# Patient Record
Sex: MALE | Race: WHITE | NOT HISPANIC OR LATINO | Employment: OTHER | URBAN - METROPOLITAN AREA
[De-identification: names, ages, dates, MRNs, and addresses within clinical notes are randomized per-mention and may not be internally consistent; named-entity substitution may affect disease eponyms.]

---

## 2022-10-01 ENCOUNTER — HOSPITAL ENCOUNTER (EMERGENCY)
Facility: MEDICAL CENTER | Age: 77
End: 2022-10-02
Attending: EMERGENCY MEDICINE
Payer: MEDICARE

## 2022-10-01 ENCOUNTER — APPOINTMENT (OUTPATIENT)
Dept: RADIOLOGY | Facility: MEDICAL CENTER | Age: 77
End: 2022-10-01
Attending: EMERGENCY MEDICINE
Payer: MEDICARE
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
## 2022-10-01 LAB — EKG IMPRESSION: NORMAL

## 2022-10-01 PROCEDURE — 70450 CT HEAD/BRAIN W/O DYE: CPT

## 2022-10-01 PROCEDURE — 93005 ELECTROCARDIOGRAM TRACING: CPT | Performed by: EMERGENCY MEDICINE

## 2022-10-01 PROCEDURE — 99284 EMERGENCY DEPT VISIT MOD MDM: CPT

## 2022-10-02 VITALS
TEMPERATURE: 98.3 F | DIASTOLIC BLOOD PRESSURE: 78 MMHG | WEIGHT: 180 LBS | SYSTOLIC BLOOD PRESSURE: 123 MMHG | HEIGHT: 69 IN | RESPIRATION RATE: 16 BRPM | HEART RATE: 65 BPM | OXYGEN SATURATION: 97 % | BODY MASS INDEX: 26.66 KG/M2

## 2022-10-02 NOTE — ED PROVIDER NOTES
Scribed for Damon Ward M.D. by Holley Hunter. 10/1/2022,  6:58 PM.    Means of Arrival: EMS  History obtained from: EMS and patient    CHIEF COMPLAINT  Chief Complaint   Patient presents with    Head Injury     MGLF. Pt at assisted facility. Pt was found on the ground and takes xarelto. Pt states he hit his head on the wall. Pt has history of dementia and is oriented 2-4 at baseline. Pt arrives alert and oriented x 4, GCS 15.        HPI  Wale Olivas is a 77 y.o. male who presents via EMS for evaluation of a head injury after a fall onset prior to arrival. According to EMS, the patient had slipped and feel, hitting his head on a door. Staff found him on the floor, after an unknown amount of time, but the believed it was a few minutes after he fell. Staff informed EMS that he was mentating to his normal baseline when they found him. He denies any loss of consciousness, chest pain, shortness of breath, syncope, or pain.     REVIEW OF SYSTEMS  HEENT: head injury  CV: No syncope  Resp: No shortness of breath   CardiacL No chest pain  See HPI for further details.   All other systems are negative.     PAST MEDICAL HISTORY  Past Medical History:   Diagnosis Date    Dementia (HCC)        FAMILY HISTORY  History reviewed. No pertinent family history.    SOCIAL HISTORY   reports that he has never smoked. He has never used smokeless tobacco. He reports that he does not currently use alcohol. He reports that he does not currently use drugs.    SURGICAL HISTORY  History reviewed. No pertinent surgical history.    CURRENT MEDICATIONS  Home Medications       Reviewed by Margot Chiang R.N. (Registered Nurse) on 10/01/22 at 1913  Med List Status: Not Addressed     Medication Last Dose Status        Patient Savage Taking any Medications                           ALLERGIES  No Known Allergies    PHYSICAL EXAM  VITAL SIGNS: BP (!) 169/72   Pulse 69   Temp 36.2 °C (97.1 °F) (Temporal)   Resp 20   Ht 1.753 m (5'  "9\")   Wt 81.6 kg (180 lb)   SpO2 97%   BMI 26.58 kg/m²    Gen: Alert, oriented  HENT: No racoon eyes septal hematoma, facial instability  Eye: EOMI, no chemosis, PERRL  Neck: trachea midline, no tenderness  Resp: no respiratory distress, no chest wall tenderness or crepitus  CV: No JVD, RRR.  + peripheral pulses. heart regular no murmurs  Abd: soft, non-distended, non-tender. No ecchymosis  Back: no spinal tenderness or deformities  Ext: No deformities, tenderness. no pedal edema  Psych: normal mood  Neuro: speech fluent, moves all extremities. GCS 15    RADIOLOGY/PROCEDURES  CT-HEAD W/O   Final Result      1.  Exam limited by motion artifact.   2.  No acute intracranial hemorrhage or acute displaced calvarial fracture.   3.  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   4.  There is diffuse atrophy and nonspecific small vessel ischemic change in the white matter.   5.  There is underlying chronic sinus disease.           EKG  Results for orders placed or performed during the hospital encounter of 10/01/22   EKG (NOW)   Result Value Ref Range    Report       [facility] Emergency Dept.    Test Date:  2022-10-01  Pt Name:    FRENCH TUBBS                Department: ER  MRN:        [id number]                      Room:       BL 15  Gender:     Male                         Technician: [id number]  :        1945                   Requested By:ER TRIAGE PROTOCOL  Order #:    [id number]                    Reading MD: Damon Ward    Measurements  Intervals                                Axis  Rate:       69                           P:          -79  WA:         70                           QRS:        -31  QRSD:       97                           T:          32  QT:         399  QTc:        428    Interpretive Statements  Sinus or ectopic atrial rhythm  Atrial premature complexes  Short WA interval  Inferior infarct, old  No previous ECG available for comparison  Electronically Signed " On 10-1-2022 19:20:03 PDT by Damon Ward          COURSE & MEDICAL DECISION MAKING  Pertinent Labs & Imaging studies reviewed. (See chart for details)    7:41 PM - Patient was reevaluated at bedside. Discussed radiology results with the patient and informed him that has long as he can walk to his baseline and without issues, I feel comfortable with him being discharged home.      8:13 PM - The patient was able to ambulate according to his baseline with his walker. I discussed plan for discharge and follow up as outlined below. The patient is stable for discharge at this time and will return for any new or worsening symptoms. Patient verbalizes understanding and support with my plan for discharge.      Appropriate PPE were worn at this encounter.     The patient will return for new or worsening symptoms and is stable at the time of discharge.    The patient is referred to a primary physician for blood pressure management, diabetic screening, and for all other preventative health concerns.    Patient presents with what appears to be a nonsyncopal fall.  He does have head strike but is not on Xarelto.  No evidence of other traumatic injury to the spine, torso, extremities.  He is at his baseline according to the staff.  CT scan of the head is reassuring.  Patient's EKG demonstrates no signs of ischemia, low suspicion for DVT/PE, ACS, cardiac syncope.  Patient is able to ambulate independently at his baseline with his walker.  We will plan for discharge home.    DISPOSITION:  Patient will be discharged home in stable condition.    FOLLOW UP:  [facility], Emergency Dept  [address] [id number] [phone]    If symptoms worsen    Your regular doctor    Schedule an appointment as soon as possible for a visit       FINAL IMPRESSION  1. Fall, initial encounter           This dictation was created using voice recognition software. The accuracy of the dictation is limited to the  abilities of the software. I expect there may be some errors of grammar and possibly content. The nursing notes were reviewed and certain aspects of this information were incorporated into this note.     I, Holley Hunter (Elisabeth), am scribing for, and in the presence of, Damon Ward M.D..    Electronically signed by: Holley Hunter (Elisabeth), 10/1/2022    IDamon M.D. personally performed the services described in this documentation, as scribed by Holley Hunter in my presence, and it is both accurate and complete.

## 2022-10-02 NOTE — ED NOTES
The patient was able to ambulate SBA with a walker down the hallway, no issues, he was steady on his feet.

## 2022-10-02 NOTE — ED NOTES
The patient is sitting at the nursing station, safely, and still waiting for his ride back to his facility.

## 2022-10-02 NOTE — DISCHARGE PLANNING
MARIAH asked to assist with transportation of Pt back to [facility].  MARIAH completed [facility] PCS form and faxed all required documentation to [facility].  MARIAH called [facility] and was told the earliest they could accommodate this Pt's transport is 10/2/22 at 0600. MARIAH placed a call to [facility] and spoke with Marvin.  This SW updated her on transport time.  Transfer packet and COBRA completed and placed in Pt medical chart.

## 2022-10-02 NOTE — ED TRIAGE NOTES
"Chief Complaint   Patient presents with    Head Injury     MGLF. Pt at assisted facility. Pt was found on the ground and takes xarelto. Pt states he hit his head on the wall. Pt has history of dementia and is oriented 2-4 at baseline. Pt arrives alert and oriented x 4, GCS 15.      BP (!) 169/72   Pulse 69   Temp 36.2 °C (97.1 °F) (Temporal)   Resp 20   Ht 1.753 m (5' 9\")   Wt 81.6 kg (180 lb)   SpO2 97%   BMI 26.58 kg/m²     "

## 2022-10-02 NOTE — DISCHARGE INSTRUCTIONS
You were seen in the emergency department after suffering a fall.  Thankfully your trauma evaluation and CAT scan were reassuring.  Your EKG demonstrates no high risk features.  At this point, it is safe to return home.    Please follow with your regular doctor.    Please return to the emergency department or seek medical attention if you develop:  Severe progressive headache, confusion, chest pain, shortness of breath, lightheadedness, abdominal pain, any other new or concerning findings

## 2022-10-02 NOTE — ED NOTES
The patient is resting comfortably in the bed, the physician was just at the bedside speaking to him, patient has no concerns at this time.  Will attempt to walk with the patient with a walker when additional help is available.

## 2022-10-02 NOTE — ED NOTES
I spoke with social work and they talked to PASTOR about a ride home for the patient. They stated to me that they will pick her up when they can but for now ETA is at 6am. The patient was made aware, I also gave the patient food, and he is resting in the bed watching TV.

## 2022-10-02 NOTE — ED NOTES
Daughter at the bedside with the patient, updated her on the treatment plan and waiting for a ride home, no further needs at this time.

## 2023-01-29 ENCOUNTER — APPOINTMENT (OUTPATIENT)
Dept: RADIOLOGY | Facility: MEDICAL CENTER | Age: 78
DRG: 872 | End: 2023-01-29
Attending: EMERGENCY MEDICINE
Payer: COMMERCIAL

## 2023-01-29 ENCOUNTER — HOSPITAL ENCOUNTER (INPATIENT)
Facility: MEDICAL CENTER | Age: 78
LOS: 4 days | DRG: 872 | End: 2023-02-02
Attending: EMERGENCY MEDICINE | Admitting: INTERNAL MEDICINE
Payer: COMMERCIAL

## 2023-01-29 ENCOUNTER — APPOINTMENT (OUTPATIENT)
Dept: RADIOLOGY | Facility: MEDICAL CENTER | Age: 78
DRG: 872 | End: 2023-01-29
Attending: INTERNAL MEDICINE
Payer: COMMERCIAL

## 2023-01-29 DIAGNOSIS — Z79.01 CHRONIC ANTICOAGULATION: ICD-10-CM

## 2023-01-29 DIAGNOSIS — E11.65 TYPE 2 DIABETES MELLITUS WITH HYPERGLYCEMIA, WITHOUT LONG-TERM CURRENT USE OF INSULIN (HCC): ICD-10-CM

## 2023-01-29 DIAGNOSIS — I49.8 ATRIAL ARRHYTHMIA: ICD-10-CM

## 2023-01-29 DIAGNOSIS — A41.9 SEPSIS SECONDARY TO UTI (HCC): ICD-10-CM

## 2023-01-29 DIAGNOSIS — N17.9 AKI (ACUTE KIDNEY INJURY) (HCC): ICD-10-CM

## 2023-01-29 DIAGNOSIS — J44.9 CHRONIC OBSTRUCTIVE PULMONARY DISEASE, UNSPECIFIED COPD TYPE (HCC): ICD-10-CM

## 2023-01-29 DIAGNOSIS — N39.0 SEPSIS SECONDARY TO UTI (HCC): ICD-10-CM

## 2023-01-29 DIAGNOSIS — A41.9 SEPSIS, DUE TO UNSPECIFIED ORGANISM, UNSPECIFIED WHETHER ACUTE ORGAN DYSFUNCTION PRESENT (HCC): ICD-10-CM

## 2023-01-29 DIAGNOSIS — N39.0 URINARY TRACT INFECTION WITHOUT HEMATURIA, SITE UNSPECIFIED: ICD-10-CM

## 2023-01-29 PROBLEM — I10 PRIMARY HYPERTENSION: Status: ACTIVE | Noted: 2023-01-29

## 2023-01-29 PROBLEM — G89.4 CHRONIC PAIN SYNDROME: Status: ACTIVE | Noted: 2023-01-29

## 2023-01-29 PROBLEM — N18.9 ACUTE KIDNEY INJURY SUPERIMPOSED ON CHRONIC KIDNEY DISEASE (HCC): Status: ACTIVE | Noted: 2023-01-29

## 2023-01-29 PROBLEM — E78.5 DYSLIPIDEMIA: Status: ACTIVE | Noted: 2023-01-29

## 2023-01-29 PROBLEM — F03.90 DEMENTIA (HCC): Status: ACTIVE | Noted: 2023-01-29

## 2023-01-29 LAB
ALBUMIN SERPL BCP-MCNC: 2.7 G/DL (ref 3.2–4.9)
ALBUMIN/GLOB SERPL: 0.7 G/DL
ALP SERPL-CCNC: 77 U/L (ref 30–99)
ALT SERPL-CCNC: 8 U/L (ref 2–50)
ANION GAP SERPL CALC-SCNC: 16 MMOL/L (ref 7–16)
APPEARANCE UR: ABNORMAL
AST SERPL-CCNC: 15 U/L (ref 12–45)
BACTERIA #/AREA URNS HPF: ABNORMAL /HPF
BASOPHILS # BLD AUTO: 0.9 % (ref 0–1.8)
BASOPHILS # BLD: 0.08 K/UL (ref 0–0.12)
BILIRUB SERPL-MCNC: 0.7 MG/DL (ref 0.1–1.5)
BILIRUB UR QL STRIP.AUTO: NEGATIVE
BUN SERPL-MCNC: 36 MG/DL (ref 8–22)
CALCIUM ALBUM COR SERPL-MCNC: 9.9 MG/DL (ref 8.5–10.5)
CALCIUM SERPL-MCNC: 8.9 MG/DL (ref 8.5–10.5)
CHLORIDE SERPL-SCNC: 96 MMOL/L (ref 96–112)
CO2 SERPL-SCNC: 19 MMOL/L (ref 20–33)
COLOR UR: YELLOW
CREAT SERPL-MCNC: 2.12 MG/DL (ref 0.5–1.4)
EKG IMPRESSION: NORMAL
EOSINOPHIL # BLD AUTO: 0 K/UL (ref 0–0.51)
EOSINOPHIL NFR BLD: 0 % (ref 0–6.9)
EPI CELLS #/AREA URNS HPF: ABNORMAL /HPF
ERYTHROCYTE [DISTWIDTH] IN BLOOD BY AUTOMATED COUNT: 44.8 FL (ref 35.9–50)
EST. AVERAGE GLUCOSE BLD GHB EST-MCNC: 146 MG/DL
GFR SERPLBLD CREATININE-BSD FMLA CKD-EPI: 32 ML/MIN/1.73 M 2
GLOBULIN SER CALC-MCNC: 4 G/DL (ref 1.9–3.5)
GLUCOSE BLD STRIP.AUTO-MCNC: 236 MG/DL (ref 65–99)
GLUCOSE BLD STRIP.AUTO-MCNC: 273 MG/DL (ref 65–99)
GLUCOSE SERPL-MCNC: 243 MG/DL (ref 65–99)
GLUCOSE UR STRIP.AUTO-MCNC: 500 MG/DL
HBA1C MFR BLD: 6.7 % (ref 4–5.6)
HCT VFR BLD AUTO: 34.1 % (ref 42–52)
HGB BLD-MCNC: 11.7 G/DL (ref 14–18)
INR PPP: 1.47 (ref 0.87–1.13)
KETONES UR STRIP.AUTO-MCNC: ABNORMAL MG/DL
LACTATE SERPL-SCNC: 4.8 MMOL/L (ref 0.5–2)
LACTATE SERPL-SCNC: 5.4 MMOL/L (ref 0.5–2)
LACTATE SERPL-SCNC: 6.6 MMOL/L (ref 0.5–2)
LEUKOCYTE ESTERASE UR QL STRIP.AUTO: ABNORMAL
LYMPHOCYTES # BLD AUTO: 0.3 K/UL (ref 1–4.8)
LYMPHOCYTES NFR BLD: 3.5 % (ref 22–41)
MANUAL DIFF BLD: ABNORMAL
MCH RBC QN AUTO: 32.1 PG (ref 27–33)
MCHC RBC AUTO-ENTMCNC: 34.3 G/DL (ref 33.7–35.3)
MCV RBC AUTO: 93.7 FL (ref 81.4–97.8)
METAMYELOCYTES NFR BLD MANUAL: 2.6 %
MICRO URNS: ABNORMAL
MONOCYTES # BLD AUTO: 0.6 K/UL (ref 0–0.85)
MONOCYTES NFR BLD AUTO: 7 % (ref 0–13.4)
MORPHOLOGY BLD-IMP: NORMAL
NEUTROPHILS # BLD AUTO: 7.31 K/UL (ref 1.82–7.42)
NEUTROPHILS NFR BLD: 73 % (ref 44–72)
NEUTS BAND NFR BLD MANUAL: 13 % (ref 0–10)
NITRITE UR QL STRIP.AUTO: NEGATIVE
NRBC # BLD AUTO: 0 K/UL
NRBC BLD-RTO: 0 /100 WBC
PH UR STRIP.AUTO: 5 [PH] (ref 5–8)
PLATELET # BLD AUTO: 87 K/UL (ref 164–446)
PLATELET BLD QL SMEAR: NORMAL
PMV BLD AUTO: 8.9 FL (ref 9–12.9)
POTASSIUM SERPL-SCNC: 3.5 MMOL/L (ref 3.6–5.5)
PROT SERPL-MCNC: 6.7 G/DL (ref 6–8.2)
PROT UR QL STRIP: 100 MG/DL
PROTHROMBIN TIME: 17.6 SEC (ref 12–14.6)
RBC # BLD AUTO: 3.64 M/UL (ref 4.7–6.1)
RBC # URNS HPF: ABNORMAL /HPF
RBC BLD AUTO: NORMAL
RBC UR QL AUTO: ABNORMAL
RENAL EPI CELLS #/AREA URNS HPF: NEGATIVE /HPF
SODIUM SERPL-SCNC: 131 MMOL/L (ref 135–145)
SP GR UR STRIP.AUTO: 1.02
TROPONIN T SERPL-MCNC: 51 NG/L (ref 6–19)
UROBILINOGEN UR STRIP.AUTO-MCNC: 1 MG/DL
WBC # BLD AUTO: 8.5 K/UL (ref 4.8–10.8)
WBC #/AREA URNS HPF: ABNORMAL /HPF

## 2023-01-29 PROCEDURE — 83605 ASSAY OF LACTIC ACID: CPT | Mod: 91

## 2023-01-29 PROCEDURE — 85610 PROTHROMBIN TIME: CPT

## 2023-01-29 PROCEDURE — 770000 HCHG ROOM/CARE - INTERMEDIATE ICU *

## 2023-01-29 PROCEDURE — 87186 SC STD MICRODIL/AGAR DIL: CPT | Mod: 91

## 2023-01-29 PROCEDURE — 700105 HCHG RX REV CODE 258: Performed by: EMERGENCY MEDICINE

## 2023-01-29 PROCEDURE — 96361 HYDRATE IV INFUSION ADD-ON: CPT

## 2023-01-29 PROCEDURE — 700105 HCHG RX REV CODE 258: Performed by: INTERNAL MEDICINE

## 2023-01-29 PROCEDURE — 70450 CT HEAD/BRAIN W/O DYE: CPT

## 2023-01-29 PROCEDURE — 85025 COMPLETE CBC W/AUTO DIFF WBC: CPT

## 2023-01-29 PROCEDURE — 84484 ASSAY OF TROPONIN QUANT: CPT

## 2023-01-29 PROCEDURE — 80053 COMPREHEN METABOLIC PANEL: CPT

## 2023-01-29 PROCEDURE — 99223 1ST HOSP IP/OBS HIGH 75: CPT | Mod: AI | Performed by: INTERNAL MEDICINE

## 2023-01-29 PROCEDURE — 87040 BLOOD CULTURE FOR BACTERIA: CPT | Mod: 91

## 2023-01-29 PROCEDURE — 81001 URINALYSIS AUTO W/SCOPE: CPT

## 2023-01-29 PROCEDURE — 87086 URINE CULTURE/COLONY COUNT: CPT

## 2023-01-29 PROCEDURE — 700111 HCHG RX REV CODE 636 W/ 250 OVERRIDE (IP): Performed by: EMERGENCY MEDICINE

## 2023-01-29 PROCEDURE — 74176 CT ABD & PELVIS W/O CONTRAST: CPT

## 2023-01-29 PROCEDURE — 71045 X-RAY EXAM CHEST 1 VIEW: CPT

## 2023-01-29 PROCEDURE — 700101 HCHG RX REV CODE 250: Performed by: INTERNAL MEDICINE

## 2023-01-29 PROCEDURE — 36415 COLL VENOUS BLD VENIPUNCTURE: CPT

## 2023-01-29 PROCEDURE — 82962 GLUCOSE BLOOD TEST: CPT

## 2023-01-29 PROCEDURE — 83036 HEMOGLOBIN GLYCOSYLATED A1C: CPT

## 2023-01-29 PROCEDURE — 99285 EMERGENCY DEPT VISIT HI MDM: CPT

## 2023-01-29 PROCEDURE — 85007 BL SMEAR W/DIFF WBC COUNT: CPT

## 2023-01-29 PROCEDURE — 700102 HCHG RX REV CODE 250 W/ 637 OVERRIDE(OP): Performed by: INTERNAL MEDICINE

## 2023-01-29 PROCEDURE — 96374 THER/PROPH/DIAG INJ IV PUSH: CPT

## 2023-01-29 PROCEDURE — A9270 NON-COVERED ITEM OR SERVICE: HCPCS | Performed by: INTERNAL MEDICINE

## 2023-01-29 PROCEDURE — 87077 CULTURE AEROBIC IDENTIFY: CPT | Mod: 91

## 2023-01-29 PROCEDURE — 93005 ELECTROCARDIOGRAM TRACING: CPT | Performed by: EMERGENCY MEDICINE

## 2023-01-29 RX ORDER — ONDANSETRON 2 MG/ML
4 INJECTION INTRAMUSCULAR; INTRAVENOUS EVERY 4 HOURS PRN
Status: DISCONTINUED | OUTPATIENT
Start: 2023-01-29 | End: 2023-02-02 | Stop reason: HOSPADM

## 2023-01-29 RX ORDER — BACLOFEN 10 MG/1
10 TABLET ORAL EVERY EVENING
Status: ON HOLD | COMMUNITY
End: 2023-02-02

## 2023-01-29 RX ORDER — SODIUM CHLORIDE, SODIUM LACTATE, POTASSIUM CHLORIDE, CALCIUM CHLORIDE 600; 310; 30; 20 MG/100ML; MG/100ML; MG/100ML; MG/100ML
1000 INJECTION, SOLUTION INTRAVENOUS ONCE
Status: COMPLETED | OUTPATIENT
Start: 2023-01-29 | End: 2023-01-29

## 2023-01-29 RX ORDER — FINASTERIDE 5 MG/1
5 TABLET, FILM COATED ORAL DAILY
COMMUNITY

## 2023-01-29 RX ORDER — HYDROCODONE BITARTRATE AND ACETAMINOPHEN 5; 325 MG/1; MG/1
1 TABLET ORAL 3 TIMES DAILY
COMMUNITY
End: 2023-09-20

## 2023-01-29 RX ORDER — CALCIUM CARBONATE 500 MG/1
500 TABLET, CHEWABLE ORAL 2 TIMES DAILY
Status: DISCONTINUED | OUTPATIENT
Start: 2023-01-29 | End: 2023-02-02 | Stop reason: HOSPADM

## 2023-01-29 RX ORDER — SENNOSIDES A AND B 8.6 MG/1
8.6 TABLET, FILM COATED ORAL 2 TIMES DAILY
COMMUNITY
End: 2023-09-20

## 2023-01-29 RX ORDER — IPRATROPIUM BROMIDE AND ALBUTEROL SULFATE 2.5; .5 MG/3ML; MG/3ML
3 SOLUTION RESPIRATORY (INHALATION)
Status: DISCONTINUED | OUTPATIENT
Start: 2023-01-29 | End: 2023-01-29

## 2023-01-29 RX ORDER — SODIUM CHLORIDE, SODIUM LACTATE, POTASSIUM CHLORIDE, CALCIUM CHLORIDE 600; 310; 30; 20 MG/100ML; MG/100ML; MG/100ML; MG/100ML
INJECTION, SOLUTION INTRAVENOUS CONTINUOUS
Status: DISCONTINUED | OUTPATIENT
Start: 2023-01-29 | End: 2023-02-02 | Stop reason: HOSPADM

## 2023-01-29 RX ORDER — ROFLUMILAST 500 UG/1
500 TABLET ORAL DAILY
Status: DISCONTINUED | OUTPATIENT
Start: 2023-01-30 | End: 2023-01-29

## 2023-01-29 RX ORDER — TAMSULOSIN HYDROCHLORIDE 0.4 MG/1
0.4 CAPSULE ORAL DAILY
COMMUNITY

## 2023-01-29 RX ORDER — LIDOCAINE 4 G/G
3 PATCH TOPICAL
COMMUNITY
End: 2023-09-20

## 2023-01-29 RX ORDER — TAMSULOSIN HYDROCHLORIDE 0.4 MG/1
0.4 CAPSULE ORAL DAILY
Status: DISCONTINUED | OUTPATIENT
Start: 2023-01-30 | End: 2023-02-02 | Stop reason: HOSPADM

## 2023-01-29 RX ORDER — DIVALPROEX SODIUM 500 MG/1
1500 TABLET, DELAYED RELEASE ORAL
Status: DISCONTINUED | OUTPATIENT
Start: 2023-01-29 | End: 2023-02-02 | Stop reason: HOSPADM

## 2023-01-29 RX ORDER — POTASSIUM CHLORIDE 20 MEQ/1
20 TABLET, EXTENDED RELEASE ORAL DAILY
COMMUNITY
End: 2023-09-20

## 2023-01-29 RX ORDER — CHOLECALCIFEROL (VITAMIN D3) 125 MCG
500 CAPSULE ORAL
COMMUNITY
End: 2023-09-20

## 2023-01-29 RX ORDER — CHOLECALCIFEROL (VITAMIN D3) 125 MCG
500 CAPSULE ORAL
Status: DISCONTINUED | OUTPATIENT
Start: 2023-01-30 | End: 2023-02-02 | Stop reason: HOSPADM

## 2023-01-29 RX ORDER — IPRATROPIUM BROMIDE AND ALBUTEROL SULFATE 2.5; .5 MG/3ML; MG/3ML
3 SOLUTION RESPIRATORY (INHALATION)
Status: DISCONTINUED | OUTPATIENT
Start: 2023-01-29 | End: 2023-02-02 | Stop reason: HOSPADM

## 2023-01-29 RX ORDER — CALCIUM CARBONATE 500 MG/1
500 TABLET, CHEWABLE ORAL 2 TIMES DAILY
COMMUNITY
End: 2023-09-20

## 2023-01-29 RX ORDER — ONDANSETRON 4 MG/1
4 TABLET, ORALLY DISINTEGRATING ORAL EVERY 4 HOURS PRN
Status: DISCONTINUED | OUTPATIENT
Start: 2023-01-29 | End: 2023-02-02 | Stop reason: HOSPADM

## 2023-01-29 RX ORDER — BISACODYL 10 MG
10 SUPPOSITORY, RECTAL RECTAL
Status: DISCONTINUED | OUTPATIENT
Start: 2023-01-29 | End: 2023-02-02 | Stop reason: HOSPADM

## 2023-01-29 RX ORDER — POLYETHYLENE GLYCOL 3350 17 G/17G
1 POWDER, FOR SOLUTION ORAL
Status: DISCONTINUED | OUTPATIENT
Start: 2023-01-29 | End: 2023-02-02 | Stop reason: HOSPADM

## 2023-01-29 RX ORDER — FLUTICASONE FUROATE AND VILANTEROL 100; 25 UG/1; UG/1
1 POWDER RESPIRATORY (INHALATION) EVERY MORNING
COMMUNITY

## 2023-01-29 RX ORDER — LIDOCAINE 50 MG/G
3 PATCH TOPICAL
Status: DISCONTINUED | OUTPATIENT
Start: 2023-01-29 | End: 2023-02-02 | Stop reason: HOSPADM

## 2023-01-29 RX ORDER — ROFLUMILAST 500 UG/1
500 TABLET ORAL DAILY
COMMUNITY
End: 2023-09-20

## 2023-01-29 RX ORDER — FINASTERIDE 5 MG/1
5 TABLET, FILM COATED ORAL DAILY
Status: DISCONTINUED | OUTPATIENT
Start: 2023-01-30 | End: 2023-02-02 | Stop reason: HOSPADM

## 2023-01-29 RX ORDER — CARBOXYMETHYLCELLULOSE SODIUM 5 MG/ML
1 SOLUTION/ DROPS OPHTHALMIC 4 TIMES DAILY
COMMUNITY
End: 2023-09-20

## 2023-01-29 RX ORDER — VITAMIN B COMPLEX
1000 TABLET ORAL DAILY
COMMUNITY
End: 2023-09-20

## 2023-01-29 RX ORDER — PANTOPRAZOLE SODIUM 40 MG/1
40 TABLET, DELAYED RELEASE ORAL DAILY
COMMUNITY

## 2023-01-29 RX ORDER — UMECLIDINIUM 62.5 UG/1
1 AEROSOL, POWDER ORAL DAILY
COMMUNITY

## 2023-01-29 RX ORDER — DIVALPROEX SODIUM 125 MG/1
250-500 TABLET, DELAYED RELEASE ORAL SEE ADMIN INSTRUCTIONS
Status: ON HOLD | COMMUNITY
End: 2024-01-11

## 2023-01-29 RX ORDER — HYDRALAZINE HYDROCHLORIDE 25 MG/1
25 TABLET, FILM COATED ORAL 2 TIMES DAILY
COMMUNITY
End: 2023-09-20

## 2023-01-29 RX ORDER — FUROSEMIDE 40 MG/1
40 TABLET ORAL DAILY
COMMUNITY
End: 2023-09-20

## 2023-01-29 RX ORDER — VITAMIN B COMPLEX
1000 TABLET ORAL DAILY
Status: DISCONTINUED | OUTPATIENT
Start: 2023-01-30 | End: 2023-02-02 | Stop reason: HOSPADM

## 2023-01-29 RX ORDER — ACETAMINOPHEN 325 MG/1
650 TABLET ORAL EVERY 6 HOURS PRN
COMMUNITY
End: 2023-09-20

## 2023-01-29 RX ORDER — CEFTRIAXONE 2 G/1
2000 INJECTION, POWDER, FOR SOLUTION INTRAMUSCULAR; INTRAVENOUS ONCE
Status: COMPLETED | OUTPATIENT
Start: 2023-01-29 | End: 2023-01-29

## 2023-01-29 RX ORDER — SODIUM CHLORIDE, SODIUM LACTATE, POTASSIUM CHLORIDE, AND CALCIUM CHLORIDE .6; .31; .03; .02 G/100ML; G/100ML; G/100ML; G/100ML
30 INJECTION, SOLUTION INTRAVENOUS
Status: COMPLETED | OUTPATIENT
Start: 2023-01-29 | End: 2023-01-30

## 2023-01-29 RX ORDER — AMOXICILLIN 250 MG
2 CAPSULE ORAL 2 TIMES DAILY
Status: DISCONTINUED | OUTPATIENT
Start: 2023-01-29 | End: 2023-02-02 | Stop reason: HOSPADM

## 2023-01-29 RX ORDER — TRIAMCINOLONE ACETONIDE 1 MG/G
1 CREAM TOPICAL 2 TIMES DAILY
COMMUNITY
Start: 2023-01-24 | End: 2023-09-20

## 2023-01-29 RX ORDER — PRAVASTATIN SODIUM 20 MG
80 TABLET ORAL NIGHTLY
Status: DISCONTINUED | OUTPATIENT
Start: 2023-01-29 | End: 2023-02-02 | Stop reason: HOSPADM

## 2023-01-29 RX ORDER — IPRATROPIUM BROMIDE AND ALBUTEROL SULFATE 2.5; .5 MG/3ML; MG/3ML
3 SOLUTION RESPIRATORY (INHALATION)
Status: DISCONTINUED | OUTPATIENT
Start: 2023-01-29 | End: 2023-01-30

## 2023-01-29 RX ORDER — PRAVASTATIN SODIUM 20 MG
80 TABLET ORAL
COMMUNITY

## 2023-01-29 RX ORDER — DULOXETIN HYDROCHLORIDE 60 MG/1
60 CAPSULE, DELAYED RELEASE ORAL DAILY
Status: DISCONTINUED | OUTPATIENT
Start: 2023-01-30 | End: 2023-02-02 | Stop reason: HOSPADM

## 2023-01-29 RX ORDER — CARBOXYMETHYLCELLULOSE SODIUM 5 MG/ML
1 SOLUTION/ DROPS OPHTHALMIC 4 TIMES DAILY
Status: DISCONTINUED | OUTPATIENT
Start: 2023-01-29 | End: 2023-02-02 | Stop reason: HOSPADM

## 2023-01-29 RX ORDER — BACLOFEN 10 MG/1
10 TABLET ORAL EVERY EVENING
Status: DISCONTINUED | OUTPATIENT
Start: 2023-01-29 | End: 2023-01-31

## 2023-01-29 RX ORDER — TRAZODONE HYDROCHLORIDE 100 MG/1
150 TABLET ORAL NIGHTLY
COMMUNITY
End: 2023-09-20

## 2023-01-29 RX ORDER — BUDESONIDE AND FORMOTEROL FUMARATE DIHYDRATE 160; 4.5 UG/1; UG/1
2 AEROSOL RESPIRATORY (INHALATION) 2 TIMES DAILY
Status: DISCONTINUED | OUTPATIENT
Start: 2023-01-30 | End: 2023-02-02 | Stop reason: HOSPADM

## 2023-01-29 RX ORDER — OMEPRAZOLE 20 MG/1
40 CAPSULE, DELAYED RELEASE ORAL DAILY
Status: DISCONTINUED | OUTPATIENT
Start: 2023-01-30 | End: 2023-02-02 | Stop reason: HOSPADM

## 2023-01-29 RX ORDER — ACETAMINOPHEN 325 MG/1
650 TABLET ORAL EVERY 6 HOURS PRN
Status: DISCONTINUED | OUTPATIENT
Start: 2023-01-29 | End: 2023-02-02 | Stop reason: HOSPADM

## 2023-01-29 RX ORDER — CEFDINIR 300 MG/1
300 CAPSULE ORAL 2 TIMES DAILY
Status: ON HOLD | COMMUNITY
Start: 2023-01-29 | End: 2023-02-02

## 2023-01-29 RX ORDER — POLYETHYLENE GLYCOL 3350 17 G/17G
17 POWDER, FOR SOLUTION ORAL DAILY
COMMUNITY

## 2023-01-29 RX ORDER — SODIUM CHLORIDE 9 MG/ML
100 INJECTION, SOLUTION INTRAVENOUS CONTINUOUS
Status: ON HOLD | COMMUNITY
Start: 2023-01-28 | End: 2023-02-02

## 2023-01-29 RX ORDER — HYDROCODONE BITARTRATE AND ACETAMINOPHEN 5; 325 MG/1; MG/1
1 TABLET ORAL 3 TIMES DAILY PRN
Status: DISCONTINUED | OUTPATIENT
Start: 2023-01-29 | End: 2023-02-02 | Stop reason: HOSPADM

## 2023-01-29 RX ORDER — PROPRANOLOL HYDROCHLORIDE 20 MG/1
40 TABLET ORAL 2 TIMES DAILY
Status: DISCONTINUED | OUTPATIENT
Start: 2023-01-29 | End: 2023-02-02 | Stop reason: HOSPADM

## 2023-01-29 RX ORDER — SODIUM CHLORIDE, SODIUM LACTATE, POTASSIUM CHLORIDE, AND CALCIUM CHLORIDE .6; .31; .03; .02 G/100ML; G/100ML; G/100ML; G/100ML
500 INJECTION, SOLUTION INTRAVENOUS
Status: DISCONTINUED | OUTPATIENT
Start: 2023-01-29 | End: 2023-02-02 | Stop reason: HOSPADM

## 2023-01-29 RX ORDER — DILTIAZEM HYDROCHLORIDE 5 MG/ML
0.25 INJECTION INTRAVENOUS ONCE
Status: DISCONTINUED | OUTPATIENT
Start: 2023-01-29 | End: 2023-01-29

## 2023-01-29 RX ORDER — LISINOPRIL 40 MG/1
40 TABLET ORAL DAILY
COMMUNITY
End: 2023-09-20

## 2023-01-29 RX ORDER — PROPRANOLOL HYDROCHLORIDE 20 MG/1
20 TABLET ORAL EVERY 12 HOURS
COMMUNITY
End: 2024-02-08

## 2023-01-29 RX ADMIN — CEFTRIAXONE SODIUM 2000 MG: 2 INJECTION, POWDER, FOR SOLUTION INTRAMUSCULAR; INTRAVENOUS at 15:31

## 2023-01-29 RX ADMIN — SODIUM CHLORIDE, POTASSIUM CHLORIDE, SODIUM LACTATE AND CALCIUM CHLORIDE 1000 ML: 600; 310; 30; 20 INJECTION, SOLUTION INTRAVENOUS at 14:08

## 2023-01-29 RX ADMIN — SENNOSIDES AND DOCUSATE SODIUM 2 TABLET: 50; 8.6 TABLET ORAL at 18:30

## 2023-01-29 RX ADMIN — INSULIN HUMAN 2 UNITS: 100 INJECTION, SOLUTION PARENTERAL at 21:27

## 2023-01-29 RX ADMIN — SODIUM CHLORIDE, POTASSIUM CHLORIDE, SODIUM LACTATE AND CALCIUM CHLORIDE: 600; 310; 30; 20 INJECTION, SOLUTION INTRAVENOUS at 21:13

## 2023-01-29 RX ADMIN — LIDOCAINE 3 PATCH: 50 PATCH TOPICAL at 21:17

## 2023-01-29 RX ADMIN — SODIUM CHLORIDE, POTASSIUM CHLORIDE, SODIUM LACTATE AND CALCIUM CHLORIDE 1000 ML: 600; 310; 30; 20 INJECTION, SOLUTION INTRAVENOUS at 17:19

## 2023-01-29 RX ADMIN — CALCIUM CARBONATE 500 MG: 500 TABLET, CHEWABLE ORAL at 21:07

## 2023-01-29 RX ADMIN — SODIUM CHLORIDE, POTASSIUM CHLORIDE, SODIUM LACTATE AND CALCIUM CHLORIDE 1000 ML: 600; 310; 30; 20 INJECTION, SOLUTION INTRAVENOUS at 15:18

## 2023-01-29 RX ADMIN — PRAVASTATIN SODIUM 80 MG: 20 TABLET ORAL at 21:07

## 2023-01-29 RX ADMIN — PROPRANOLOL HYDROCHLORIDE 40 MG: 20 TABLET ORAL at 21:06

## 2023-01-29 RX ADMIN — HYDROCODONE BITARTRATE AND ACETAMINOPHEN 1 TABLET: 5; 325 TABLET ORAL at 21:08

## 2023-01-29 RX ADMIN — SODIUM CHLORIDE, POTASSIUM CHLORIDE, SODIUM LACTATE AND CALCIUM CHLORIDE 448 ML: 600; 310; 30; 20 INJECTION, SOLUTION INTRAVENOUS at 16:12

## 2023-01-29 RX ADMIN — BACLOFEN 10 MG: 10 TABLET ORAL at 21:07

## 2023-01-29 ASSESSMENT — COGNITIVE AND FUNCTIONAL STATUS - GENERAL
DRESSING REGULAR LOWER BODY CLOTHING: A LOT
MOVING FROM LYING ON BACK TO SITTING ON SIDE OF FLAT BED: UNABLE
SUGGESTED CMS G CODE MODIFIER MOBILITY: CM
MOVING TO AND FROM BED TO CHAIR: UNABLE
DRESSING REGULAR UPPER BODY CLOTHING: A LOT
STANDING UP FROM CHAIR USING ARMS: TOTAL
TURNING FROM BACK TO SIDE WHILE IN FLAT BAD: A LOT
SUGGESTED CMS G CODE MODIFIER DAILY ACTIVITY: CK
TOILETING: A LOT
DAILY ACTIVITIY SCORE: 14
MOBILITY SCORE: 7
HELP NEEDED FOR BATHING: A LOT
CLIMB 3 TO 5 STEPS WITH RAILING: TOTAL
WALKING IN HOSPITAL ROOM: TOTAL
PERSONAL GROOMING: A LOT

## 2023-01-29 ASSESSMENT — CHA2DS2 SCORE
VASCULAR DISEASE: NO
PRIOR STROKE OR TIA OR THROMBOEMBOLISM: NO
SEX: MALE
AGE 75 OR GREATER: YES
CHF OR LEFT VENTRICULAR DYSFUNCTION: NO
CHA2DS2 VASC SCORE: 3
AGE 65 TO 74: NO
DIABETES: NO
HYPERTENSION: YES

## 2023-01-29 ASSESSMENT — LIFESTYLE VARIABLES
DOES PATIENT WANT TO STOP DRINKING: CANNOT ASSESS
ALCOHOL_USE: YES

## 2023-01-29 ASSESSMENT — FIBROSIS 4 INDEX
FIB4 SCORE: 4.57
FIB4 SCORE: 4.57

## 2023-01-29 ASSESSMENT — PATIENT HEALTH QUESTIONNAIRE - PHQ9
2. FEELING DOWN, DEPRESSED, IRRITABLE, OR HOPELESS: NOT AT ALL
1. LITTLE INTEREST OR PLEASURE IN DOING THINGS: NOT AT ALL
SUM OF ALL RESPONSES TO PHQ9 QUESTIONS 1 AND 2: 0

## 2023-01-29 ASSESSMENT — COPD QUESTIONNAIRES
HAVE YOU SMOKED AT LEAST 100 CIGARETTES IN YOUR ENTIRE LIFE: YES
DURING THE PAST 4 WEEKS HOW MUCH DID YOU FEEL SHORT OF BREATH: NONE/LITTLE OF THE TIME
DO YOU EVER COUGH UP ANY MUCUS OR PHLEGM?: NO/ONLY WITH OCCASIONAL COLDS OR INFECTIONS
COPD SCREENING SCORE: 4

## 2023-01-29 ASSESSMENT — PAIN DESCRIPTION - PAIN TYPE: TYPE: ACUTE PAIN

## 2023-01-29 NOTE — ED PROVIDER NOTES
ER Provider Note    Scribed for Manuel Alvarez M.d. by Bucky Tim. 1/29/2023  1:31 PM    Primary Care Provider: Pcp Pt States None    CHIEF COMPLAINT  Chief Complaint   Patient presents with    T-5000 FALL     Pt BIB EMS from a [facility] where he suffered an unwitnessed fall out of bed. Unknown LOC, no obvious head trauma noted. Pt recently diagnosed with a UTI and starting on Cefdinir as of this morning. Pt has hx of demential and arrives disoriented and unable to provide any history surrounding the event.      LIMITATION TO HISTORY   Select: Altered mental status / Confusion    HPI/ROS  OUTSIDE HISTORIAN(S):  EMS Brought in by    EXTERNAL RECORDS REVIEWED  Other records from the outpatient facility show a history of diabetes mellitus dementia    Wale Olivas is a 77 y.o. male who presents to the ED via EMS for possible traumatic brain injury. Per EMS, the patient had an unwitnessed ground level fall at the [facility]. He was found by the staff on the floor on his right side with his head laying on the floor. It is unknown if there was loss of consciousness. The nursing staff also reports that the patient appears in worse condition than last night. Patient is currently taking blood thinners. He is also currently being treated for a UTI with Cefdinir. He was started on IV Fluids prior to EMS arrival. No alleviating or exacerbating factors reported. Patient has a past medical history of Dementia.    Further history limited by patient's ALOC and confusion.    PAST MEDICAL HISTORY  Past Medical History:   Diagnosis Date    Anxiety     BPH (benign prostatic hyperplasia)     CKD (chronic kidney disease)     COPD (chronic obstructive pulmonary disease) ([facility])     COVID-19     Dementia ([facility])     GERD (gastroesophageal reflux disease)     Hyperlipidemia     Insomnia     Major depressive disorder     Type II diabetes mellitus (HCC)        SURGICAL HISTORY  History reviewed. No pertinent surgical  history.    FAMILY HISTORY  History reviewed. No pertinent family history.    SOCIAL HISTORY   reports that he has never smoked. He has never used smokeless tobacco. He reports that he does not currently use alcohol. He reports that he does not currently use drugs.    CURRENT MEDICATIONS  Previous Medications    ACETAMINOPHEN (TYLENOL) 325 MG TAB    Take 650 mg by mouth every 6 hours as needed for Mild Pain.    APIXABAN (ELIQUIS) 2.5MG TAB    Take 2.5 mg by mouth 2 times a day.    BACLOFEN (LIORESAL) 10 MG TAB    Take 10 mg by mouth every evening.    CALCIUM CARBONATE (TUMS) 500 MG CHEW TAB    Chew 500 mg 2 times a day.    CARBOXYMETHYLCELLULOSE (REFRESH TEARS) 0.5 % SOLUTION    Administer 1 Drop into both eyes 4 times a day.    CEFDINIR (OMNICEF) 300 MG CAP    Take 300 mg by mouth 2 times a day.    CYANOCOBALAMIN (VITAMIN B-12) 500 MCG TAB    Take 500 mcg by mouth every 48 hours.    DICLOFENAC SODIUM (VOLTAREN) 1 % GEL    Apply  topically 4 times a day. Apply to thumbs for chronic pain syndrome    DIVALPROEX (DEPAKOTE) 500 MG TABLET DELAYED RESPONSE    Take 1,500 mg by mouth at bedtime.    DULOXETINE (CYMBALTA) 60 MG CAP DR PARTICLES DELAYED-RELEASE CAPSULE    Take 60 mg by mouth every day.    FINASTERIDE (PROSCAR) 5 MG TAB    Take 5 mg by mouth every day.    FLUTICASONE FUROATE-VILANTEROL (BREO) 100-25 MCG/ACT AEROSOL POWDER, BREATH ACTIVATED    Inhale 1 Puff every day.    FUROSEMIDE (LASIX) 40 MG TAB    Take 40 mg by mouth every day.    HYDRALAZINE (APRESOLINE) 25 MG TAB    Take 25 mg by mouth 2 times a day.    HYDROCODONE-ACETAMINOPHEN (NORCO) 5-325 MG TAB PER TABLET    Take 1 Tablet by mouth 3 times a day. Scheduled    LIDOCAINE 4 % PATCH    Apply 3 Patches topically at bedtime. Apply to back for chronic pain syndrome    LISINOPRIL (PRINIVIL) 40 MG TABLET    Take 40 mg by mouth every day.    MENTHOL-METHYL SALICYLATE EX    Apply 1 Application topically 2 times a day. Apply to hands for chronic pain syndrome     "PANTOPRAZOLE (PROTONIX) 40 MG TABLET DELAYED RESPONSE    Take 40 mg by mouth every day.    POLYETHYLENE GLYCOL/LYTES (MIRALAX) 17 G PACK    Take 17 g by mouth every day.    POTASSIUM CHLORIDE SA (KDUR) 20 MEQ TAB CR    Take 20 mEq by mouth every day.    PRAVASTATIN (PRAVACHOL) 80 MG TABLET    Take 80 mg by mouth every evening.    PROBIOTIC PRODUCT (PROBIOTIC PO)    Take 1 Capsule by mouth 2 times a day.    PROPRANOLOL (INDERAL) 40 MG TAB    Take 40 mg by mouth 2 times a day.    ROFLUMILAST 500 MCG TAB    Take 500 mcg by mouth every day.    SENNOSIDES (SENOKOT) 8.6 MG TAB    Take 8.6 mg by mouth 2 times a day.    SODIUM CHLORIDE (NS) SOLUTION    Infuse 100 mL into a venous catheter continuous. Times 2 liters    TAMSULOSIN (FLOMAX) 0.4 MG CAPSULE    Take 0.4 mg by mouth every day.    TRAZODONE (DESYREL) 100 MG TAB    Take 150 mg by mouth every evening.    TRIAMCINOLONE ACETONIDE (KENALOG) 0.1 % CREAM    Apply 1 Application topically 2 times a day. Apply to abdomen ( rash)   For 14 days    UMECLIDINIUM BROMIDE (INCRUSE ELLIPTA) 62.5 MCG/ACT AEROSOL POWDER, BREATH ACTIVATED    Inhale 1 Puff every day.    VITAMIN D3 (CHOLECALCIFEROL) 1000 UNIT (25 MCG) TAB    Take 1,000 Units by mouth every day.       ALLERGIES  Patient has no known allergies.    PHYSICAL EXAM  /61   Pulse (!) 119   Temp 36 °C (96.8 °F) (Temporal)   Resp 20   Ht 1.753 m (5' 9\")   Wt 81.6 kg (180 lb)   SpO2 96%   BMI 26.58 kg/m²     Constitutional: Well-developed, elderly appearance, looking around. No overt signs of new trauma.  HENT: Normocephalic, Atraumatic, Bilateral external ears normal. Dry mucus membranes.  Eyes:  conjunctiva are normal.   Neck: Supple.  Nontender midline  Cardiovascular: Regular rate and rhythm without murmurs gallops or rubs. Diminished heart tone.  Thorax & Lungs: Breathing comfortably. Lungs are diminished bilaterally, there are no wheezes no rales. Chest wall is nontender.  Abdomen: Soft, nontender " nondistended.  Skin: Warm, Dry, Skin tears on left arm, multiple contusions  Back: No tenderness, No CVA tenderness.  Musculoskeletal: No clubbing cyanosis or edema good range of motion   Neurologic: Alert & oriented x 1 only to person, Cranial nerves II-XII grossly intact, moving all 4 extremities, 5/5 strength in all 4 extremities, cerebellar functions intact, no other focal abnormalities. Follows commands.  Psychiatric: Affect normal, Judgment normal, Mood normal.     DIAGNOSTIC STUDIES & PROCEDURES    Labs:   Results for orders placed or performed during the hospital encounter of 01/29/23   LACTIC ACID   Result Value Ref Range    Lactic Acid 5.4 (HH) 0.5 - 2.0 mmol/L   LACTIC ACID   Result Value Ref Range    Lactic Acid 4.8 (HH) 0.5 - 2.0 mmol/L   CBC WITH DIFFERENTIAL   Result Value Ref Range    WBC 8.5 4.8 - 10.8 K/uL    RBC 3.64 (L) 4.70 - 6.10 M/uL    Hemoglobin 11.7 (L) 14.0 - 18.0 g/dL    Hematocrit 34.1 (L) 42.0 - 52.0 %    MCV 93.7 81.4 - 97.8 fL    MCH 32.1 27.0 - 33.0 pg    MCHC 34.3 33.7 - 35.3 g/dL    RDW 44.8 35.9 - 50.0 fL    Platelet Count 87 (L) 164 - 446 K/uL    MPV 8.9 (L) 9.0 - 12.9 fL    Neutrophils-Polys 73.00 (H) 44.00 - 72.00 %    Lymphocytes 3.50 (L) 22.00 - 41.00 %    Monocytes 7.00 0.00 - 13.40 %    Eosinophils 0.00 0.00 - 6.90 %    Basophils 0.90 0.00 - 1.80 %    Nucleated RBC 0.00 /100 WBC    Neutrophils (Absolute) 7.31 1.82 - 7.42 K/uL    Lymphs (Absolute) 0.30 (L) 1.00 - 4.80 K/uL    Monos (Absolute) 0.60 0.00 - 0.85 K/uL    Eos (Absolute) 0.00 0.00 - 0.51 K/uL    Baso (Absolute) 0.08 0.00 - 0.12 K/uL    NRBC (Absolute) 0.00 K/uL   COMP METABOLIC PANEL   Result Value Ref Range    Sodium 131 (L) 135 - 145 mmol/L    Potassium 3.5 (L) 3.6 - 5.5 mmol/L    Chloride 96 96 - 112 mmol/L    Co2 19 (L) 20 - 33 mmol/L    Anion Gap 16.0 7.0 - 16.0    Glucose 243 (H) 65 - 99 mg/dL    Bun 36 (H) 8 - 22 mg/dL    Creatinine 2.12 (H) 0.50 - 1.40 mg/dL    Calcium 8.9 8.5 - 10.5 mg/dL    AST(SGOT) 15  12 - 45 U/L    ALT(SGPT) 8 2 - 50 U/L    Alkaline Phosphatase 77 30 - 99 U/L    Total Bilirubin 0.7 0.1 - 1.5 mg/dL    Albumin 2.7 (L) 3.2 - 4.9 g/dL    Total Protein 6.7 6.0 - 8.2 g/dL    Globulin 4.0 (H) 1.9 - 3.5 g/dL    A-G Ratio 0.7 g/dL   URINALYSIS    Specimen: Urine   Result Value Ref Range    Color Yellow     Character Turbid (A)     Specific Gravity 1.017 <1.035    Ph 5.0 5.0 - 8.0    Glucose 500 (A) Negative mg/dL    Ketones Trace (A) Negative mg/dL    Protein 100 (A) Negative mg/dL    Bilirubin Negative Negative    Urobilinogen, Urine 1.0 Negative    Nitrite Negative Negative    Leukocyte Esterase Large (A) Negative    Occult Blood Large (A) Negative    Micro Urine Req Microscopic    TROPONIN   Result Value Ref Range    Troponin T 51 (H) 6 - 19 ng/L   DIFFERENTIAL MANUAL   Result Value Ref Range    Bands-Stabs 13.00 (H) 0.00 - 10.00 %    Metamyelocytes 2.60 %    Manual Diff Status PERFORMED    PERIPHERAL SMEAR REVIEW   Result Value Ref Range    Peripheral Smear Review see below    PLATELET ESTIMATE   Result Value Ref Range    Plt Estimation Decreased    MORPHOLOGY   Result Value Ref Range    RBC Morphology Normal    CORRECTED CALCIUM   Result Value Ref Range    Correct Calcium 9.9 8.5 - 10.5 mg/dL   ESTIMATED GFR   Result Value Ref Range    GFR (CKD-EPI) 32 (A) >60 mL/min/1.73 m 2   URINE MICROSCOPIC (W/UA)   Result Value Ref Range    WBC Packed (A) /hpf    RBC 5-10 (A) /hpf    Bacteria Moderate (A) None /hpf    Epithelial Cells Few /hpf    Epithelial Cells Renal Negative /hpf   EKG   Result Value Ref Range    Report       [facility] Emergency Dept.    Test Date:  2023  Pt Name:    FRENCH TUBBS                Department: ER  MRN:        [id number]                      Room:       RD 04  Gender:     Male                         Technician:  :        [id number]                   Requested By:LISETTE CABRERA  Order #:    [id number]                    Ziggy MD: LISETTE CABRERA,  MD    Measurements  Intervals                                Axis  Rate:       110                          P:          -89  CO:         139                          QRS:        -26  QRSD:       79                           T:          31  QT:         337  QTc:        456    Interpretive Statements  Sinus tachycardia with irregular rate  Low voltage, extremity leads  Anteroseptal infarct, old  Compared to ECG 10/01/2022 19:15:05  Low QRS voltage now present  Ectopic atrial rhythm no longer present  Atrial premature complex(es) no longer present  Short CO interval no longer present  Myocardial infarct find ing still present  Electronically Signed On 2023 16:52:47 PST by LISETTE CABRERA MD     POCT glucose device results   Result Value Ref Range    POC Glucose, Blood 273 (H) 65 - 99 mg/dL     All labs reviewed by me.    EK Lead EKG interpreted by me as shown above.    Radiology:   The attending Emergency Physician has independently interpreted the diagnostic imaging associated with this visit and is awaiting the final reading from the radiologist, which will be displayed below.    DX-CHEST-PORTABLE (1 VIEW)   Final Result      No acute cardiac or pulmonary abnormalities are identified.      CT-HEAD W/O   Final Result      1.  Cerebral atrophy.      2.  White matter lucencies most consistent with small vessel ischemic change versus demyelination or gliosis.      3. Right cerebral hemisphere encephalomalacia again noted. No evidence of acute cerebral infarction, hemorrhage or mass lesion.         CT-RENAL COLIC EVALUATION(A/P W/O)    (Results Pending)        COURSE & MEDICAL DECISION MAKING    1:31 PM - Patient seen and evaluated at bedside.  Ordered DX-Chest, CT-Head without, Differential Manual, Peripheral Smear Review, Platelet estimate, Morphology, Lactic acid x2, CBC w/ diff, CMP, UA, Urine culture, Blood culture x2, and Troponin to evaluate. He understands and agrees to the plan of care. Differential  diagnoses include but are not limited to: intracerebral injury from fall, sepsis, assumed UTI, metabolic derangement.    ED Observation Status? Yes; I am placing the patient in to an observation status due to a diagnostic uncertainty as well as therapeutic intensity. Patient placed in observation status at 1:41 PM, 1/29/2023.     Observation plan is as follows: IV fluids evaluation for his weakness possible sepsis CT of the head    Upon Reevaluation, the patient's condition has: not improved; and will be escalated to hospitalization.    Patient discharged from ED Observation status at 5:23 PM  (Time) 1/29/2023 (Date).     2:02 PM - Treated patient with IV Fluids for her symptoms.     HYDRATION: Based on the patient's presentation of dehydration,  the patient was given IV fluids. IV Hydration was used because oral hydration is unable to be done due to the patient's symptoms. Upon recheck following hydration, the patient was improving.    3:03 PM - Patient's lactic acid is 5.4. I will continue IV Fluid treatment for the patient. Treated patient with Rocephin 2,000 mg injection due to his recent diagnosis of UTI.    3:54 PM - Treated patient with Cardizem 20.4 mg injection for his A-fib. Ordered EKG for evaluation.        INITIAL ASSESSMENT AND PLAN  Care Narrative: Patient presents emerged part for evaluation.  The patient is altered but apparently is at baseline mental status per the paramedics that brought the patient from the care facility.  Patient was just recently diagnosed apparently with urinary tract infection but is been getting weaker and was found down today.  Upon initial arrival he was apparently noted to be hypotensive per the care facility he had already gotten a liter and a half of normal saline at the facility.  Upon arrival here I did start the patient on lactated ringer bolus 1 L he was tachycardic.  Initial laboratory studies as well as urinalysis show a significant lactic acidosis at 5.4 I did  initiate a 30 cc/kg bolus of fluids as well as empiric treatment for what appears to be a urinary tract infection with Rocephin.  His urine did come back with packed white blood cells we will send this for culture and sensitivity.  Patient did get a CT scan because of the fall to the head there is no signs of significant or cerebral injuries.  At this point the patient is still showing signs of sepsis he is still tachycardic but not hypotensive his lactic acid is responding to the fluids he is responded from 5.4 down to 4.6 but at this point because of the the continued lactic acidosis the patient will require higher level of care than the emergency department so I did speak with Dr. Jeter of the ICU and he agrees the patient does meet criteria for the IMC.  I have spoke to Dr. Penn for admission.      CRITICAL CARE  The very real possibilty of a deterioration of this patient's condition required the highest level of my preparedness for sudden, emergent intervention.  I provided critical care services, which included medication orders, frequent reevaluations of the patient's condition and response to treatment, ordering and reviewing test results, and discussing the case with various consultants.  The critical care time associated with the care of the patient was 50 minutes. Review chart for interventions. This time is exclusive of any other billable procedures.     ADDITIONAL PROBLEM LIST AND DISPOSITION  Apparent dementia  Diabetes mellitus  Chronic renal disease      DISPOSITION:  Patient will be hospitalized by Dr. Penn in critical condition.    FINAL IMPRESSION   1. Sepsis, due to unspecified organism, unspecified whether acute organ dysfunction present (HCC)    2. Urinary tract infection without hematuria, site unspecified    3. JOSE ENRIQUE (acute kidney injury) (HCC)    4. Type 2 diabetes mellitus with hyperglycemia, without long-term current use of insulin (HCC)         I, Bucky Tim (Scribrachel), am  scribing for, and in the presence of, Manuel Alvarez M.D..    Electronically signed by: Bucky Tim (Scribe), 1/29/2023    IManuel M.D. personally performed the services described in this documentation, as scribed by Bucky Tim in my presence, and it is both accurate and complete.    The note accurately reflects work and decisions made by me.  Manuel Alvarez M.D.  1/29/2023  6:07 PM

## 2023-01-29 NOTE — ED NOTES
Wilfredo from Lab called with critical result of lactic acid = 5.4 at 1455. Critical lab result read back to Wilfredo.   Dr. Alvarez notified of critical lab result at 1500.  Critical lab result read back by Dr. Alvarez.

## 2023-01-29 NOTE — ED NOTES
Med rec updated and complete. Allergies reviewed. Per John from [facility].  Pt started on Cefdinir on 01/29/23.      Home pharmacy [facility]

## 2023-01-29 NOTE — ED TRIAGE NOTES
"Chief Complaint   Patient presents with    T-5000 FALL     [facility] unwitnessed fall out of bed. Unknown LOC, no obvious head trauma noted. Pt recently diagnosed with a UTI and starting on Cefdinir as of this morning. Pt has hx of demential and arrives disoriented and unable to provide any history surrounding the event.      /61   Pulse (!) 119   Temp 36 °C (96.8 °F) (Temporal)   Resp 20   Ht 1.753 m (5' 9\")   Wt 81.6 kg (180 lb)   SpO2 96%   BMI 26.58 kg/m²     Pt activated as a TBI and taken to CT with this RN upon arrival.   "

## 2023-01-30 ENCOUNTER — APPOINTMENT (OUTPATIENT)
Dept: CARDIOLOGY | Facility: MEDICAL CENTER | Age: 78
DRG: 872 | End: 2023-01-30
Attending: INTERNAL MEDICINE
Payer: COMMERCIAL

## 2023-01-30 PROBLEM — G93.49 ENCEPHALOPATHY CHRONIC: Status: ACTIVE | Noted: 2023-01-30

## 2023-01-30 PROBLEM — E87.6 HYPOKALEMIA: Status: ACTIVE | Noted: 2023-01-30

## 2023-01-30 PROBLEM — E66.3 OVERWEIGHT: Status: ACTIVE | Noted: 2023-01-30

## 2023-01-30 PROBLEM — E87.1 HYPONATREMIA: Status: ACTIVE | Noted: 2023-01-30

## 2023-01-30 LAB
ANION GAP SERPL CALC-SCNC: 10 MMOL/L (ref 7–16)
BUN SERPL-MCNC: 30 MG/DL (ref 8–22)
CALCIUM SERPL-MCNC: 9 MG/DL (ref 8.5–10.5)
CHLORIDE SERPL-SCNC: 102 MMOL/L (ref 96–112)
CO2 SERPL-SCNC: 24 MMOL/L (ref 20–33)
CREAT SERPL-MCNC: 1.57 MG/DL (ref 0.5–1.4)
ERYTHROCYTE [DISTWIDTH] IN BLOOD BY AUTOMATED COUNT: 42.5 FL (ref 35.9–50)
GFR SERPLBLD CREATININE-BSD FMLA CKD-EPI: 45 ML/MIN/1.73 M 2
GLUCOSE BLD STRIP.AUTO-MCNC: 172 MG/DL (ref 65–99)
GLUCOSE BLD STRIP.AUTO-MCNC: 173 MG/DL (ref 65–99)
GLUCOSE SERPL-MCNC: 196 MG/DL (ref 65–99)
HCT VFR BLD AUTO: 31.2 % (ref 42–52)
HGB BLD-MCNC: 10.9 G/DL (ref 14–18)
LACTATE SERPL-SCNC: 2.8 MMOL/L (ref 0.5–2)
LV EJECT FRACT  99904: 55
LV EJECT FRACT MOD 2C 99903: 60.79
LV EJECT FRACT MOD 4C 99902: 54.98
LV EJECT FRACT MOD BP 99901: 59.6
MCH RBC QN AUTO: 32.2 PG (ref 27–33)
MCHC RBC AUTO-ENTMCNC: 34.9 G/DL (ref 33.7–35.3)
MCV RBC AUTO: 92 FL (ref 81.4–97.8)
PLATELET # BLD AUTO: 69 K/UL (ref 164–446)
PMV BLD AUTO: 9.6 FL (ref 9–12.9)
POTASSIUM SERPL-SCNC: 3.8 MMOL/L (ref 3.6–5.5)
RBC # BLD AUTO: 3.39 M/UL (ref 4.7–6.1)
SODIUM SERPL-SCNC: 136 MMOL/L (ref 135–145)
VALPROATE SERPL-MCNC: 71.3 UG/ML (ref 50–100)
WBC # BLD AUTO: 7.1 K/UL (ref 4.8–10.8)

## 2023-01-30 PROCEDURE — 80048 BASIC METABOLIC PNL TOTAL CA: CPT

## 2023-01-30 PROCEDURE — 94640 AIRWAY INHALATION TREATMENT: CPT

## 2023-01-30 PROCEDURE — 85027 COMPLETE CBC AUTOMATED: CPT

## 2023-01-30 PROCEDURE — 80164 ASSAY DIPROPYLACETIC ACD TOT: CPT

## 2023-01-30 PROCEDURE — A9270 NON-COVERED ITEM OR SERVICE: HCPCS | Performed by: HOSPITALIST

## 2023-01-30 PROCEDURE — 700102 HCHG RX REV CODE 250 W/ 637 OVERRIDE(OP): Performed by: HOSPITALIST

## 2023-01-30 PROCEDURE — 36415 COLL VENOUS BLD VENIPUNCTURE: CPT

## 2023-01-30 PROCEDURE — 700117 HCHG RX CONTRAST REV CODE 255: Performed by: INTERNAL MEDICINE

## 2023-01-30 PROCEDURE — 83605 ASSAY OF LACTIC ACID: CPT

## 2023-01-30 PROCEDURE — 93306 TTE W/DOPPLER COMPLETE: CPT | Mod: 26 | Performed by: INTERNAL MEDICINE

## 2023-01-30 PROCEDURE — 99233 SBSQ HOSP IP/OBS HIGH 50: CPT | Performed by: HOSPITALIST

## 2023-01-30 PROCEDURE — 700102 HCHG RX REV CODE 250 W/ 637 OVERRIDE(OP): Performed by: INTERNAL MEDICINE

## 2023-01-30 PROCEDURE — 700101 HCHG RX REV CODE 250: Performed by: INTERNAL MEDICINE

## 2023-01-30 PROCEDURE — 700111 HCHG RX REV CODE 636 W/ 250 OVERRIDE (IP): Performed by: INTERNAL MEDICINE

## 2023-01-30 PROCEDURE — 82962 GLUCOSE BLOOD TEST: CPT

## 2023-01-30 PROCEDURE — A9270 NON-COVERED ITEM OR SERVICE: HCPCS | Performed by: INTERNAL MEDICINE

## 2023-01-30 PROCEDURE — 93306 TTE W/DOPPLER COMPLETE: CPT

## 2023-01-30 PROCEDURE — 770000 HCHG ROOM/CARE - INTERMEDIATE ICU *

## 2023-01-30 RX ORDER — LABETALOL HYDROCHLORIDE 5 MG/ML
10 INJECTION, SOLUTION INTRAVENOUS EVERY 4 HOURS PRN
Status: DISCONTINUED | OUTPATIENT
Start: 2023-01-30 | End: 2023-01-30

## 2023-01-30 RX ORDER — SODIUM CHLORIDE 1 G/1
1 TABLET ORAL 2 TIMES DAILY WITH MEALS
Status: DISCONTINUED | OUTPATIENT
Start: 2023-01-30 | End: 2023-01-30

## 2023-01-30 RX ORDER — HYDRALAZINE HYDROCHLORIDE 20 MG/ML
20 INJECTION INTRAMUSCULAR; INTRAVENOUS EVERY 6 HOURS PRN
Status: DISCONTINUED | OUTPATIENT
Start: 2023-01-30 | End: 2023-02-02 | Stop reason: HOSPADM

## 2023-01-30 RX ADMIN — CARBOXYMETHYLCELLULOSE SODIUM 1 DROP: 5 SOLUTION/ DROPS OPHTHALMIC at 18:30

## 2023-01-30 RX ADMIN — DULOXETINE HYDROCHLORIDE 60 MG: 60 CAPSULE, DELAYED RELEASE ORAL at 05:13

## 2023-01-30 RX ADMIN — CARBOXYMETHYLCELLULOSE SODIUM 1 DROP: 5 SOLUTION/ DROPS OPHTHALMIC at 06:30

## 2023-01-30 RX ADMIN — Medication 1000 UNITS: at 05:14

## 2023-01-30 RX ADMIN — PROPRANOLOL HYDROCHLORIDE 40 MG: 20 TABLET ORAL at 17:23

## 2023-01-30 RX ADMIN — LIDOCAINE 3 PATCH: 50 PATCH TOPICAL at 20:53

## 2023-01-30 RX ADMIN — APIXABAN 5 MG: 5 TABLET, FILM COATED ORAL at 17:24

## 2023-01-30 RX ADMIN — OMEPRAZOLE 40 MG: 20 CAPSULE, DELAYED RELEASE ORAL at 05:12

## 2023-01-30 RX ADMIN — DIVALPROEX SODIUM 1500 MG: 500 TABLET, DELAYED RELEASE ORAL at 20:52

## 2023-01-30 RX ADMIN — INSULIN HUMAN 1 UNITS: 100 INJECTION, SOLUTION PARENTERAL at 07:49

## 2023-01-30 RX ADMIN — HUMAN ALBUMIN MICROSPHERES AND PERFLUTREN 3 ML: 10; .22 INJECTION, SOLUTION INTRAVENOUS at 10:47

## 2023-01-30 RX ADMIN — CALCIUM CARBONATE 500 MG: 500 TABLET, CHEWABLE ORAL at 06:03

## 2023-01-30 RX ADMIN — DIVALPROEX SODIUM 1500 MG: 500 TABLET, DELAYED RELEASE ORAL at 01:33

## 2023-01-30 RX ADMIN — SENNOSIDES AND DOCUSATE SODIUM 2 TABLET: 50; 8.6 TABLET ORAL at 05:13

## 2023-01-30 RX ADMIN — INSULIN HUMAN 1 UNITS: 100 INJECTION, SOLUTION PARENTERAL at 23:14

## 2023-01-30 RX ADMIN — BUDESONIDE AND FORMOTEROL FUMARATE DIHYDRATE 2 PUFF: 160; 4.5 AEROSOL RESPIRATORY (INHALATION) at 05:15

## 2023-01-30 RX ADMIN — PRAVASTATIN SODIUM 80 MG: 20 TABLET ORAL at 20:52

## 2023-01-30 RX ADMIN — FINASTERIDE 5 MG: 5 TABLET, FILM COATED ORAL at 05:13

## 2023-01-30 RX ADMIN — CYANOCOBALAMIN TAB 500 MCG 500 MCG: 500 TAB at 07:52

## 2023-01-30 RX ADMIN — APIXABAN 2.5 MG: 2.5 TABLET, FILM COATED ORAL at 05:13

## 2023-01-30 RX ADMIN — PROPRANOLOL HYDROCHLORIDE 40 MG: 20 TABLET ORAL at 05:14

## 2023-01-30 RX ADMIN — INSULIN HUMAN 2 UNITS: 100 INJECTION, SOLUTION PARENTERAL at 17:25

## 2023-01-30 RX ADMIN — INSULIN HUMAN 1 UNITS: 100 INJECTION, SOLUTION PARENTERAL at 11:14

## 2023-01-30 RX ADMIN — IPRATROPIUM BROMIDE AND ALBUTEROL SULFATE 3 ML: .5; 2.5 SOLUTION RESPIRATORY (INHALATION) at 02:32

## 2023-01-30 RX ADMIN — CARBOXYMETHYLCELLULOSE SODIUM 1 DROP: 5 SOLUTION/ DROPS OPHTHALMIC at 12:30

## 2023-01-30 RX ADMIN — CEFTRIAXONE SODIUM 2000 MG: 10 INJECTION, POWDER, FOR SOLUTION INTRAVENOUS at 15:15

## 2023-01-30 RX ADMIN — BACLOFEN 10 MG: 10 TABLET ORAL at 17:23

## 2023-01-30 RX ADMIN — TIOTROPIUM BROMIDE INHALATION SPRAY 5 MCG: 3.12 SPRAY, METERED RESPIRATORY (INHALATION) at 05:14

## 2023-01-30 RX ADMIN — TAMSULOSIN HYDROCHLORIDE 0.4 MG: 0.4 CAPSULE ORAL at 05:13

## 2023-01-30 RX ADMIN — BUDESONIDE AND FORMOTEROL FUMARATE DIHYDRATE 2 PUFF: 160; 4.5 AEROSOL RESPIRATORY (INHALATION) at 17:25

## 2023-01-30 ASSESSMENT — PAIN DESCRIPTION - PAIN TYPE
TYPE: ACUTE PAIN

## 2023-01-30 ASSESSMENT — FIBROSIS 4 INDEX: FIB4 SCORE: 5.6

## 2023-01-30 NOTE — ASSESSMENT & PLAN NOTE
Monitor Accu-Cheks cover sliding scale insulin  1/29 glycohemoglobin: 6.7  Diabetic diet  Hypoglycemic protocol

## 2023-01-30 NOTE — PROGRESS NOTES
Notified Dr. Montanez of positive blood cultures growing GNR. On Rocephin Q24H. No new orders received. Will continue to monitor.

## 2023-01-30 NOTE — PROGRESS NOTES
COPD order set was placed. Chart reviewed. Patient on room air. Reported history of COPD and COVID 19 but no report of exacerbation. Pulmonary consult deferred. Please reach out on [facility] if a pulmonary consult is desired.     We will sign off. Please do not hesitate to contact us if we can be of any further assistance. I am most easily reached via [employer] secure messaging application.    Otoniel Hartley,   Staff Pulmonologist and Intensivist  [facility]     Please note that this dictation was created using voice recognition software. The accuracy of the dictation is limited to the abilities of the software. I have made every reasonable attempt to correct obvious errors, but I expect that there are errors of grammar and possibly content that I did not discover before finalizing the note.

## 2023-01-30 NOTE — ED NOTES
2nd liter NS complete.  Lactic drawn and sent to the lab.  Liter #3 hung per MAR to complete 2448 ml bolus.

## 2023-01-30 NOTE — PROGRESS NOTES
Pulmonary and Critical Care Medicine Progress Note    I had the pleasure of evaluating Wale for admission disposition.  Wale has dementia and cannot provide me with any useful historical information.  He presents to the ED after an unwitnessed ground-level fall.  Head CT is negative for acute pathology.  He has JOSE ENRIQUE, acute cystitis and lactic acidosis due to sepsis and intravascular volume depletion.  May be safely admitted to the IMCU.  He is hemodynamically stable.    Bird Young MD  Pulmonary and Critical Care Medicine

## 2023-01-30 NOTE — ED NOTES
Socorro Caraballo ([phone]) & Jose Flores  ([phone]) magalie for patient updates and treatment information.

## 2023-01-30 NOTE — ASSESSMENT & PLAN NOTE
CT no abscess, perinephric stranding or hydronephrosis  IV fluids, monitor I/O's, vitals and labs  Improved, continue to monitor

## 2023-01-30 NOTE — ED NOTES
Patient's daughter Madhavi updated on POC and admission status.  Per Madhavi, staff at [facility] concerned that patient may have broken his hip on his fall. Updated IMCU RN on daughters concerns with hip and IMCU RN to updated MD.    Patient, all belongings, and chart to [id number] with IMC RN x2.

## 2023-01-30 NOTE — ASSESSMENT & PLAN NOTE
Monitor vitals.  On propranolol  Lisinopril, lasix, hydralazine  On hold due to renal insufficiency

## 2023-01-30 NOTE — H&P
"Hospital Medicine History & Physical Note    Date of Service  1/29/2023    Primary Care Physician  Pcp Pt States None    Consultants      Code Status  No Order    Chief Complaint  Chief Complaint   Patient presents with    T-5000 FALL     [facility] suffered an unwitnessed fall out of bed. Unknown LOC, no obvious head trauma noted. Pt recently diagnosed with a UTI and starting on Cefdinir as of this morning. Pt has hx of demential and arrives disoriented and unable to provide any history surrounding the event.        History of Presenting Illness  Wale Olivas is a 77 y.o. male who presented 1/29/2023 with T-5000 FALL ([facility] he suffered an unwitnessed fall out of bed. Unknown LOC, no obvious head trauma noted. Pt recently diagnosed with a UTI and starting on Cefdinir as of this morning. Pt has hx of demential and arrives disoriented and unable to provide any history surrounding the event. )  He has dementia, baseline AAOx2 thus is a nonhistorian. He did say \"ouch\" at one point. Reviewing his chart he has chronic pain syndrome, atrial arrhythmia (Afib?) on anticoagulation, COPD, diabetes, hypertension. He was brought in for fall out of bed.   At the ED, he is Uro sepsis lactic acid came down  260  inez  I discussed the plan of care with bedside RN.    Review of Systems  Cannot be performed. confused    Past Medical History   has a past medical history of Anxiety, BPH (benign prostatic hyperplasia), CKD (chronic kidney disease), COPD (chronic obstructive pulmonary disease) ([facility]), COVID-19, Dementia ([facility]), GERD (gastroesophageal reflux disease), Hyperlipidemia, Insomnia, Major depressive disorder, and Type II diabetes mellitus ([facility]).    Surgical History   has no past surgical history on file.     Family History  family history is not on file.   Family history reviewed with patient. There is no family history that is pertinent to the chief " complaint.     Social History   reports that he has never smoked. He has never used smokeless tobacco. He reports that he does not currently use alcohol. He reports that he does not currently use drugs.    Allergies  Allergies   Allergen Reactions    Metformin     Morphine     Simvastatin     Spironolactone        Medications  Prior to Admission Medications   Prescriptions Last Dose Informant Patient Reported? Taking?   DULoxetine (CYMBALTA) 60 MG Cap DR Particles delayed-release capsule 1/29/2023 at 0800 MAR from Other Facility Yes Yes   Sig: Take 60 mg by mouth every day.   HYDROcodone-acetaminophen (NORCO) 5-325 MG Tab per tablet 1/29/2023 at 0800 MAR from Other Facility Yes Yes   Sig: Take 1 Tablet by mouth 3 times a day. Scheduled   Lidocaine 4 % Patch 1/28/2023 at 2000 MAR from Other Facility Yes Yes   Sig: Apply 3 Patches topically at bedtime. Apply to back for chronic pain syndrome   MENTHOL-METHYL SALICYLATE EX 1/29/2023 at 0800 MAR from Other Facility Yes Yes   Sig: Apply 1 Application topically 2 times a day. Apply to hands for chronic pain syndrome   Probiotic Product (PROBIOTIC PO) 1/29/2023 at 0800 MAR from Other Facility Yes Yes   Sig: Take 1 Capsule by mouth 2 times a day.   Roflumilast 500 MCG Tab 1/29/2023 at 0800 MAR from Other Facility Yes Yes   Sig: Take 500 mcg by mouth every day.   Sodium Chloride (NS) Solution 1/29/2023 at continuous MAR from Other Facility Yes Yes   Sig: Infuse 100 mL into a venous catheter continuous. Times 2 liters   Umeclidinium Bromide (INCRUSE ELLIPTA) 62.5 MCG/ACT AEROSOL POWDER, BREATH ACTIVATED 1/29/2023 at 0800 MAR from Other Facility Yes Yes   Sig: Inhale 1 Puff every day.   acetaminophen (TYLENOL) 325 MG Tab 1/27/2023 at 1747 MAR from Other Facility Yes Yes   Sig: Take 650 mg by mouth every 6 hours as needed for Mild Pain.   apixaban (ELIQUIS) 2.5mg Tab 1/29/2023 at 0800 MAR from Other Facility Yes Yes   Sig: Take 2.5 mg by mouth 2 times a day.   baclofen  (LIORESAL) 10 MG Tab 1/28/2023 at 1900 MAR from Other Facility Yes Yes   Sig: Take 10 mg by mouth every evening.   calcium carbonate (TUMS) 500 MG Chew Tab 1/29/2023 at 0800 MAR from Other Facility Yes Yes   Sig: Chew 500 mg 2 times a day.   carboxymethylcellulose (REFRESH TEARS) 0.5 % Solution 1/29/2023 at 0800 MAR from Other Facility Yes Yes   Sig: Administer 1 Drop into both eyes 4 times a day.   cefdinir (OMNICEF) 300 MG Cap 1/29/2023 at 0230 MAR from Other Facility Yes Yes   Sig: Take 300 mg by mouth 2 times a day.   cyanocobalamin (VITAMIN B-12) 500 MCG Tab 1/28/2023 at 0800 MAR from Other Facility Yes Yes   Sig: Take 500 mcg by mouth every 48 hours.   diclofenac sodium (VOLTAREN) 1 % Gel 1/29/2023 at 0800 MAR from Other Facility Yes Yes   Sig: Apply  topically 4 times a day. Apply to thumbs for chronic pain syndrome   divalproex (DEPAKOTE) 500 MG Tablet Delayed Response 1/28/2023 at 2000 MAR from Other Facility Yes Yes   Sig: Take 1,500 mg by mouth at bedtime.   finasteride (PROSCAR) 5 MG Tab 1/29/2023 at 0800 MAR from Other Facility Yes Yes   Sig: Take 5 mg by mouth every day.   fluticasone furoate-vilanterol (BREO) 100-25 MCG/ACT AEROSOL POWDER, BREATH ACTIVATED 1/29/2023 at 0800 MAR from Other Facility Yes Yes   Sig: Inhale 1 Puff every day.   furosemide (LASIX) 40 MG Tab 1/29/2023 at 0800 MAR from Other Facility Yes Yes   Sig: Take 40 mg by mouth every day.   hydrALAZINE (APRESOLINE) 25 MG Tab 1/29/2023 at 0800 MAR from Other Facility Yes Yes   Sig: Take 25 mg by mouth 2 times a day.   lisinopril (PRINIVIL) 40 MG tablet 1/29/2023 at 0800 MAR from Other Facility Yes Yes   Sig: Take 40 mg by mouth every day.   pantoprazole (PROTONIX) 40 MG Tablet Delayed Response 1/29/2023 at 0800 MAR from Other Facility Yes Yes   Sig: Take 40 mg by mouth every day.   polyethylene glycol/lytes (MIRALAX) 17 g Pack 1/29/2023 at 0800 MAR from Other Facility Yes Yes   Sig: Take 17 g by mouth every day.   potassium chloride SA  (KDUR) 20 MEQ Tab CR 1/29/2023 at 0800 MAR from Other Facility Yes Yes   Sig: Take 20 mEq by mouth every day.   pravastatin (PRAVACHOL) 80 MG tablet 1/28/2023 at 2000 MAR from Other Facility Yes Yes   Sig: Take 80 mg by mouth every evening.   propranolol (INDERAL) 40 MG Tab 1/29/2023 at 0800 MAR from Other Facility Yes Yes   Sig: Take 40 mg by mouth 2 times a day.   sennosides (SENOKOT) 8.6 MG Tab 1/29/2023 at 0800 MAR from Other Facility Yes Yes   Sig: Take 8.6 mg by mouth 2 times a day.   tamsulosin (FLOMAX) 0.4 MG capsule 1/29/2023 at 0800 MAR from Other Facility Yes Yes   Sig: Take 0.4 mg by mouth every day.   traZODone (DESYREL) 100 MG Tab 1/28/2023 at 2000 MAR from Other Facility Yes Yes   Sig: Take 150 mg by mouth every evening.   triamcinolone acetonide (KENALOG) 0.1 % Cream 1/29/2023 at 0800 MAR from Other Facility Yes Yes   Sig: Apply 1 Application topically 2 times a day. Apply to abdomen ( rash)   For 14 days   vitamin D3 (CHOLECALCIFEROL) 1000 Unit (25 mcg) Tab 1/29/2023 at 0800 MAR from Other Facility Yes Yes   Sig: Take 1,000 Units by mouth every day.      Facility-Administered Medications: None       Physical Exam  Temp:  [36 °C (96.8 °F)] 36 °C (96.8 °F)  Pulse:  [101-140] 104  Resp:  [17-26] 20  BP: (105-161)/() 161/72  SpO2:  [94 %-98 %] 94 %  Blood Pressure : (!) 161/72   Temperature: 36 °C (96.8 °F)   Pulse: (!) 104   Respiration: 20   Pulse Oximetry: 94 %       Physical Exam  Constitutional:       Appearance: He is ill-appearing.   Pulmonary:      Breath sounds: Wheezing (occasional) present.   Neurological:      Motor: Weakness present.      Comments: confused       Laboratory:  Recent Labs     01/29/23  1359   WBC 8.5   RBC 3.64*   HEMOGLOBIN 11.7*   HEMATOCRIT 34.1*   MCV 93.7   MCH 32.1   MCHC 34.3   RDW 44.8   PLATELETCT 87*   MPV 8.9*     Recent Labs     01/29/23  1359   SODIUM 131*   POTASSIUM 3.5*   CHLORIDE 96   CO2 19*   GLUCOSE 243*   BUN 36*   CREATININE 2.12*   CALCIUM 8.9      Recent Labs     01/29/23  1359   ALTSGPT 8   ASTSGOT 15   ALKPHOSPHAT 77   TBILIRUBIN 0.7   GLUCOSE 243*         No results for input(s): NTPROBNP in the last 72 hours.      Recent Labs     01/29/23  1359   TROPONINT 51*       Imaging:  DX-CHEST-PORTABLE (1 VIEW)   Final Result      No acute cardiac or pulmonary abnormalities are identified.      CT-HEAD W/O   Final Result      1.  Cerebral atrophy.      2.  White matter lucencies most consistent with small vessel ischemic change versus demyelination or gliosis.      3. Right cerebral hemisphere encephalomalacia again noted. No evidence of acute cerebral infarction, hemorrhage or mass lesion.                 Assessment/Plan    * Sepsis secondary to UTI, JOSE ENRIQUE  ([facility])- (present on admission)  Assessment & Plan  This is Sepsis Present on admission  SIRS criteria identified on my evaluation include: Tachycardia, with heart rate greater than 90 BPM and Leukocytosis, with WBC greater than 12,000  Source is urinary  Sepsis protocol initiated  Fluid resuscitation ordered per protocol  Crystalloid Fluid Administration: iv fluids and bolus  IV antibiotics as appropriate for source of sepsis  Reassessment: I have reassessed the patient's hemodynamic status  Antibiotics  Follow blood cultures        Dementia ([facility])  Assessment & Plan  Noted    Atrial arrhythmia  Assessment & Plan  On anticoagulation    Chronic pain syndrome  Assessment & Plan  Pain medications PRN.   On baclofen    Acute kidney injury superimposed on chronic kidney disease ([facility])  Assessment & Plan  Ordered CT no abscess, perinephric stranding or hydronephrosis  IV fluids    Chronic anticoagulation  Assessment & Plan  Continue    COPD (chronic obstructive pulmonary disease) ([facility])  Assessment & Plan  O2 and resp per protocol/nebs/inhalers  Steroids if wheezing or worsening    Type 2 diabetes mellitus with hyperglycemia, without long-term current use of insulin ([facility])  Assessment & Plan  Ss insulin        VTE  prophylaxis: therapeutic anticoagulation

## 2023-01-30 NOTE — CARE PLAN
The patient is Stable - Low risk of patient condition declining or worsening    Shift Goals  Clinical Goals: Hemodynamic stability, increased mobility  Patient Goals: CJ  Family Goals: CJ    Progress made toward(s) clinical / shift goals:    Problem: Risk for Infection - COPD  Goal: Patient will remain free from signs and symptoms of infection  Outcome: Progressing     Problem: Risk for Aspiration  Goal: Patient's risk for aspiration will be absent or decrease  Outcome: Progressing     Problem: Hemodynamics  Goal: Patient's hemodynamics, fluid balance and neurologic status will be stable or improve  Outcome: Progressing     Problem: Respiratory  Goal: Patient will achieve/maintain optimum respiratory ventilation and gas exchange  Outcome: Progressing     Problem: Skin Integrity  Goal: Skin integrity is maintained or improved  Outcome: Progressing     Problem: Fall Risk  Goal: Patient will remain free from falls  Outcome: Progressing     Problem: Pain - Standard  Goal: Alleviation of pain or a reduction in pain to the patient’s comfort goal  Outcome: Progressing

## 2023-01-30 NOTE — ED NOTES
Ashley from Lab called with critical result of lactic acid = 4.8 at 1648. Critical lab result read back to Ashley.   Dr. Alvarez notified of critical lab result at 1648.  Critical lab result read back by Dr. Alvarez.

## 2023-01-30 NOTE — PROGRESS NOTES
Hospital Medicine Daily Progress Note    Date of Service  1/30/2023    Chief Complaint  Sent from [facility] with an unwitnessed fall out of bed    Hospital Course  Wale Olivas is a 77 y.o. male with dementia, atrial arrhythmia on anticoagualtion, COPD, diabetes, hypertension. He was admitted 1/29/2023 with an unwitnessed fall at home likely from sepsis from possible UTI. Head CT was unremarkable.    In the ER: wbc:8.5, Na:131, K:3.5, Glu:243, BUN:36, Cr:2.12. Lactic acid:5.4 >6.6.    Interval Problem Update  1/30 Blood cultues already GNR positive.  Lactic acid worse:6.6<5.4. Spoke to Jose, daughter, via telephone and gave her updates.  Patient receiving echocardiogram while I was examining the patient.  Patient confused and was not follow commands.  Patient fidgeting and impulsive. Gram negative rods growing in blood cultures.  Daughter concerned as they state they lost her mother the past year secondary to sepsis.  Patient lives in [place]    I have discussed this patient's plan of care and discharge plan at IDT rounds today with Case Management, Nursing, Nursing leadership, and other members of the IDT team.      Code Status  Full Code    Disposition  Patient is not medically cleared for discharge.   Anticipate discharge to  be determined .  I have placed the appropriate orders for post-discharge needs.    Review of Systems  Review of Systems   Unable to perform ROS: Mental acuity      Physical Exam  Temp:  [36 °C (96.8 °F)-36.4 °C (97.6 °F)] 36.1 °C (96.9 °F)  Pulse:  [] 84  Resp:  [16-26] 16  BP: (105-191)/() 185/94  SpO2:  [90 %-98 %] 96 %    Physical Exam  Vitals reviewed.   Constitutional:       General: He is awake.      Appearance: He is overweight. He is not diaphoretic.      Interventions: He is restrained.   HENT:      Head: Normocephalic and atraumatic.      Nose: Nose normal.      Mouth/Throat:      Mouth: Mucous membranes are dry.      Pharynx: No oropharyngeal exudate.    Eyes:      General: No scleral icterus.        Right eye: No discharge.         Left eye: No discharge.      Extraocular Movements: Extraocular movements intact.      Conjunctiva/sclera: Conjunctivae normal.   Cardiovascular:      Rate and Rhythm: Normal rate and regular rhythm.      Pulses:           Radial pulses are 2+ on the right side and 2+ on the left side.        Dorsalis pedis pulses are 2+ on the right side and 2+ on the left side.      Heart sounds: No murmur heard.  Pulmonary:      Effort: Pulmonary effort is normal. No respiratory distress.      Breath sounds: Normal breath sounds. No wheezing or rales.   Abdominal:      General: Bowel sounds are normal. There is no distension.      Palpations: Abdomen is soft.      Tenderness: There is no abdominal tenderness.   Musculoskeletal:         General: No swelling or tenderness.      Cervical back: No tenderness. No muscular tenderness.      Right lower leg: No edema.      Left lower leg: No edema.   Lymphadenopathy:      Cervical: No cervical adenopathy.   Skin:     Coloration: Skin is not jaundiced or pale.   Neurological:      Mental Status: He is disoriented.      Comments: Moves all extremities   Psychiatric:         Speech: He is noncommunicative.         Behavior: Behavior is uncooperative.         Cognition and Memory: Cognition is impaired.         Judgment: Judgment is impulsive.       Fluids    Intake/Output Summary (Last 24 hours) at 1/30/2023 1052  Last data filed at 1/30/2023 1000  Gross per 24 hour   Intake 797.63 ml   Output 300 ml   Net 497.63 ml       Laboratory  Recent Labs     01/29/23  1359 01/30/23  0230   WBC 8.5 7.1   RBC 3.64* 3.39*   HEMOGLOBIN 11.7* 10.9*   HEMATOCRIT 34.1* 31.2*   MCV 93.7 92.0   MCH 32.1 32.2   MCHC 34.3 34.9   RDW 44.8 42.5   PLATELETCT 87* 69*   MPV 8.9* 9.6     Recent Labs     01/29/23  1359 01/30/23  0810   SODIUM 131* 136   POTASSIUM 3.5* 3.8   CHLORIDE 96 102   CO2 19* 24   GLUCOSE 243* 196*   BUN 36* 30*    CREATININE 2.12* 1.57*   CALCIUM 8.9 9.0     Recent Labs     01/29/23  1359   INR 1.47*               Imaging  EC-ECHOCARDIOGRAM COMPLETE W/ CONT         CT-RENAL COLIC EVALUATION(A/P W/O)   Final Result         1.  No hydronephrosis or urolithiasis.      2.  Cholelithiasis.      3.  Atherosclerotic disease.      4.  Dependent atelectasis and apparent trace bilateral pleural effusions.      DX-CHEST-PORTABLE (1 VIEW)   Final Result      No acute cardiac or pulmonary abnormalities are identified.      CT-HEAD W/O   Final Result      1.  Cerebral atrophy.      2.  White matter lucencies most consistent with small vessel ischemic change versus demyelination or gliosis.      3. Right cerebral hemisphere encephalomalacia again noted. No evidence of acute cerebral infarction, hemorrhage or mass lesion.              Assessment/Plan  * Sepsis secondary to UTI, JOSE ENRIQUE  ([facility])- (present on admission)  Assessment & Plan  1/30 worsening lactic acid  Increase fluids  Monitor I/O's, lactic acid, labs  GNR bacteremia likely from UTI  antibiotics      Hyponatremia  Assessment & Plan  Concern of hypovolemia as etiology  IV fluids and monitor labs    Hypokalemia  Assessment & Plan  Replace and monitor    Overweight  Assessment & Plan  Body mass index is 28.96 kg/m².  Once more alert encourage healthier diet increase activity as tolerates close follow-up with his primary care    Encephalopathy chronic  Assessment & Plan  Unknown baseline but would assume this is an acute on chronic component based on discussion with daughter.  Suspect acute encephalopathy from infectious etiology  Treat GNR bacteremia with antibiotics  IV fluids  Watch medications  Check depakote level    Dementia ([facility])  Assessment & Plan  Per report    Atrial arrhythmia  Assessment & Plan  apixaban 2.5mg BID  Propranolol 40mg BID  Checking echocardiogram    Dyslipidemia  Assessment & Plan  Continue active management with statin    Primary hypertension  Assessment &  Plan  Monitor vitals.  On propranolol  Lisinopril, lasix, hydralazine  On hold due to renal insufficiency      Chronic pain syndrome  Assessment & Plan  Altered on exam 1/30   Hold Norco unless clear sign of pain.  On baclofen, prn norco, cymbalta    Acute kidney injury superimposed on chronic kidney disease ([facility])  Assessment & Plan  Ordered CT no abscess, perinephric stranding or hydronephrosis  IV fluids, monitor I/O's, vitals and labs  1/30 BUN:30 < 36.  Cr:1.57 < 2.12    Chronic anticoagulation  Assessment & Plan  Continue    COPD (chronic obstructive pulmonary disease) ([facility])  Assessment & Plan  O2 and resp per protocol/nebs/inhalers  RT per protocol    Type 2 diabetes mellitus with hyperglycemia, without long-term current use of insulin ([facility])  Assessment & Plan  Monitor Accu-Cheks cover sliding scale insulin  1/29 glycohemoglobin: 6.7  Diabetic diet  Hypoglycemic protocol         VTE prophylaxis: SCDs/TEDs and therapeutic anticoagulation with apixaban    I have performed a physical exam and reviewed and updated ROS and Plan today (1/30/2023). In review of yesterday's note (1/29/2023), there are no changes except as documented above.

## 2023-01-30 NOTE — ASSESSMENT & PLAN NOTE
Unknown baseline but would assume this is an acute on chronic component based on discussion with daughter.  Suspect acute encephalopathy from infectious etiology  Treat GNR bacteremia with antibiotics  IV fluids  Watch medications  Avoid conflicting agents

## 2023-01-30 NOTE — ASSESSMENT & PLAN NOTE
Improved after antibiotics and fluid resuscitation  Monitor I/O's, lactic acid, labs  GNR bacteremia likely from UTI, currently resulting same organism, E. coli  antibiotics

## 2023-01-30 NOTE — ASSESSMENT & PLAN NOTE
Body mass index is 28.96 kg/m².  Once more alert encourage healthier diet increase activity as tolerates close follow-up with his primary care

## 2023-01-30 NOTE — ED NOTES
"Report from NAHEED Contreras  Patient resting comfortably, resp even and unlabored, NAD.     Patient pending transfer to [facility].    BP (!) 184/84   Pulse (!) 122   Temp 36 °C (96.8 °F) (Temporal)   Resp 17   Ht 1.753 m (5' 9\")   Wt 81.6 kg (180 lb)   SpO2 94%    "

## 2023-01-30 NOTE — CARE PLAN
The patient is Watcher - Medium risk of patient condition declining or worsening      Problem: Risk for Infection - COPD  Goal: Patient will remain free from signs and symptoms of infection  Outcome: Not Progressing     Problem: Self Care  Goal: Patient will have the ability to perform ADLs independently or with assistance (bathe, groom, dress, toilet and feed)  Outcome: Not Progressing     Problem: Hemodynamics  Goal: Patient's hemodynamics, fluid balance and neurologic status will be stable or improve  Outcome: Not Progressing     Problem: Mechanical Ventilation  Goal: Patient will be able to express needs and understand communication  Outcome: Not Progressing     Problem: Physical Regulation  Goal: Signs and symptoms of infection will decrease  Outcome: Not Progressing     Problem: Knowledge Deficit - Standard  Goal: Patient and family/care givers will demonstrate understanding of plan of care, disease process/condition, diagnostic tests and medications  Outcome: Not Progressing     Problem: Pain - Standard  Goal: Alleviation of pain or a reduction in pain to the patient’s comfort goal  Outcome: Not Progressing

## 2023-01-30 NOTE — RESPIRATORY CARE
" COPD EDUCATION by COPD CLINICAL EDUCATOR  1/30/2023 at 10:29 AM by Susana Jansen, RRT     Patient interviewed. Patient unable to engaged in discussion. His Action Plan was completed and his care team was contacted. Updates noted below.    COPD Screen  COPD Risk Screening  Do you have a history of COPD?: Yes  COPD Population Screener  During the past 4 weeks, how much did you feel short of breath?: None/Little of the time  Do you ever cough up any mucus or phlegm?: No/only with occasional colds or infections  In the past 12 months, you do less than you used to because of your breathing problems: Disagree/unsure  Have you smoked at least 100 cigarettes in your entire life?: Yes  How old are you?: 60+  COPD Screening Score: 4  COPD Coordinator Not Recommended: Yes    COPD Assessment  COPD Clinical Specialists ONLY  COPD Education Initiated: Yes--Short Intervention (Advanced Dementia resides at care facility; had GLF prompting admission and evaluation; orders reviewed and discussed not exacerbation; pt unable to engage)  DME Company: none prior  [facility]  [facility]  Is this a COPD exacerbation patient?: No (Orderset placed IP Pulmonary assisted D/W MD not exacerbation admit)    PFT Results  No results found for: PFT    Meds to Beds  Would the patient like to opt in for Bedside Medication Delivery at Discharge?: Yes, interested     MY COPD ACTION PLAN     It is recommended that patients and physicians /healthcare providers complete this action plan together. This plan should be discussed at each physician visit and updated as needed.    The green, yellow and red zones show groups of symptoms of COPD. This list of symptoms is not comprehensive, and you may experience other symptoms. In the \"Actions\" column, your healthcare provider has recommended actions for you to take based on your symptoms.    Patient Name: Wale Olivas   YOB: 1945   Last Updated " "on:     Green Zone:  I am doing well today Actions     Usual activitiy and exercise level   Take daily medications     Usual amounts of cough and phlegm/mucus   Use oxygen as prescribed     Sleep well at night   Continue regular exercise/diet plan     Appetite is good   At all times avoid cigarette smoke, inhaled irritants     Daily Medications (these medications are taken every day):   Fluticasone Furoate and Vilanterol trifenatate (Breo)  Umeclidinium (Incurse Ellipta) 1 Puff  1 Puff Once daily  Once daily     Additional Information:  Remember to Rinse your mouth after using Breo inhaler    Yellow Zone:  I am having a bad day or a COPD flare Actions     More breathless than usual   Continue daily medications     I have less energy for my daily activities   Use quick relief inhaler as ordered     Increased or thicker phlegm/mucus   Use oxygen as prescribed     Using quick relief inhaler/nebulizer more often   Get plenty of rest     Swelling of ankles more than usual   Use pursed lip breathing     More coughing than usual   At all times avoid cigarette smoke, inhaled irritants     I feel like I have a \"chest cold\"     Poor sleep and my symptoms woke me up     My appetite is not good     My medicine is not helping      Call provider immediately if symptoms don’t improve     Continue daily medications, add rescue medications:               Medications to be used during a flare up, (as Discussed with Provider):              Red Zone:  I need urgent medical care Actions     Severe shortness of breath even at rest   Call 911 or seek medical care immediately     Not able to do any activity because of breathing      Fever or shaking chills      Feeling confused or very drowsy       Chest pains      Coughing up blood                  "

## 2023-01-31 LAB
ANION GAP SERPL CALC-SCNC: 10 MMOL/L (ref 7–16)
BACTERIA UR CULT: ABNORMAL
BACTERIA UR CULT: ABNORMAL
BUN SERPL-MCNC: 28 MG/DL (ref 8–22)
CALCIUM SERPL-MCNC: 8.7 MG/DL (ref 8.5–10.5)
CHLORIDE SERPL-SCNC: 104 MMOL/L (ref 96–112)
CO2 SERPL-SCNC: 24 MMOL/L (ref 20–33)
CREAT SERPL-MCNC: 1.28 MG/DL (ref 0.5–1.4)
ERYTHROCYTE [DISTWIDTH] IN BLOOD BY AUTOMATED COUNT: 44.6 FL (ref 35.9–50)
GFR SERPLBLD CREATININE-BSD FMLA CKD-EPI: 58 ML/MIN/1.73 M 2
GLUCOSE BLD STRIP.AUTO-MCNC: 155 MG/DL (ref 65–99)
GLUCOSE BLD STRIP.AUTO-MCNC: 170 MG/DL (ref 65–99)
GLUCOSE BLD STRIP.AUTO-MCNC: 172 MG/DL (ref 65–99)
GLUCOSE BLD STRIP.AUTO-MCNC: 179 MG/DL (ref 65–99)
GLUCOSE BLD STRIP.AUTO-MCNC: 203 MG/DL (ref 65–99)
GLUCOSE BLD STRIP.AUTO-MCNC: 207 MG/DL (ref 65–99)
GLUCOSE SERPL-MCNC: 166 MG/DL (ref 65–99)
HCT VFR BLD AUTO: 32.1 % (ref 42–52)
HGB BLD-MCNC: 11.2 G/DL (ref 14–18)
MCH RBC QN AUTO: 32.3 PG (ref 27–33)
MCHC RBC AUTO-ENTMCNC: 34.9 G/DL (ref 33.7–35.3)
MCV RBC AUTO: 92.5 FL (ref 81.4–97.8)
PLATELET # BLD AUTO: 71 K/UL (ref 164–446)
PMV BLD AUTO: 8.9 FL (ref 9–12.9)
POTASSIUM SERPL-SCNC: 3.9 MMOL/L (ref 3.6–5.5)
RBC # BLD AUTO: 3.47 M/UL (ref 4.7–6.1)
SIGNIFICANT IND 70042: ABNORMAL
SITE SITE: ABNORMAL
SODIUM SERPL-SCNC: 138 MMOL/L (ref 135–145)
SOURCE SOURCE: ABNORMAL
WBC # BLD AUTO: 7.3 K/UL (ref 4.8–10.8)

## 2023-01-31 PROCEDURE — 97602 WOUND(S) CARE NON-SELECTIVE: CPT

## 2023-01-31 PROCEDURE — 770020 HCHG ROOM/CARE - TELE (206)

## 2023-01-31 PROCEDURE — 700105 HCHG RX REV CODE 258: Performed by: HOSPITALIST

## 2023-01-31 PROCEDURE — 700111 HCHG RX REV CODE 636 W/ 250 OVERRIDE (IP): Performed by: INTERNAL MEDICINE

## 2023-01-31 PROCEDURE — 700101 HCHG RX REV CODE 250: Performed by: INTERNAL MEDICINE

## 2023-01-31 PROCEDURE — 700102 HCHG RX REV CODE 250 W/ 637 OVERRIDE(OP): Performed by: HOSPITALIST

## 2023-01-31 PROCEDURE — 85027 COMPLETE CBC AUTOMATED: CPT

## 2023-01-31 PROCEDURE — 97166 OT EVAL MOD COMPLEX 45 MIN: CPT

## 2023-01-31 PROCEDURE — 97162 PT EVAL MOD COMPLEX 30 MIN: CPT

## 2023-01-31 PROCEDURE — A9270 NON-COVERED ITEM OR SERVICE: HCPCS | Performed by: HOSPITALIST

## 2023-01-31 PROCEDURE — A9270 NON-COVERED ITEM OR SERVICE: HCPCS | Performed by: INTERNAL MEDICINE

## 2023-01-31 PROCEDURE — 82962 GLUCOSE BLOOD TEST: CPT

## 2023-01-31 PROCEDURE — 99233 SBSQ HOSP IP/OBS HIGH 50: CPT | Performed by: HOSPITALIST

## 2023-01-31 PROCEDURE — 80048 BASIC METABOLIC PNL TOTAL CA: CPT

## 2023-01-31 PROCEDURE — 700102 HCHG RX REV CODE 250 W/ 637 OVERRIDE(OP): Performed by: INTERNAL MEDICINE

## 2023-01-31 PROCEDURE — 302146: Performed by: HOSPITALIST

## 2023-01-31 RX ADMIN — BUDESONIDE AND FORMOTEROL FUMARATE DIHYDRATE 2 PUFF: 160; 4.5 AEROSOL RESPIRATORY (INHALATION) at 16:27

## 2023-01-31 RX ADMIN — SODIUM CHLORIDE, POTASSIUM CHLORIDE, SODIUM LACTATE AND CALCIUM CHLORIDE: 600; 310; 30; 20 INJECTION, SOLUTION INTRAVENOUS at 07:46

## 2023-01-31 RX ADMIN — PROPRANOLOL HYDROCHLORIDE 40 MG: 20 TABLET ORAL at 16:27

## 2023-01-31 RX ADMIN — CARBOXYMETHYLCELLULOSE SODIUM 1 DROP: 5 SOLUTION/ DROPS OPHTHALMIC at 16:28

## 2023-01-31 RX ADMIN — INSULIN HUMAN 1 UNITS: 100 INJECTION, SOLUTION PARENTERAL at 09:26

## 2023-01-31 RX ADMIN — PROPRANOLOL HYDROCHLORIDE 40 MG: 20 TABLET ORAL at 05:04

## 2023-01-31 RX ADMIN — TAMSULOSIN HYDROCHLORIDE 0.4 MG: 0.4 CAPSULE ORAL at 05:05

## 2023-01-31 RX ADMIN — PRAVASTATIN SODIUM 80 MG: 20 TABLET ORAL at 20:46

## 2023-01-31 RX ADMIN — CARBOXYMETHYLCELLULOSE SODIUM 1 DROP: 5 SOLUTION/ DROPS OPHTHALMIC at 00:30

## 2023-01-31 RX ADMIN — FINASTERIDE 5 MG: 5 TABLET, FILM COATED ORAL at 05:05

## 2023-01-31 RX ADMIN — CALCIUM CARBONATE 500 MG: 500 TABLET, CHEWABLE ORAL at 06:30

## 2023-01-31 RX ADMIN — LIDOCAINE 3 PATCH: 50 PATCH TOPICAL at 20:46

## 2023-01-31 RX ADMIN — OMEPRAZOLE 40 MG: 20 CAPSULE, DELAYED RELEASE ORAL at 05:04

## 2023-01-31 RX ADMIN — DULOXETINE HYDROCHLORIDE 60 MG: 60 CAPSULE, DELAYED RELEASE ORAL at 05:05

## 2023-01-31 RX ADMIN — INSULIN HUMAN 1 UNITS: 100 INJECTION, SOLUTION PARENTERAL at 12:17

## 2023-01-31 RX ADMIN — SODIUM CHLORIDE, POTASSIUM CHLORIDE, SODIUM LACTATE AND CALCIUM CHLORIDE: 600; 310; 30; 20 INJECTION, SOLUTION INTRAVENOUS at 23:24

## 2023-01-31 RX ADMIN — Medication 1000 UNITS: at 05:04

## 2023-01-31 RX ADMIN — BUDESONIDE AND FORMOTEROL FUMARATE DIHYDRATE 2 PUFF: 160; 4.5 AEROSOL RESPIRATORY (INHALATION) at 06:00

## 2023-01-31 RX ADMIN — APIXABAN 5 MG: 5 TABLET, FILM COATED ORAL at 16:27

## 2023-01-31 RX ADMIN — INSULIN HUMAN 2 UNITS: 100 INJECTION, SOLUTION PARENTERAL at 21:00

## 2023-01-31 RX ADMIN — CARBOXYMETHYLCELLULOSE SODIUM 1 DROP: 5 SOLUTION/ DROPS OPHTHALMIC at 06:30

## 2023-01-31 RX ADMIN — APIXABAN 5 MG: 5 TABLET, FILM COATED ORAL at 06:00

## 2023-01-31 RX ADMIN — CEFTRIAXONE SODIUM 2000 MG: 10 INJECTION, POWDER, FOR SOLUTION INTRAVENOUS at 16:27

## 2023-01-31 RX ADMIN — DIVALPROEX SODIUM 1500 MG: 500 TABLET, DELAYED RELEASE ORAL at 20:46

## 2023-01-31 RX ADMIN — CALCIUM CARBONATE 500 MG: 500 TABLET, CHEWABLE ORAL at 16:27

## 2023-01-31 RX ADMIN — MICONAZOLE NITRATE: 20 CREAM TOPICAL at 16:28

## 2023-01-31 RX ADMIN — CARBOXYMETHYLCELLULOSE SODIUM 1 DROP: 5 SOLUTION/ DROPS OPHTHALMIC at 23:26

## 2023-01-31 RX ADMIN — TIOTROPIUM BROMIDE INHALATION SPRAY 5 MCG: 3.12 SPRAY, METERED RESPIRATORY (INHALATION) at 06:00

## 2023-01-31 RX ADMIN — INSULIN HUMAN 1 UNITS: 100 INJECTION, SOLUTION PARENTERAL at 16:38

## 2023-01-31 ASSESSMENT — COGNITIVE AND FUNCTIONAL STATUS - GENERAL
DRESSING REGULAR UPPER BODY CLOTHING: A LITTLE
MOVING TO AND FROM BED TO CHAIR: UNABLE
HELP NEEDED FOR BATHING: A LOT
SUGGESTED CMS G CODE MODIFIER MOBILITY: CM
DAILY ACTIVITIY SCORE: 16
PERSONAL GROOMING: A LITTLE
DRESSING REGULAR LOWER BODY CLOTHING: A LOT
MOBILITY SCORE: 9
CLIMB 3 TO 5 STEPS WITH RAILING: TOTAL
WALKING IN HOSPITAL ROOM: TOTAL
TURNING FROM BACK TO SIDE WHILE IN FLAT BAD: A LOT
STANDING UP FROM CHAIR USING ARMS: A LITTLE
MOVING FROM LYING ON BACK TO SITTING ON SIDE OF FLAT BED: UNABLE
SUGGESTED CMS G CODE MODIFIER DAILY ACTIVITY: CK
TOILETING: A LOT

## 2023-01-31 ASSESSMENT — ACTIVITIES OF DAILY LIVING (ADL): TOILETING: INDEPENDENT

## 2023-01-31 ASSESSMENT — GAIT ASSESSMENTS: GAIT LEVEL OF ASSIST: UNABLE TO PARTICIPATE

## 2023-01-31 ASSESSMENT — PAIN DESCRIPTION - PAIN TYPE
TYPE: ACUTE PAIN
TYPE: ACUTE PAIN

## 2023-01-31 ASSESSMENT — FIBROSIS 4 INDEX: FIB4 SCORE: 5.6

## 2023-01-31 NOTE — PROGRESS NOTES
4 Eyes Skin Assessment Completed by NAHEED Colon and NAHEED Chance.    Head WDL  Ears WDL  Nose WDL  Mouth WDL  Neck WDL  Breast/Chest WDL  Shoulder Blades WDL  Spine WDL  (R) Arm/Elbow/Hand Bruising  (L) Arm/Elbow/Hand Bruising  Abdomen Bruising  Groin WDL  Scrotum/Coccyx/Buttocks Redness  (R) Leg Bruising  (L) Leg Bruising  (R) Heel/Foot/Toe Bruising  (L) Heel/Foot/Toe WDL      -Moisture fissure to sacrum and scattered bruising to upper and lower extremities.    Devices In Places Tele Box, Blood Pressure Cuff, and Pulse Ox      Interventions In Place Pressure Redistribution Mattress    Possible Skin Injury Yes    Pictures Uploaded Into Epic Yes  Wound Consult Placed N/A  RN Wound Prevention Protocol Ordered No

## 2023-01-31 NOTE — PROGRESS NOTES
Hospital Medicine Daily Progress Note    Date of Service  1/31/2023    Chief Complaint  Sent from [facility] with an unwitnessed fall out of bed    Hospital Course  Wale Olivas is a 77 y.o. male with dementia, atrial arrhythmia on anticoagualtion, COPD, diabetes, hypertension. He was admitted 1/29/2023 with an unwitnessed fall at home likely from sepsis from possible UTI. Head CT was unremarkable.    In the ER: wbc:8.5, Na:131, K:3.5, Glu:243, BUN:36, Cr:2.12. Lactic acid:5.4 >6.6.    Interval Problem Update  Patient seen and examined today.  Data, Medication data reviewed.  Case discussed with nursing as available.  Plan of Care reviewed with patient and notified of changes.   1/30 Blood cultues already GNR positive.  Lactic acid worse:6.6<5.4. Spoke to Jose, daughter, via telephone and gave her updates.  Patient receiving echocardiogram while I was examining the patient.  Patient confused and was not follow commands.  Patient fidgeting and impulsive. Gram negative rods growing in blood cultures.  Daughter concerned as they state they lost her mother the past year secondary to sepsis.  Patient lives in [place]  1/30 the patient continues to be confused, alert and oriented x1, appears nonfocal, afebrile, heart rate in the 60s, respiration unlabored, 18, on room air, blood pressure elevated in the 160s over 70s and 90s.  Reviewed laboratory data, renal function is improved, lactic acid improved urine culture positive for pansensitive E. coli, blood cultures consistent with that    I have discussed this patient's plan of care and discharge plan at IDT rounds today with Case Management, Nursing, Nursing leadership, and other members of the IDT team.      Code Status  Full Code    Disposition  Patient is not medically cleared for discharge.   Anticipate discharge to  be determined .  I have placed the appropriate orders for post-discharge needs.    Review of Systems  Review of Systems   Unable to perform ROS:  Mental acuity      Physical Exam  Temp:  [36.1 °C (97 °F)-36.8 °C (98.2 °F)] 36.2 °C (97.2 °F)  Pulse:  [62-89] 62  Resp:  [10-23] 18  BP: (137-187)/() 150/94  SpO2:  [92 %-96 %] 95 %    Physical Exam  Vitals reviewed.   Constitutional:       General: He is awake.      Appearance: He is overweight. He is not diaphoretic.      Interventions: He is restrained.   HENT:      Head: Normocephalic and atraumatic.      Nose: Nose normal.      Mouth/Throat:      Mouth: Mucous membranes are dry.      Pharynx: No oropharyngeal exudate.   Eyes:      General: No scleral icterus.        Right eye: No discharge.         Left eye: No discharge.      Extraocular Movements: Extraocular movements intact.      Conjunctiva/sclera: Conjunctivae normal.   Cardiovascular:      Rate and Rhythm: Normal rate and regular rhythm.      Pulses:           Radial pulses are 2+ on the right side and 2+ on the left side.        Dorsalis pedis pulses are 2+ on the right side and 2+ on the left side.      Heart sounds: No murmur heard.  Pulmonary:      Effort: Pulmonary effort is normal. No respiratory distress.      Breath sounds: Normal breath sounds. No wheezing or rales.   Abdominal:      General: Bowel sounds are normal. There is no distension.      Palpations: Abdomen is soft.      Tenderness: There is no abdominal tenderness.   Musculoskeletal:         General: No swelling or tenderness.      Cervical back: No tenderness. No muscular tenderness.      Right lower leg: No edema.      Left lower leg: No edema.   Lymphadenopathy:      Cervical: No cervical adenopathy.   Skin:     Coloration: Skin is not jaundiced or pale.   Neurological:      Mental Status: He is disoriented.      Comments: Moves all extremities   Psychiatric:         Speech: He is noncommunicative.         Behavior: Behavior is uncooperative.         Cognition and Memory: Cognition is impaired.         Judgment: Judgment is impulsive.       Fluids    Intake/Output Summary  (Last 24 hours) at 1/31/2023 1538  Last data filed at 1/31/2023 1129  Gross per 24 hour   Intake 2554.54 ml   Output 1450 ml   Net 1104.54 ml         Laboratory  Recent Labs     01/29/23  1359 01/30/23  0230 01/31/23  0020   WBC 8.5 7.1 7.3   RBC 3.64* 3.39* 3.47*   HEMOGLOBIN 11.7* 10.9* 11.2*   HEMATOCRIT 34.1* 31.2* 32.1*   MCV 93.7 92.0 92.5   MCH 32.1 32.2 32.3   MCHC 34.3 34.9 34.9   RDW 44.8 42.5 44.6   PLATELETCT 87* 69* 71*   MPV 8.9* 9.6 8.9*       Recent Labs     01/29/23  1359 01/30/23  0810 01/31/23  0020   SODIUM 131* 136 138   POTASSIUM 3.5* 3.8 3.9   CHLORIDE 96 102 104   CO2 19* 24 24   GLUCOSE 243* 196* 166*   BUN 36* 30* 28*   CREATININE 2.12* 1.57* 1.28   CALCIUM 8.9 9.0 8.7       Recent Labs     01/29/23  1359   INR 1.47*                 Imaging  EC-ECHOCARDIOGRAM COMPLETE W/ CONT   Final Result      CT-RENAL COLIC EVALUATION(A/P W/O)   Final Result         1.  No hydronephrosis or urolithiasis.      2.  Cholelithiasis.      3.  Atherosclerotic disease.      4.  Dependent atelectasis and apparent trace bilateral pleural effusions.      DX-CHEST-PORTABLE (1 VIEW)   Final Result      No acute cardiac or pulmonary abnormalities are identified.      CT-HEAD W/O   Final Result      1.  Cerebral atrophy.      2.  White matter lucencies most consistent with small vessel ischemic change versus demyelination or gliosis.      3. Right cerebral hemisphere encephalomalacia again noted. No evidence of acute cerebral infarction, hemorrhage or mass lesion.                Assessment/Plan  * Sepsis secondary to UTI, JOSE ENRIQUE  (HCC)- (present on admission)  Assessment & Plan  Improved after antibiotics and fluid resuscitation  Monitor I/O's, lactic acid, labs  GNR bacteremia likely from UTI, currently resulting same organism, E. coli  antibiotics      Hyponatremia  Assessment & Plan  Concern of hypovolemia as etiology  IV fluids and monitor labs    Hypokalemia  Assessment & Plan  Replace and  monitor    Overweight  Assessment & Plan  Body mass index is 28.96 kg/m².  Once more alert encourage healthier diet increase activity as tolerates close follow-up with his primary care    Encephalopathy chronic- (present on admission)  Assessment & Plan  Unknown baseline but would assume this is an acute on chronic component based on discussion with daughter.  Suspect acute encephalopathy from infectious etiology  Treat GNR bacteremia with antibiotics  IV fluids  Watch medications  Avoid conflicting agents    Dementia ([facility])- (present on admission)  Assessment & Plan  Per report, [facility]    Atrial arrhythmia- (present on admission)  Assessment & Plan  apixaban 2.5mg BID  Propranolol 40mg BID  Fairly normal echocardiogram    Dyslipidemia- (present on admission)  Assessment & Plan  Continue active management with statin    Primary hypertension  Assessment & Plan  Monitor vitals.  On propranolol  Lisinopril, lasix, hydralazine  On hold due to renal insufficiency      Chronic pain syndrome- (present on admission)  Assessment & Plan  Altered on exam 1/30   Hold Norco unless clear sign of pain.  On baclofen, prn norco, cymbalta    Acute kidney injury superimposed on chronic kidney disease ([facility])  Assessment & Plan  CT no abscess, perinephric stranding or hydronephrosis  IV fluids, monitor I/O's, vitals and labs  Improved, continue to monitor    Chronic anticoagulation- (present on admission)  Assessment & Plan  Continue    COPD (chronic obstructive pulmonary disease) ([facility])  Assessment & Plan  O2 and resp per protocol/nebs/inhalers  RT per protocol    Type 2 diabetes mellitus with hyperglycemia, without long-term current use of insulin ([facility])  Assessment & Plan  Monitor Accu-Cheks cover sliding scale insulin  1/29 glycohemoglobin: 6.7  Diabetic diet  Hypoglycemic protocol    Plan  Continue with antibiotic therapy  Reduce IV fluids  Follow labs closely  See orders  Medically  complex high-risk patient    VTE prophylaxis: SCDs/TEDs and therapeutic anticoagulation with apixaban    I have performed a physical exam and reviewed and updated ROS and Plan today (1/31/2023). In review of yesterday's note (1/30/2023), there are no changes except as documented above.      Please note that this dictation was created using voice recognition software. I have made every reasonable attempt to correct obvious errors, but I expect that there are errors of grammar and possibly context that I did not discover before finalizing the note.

## 2023-01-31 NOTE — WOUND TEAM
[facility]  Inpatient Services  Initial Wound and Skin Care Evaluation    Admission Date: 1/29/2023     Last order of IP CONSULT TO WOUND CARE was found on 1/31/2023 from Hospital Encounter on 1/29/2023     HPI, PMH, SH: Reviewed    History reviewed. No pertinent surgical history.  Social History     Tobacco Use    Smoking status: Never    Smokeless tobacco: Never   Substance Use Topics    Alcohol use: Not Currently     Chief Complaint   Patient presents with    T-5000 FALL     [facility] unwitnessed fall out of bed. Unknown LOC, no obvious head trauma noted. Pt recently diagnosed with a UTI and starting on Cefdinir as of this morning. Pt has hx of demential and arrives disoriented and unable to provide any history surrounding the event.      Diagnosis: Sepsis (HCC) [A41.9]    Unit where seen by Wound Team: T734/02     WOUND CONSULT/FOLLOW UP RELATED TO:  Sacrum     WOUND HISTORY:  patient fell out of bed and was admitted for UTI.  Hx of dementia, COPD, DM, hypertension.  [facility]      WOUND ASSESSMENT/LDA  Moisture Associated Skin Damage 01/31/23 Sacrum (Active)   Wound Image   01/31/23 1400   NEXT Weekly Photo (Inpatient Only) 02/06/23 01/31/23 1400   Drainage Amount None 01/31/23 1400   Periwound Assessment Red;Blanchable erythema 01/31/23 1400   IAD Cleansing Dimethecone Wipes 01/31/23 1400   Periwound Protectant Antifungal Therapy 01/31/23 1400   IAD Containment Device Condom Catheter 01/31/23 1400   WOUND NURSE ONLY - Time Spent with Patient (mins) 60 01/31/23 1400   Number of days: 0        Vascular:    MARSHALL:   No results found.    Lab Values:    Lab Results   Component Value Date/Time    WBC 7.3 01/31/2023 12:20 AM    RBC 3.47 (L) 01/31/2023 12:20 AM    HEMOGLOBIN 11.2 (L) 01/31/2023 12:20 AM    HEMATOCRIT 32.1 (L) 01/31/2023 12:20 AM    HBA1C 6.7 (H) 01/29/2023 01:59 PM        Culture Results show:  No  results found for this or any previous visit (from the past 720 hour(s)).    Pain Level/Medicated:  denied pain       INTERVENTIONS BY WOUND TEAM:  Chart and images reviewed. Discussed with bedside RN. All areas of concern (based on picture review, LDA review and discussion with bedside RN) have been thoroughly assessed. Documentation of areas based on significant findings. This RN in to assess patient. Performed standard wound care which includes appropriate positioning, dressing removal and non-selective debridement. Pictures and measurements obtained weekly if/when required.  Preparation for Dressing removal: Dressing soaked with n/a  Non-selectively Debrided with:  barrier wipes  Sharp debridement: n/a  Fadia wound: Cleansed with barrier wipes, Prepped with antifungal  Primary Dressing: [id number]  Secondary (Outer) Dressing: Offloading    Advanced Wound Care Discharge Planning  Number of Clinicians necessary to complete wound care: 1  Is patient requiring IV pain medications for dressing changes: no  Length of time for dressing change 5 min. (This does not include chart review, pre-medication time, set up, clean up or time spent charting.)    Interdisciplinary consultation: Patient, Bedside RN Shantal (Wound Tech)    EVALUATION / RATIONALE FOR TREATMENT:  Most Recent Date:  1/31/23: Patient with moisture fissure and red sacrum with rash like concern for fungal infection.  Moisture management and antifungal treatment.  Offloading is important.       Goals: Steady decrease in wound area and depth weekly.    WOUND TEAM PLAN OF CARE ([X] for frequency of wound follow up,): X  Nursing to follow dressing orders written for wound care. Contact wound team if area fails to progress, deteriorates or with any questions/concerns if something comes up before next scheduled follow up (See below as to whether wound is following and frequency of wound follow up)  Dressing changes by wound team:                   Follow up 3 times  weekly:                NPWT change 3 times weekly:     Follow up 1-2 times weekly:      Follow up Bi-Monthly:           Follow up Monthly (High Risk):                        Follow up as needed:     Other (explain): Wound team is not following    NURSING PLAN OF CARE ORDERS (X):  Dressing changes: See Dressing Care orders: x  Skin care: See Skin Care orders: x  RN Prevention Protocol: x  Rectal tube care: See Rectal Tube Care orders:   Other orders:    RSKIN:   CURRENTLY IN PLACE (X), APPLIED THIS VISIT (A), ORDERED (O):   Q shift Hector:  X  Q shift pressure point assessments:  X    Surface/Positioning X  Pressure redistribution mattress   x         Low Airloss          ICU Low Airloss   Bariatric SUSY     Waffle cushion        Waffle Overlay      o    Reposition q 2 hours  o    TAPs Turning system     Z German Pillow     Offloading/Redistribution O  Sacral Mepilex (Silicone dressing)     Heel Mepilex (Silicone dressing)  o       Heel float boots (Prevalon boot)             Float Heels off Bed with Pillows           Respiratory room air  Silicone O2 tubing         Gray Foam Ear protectors     Cannula fixation Device (Tender )          High flow offloading Clip    Elastic head band offloading device      Anchorfast                                                         Trach with Optifoam split foam             Containment/Moisture Prevention incontinent    Rectal tube or BMS    Purwick/Condom Cath o       Ferro Catheter    Barrier wipes o          Barrier paste       Antifungal tx   o   Interdry        Mobilization CJ      Up to chair        Ambulate      PT/OT      Nutrition PO      Dietician        Diabetes Education      PO     TF     TPN     NPO   # days     Other        Anticipated discharge plans: CJ  LTACH:        SNF/Rehab:                  Home Health Care:           Outpatient Wound Center:            Self/Family Care:        Other:                  Vac Discharge Needs:   Vac Discharge plan is purely a  recommendation from wound team and not a requirement for discharge unless otherwise stated by physician.  Not Applicable Pt not on a wound vac:     x  Regular Vac while inpatient, alternative dressing at DC:        Regular Vac in use and continued at DC:            Reg. Vac w/ Skin Sub/Biologic in use. Will need to be changed 2x wkly:      Veraflo Vac while inpatient, ok to transition to Regular Vac on Discharge (Bedside RN to Clamp small instillation tubing at time of DC):           Veraflo Vac while inpatient, would benefit from remaining on Veraflo Vac upon discharge:

## 2023-01-31 NOTE — CARE PLAN
The patient is Watcher - Medium risk of patient condition declining or worsening    Shift Goals  Clinical Goals: Hemodynamic stability, increased mobility  Patient Goals: CJ  Family Goals: CJ    Progress made toward(s) clinical / shift goals:    Problem: Skin Integrity  Goal: Skin integrity is maintained or improved  Outcome: Progressing - Pt has potential wound on sacrum, Q2 turns done, barrier cream used, pt cleaned immediately when he has incontinence, wound consult placed      Problem: Fall Risk  Goal: Patient will remain free from falls  Outcome: Progressing Pt is A&Ox1. BED ALARM IN PLACE AT ALL TIMES. Treaded slipper socks on pt. Educated to call light. Personal belongings within reach. Remains free from falls at this time. This RN to remain close to patient room when possible, door kept open so bed alarm can be heard at all times.     Problem: Pain - Standard  Goal: Alleviation of pain or a reduction in pain to the patient’s comfort goal  Outcome: Progressing - Patient is comfortable at this time without statements of discomfort or pain.    Patient is not progressing towards the following goals:      Problem: Knowledge Deficit - COPD  Goal: Patient/significant other demonstrates understanding of disease process, utilization of the Action Plan, medications and discharge instruction  Outcome: Not Progressing - Pt AxO x 1 and does not understand education

## 2023-01-31 NOTE — PROGRESS NOTES
Late entry:     Report called to NAHEED Colon.  Pt transferred in bed, on telemetry monitor, with all belongings and medications to room T734 via CNA escort without incident. VS before transfer as charted at 1200.

## 2023-01-31 NOTE — PROGRESS NOTES
Patient up to unit from [facility]. Patient is A&Ox1, oriented to self, VSS, no signs of distress. Tele monitor placed and verified by monitor room, bed alarm on, call light in reach, daughter at bedside. All questions and concerns answered, call light in reach, will continue to monitor.

## 2023-02-01 LAB
ALBUMIN SERPL BCP-MCNC: 2.5 G/DL (ref 3.2–4.9)
ALBUMIN/GLOB SERPL: 0.7 G/DL
ALP SERPL-CCNC: 75 U/L (ref 30–99)
ALT SERPL-CCNC: 6 U/L (ref 2–50)
ANION GAP SERPL CALC-SCNC: 11 MMOL/L (ref 7–16)
AST SERPL-CCNC: 14 U/L (ref 12–45)
BACTERIA BLD CULT: ABNORMAL
BILIRUB SERPL-MCNC: 0.3 MG/DL (ref 0.1–1.5)
BUN SERPL-MCNC: 36 MG/DL (ref 8–22)
CALCIUM ALBUM COR SERPL-MCNC: 10.2 MG/DL (ref 8.5–10.5)
CALCIUM SERPL-MCNC: 9 MG/DL (ref 8.5–10.5)
CHLORIDE SERPL-SCNC: 100 MMOL/L (ref 96–112)
CO2 SERPL-SCNC: 24 MMOL/L (ref 20–33)
CREAT SERPL-MCNC: 1.37 MG/DL (ref 0.5–1.4)
ERYTHROCYTE [DISTWIDTH] IN BLOOD BY AUTOMATED COUNT: 44.2 FL (ref 35.9–50)
GFR SERPLBLD CREATININE-BSD FMLA CKD-EPI: 54 ML/MIN/1.73 M 2
GLOBULIN SER CALC-MCNC: 3.5 G/DL (ref 1.9–3.5)
GLUCOSE BLD STRIP.AUTO-MCNC: 176 MG/DL (ref 65–99)
GLUCOSE BLD STRIP.AUTO-MCNC: 183 MG/DL (ref 65–99)
GLUCOSE BLD STRIP.AUTO-MCNC: 186 MG/DL (ref 65–99)
GLUCOSE BLD STRIP.AUTO-MCNC: 204 MG/DL (ref 65–99)
GLUCOSE SERPL-MCNC: 191 MG/DL (ref 65–99)
HCT VFR BLD AUTO: 38.4 % (ref 42–52)
HGB BLD-MCNC: 13.4 G/DL (ref 14–18)
MAGNESIUM SERPL-MCNC: 1.6 MG/DL (ref 1.5–2.5)
MCH RBC QN AUTO: 31.6 PG (ref 27–33)
MCHC RBC AUTO-ENTMCNC: 34.9 G/DL (ref 33.7–35.3)
MCV RBC AUTO: 90.6 FL (ref 81.4–97.8)
NT-PROBNP SERPL IA-MCNC: 8391 PG/ML (ref 0–125)
PHOSPHATE SERPL-MCNC: 3.4 MG/DL (ref 2.5–4.5)
PLATELET # BLD AUTO: 95 K/UL (ref 164–446)
PMV BLD AUTO: 8.9 FL (ref 9–12.9)
POTASSIUM SERPL-SCNC: 3.4 MMOL/L (ref 3.6–5.5)
PROT SERPL-MCNC: 6 G/DL (ref 6–8.2)
RBC # BLD AUTO: 4.24 M/UL (ref 4.7–6.1)
SIGNIFICANT IND 70042: ABNORMAL
SIGNIFICANT IND 70042: ABNORMAL
SITE SITE: ABNORMAL
SITE SITE: ABNORMAL
SODIUM SERPL-SCNC: 135 MMOL/L (ref 135–145)
SOURCE SOURCE: ABNORMAL
SOURCE SOURCE: ABNORMAL
WBC # BLD AUTO: 8.2 K/UL (ref 4.8–10.8)

## 2023-02-01 PROCEDURE — 36415 COLL VENOUS BLD VENIPUNCTURE: CPT

## 2023-02-01 PROCEDURE — 700102 HCHG RX REV CODE 250 W/ 637 OVERRIDE(OP): Performed by: STUDENT IN AN ORGANIZED HEALTH CARE EDUCATION/TRAINING PROGRAM

## 2023-02-01 PROCEDURE — 700101 HCHG RX REV CODE 250: Performed by: INTERNAL MEDICINE

## 2023-02-01 PROCEDURE — 770020 HCHG ROOM/CARE - TELE (206)

## 2023-02-01 PROCEDURE — 700111 HCHG RX REV CODE 636 W/ 250 OVERRIDE (IP): Performed by: INTERNAL MEDICINE

## 2023-02-01 PROCEDURE — 700102 HCHG RX REV CODE 250 W/ 637 OVERRIDE(OP): Performed by: HOSPITALIST

## 2023-02-01 PROCEDURE — 700105 HCHG RX REV CODE 258: Performed by: HOSPITALIST

## 2023-02-01 PROCEDURE — A9270 NON-COVERED ITEM OR SERVICE: HCPCS | Performed by: STUDENT IN AN ORGANIZED HEALTH CARE EDUCATION/TRAINING PROGRAM

## 2023-02-01 PROCEDURE — A9270 NON-COVERED ITEM OR SERVICE: HCPCS | Performed by: HOSPITALIST

## 2023-02-01 PROCEDURE — 84100 ASSAY OF PHOSPHORUS: CPT

## 2023-02-01 PROCEDURE — 700102 HCHG RX REV CODE 250 W/ 637 OVERRIDE(OP): Performed by: INTERNAL MEDICINE

## 2023-02-01 PROCEDURE — 85027 COMPLETE CBC AUTOMATED: CPT

## 2023-02-01 PROCEDURE — 83880 ASSAY OF NATRIURETIC PEPTIDE: CPT

## 2023-02-01 PROCEDURE — 99233 SBSQ HOSP IP/OBS HIGH 50: CPT | Performed by: STUDENT IN AN ORGANIZED HEALTH CARE EDUCATION/TRAINING PROGRAM

## 2023-02-01 PROCEDURE — 80053 COMPREHEN METABOLIC PANEL: CPT

## 2023-02-01 PROCEDURE — 83735 ASSAY OF MAGNESIUM: CPT

## 2023-02-01 PROCEDURE — 82962 GLUCOSE BLOOD TEST: CPT

## 2023-02-01 PROCEDURE — A9270 NON-COVERED ITEM OR SERVICE: HCPCS | Performed by: INTERNAL MEDICINE

## 2023-02-01 RX ORDER — POTASSIUM CHLORIDE 20 MEQ/1
40 TABLET, EXTENDED RELEASE ORAL ONCE
Status: COMPLETED | OUTPATIENT
Start: 2023-02-01 | End: 2023-02-01

## 2023-02-01 RX ORDER — LEVOFLOXACIN 500 MG/1
500 TABLET, FILM COATED ORAL EVERY 24 HOURS
Status: DISCONTINUED | OUTPATIENT
Start: 2023-02-02 | End: 2023-02-02 | Stop reason: HOSPADM

## 2023-02-01 RX ADMIN — INSULIN HUMAN 1 UNITS: 100 INJECTION, SOLUTION PARENTERAL at 06:13

## 2023-02-01 RX ADMIN — MICONAZOLE NITRATE: 20 CREAM TOPICAL at 17:08

## 2023-02-01 RX ADMIN — FINASTERIDE 5 MG: 5 TABLET, FILM COATED ORAL at 04:58

## 2023-02-01 RX ADMIN — DULOXETINE HYDROCHLORIDE 60 MG: 60 CAPSULE, DELAYED RELEASE ORAL at 04:58

## 2023-02-01 RX ADMIN — APIXABAN 5 MG: 5 TABLET, FILM COATED ORAL at 04:57

## 2023-02-01 RX ADMIN — Medication 1000 UNITS: at 04:58

## 2023-02-01 RX ADMIN — CEFTRIAXONE SODIUM 2000 MG: 10 INJECTION, POWDER, FOR SOLUTION INTRAVENOUS at 14:37

## 2023-02-01 RX ADMIN — INSULIN HUMAN 2 UNITS: 100 INJECTION, SOLUTION PARENTERAL at 17:10

## 2023-02-01 RX ADMIN — CYANOCOBALAMIN TAB 500 MCG 500 MCG: 500 TAB at 08:56

## 2023-02-01 RX ADMIN — CALCIUM CARBONATE 500 MG: 500 TABLET, CHEWABLE ORAL at 17:05

## 2023-02-01 RX ADMIN — ACETAMINOPHEN 650 MG: 325 TABLET, FILM COATED ORAL at 14:15

## 2023-02-01 RX ADMIN — CARBOXYMETHYLCELLULOSE SODIUM 1 DROP: 5 SOLUTION/ DROPS OPHTHALMIC at 17:05

## 2023-02-01 RX ADMIN — PRAVASTATIN SODIUM 80 MG: 20 TABLET ORAL at 20:59

## 2023-02-01 RX ADMIN — INSULIN HUMAN 1 UNITS: 100 INJECTION, SOLUTION PARENTERAL at 21:03

## 2023-02-01 RX ADMIN — APIXABAN 5 MG: 5 TABLET, FILM COATED ORAL at 17:05

## 2023-02-01 RX ADMIN — BUDESONIDE AND FORMOTEROL FUMARATE DIHYDRATE 2 PUFF: 160; 4.5 AEROSOL RESPIRATORY (INHALATION) at 04:58

## 2023-02-01 RX ADMIN — SENNOSIDES AND DOCUSATE SODIUM 2 TABLET: 50; 8.6 TABLET ORAL at 17:07

## 2023-02-01 RX ADMIN — PROPRANOLOL HYDROCHLORIDE 40 MG: 20 TABLET ORAL at 17:04

## 2023-02-01 RX ADMIN — CARBOXYMETHYLCELLULOSE SODIUM 1 DROP: 5 SOLUTION/ DROPS OPHTHALMIC at 12:07

## 2023-02-01 RX ADMIN — POTASSIUM CHLORIDE 40 MEQ: 1500 TABLET, EXTENDED RELEASE ORAL at 11:55

## 2023-02-01 RX ADMIN — TAMSULOSIN HYDROCHLORIDE 0.4 MG: 0.4 CAPSULE ORAL at 04:58

## 2023-02-01 RX ADMIN — DIVALPROEX SODIUM 1500 MG: 500 TABLET, DELAYED RELEASE ORAL at 20:59

## 2023-02-01 RX ADMIN — OMEPRAZOLE 40 MG: 20 CAPSULE, DELAYED RELEASE ORAL at 04:57

## 2023-02-01 RX ADMIN — HYDROCODONE BITARTRATE AND ACETAMINOPHEN 1 TABLET: 5; 325 TABLET ORAL at 17:04

## 2023-02-01 RX ADMIN — LIDOCAINE 3 PATCH: 50 PATCH TOPICAL at 21:08

## 2023-02-01 RX ADMIN — CARBOXYMETHYLCELLULOSE SODIUM 1 DROP: 5 SOLUTION/ DROPS OPHTHALMIC at 23:21

## 2023-02-01 RX ADMIN — BUDESONIDE AND FORMOTEROL FUMARATE DIHYDRATE 2 PUFF: 160; 4.5 AEROSOL RESPIRATORY (INHALATION) at 17:06

## 2023-02-01 RX ADMIN — MICONAZOLE NITRATE: 20 CREAM TOPICAL at 04:47

## 2023-02-01 RX ADMIN — SODIUM CHLORIDE, POTASSIUM CHLORIDE, SODIUM LACTATE AND CALCIUM CHLORIDE: 600; 310; 30; 20 INJECTION, SOLUTION INTRAVENOUS at 17:20

## 2023-02-01 RX ADMIN — CALCIUM CARBONATE 500 MG: 500 TABLET, CHEWABLE ORAL at 04:58

## 2023-02-01 RX ADMIN — PROPRANOLOL HYDROCHLORIDE 40 MG: 20 TABLET ORAL at 04:57

## 2023-02-01 ASSESSMENT — PAIN DESCRIPTION - PAIN TYPE
TYPE: CHRONIC PAIN

## 2023-02-01 ASSESSMENT — FIBROSIS 4 INDEX: FIB4 SCORE: 4.51

## 2023-02-01 NOTE — THERAPY
Physical Therapy   Initial Evaluation     Patient Name: Wale Olivas  Age:  77 y.o., Sex:  male  Medical Record #: [id number]  Today's Date: 1/31/2023     Precautions  Precautions: Fall Risk  Comments: dementia    Assessment  Patient is 77 y.o. male [facility]. Dx sepsis 2/2 UTI. PMH includes dementia, COPD, DM, HTN, and atrial arrhythmia. Pts daughter at bedside during evaluation and able to confirm information regarding prior level of function. Mod assist required for bed mobility and CGA assist with transfers. Increased time required due to delayed responses. Education provided to daughter to encouraged staff to help pt to chair for meals and keep blinds open to improve overall level of arousal. PT will cont while pt is in acute care setting.     Plan    Physical Therapy Initial Treatment Plan   Treatment Plan : Bed Mobility, Neuro Re-Education / Balance, Self Care / Home Evaluation, Therapeutic Activities, Therapeutic Exercise  Treatment Frequency: 3 Times per Week  Duration: Until Therapy Goals Met    DC Equipment Recommendations: None  Discharge Recommendations: Recommend home health for continued physical therapy services ([facility])          01/31/23 8441   Prior Living Situation   Prior Services Skilled Home Health Services   Housing / Facility Skilled Nursing Facility   Equipment Owned Wheelchair   Lives with - Patient's Self Care Capacity Other (Comments)   Comments pts daughter present during evaluation. She [facility]. He typically uses a WC and is indepenent with transfers and propelling. He recevies assist with showering and meals.   Prior Level of Functional Mobility   Bed Mobility Independent   Transfer Status Independent   Wheelchair Independent   Comments independent in WC per daughter   History of Falls   History of Falls Yes   Cognition    Cognition / Consciousness X   Level of Consciousness Alert    Ability To Follow Commands 1 Step   Safety Awareness Impaired;Impulsive   New Learning Impaired   Attention Impaired   Comments cooperative, hx of dementia   Active ROM Lower Body    Active ROM Lower Body  WDL   Strength Lower Body   Lower Body Strength  X   Gross Strength Generalized Weakness, Equal Bilaterally   Other Treatments   Other Treatments Provided education to daughter on keeping blinds open, encouraging staff to assist pt to chair for meals   Balance Assessment   Sitting Balance (Static) Fair   Sitting Balance (Dynamic) Fair -   Standing Balance (Static) Poor +   Standing Balance (Dynamic) Poor +   Weight Shift Sitting Fair   Weight Shift Standing Fair   Comments transfers with HHA   Bed Mobility    Supine to Sit Moderate Assist   Scooting Supervised   Comments HOB raised   Gait Analysis   Gait Level Of Assist Unable to Participate   Comments WC at baseline   Functional Mobility   Sit to Stand Contact Guard Assist   Bed, Chair, Wheelchair Transfer Contact Guard Assist   Transfer Method Stand Pivot   Mobility EOB, STS, SPT to chair   Edema / Skin Assessment   Edema / Skin  Not Assessed   Patient / Family Goals    Patient / Family Goal #1 get back to being independent in WC   Short Term Goals    Short Term Goal # 1 pt will be able to complete supine<>sitting from flat bed with SPV in 6tx in order to return to prior level of function   Short Term Goal # 2 pt will be able to complete functional transfers with SPV in 6tx in order to return to prior level of function   Education Group   Education Provided Role of Physical Therapist   Role of Physical Therapist Patient Response Patient;Family;Acceptance;Explanation;Demonstration;Action Demonstration;Verbal Demonstration   Anticipated Discharge Equipment and Recommendations   DC Equipment Recommendations None   Discharge Recommendations Recommend home health for continued physical therapy services  ([facility])

## 2023-02-01 NOTE — CARE PLAN
The patient is Stable - Low risk of patient condition declining or worsening    Shift Goals: patient will have improved mobility and PO intake.   Clinical Goals: improved interaction with staff and infection cleats  Patient Goals: patient unable to verbalize  Family Goals: spoke with daughter who would like patient to cognatively return to [facility] and return home    Progress made toward(s) clinical / shift goals:  Patient is verbalizing more frequently with family present,     Patient is not progressing towards the following goals:  He remains unable to ambulate or care for his physical needs.     Problem: Nutrition - Advanced  Goal: Patient will display progressive weight gain toward goal have adequate food and fluid intake  Outcome: Not Met     Problem: Self Care  Goal: Patient will have the ability to perform ADLs independently or with assistance (bathe, groom, dress, toilet and feed)  Outcome: Not Met

## 2023-02-01 NOTE — CARE PLAN
The patient is Stable - Low risk of patient condition declining or worsening    Shift Goals  Clinical Goals: maintain safety  Patient Goals:  (unable to assess)  Family Goals: CJ    Progress made toward(s) clinical / shift goals:    Problem: Self Care  Goal: Patient will have the ability to perform ADLs independently or with assistance (bathe, groom, dress, toilet and feed)  Outcome: Not Progressing  Note: Patient oriented to self, incontinent     Problem: Hemodynamics  Goal: Patient's hemodynamics, fluid balance and neurologic status will be stable or improve  Outcome: Progressing     Problem: Fluid Volume  Goal: Fluid volume balance will be maintained  Outcome: Progressing     Problem: Urinary - Renal Perfusion  Goal: Ability to achieve and maintain adequate renal perfusion and functioning will improve  Outcome: Progressing     Problem: Respiratory  Goal: Patient will achieve/maintain optimum respiratory ventilation and gas exchange  Outcome: Progressing       Patient is not progressing towards the following goals:      Problem: Self Care  Goal: Patient will have the ability to perform ADLs independently or with assistance (bathe, groom, dress, toilet and feed)  Outcome: Not Progressing  Note: Patient oriented to self, incontinent

## 2023-02-01 NOTE — THERAPY
"Occupational Therapy   Initial Evaluation     Patient Name: Wale Olivas  Age:  77 y.o., Sex:  male  Medical Record #: [id number]  Today's Date: 1/31/2023     Precautions  Precautions: (P) Fall Risk    Assessment  Patient is 77 y.o. male who presents to acute [facility]. PMH includes dementia, atrial arrhythmia, COPD, DM, and HTN. Pt found to have UTIL and was septic. Dtr present, reports that at baseline pt txf's to w/c, self feeds, grooms, and typically dresses himself at home. Today pt required CGA for txf to chair, able to self feed finger foods, however required assist w/ utensils. Pt demo'd impaired balance, functional mobility, and activity tolerance impacting functional independence. Will continue to follow. Anticipate pt will be functionally capable to DC home back to LTC facility w/ HH services.     Plan    Occupational Therapy Initial Treatment Plan   Treatment Interventions: (P) Self Care / Activities of Daily Living, Neuro Re-Education / Balance, Therapeutic Exercises, Therapeutic Activity, Adaptive Equipment  Treatment Frequency: (P) 3 Times per Week  Duration: (P) Until Therapy Goals Met    DC Equipment Recommendations: (P) None  Discharge Recommendations: (P) Recommend home health for continued occupational therapy services (and return to LTC home)     Subjective    \"I'll eat if its hot\"     Objective       01/31/23 1548   Charge Group   OT Evaluation OT Evaluation Mod   Total Time Spent   OT Time Spent Yes   OT Evaluation (Minutes) 25   Initial Contact Note    Initial Contact Note Order Received and Verified, Occupational Therapy Evaluation in Progress with Full Report to Follow.   Prior Living Situation   Prior Services Skilled Home Health Services   Housing / Facility Skilled Nursing Facility   Comments Pt's dtr present, [facility]. Per dtr pt is independent w/ self feeding, dressing, grooming, receives assist w/ bathing. Typically " txfs to a w/c or lift chair. Rolls w/c to cafeteria   Prior Level of ADL Function   Self Feeding Independent   Grooming / Hygiene Independent   Bathing Requires Assist   Dressing Independent   Toileting Independent   Prior Level of IADL Function   Medication Management Requires Assist   Laundry Requires Assist   Kitchen Mobility Requires Assist   Finances Requires Assist   Home Management Requires Assist   Shopping Requires Assist   Prior Level Of Mobility Uses Wheel Chair for Community Mobility;Uses Wheel Chair for in Home Mobility   Precautions   Precautions Fall Risk   Vitals   O2 (LPM) 0   O2 Delivery Device None - Room Air   Pain 0 - 10 Group   Therapist Pain Assessment Post Activity Pain Same as Prior to Activity;Nurse Notified  (no c/o pain during session)   Cognition    Cognition / Consciousness X   Level of Consciousness Alert   Ability To Follow Commands 1 Step   Safety Awareness Impaired;Impulsive   New Learning Impaired   Attention Impaired   Comments pleasent and cooperative, more alert when sitting up, hx of dementia   Active ROM Upper Body   Comments WFL   Strength Upper Body   Comments hx of stroke, typically able to self feed, today was shakey and uncoordinated with utensils   Coordination Upper Body   Comments uses weighted utensils at baseline   Balance Assessment   Sitting Balance (Static) Fair -   Sitting Balance (Dynamic) Fair -   Standing Balance (Static) Poor +   Standing Balance (Dynamic) Poor +   Weight Shift Sitting Fair   Weight Shift Standing Fair   Comments w/ B UE support, no FWW used   Bed Mobility    Supine to Sit Moderate Assist   Sit to Supine   (NT in chair post)   Scooting Supervised   ADL Assessment   Grooming Contact Guard Assist;Seated   Upper Body Dressing Minimal Assist   Toileting Minimal Assist  (stood w/ urinal)   How much help from another person does the patient currently need...   Putting on and taking off regular lower body clothing? 2   Bathing (including washing,  rinsing, and drying)? 2   Toileting, which includes using a toilet, bedpan, or urinal? 2   Putting on and taking off regular upper body clothing? 3   Taking care of personal grooming such as brushing teeth? 3   Eating meals? 4   6 Clicks Daily Activity Score 16   Functional Mobility   Sit to Stand Contact Guard Assist   Bed, Chair, Wheelchair Transfer Contact Guard Assist   Mobility SPT txf from EOB > Chair w/ B UE support   Activity Tolerance   Sitting in Chair 10 min + (up post)   Sitting Edge of Bed 5 min   Standing 2 min total   Patient / Family Goals   Patient / Family Goal #1 Family wishes for pt to stay here 2 more days and then [facility]   Short Term Goals   Short Term Goal # 1 Pt will demo self feeding w/ SPV   Short Term Goal # 2 Pt will demo BSC txf w/ SPV   Short Term Goal # 3 Pt will demo toilet hygiene w/ SPV   Education Group   Role of Occupational Therapist Patient Response Patient;Acceptance;Explanation;Demonstration;Verbal Demonstration;Action Demonstration   Occupational Therapy Initial Treatment Plan    Treatment Interventions Self Care / Activities of Daily Living;Neuro Re-Education / Balance;Therapeutic Exercises;Therapeutic Activity;Adaptive Equipment   Treatment Frequency 3 Times per Week   Duration Until Therapy Goals Met   Problem List   Problem List Decreased Active Daily Living Skills;Decreased Homemaking Skills;Decreased Functional Mobility;Decreased Activity Tolerance;Impaired Postural Control / Balance   Anticipated Discharge Equipment and Recommendations   DC Equipment Recommendations None   Discharge Recommendations Recommend home health for continued occupational therapy services  (and return to LTC home)   Interdisciplinary Plan of Care Collaboration   IDT Collaboration with  Nursing;Family / Caregiver;Physical Therapist   Patient Position at End of Therapy Seated;Chair Alarm On;Call Light within Reach;Tray Table within Reach;Phone within Reach;Family / Friend in Room    Collaboration Comments report given   Session Information   Date / Session Number  1/31, 1 (1/3, 2/6)

## 2023-02-02 VITALS
WEIGHT: 185.19 LBS | TEMPERATURE: 98.6 F | BODY MASS INDEX: 27.43 KG/M2 | HEART RATE: 67 BPM | DIASTOLIC BLOOD PRESSURE: 69 MMHG | OXYGEN SATURATION: 91 % | HEIGHT: 69 IN | RESPIRATION RATE: 16 BRPM | SYSTOLIC BLOOD PRESSURE: 158 MMHG

## 2023-02-02 LAB
GLUCOSE BLD STRIP.AUTO-MCNC: 115 MG/DL (ref 65–99)
GLUCOSE BLD STRIP.AUTO-MCNC: 161 MG/DL (ref 65–99)

## 2023-02-02 PROCEDURE — A9270 NON-COVERED ITEM OR SERVICE: HCPCS | Performed by: INTERNAL MEDICINE

## 2023-02-02 PROCEDURE — 700102 HCHG RX REV CODE 250 W/ 637 OVERRIDE(OP): Performed by: STUDENT IN AN ORGANIZED HEALTH CARE EDUCATION/TRAINING PROGRAM

## 2023-02-02 PROCEDURE — A9270 NON-COVERED ITEM OR SERVICE: HCPCS | Performed by: HOSPITALIST

## 2023-02-02 PROCEDURE — 700102 HCHG RX REV CODE 250 W/ 637 OVERRIDE(OP): Performed by: HOSPITALIST

## 2023-02-02 PROCEDURE — 82962 GLUCOSE BLOOD TEST: CPT | Mod: 91

## 2023-02-02 PROCEDURE — 700102 HCHG RX REV CODE 250 W/ 637 OVERRIDE(OP): Performed by: INTERNAL MEDICINE

## 2023-02-02 PROCEDURE — 99239 HOSP IP/OBS DSCHRG MGMT >30: CPT | Performed by: STUDENT IN AN ORGANIZED HEALTH CARE EDUCATION/TRAINING PROGRAM

## 2023-02-02 PROCEDURE — A9270 NON-COVERED ITEM OR SERVICE: HCPCS | Performed by: STUDENT IN AN ORGANIZED HEALTH CARE EDUCATION/TRAINING PROGRAM

## 2023-02-02 RX ORDER — LEVOFLOXACIN 500 MG/1
500 TABLET, FILM COATED ORAL EVERY 24 HOURS
Qty: 5 TABLET | Refills: 0 | Status: ACTIVE | OUTPATIENT
Start: 2023-02-03 | End: 2023-02-08

## 2023-02-02 RX ADMIN — CARBOXYMETHYLCELLULOSE SODIUM 1 DROP: 5 SOLUTION/ DROPS OPHTHALMIC at 05:55

## 2023-02-02 RX ADMIN — DULOXETINE HYDROCHLORIDE 60 MG: 60 CAPSULE, DELAYED RELEASE ORAL at 05:55

## 2023-02-02 RX ADMIN — OMEPRAZOLE 40 MG: 20 CAPSULE, DELAYED RELEASE ORAL at 05:50

## 2023-02-02 RX ADMIN — BUDESONIDE AND FORMOTEROL FUMARATE DIHYDRATE 2 PUFF: 160; 4.5 AEROSOL RESPIRATORY (INHALATION) at 05:55

## 2023-02-02 RX ADMIN — INSULIN HUMAN 1 UNITS: 100 INJECTION, SOLUTION PARENTERAL at 10:56

## 2023-02-02 RX ADMIN — TAMSULOSIN HYDROCHLORIDE 0.4 MG: 0.4 CAPSULE ORAL at 05:50

## 2023-02-02 RX ADMIN — Medication 1000 UNITS: at 05:50

## 2023-02-02 RX ADMIN — CALCIUM CARBONATE 500 MG: 500 TABLET, CHEWABLE ORAL at 05:55

## 2023-02-02 RX ADMIN — LEVOFLOXACIN 500 MG: 500 TABLET, FILM COATED ORAL at 05:54

## 2023-02-02 RX ADMIN — TIOTROPIUM BROMIDE INHALATION SPRAY 5 MCG: 3.12 SPRAY, METERED RESPIRATORY (INHALATION) at 05:56

## 2023-02-02 RX ADMIN — PROPRANOLOL HYDROCHLORIDE 40 MG: 20 TABLET ORAL at 05:50

## 2023-02-02 RX ADMIN — FINASTERIDE 5 MG: 5 TABLET, FILM COATED ORAL at 05:55

## 2023-02-02 RX ADMIN — HYDROCODONE BITARTRATE AND ACETAMINOPHEN 1 TABLET: 5; 325 TABLET ORAL at 05:55

## 2023-02-02 RX ADMIN — MICONAZOLE NITRATE: 20 CREAM TOPICAL at 05:56

## 2023-02-02 RX ADMIN — APIXABAN 5 MG: 5 TABLET, FILM COATED ORAL at 05:54

## 2023-02-02 ASSESSMENT — PAIN DESCRIPTION - PAIN TYPE: TYPE: CHRONIC PAIN

## 2023-02-02 ASSESSMENT — PAIN SCALES - PAIN ASSESSMENT IN ADVANCED DEMENTIA (PAINAD)
BREATHING: NORMAL
BODYLANGUAGE: RELAXED
CONSOLABILITY: DISTRACTED OR REASSURED BY VOICE/TOUCH
NEGVOCALIZATION: OCCASIONAL MOAN/GROAN, LOW SPEECH, NEGATIVE/DISAPPROVING QUALITY

## 2023-02-02 NOTE — CARE PLAN
The patient is Stable - Low risk of patient condition declining or worsening    Shift Goals  Clinical Goals: Maintain safety, provide frequent toileting. Monitor labs/vitals, pain management  Patient Goals: Unable to assess  Family Goals: spoke with daughter who would like patient to cognatively return to [facility] and return home    Progress made toward(s) clinical / shift goals:    Problem: Hemodynamics  Goal: Patient's hemodynamics, fluid balance and neurologic status will be stable or improve  Outcome: Progressing  Patient's vital signs stable.     Problem: Fluid Volume  Goal: Fluid volume balance will be maintained  Outcome: Progressing  Patient receiving LR continuous at 60ml/hr.     Problem: Knowledge Deficit - Standard  Goal: Patient and family/care givers will demonstrate understanding of plan of care, disease process/condition, diagnostic tests and medications  Outcome: Progressing  Patient's family updated on plan of care during the day 2/1/2022     Problem: Skin Integrity  Goal: Skin integrity is maintained or improved  Outcome: Progressing  Patient turned Q2hrs, barrier paste applied to patient.        Patient is not progressing towards the following goals:

## 2023-02-02 NOTE — PROGRESS NOTES
MONITOR SUMMARY:     Rhythm: SB, SR  Rate: 57 - 67  Ectopy: (o)PVC, (r)Trig, (r)Bigem    12 Hour chart check.

## 2023-02-02 NOTE — DISCHARGE PLANNING
"RN CM spoke with \"Albert\" with [facility] ([address] [facility]) pH# [phone]. Informed him of plan to d/c patient today. Transport time arranged for 1pm p/u.   "

## 2023-02-02 NOTE — HOSPITAL COURSE
"Wale Olivas is a 77 y.o. male who presented 1/29/2023 with T-5000 FALL ([facility] an unwitnessed fall out of bed. Unknown LOC, no obvious head trauma noted. Pt recently diagnosed with a UTI and starting on Cefdinir as of this morning. Pt has hx of demential and arrives disoriented and unable to provide any history surrounding the event. )  He has dementia, baseline AAOx2 thus is a nonhistorian. He did say \"ouch\" at one point. Reviewing his chart he has chronic pain syndrome, atrial arrhythmia (Afib?) on anticoagulation, COPD, diabetes, hypertension. He was brought in for fall out of bed.    During hospitalization patient had blood cultures drawn he also had urine culture performed all of which were conclusive for E. coli with similar sensitivities.  Sensitivities included bacilio quinolones.  Patient was medically transitioned off IV antimicrobials and to p.o. antimicrobials which he tolerated without issue.  Patient will continue on levofloxacin 500 mg daily until 2/8/2023.  This had already counted days of antimicrobial received via IV during hospitalization.  Patient will continue on previous medication regimen.  Would caution against sedative medications have held patient's baclofen at present.  Medications were sent to pharmacy of record.  "

## 2023-02-02 NOTE — DISCHARGE PLANNING
DC Transport Scheduled    Received request at: 0833 2/2/2023    Transport Company Scheduled:  [employer]  Spoke with Kunal at [facility] to schedule transport.    Scheduled Date: 2/2/2023  Scheduled Time: 1300    Destination: [facility]    Notified care team of scheduled transport via [employer].     If there are any changes needed to the DC transportation scheduled, please contact [facility] at ext. [phone] between the hours of 3166-2162 Mon-Fri. If outside those hours, contact the ED Case Manager at ext. [phone].

## 2023-02-02 NOTE — CARE PLAN
The patient is Stable - Low risk of patient condition declining or worsening    Shift Goals  Clinical Goals: Maintain safety, provide frequent toileting. Monitor labs/vitals, pain management  Patient Goals: Unable to assess  Family Goals: spoke with daughter who would like patient to cognatively return to [facility] and return home    Progress made toward(s) clinical / shift goals:    Problem: Knowledge Deficit - COPD  Goal: Patient/significant other demonstrates understanding of disease process, utilization of the Action Plan, medications and discharge instruction  Outcome: Progressing     Problem: Risk for Infection - COPD  Goal: Patient will remain free from signs and symptoms of infection  Outcome: Progressing     Problem: Nutrition - Advanced  Goal: Patient will display progressive weight gain toward goal have adequate food and fluid intake  Outcome: Progressing     Problem: Ineffective Airway Clearance  Goal: Patient will maintain patent airway with clear/clearing breath sounds  Outcome: Progressing     Problem: Impaired Gas Exchange  Goal: Patient will demonstrate improved ventilation and adequate oxygenation and participate in treatment regimen within the level of ability/situation.  Outcome: Progressing     Problem: Risk for Aspiration  Goal: Patient's risk for aspiration will be absent or decrease  Outcome: Progressing     Problem: Self Care  Goal: Patient will have the ability to perform ADLs independently or with assistance (bathe, groom, dress, toilet and feed)  Outcome: Progressing     Problem: Hemodynamics  Goal: Patient's hemodynamics, fluid balance and neurologic status will be stable or improve  Outcome: Progressing     Problem: Fluid Volume  Goal: Fluid volume balance will be maintained  Outcome: Progressing     Problem: Urinary - Renal Perfusion  Goal: Ability to achieve and maintain adequate renal perfusion and functioning will improve  Outcome: Progressing     Problem: Respiratory  Goal: Patient  will achieve/maintain optimum respiratory ventilation and gas exchange  Outcome: Progressing     Problem: Physical Regulation  Goal: Diagnostic test results will improve  Outcome: Progressing  Goal: Signs and symptoms of infection will decrease  Outcome: Progressing     Problem: Knowledge Deficit - Standard  Goal: Patient and family/care givers will demonstrate understanding of plan of care, disease process/condition, diagnostic tests and medications  Outcome: Progressing     Problem: Skin Integrity  Goal: Skin integrity is maintained or improved  Outcome: Progressing     Problem: Fall Risk  Goal: Patient will remain free from falls  Outcome: Progressing     Problem: Pain - Standard  Goal: Alleviation of pain or a reduction in pain to the patient’s comfort goal  Outcome: Progressing     Problem: Mechanical Ventilation  Goal: Safe management of artificial airway and ventilation  Outcome: Progressing  Goal: Successful weaning off mechanical ventilator, spontaneously maintains adequate gas exchange  Outcome: Progressing  Goal: Patient will be able to express needs and understand communication  Outcome: Progressing       Patient is not progressing towards the following goals:

## 2023-02-02 NOTE — PROGRESS NOTES
Report received from day shift RN, assumed care of pt. Pt A&Ox1 Oriented to self. Plan of care discussed with pt, labs and chart reviewed. All needs met at this time. Tele box on. On Ra. Call light within reach, bed locked and in lowest position. All fall precautions and hourly rounding in place.

## 2023-02-02 NOTE — DISCHARGE SUMMARY
"Discharge Summary    CHIEF COMPLAINT ON ADMISSION  Chief Complaint   Patient presents with    T-5000 FALL     [facility] unwitnessed fall out of bed. Unknown LOC, no obvious head trauma noted. Pt recently diagnosed with a UTI and starting on Cefdinir as of this morning. Pt has hx of demential and arrives disoriented and unable to provide any history surrounding the event.        Reason for Admission  tbi     Admission Date  1/29/2023    CODE STATUS  Full Code    HPI & HOSPITAL COURSE  This is a 77 y.o. male here with ground-level fall  Wale Olivas is a 77 y.o. male who presented 1/29/2023 with T-5000 FALL ([facility] an unwitnessed fall out of bed. Unknown LOC, no obvious head trauma noted. Pt recently diagnosed with a UTI and starting on Cefdinir as of this morning. Pt has hx of demential and arrives disoriented and unable to provide any history surrounding the event. )  He has dementia, baseline AAOx2 thus is a nonhistorian. He did say \"ouch\" at one point. Reviewing his chart he has chronic pain syndrome, atrial arrhythmia (Afib?) on anticoagulation, COPD, diabetes, hypertension. He was brought in for fall out of bed.    During hospitalization patient had blood cultures drawn he also had urine culture performed all of which were conclusive for E. coli with similar sensitivities.  Sensitivities included bacilio quinolones.  Patient was medically transitioned off IV antimicrobials and to p.o. antimicrobials which he tolerated without issue.  Patient will continue on levofloxacin 500 mg daily until 2/8/2023.  This had already counted days of antimicrobial received via IV during hospitalization.  Patient will continue on previous medication regimen.  Would caution against sedative medications have held patient's baclofen at present.  Medications were sent to pharmacy of record.    Therefore, he is discharged in good and " stable condition to skilled nursing facility.    The patient met 2-midnight criteria for an inpatient stay at the time of discharge.    Discharge Date  2/2/2023    FOLLOW UP ITEMS POST DISCHARGE  Take all medication as prescribed  Go to all follow-up appointments as indicated    DISCHARGE DIAGNOSES  Principal Problem:    Sepsis secondary to UTI, JOSE ENRIQUE  (HCC) POA: Yes  Active Problems:    Type 2 diabetes mellitus with hyperglycemia, without long-term current use of insulin (HCC) POA: Unknown    COPD (chronic obstructive pulmonary disease) (HCC) POA: Unknown    Chronic anticoagulation POA: Yes    Acute kidney injury superimposed on chronic kidney disease (HCC) POA: Unknown    Chronic pain syndrome POA: Yes    Primary hypertension POA: Unknown    Dyslipidemia POA: Yes    Atrial arrhythmia POA: Yes    Dementia (HCC) POA: Yes    Encephalopathy chronic POA: Yes    Overweight POA: Unknown    Hypokalemia POA: Unknown    Hyponatremia POA: Unknown  Resolved Problems:    * No resolved hospital problems. *      FOLLOW UP  [facility] administration  No follow-up provider specified.    MEDICATIONS ON DISCHARGE     Medication List        START taking these medications        Instructions   levoFLOXacin 500 MG tablet  Start taking on: February 3, 2023  Commonly known as: LEVAQUIN   Take 1 Tablet by mouth every 24 hours for 5 days.  Dose: 500 mg            CHANGE how you take these medications        Instructions   apixaban 5mg Tabs  What changed:   medication strength  how much to take  Commonly known as: ELIQUIS   Take 1 Tablet by mouth 2 times a day.  Dose: 5 mg            CONTINUE taking these medications        Instructions   acetaminophen 325 MG Tabs  Commonly known as: Tylenol   Take 650 mg by mouth every 6 hours as needed for Mild Pain.  Dose: 650 mg     calcium carbonate 500 MG Chew  Commonly known as: Tums   Chew 500 mg 2 times a day.  Dose: 500 mg     carboxymethylcellulose 0.5 % Soln  Commonly known as: REFRESH TEARS    Administer 1 Drop into both eyes 4 times a day.  Dose: 1 Drop     cyanocobalamin 500 MCG Tabs  Commonly known as: VITAMIN B-12   Take 500 mcg by mouth every 48 hours.  Dose: 500 mcg     diclofenac sodium 1 % Gel  Commonly known as: Voltaren   Apply  topically 4 times a day. Apply to thumbs for chronic pain syndrome     divalproex 500 MG Tbec  Commonly known as: DEPAKOTE   Take 1,500 mg by mouth at bedtime.  Dose: 1,500 mg     DULoxetine 60 MG Cpep delayed-release capsule  Commonly known as: CYMBALTA   Take 60 mg by mouth every day.  Dose: 60 mg     finasteride 5 MG Tabs  Commonly known as: PROSCAR   Take 5 mg by mouth every day.  Dose: 5 mg     fluticasone furoate-vilanterol 100-25 MCG/ACT Aepb  Commonly known as: Breo   Inhale 1 Puff every day.  Dose: 1 Puff     furosemide 40 MG Tabs  Commonly known as: LASIX   Take 40 mg by mouth every day.  Dose: 40 mg     hydrALAZINE 25 MG Tabs  Commonly known as: APRESOLINE   Take 25 mg by mouth 2 times a day.  Dose: 25 mg     HYDROcodone-acetaminophen 5-325 MG Tabs per tablet  Commonly known as: NORCO   Take 1 Tablet by mouth 3 times a day. Scheduled  Dose: 1 Tablet     Incruse Ellipta 62.5 MCG/ACT Aepb  Generic drug: Umeclidinium Bromide   Inhale 1 Puff every day.  Dose: 1 Puff     Lidocaine 4 % Ptch   Apply 3 Patches topically at bedtime. Apply to back for chronic pain syndrome  Dose: 3 Patch     lisinopril 40 MG tablet  Commonly known as: PRINIVIL   Take 40 mg by mouth every day.  Dose: 40 mg     MENTHOL-METHYL SALICYLATE EX   Apply 1 Application topically 2 times a day. Apply to hands for chronic pain syndrome  Dose: 1 Application     pantoprazole 40 MG Tbec  Commonly known as: PROTONIX   Take 40 mg by mouth every day.  Dose: 40 mg     polyethylene glycol/lytes 17 g Pack  Commonly known as: MIRALAX   Take 17 g by mouth every day.  Dose: 17 g     potassium chloride SA 20 MEQ Tbcr  Commonly known as: Kdur   Take 20 mEq by mouth every day.  Dose: 20 mEq     pravastatin 80 MG  tablet  Commonly known as: PRAVACHOL   Take 80 mg by mouth every evening.  Dose: 80 mg     PROBIOTIC PO   Take 1 Capsule by mouth 2 times a day.  Dose: 1 Capsule     propranolol 40 MG Tabs  Commonly known as: INDERAL   Take 40 mg by mouth 2 times a day.  Dose: 40 mg     Roflumilast 500 MCG Tabs   Take 500 mcg by mouth every day.  Dose: 500 mcg     sennosides 8.6 MG Tabs  Commonly known as: SENOKOT   Take 8.6 mg by mouth 2 times a day.  Dose: 8.6 mg     tamsulosin 0.4 MG capsule  Commonly known as: FLOMAX   Take 0.4 mg by mouth every day.  Dose: 0.4 mg     traZODone 100 MG Tabs  Commonly known as: DESYREL   Take 150 mg by mouth every evening.  Dose: 150 mg     triamcinolone acetonide 0.1 % Crea  Commonly known as: KENALOG   Apply 1 Application topically 2 times a day. Apply to abdomen ( rash)   For 14 days  Dose: 1 Application     vitamin D3 1000 Unit (25 mcg) Tabs  Commonly known as: cholecalciferol   Take 1,000 Units by mouth every day.  Dose: 1,000 Units            STOP taking these medications      baclofen 10 MG Tabs  Commonly known as: LIORESAL     cefdinir 300 MG Caps  Commonly known as: OMNICEF     NS Soln              Allergies  Allergies   Allergen Reactions    Metformin     Morphine     Simvastatin     Spironolactone        DIET  Orders Placed This Encounter   Procedures    Diet Order Diet: Consistent CHO (Diabetic)     Standing Status:   Standing     Number of Occurrences:   1     Order Specific Question:   Diet:     Answer:   Consistent CHO (Diabetic) [4]       ACTIVITY  As tolerated and directed by skilled nursing.  Weight bearing as tolerated    CONSULTATIONS  None    PROCEDURES  None    LABORATORY  Lab Results   Component Value Date    SODIUM 135 02/01/2023    POTASSIUM 3.4 (L) 02/01/2023    CHLORIDE 100 02/01/2023    CO2 24 02/01/2023    GLUCOSE 191 (H) 02/01/2023    BUN 36 (H) 02/01/2023    CREATININE 1.37 02/01/2023        Lab Results   Component Value Date    WBC 8.2 02/01/2023    HEMOGLOBIN 13.4  (L) 02/01/2023    HEMATOCRIT 38.4 (L) 02/01/2023    PLATELETCT 95 (L) 02/01/2023      Please note that this dictation was created using voice recognition software. I have made every reasonable attempt to correct obvious errors, but I expect that there are errors of grammar and possibly context that I did not discover before finalizing the note.    Total time of the discharge process exceeds 35 minutes.

## 2023-02-02 NOTE — PROGRESS NOTES
Hospital Medicine Daily Progress Note    Date of Service  2/1/2023    Chief Complaint  Sent from [facility] with an unwitnessed fall out of bed    Hospital Course  Wale Olivas is a 77 y.o. male with dementia, atrial arrhythmia on anticoagualtion, COPD, diabetes, hypertension. He was admitted 1/29/2023 with an unwitnessed fall at home likely from sepsis from possible UTI. Head CT was unremarkable.    In the ER: wbc:8.5, Na:131, K:3.5, Glu:243, BUN:36, Cr:2.12. Lactic acid:5.4 >6.6.    Interval Problem Update  Patient seen and examined today.  Data, Medication data reviewed.  Case discussed with nursing as available.  Plan of Care reviewed with patient and notified of changes.   1/30 Blood cultues already GNR positive.  Lactic acid worse:6.6<5.4. Spoke to Jose, daughter, via telephone and gave her updates.  Patient receiving echocardiogram while I was examining the patient.  Patient confused and was not follow commands.  Patient fidgeting and impulsive. Gram negative rods growing in blood cultures.  Daughter concerned as they state they lost her mother the past year secondary to sepsis.  Patient lives in [place]  1/31 the patient continues to be confused, alert and oriented x1, appears nonfocal, afebrile, heart rate in the 60s, respiration unlabored, 18, on room air, blood pressure elevated in the 160s over 70s and 90s.  Reviewed laboratory data, renal function is improved, lactic acid improved urine culture positive for pansensitive E. coli, blood cultures consistent with that  2/1/2023:  Continue present medical management patient having overall improvement anticipate possible discharge in 1 or 2 days.  Patient will return back to [facility] home.  No acute events overnight.  I have discussed this patient's plan of care and discharge plan at IDT rounds today with Case Management, Nursing, Nursing leadership, and other members of the IDT team.      Code Status  Full Code    Disposition  Patient is not  medically cleared for discharge.   Anticipate discharge to  be determined .  I have placed the appropriate orders for post-discharge needs.    Review of Systems  Review of Systems   Unable to perform ROS: Mental acuity      Physical Exam  Temp:  [36.2 °C (97.1 °F)-37.2 °C (99 °F)] 36.9 °C (98.4 °F)  Pulse:  [65-78] 71  Resp:  [16-18] 18  BP: (110-155)/(64-83) 155/64  SpO2:  [92 %-98 %] 92 %    Physical Exam  Vitals reviewed.   Constitutional:       General: He is awake.      Appearance: He is overweight. He is not diaphoretic.      Interventions: He is restrained.   HENT:      Head: Normocephalic and atraumatic.      Nose: Nose normal.      Mouth/Throat:      Mouth: Mucous membranes are dry.      Pharynx: No oropharyngeal exudate.   Eyes:      General: No scleral icterus.        Right eye: No discharge.         Left eye: No discharge.      Extraocular Movements: Extraocular movements intact.      Conjunctiva/sclera: Conjunctivae normal.   Cardiovascular:      Rate and Rhythm: Normal rate and regular rhythm.      Pulses:           Radial pulses are 2+ on the right side and 2+ on the left side.        Dorsalis pedis pulses are 2+ on the right side and 2+ on the left side.      Heart sounds: No murmur heard.  Pulmonary:      Effort: Pulmonary effort is normal. No respiratory distress.      Breath sounds: Normal breath sounds. No wheezing or rales.   Abdominal:      General: Bowel sounds are normal. There is no distension.      Palpations: Abdomen is soft.      Tenderness: There is no abdominal tenderness.   Musculoskeletal:         General: No swelling or tenderness.      Cervical back: No tenderness. No muscular tenderness.      Right lower leg: No edema.      Left lower leg: No edema.   Lymphadenopathy:      Cervical: No cervical adenopathy.   Skin:     Coloration: Skin is not jaundiced or pale.   Neurological:      Mental Status: He is disoriented.      Comments: Moves all extremities   Psychiatric:         Speech:  He is noncommunicative.         Behavior: Behavior is uncooperative.         Cognition and Memory: Cognition is impaired.         Judgment: Judgment is impulsive.       Fluids    Intake/Output Summary (Last 24 hours) at 2/1/2023 1849  Last data filed at 2/1/2023 1200  Gross per 24 hour   Intake 1352.69 ml   Output --   Net 1352.69 ml         Laboratory  Recent Labs     01/30/23  0230 01/31/23  0020 02/01/23  0522   WBC 7.1 7.3 8.2   RBC 3.39* 3.47* 4.24*   HEMOGLOBIN 10.9* 11.2* 13.4*   HEMATOCRIT 31.2* 32.1* 38.4*   MCV 92.0 92.5 90.6   MCH 32.2 32.3 31.6   MCHC 34.9 34.9 34.9   RDW 42.5 44.6 44.2   PLATELETCT 69* 71* 95*   MPV 9.6 8.9* 8.9*       Recent Labs     01/30/23  0810 01/31/23  0020 02/01/23  0522   SODIUM 136 138 135   POTASSIUM 3.8 3.9 3.4*   CHLORIDE 102 104 100   CO2 24 24 24   GLUCOSE 196* 166* 191*   BUN 30* 28* 36*   CREATININE 1.57* 1.28 1.37   CALCIUM 9.0 8.7 9.0                       Imaging  EC-ECHOCARDIOGRAM COMPLETE W/ CONT   Final Result      CT-RENAL COLIC EVALUATION(A/P W/O)   Final Result         1.  No hydronephrosis or urolithiasis.      2.  Cholelithiasis.      3.  Atherosclerotic disease.      4.  Dependent atelectasis and apparent trace bilateral pleural effusions.      DX-CHEST-PORTABLE (1 VIEW)   Final Result      No acute cardiac or pulmonary abnormalities are identified.      CT-HEAD W/O   Final Result      1.  Cerebral atrophy.      2.  White matter lucencies most consistent with small vessel ischemic change versus demyelination or gliosis.      3. Right cerebral hemisphere encephalomalacia again noted. No evidence of acute cerebral infarction, hemorrhage or mass lesion.                Assessment/Plan  * Sepsis secondary to UTI, JOSE ENRIQUE  (HCC)- (present on admission)  Assessment & Plan  Improved after antibiotics and fluid resuscitation  Monitor I/O's, lactic acid, labs  GNR bacteremia likely from UTI, currently resulting same organism, E.  coli  antibiotics      Hyponatremia  Assessment & Plan  Concern of hypovolemia as etiology  IV fluids and monitor labs    Hypokalemia  Assessment & Plan  Replace and monitor    Overweight  Assessment & Plan  Body mass index is 28.96 kg/m².  Once more alert encourage healthier diet increase activity as tolerates close follow-up with his primary care    Encephalopathy chronic- (present on admission)  Assessment & Plan  Unknown baseline but would assume this is an acute on chronic component based on discussion with daughter.  Suspect acute encephalopathy from infectious etiology  Treat GNR bacteremia with antibiotics  IV fluids  Watch medications  Avoid conflicting agents    Dementia (formerly [facility])- (present on admission)  Assessment & Plan  Per report, [facility]    Atrial arrhythmia- (present on admission)  Assessment & Plan  apixaban 2.5mg BID  Propranolol 40mg BID  Fairly normal echocardiogram    Dyslipidemia- (present on admission)  Assessment & Plan  Continue active management with statin    Primary hypertension  Assessment & Plan  Monitor vitals.  On propranolol  Lisinopril, lasix, hydralazine  On hold due to renal insufficiency      Chronic pain syndrome- (present on admission)  Assessment & Plan  Altered on exam 1/30   Hold Norco unless clear sign of pain.  On baclofen, prn norco, cymbalta    Acute kidney injury superimposed on chronic kidney disease (formerly [facility])  Assessment & Plan  CT no abscess, perinephric stranding or hydronephrosis  IV fluids, monitor I/O's, vitals and labs  Improved, continue to monitor    Chronic anticoagulation- (present on admission)  Assessment & Plan  Continue    COPD (chronic obstructive pulmonary disease) (formerly [facility])  Assessment & Plan  O2 and resp per protocol/nebs/inhalers  RT per protocol    Type 2 diabetes mellitus with hyperglycemia, without long-term current use of insulin (formerly [facility])  Assessment & Plan  Monitor Accu-Cheks cover sliding scale insulin  1/29  glycohemoglobin: 6.7  Diabetic diet  Hypoglycemic protocol      VTE prophylaxis: SCDs/TEDs and therapeutic anticoagulation with apixaban    I have performed a physical exam and reviewed and updated ROS and Plan today (2/1/2023). In review of yesterday's note (1/31/2023), there are no changes except as documented above.      Please note that this dictation was created using voice recognition software. I have made every reasonable attempt to correct obvious errors, but I expect that there are errors of grammar and possibly context that I did not discover before finalizing the note.

## 2023-04-02 ENCOUNTER — APPOINTMENT (OUTPATIENT)
Dept: RADIOLOGY | Facility: MEDICAL CENTER | Age: 78
End: 2023-04-02
Attending: EMERGENCY MEDICINE
Payer: MEDICARE

## 2023-04-02 ENCOUNTER — HOSPITAL ENCOUNTER (EMERGENCY)
Facility: MEDICAL CENTER | Age: 78
End: 2023-04-02
Attending: EMERGENCY MEDICINE
Payer: MEDICARE

## 2023-04-02 VITALS
TEMPERATURE: 97.5 F | BODY MASS INDEX: 25.9 KG/M2 | DIASTOLIC BLOOD PRESSURE: 76 MMHG | HEIGHT: 67 IN | WEIGHT: 165 LBS | HEART RATE: 76 BPM | OXYGEN SATURATION: 94 % | RESPIRATION RATE: 17 BRPM | SYSTOLIC BLOOD PRESSURE: 149 MMHG

## 2023-04-02 DIAGNOSIS — S06.0XAA CONCUSSION WITH UNKNOWN LOSS OF CONSCIOUSNESS STATUS, INITIAL ENCOUNTER: ICD-10-CM

## 2023-04-02 DIAGNOSIS — R79.9 ELEVATED BUN: ICD-10-CM

## 2023-04-02 DIAGNOSIS — D64.9 ANEMIA, UNSPECIFIED TYPE: ICD-10-CM
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
## 2023-04-02 LAB
ALBUMIN SERPL BCP-MCNC: 3.3 G/DL (ref 3.2–4.9)
ALBUMIN/GLOB SERPL: 0.7 G/DL
ALP SERPL-CCNC: 64 U/L (ref 30–99)
ALT SERPL-CCNC: 5 U/L (ref 2–50)
ANION GAP SERPL CALC-SCNC: 14 MMOL/L (ref 7–16)
AST SERPL-CCNC: 14 U/L (ref 12–45)
BASOPHILS # BLD AUTO: 0.7 % (ref 0–1.8)
BASOPHILS # BLD: 0.06 K/UL (ref 0–0.12)
BILIRUB SERPL-MCNC: 0.4 MG/DL (ref 0.1–1.5)
BUN SERPL-MCNC: 23 MG/DL (ref 8–22)
CALCIUM ALBUM COR SERPL-MCNC: 9.9 MG/DL (ref 8.5–10.5)
CALCIUM SERPL-MCNC: 9.3 MG/DL (ref 8.5–10.5)
CHLORIDE SERPL-SCNC: 99 MMOL/L (ref 96–112)
CO2 SERPL-SCNC: 22 MMOL/L (ref 20–33)
CREAT SERPL-MCNC: 1.37 MG/DL (ref 0.5–1.4)
EOSINOPHIL # BLD AUTO: 0.14 K/UL (ref 0–0.51)
EOSINOPHIL NFR BLD: 1.7 % (ref 0–6.9)
ERYTHROCYTE [DISTWIDTH] IN BLOOD BY AUTOMATED COUNT: 44.8 FL (ref 35.9–50)
GFR SERPLBLD CREATININE-BSD FMLA CKD-EPI: 53 ML/MIN/1.73 M 2
GLOBULIN SER CALC-MCNC: 5 G/DL (ref 1.9–3.5)
GLUCOSE SERPL-MCNC: 180 MG/DL (ref 65–99)
HCT VFR BLD AUTO: 33.9 % (ref 42–52)
HGB BLD-MCNC: 11.6 G/DL (ref 14–18)
IMM GRANULOCYTES # BLD AUTO: 0.07 K/UL (ref 0–0.11)
IMM GRANULOCYTES NFR BLD AUTO: 0.9 % (ref 0–0.9)
LYMPHOCYTES # BLD AUTO: 3.54 K/UL (ref 1–4.8)
LYMPHOCYTES NFR BLD: 43.3 % (ref 22–41)
MCH RBC QN AUTO: 32 PG (ref 27–33)
MCHC RBC AUTO-ENTMCNC: 34.2 G/DL (ref 33.7–35.3)
MCV RBC AUTO: 93.6 FL (ref 81.4–97.8)
MONOCYTES # BLD AUTO: 0.51 K/UL (ref 0–0.85)
MONOCYTES NFR BLD AUTO: 6.2 % (ref 0–13.4)
NEUTROPHILS # BLD AUTO: 3.85 K/UL (ref 1.82–7.42)
NEUTROPHILS NFR BLD: 47.2 % (ref 44–72)
NRBC # BLD AUTO: 0 K/UL
NRBC BLD-RTO: 0 /100 WBC
PLATELET # BLD AUTO: 168 K/UL (ref 164–446)
PMV BLD AUTO: 8.2 FL (ref 9–12.9)
POTASSIUM SERPL-SCNC: 3.5 MMOL/L (ref 3.6–5.5)
PROT SERPL-MCNC: 8.3 G/DL (ref 6–8.2)
RBC # BLD AUTO: 3.62 M/UL (ref 4.7–6.1)
SODIUM SERPL-SCNC: 135 MMOL/L (ref 135–145)
WBC # BLD AUTO: 8.2 K/UL (ref 4.8–10.8)

## 2023-04-02 PROCEDURE — 99284 EMERGENCY DEPT VISIT MOD MDM: CPT

## 2023-04-02 PROCEDURE — 80053 COMPREHEN METABOLIC PANEL: CPT

## 2023-04-02 PROCEDURE — 70450 CT HEAD/BRAIN W/O DYE: CPT

## 2023-04-02 PROCEDURE — 36415 COLL VENOUS BLD VENIPUNCTURE: CPT

## 2023-04-02 PROCEDURE — 85025 COMPLETE CBC W/AUTO DIFF WBC: CPT

## 2023-04-02 ASSESSMENT — FIBROSIS 4 INDEX: FIB4 SCORE: 4.57

## 2023-04-02 NOTE — ED PROVIDER NOTES
ED Provider Note    CHIEF COMPLAINT  Chief Complaint   Patient presents with    GLF     Pt was BIBA from [facility] after being found on the ground with abrasion to posterior head. Pt was seen 20 minutes prior per facility. Pt has hx of dementia, alzheimer's and DVTs. Pt baseline A+Ox2, pt currently at baseline.       EXTERNAL RECORDS REVIEWED  Inpatient Notes discharge summary from January 29 reviewed; patient presenting with fall and ultimately diagnosed with TBI; patient reportedly diagnosed with UTI and was on Omnicef. .    Admitting ER note also reviewed and it appears that the patient had diagnosis of sepsis likely believed secondary to UTI.  Patient also with JOSE ENRIQUE at that time.    HPI/ROS  LIMITATION TO HISTORY   Select: Altered mental status / Confusion  OUTSIDE HISTORIAN(S):  EMS report directly to myself    Wale Olivas is a 78 y.o. male who presents to the emergency department after unwitnessed fall at care facility.  Brought in by [facility].  They explained that the patient had been seen upright and when was checked on again he was on the floor.  Again presumed fall (unknown loss of consciousness) and note of contusion to the back of his head.  He is on blood thinners and therefore EMS was called.  No other significant notable injuries from the fall.  Patient otherwise at his baseline level of cognition with known dementia.    No history from patient given his chronic dementia and level of consciousness    PAST MEDICAL HISTORY   has a past medical history of Anxiety, BPH (benign prostatic hyperplasia), CKD (chronic kidney disease), COPD (chronic obstructive pulmonary disease) ([facility]), COVID-19, Dementia ([facility]), GERD (gastroesophageal reflux disease), Hyperlipidemia, Insomnia, Major depressive disorder, and Type II diabetes mellitus ([facility]).    SURGICAL HISTORY  patient denies any surgical history    FAMILY HISTORY  History reviewed. No pertinent family history.    SOCIAL HISTORY  Social History     Tobacco Use     Smoking status: Never    Smokeless tobacco: Never   Vaping Use    Vaping Use: Never used   Substance and Sexual Activity    Alcohol use: Not Currently    Drug use: Not Currently    Sexual activity: Not on file       CURRENT MEDICATIONS  Home Medications       Reviewed by Bonnie Chanel R.N. (Registered Nurse) on 04/02/23 at 1152  Med List Status: Partial     Medication Last Dose Status   acetaminophen (TYLENOL) 325 MG Tab  Active   apixaban (ELIQUIS) 5mg Tab  Active   calcium carbonate (TUMS) 500 MG Chew Tab  Active   carboxymethylcellulose (REFRESH TEARS) 0.5 % Solution  Active   cyanocobalamin (VITAMIN B-12) 500 MCG Tab  Active   diclofenac sodium (VOLTAREN) 1 % Gel  Active   divalproex (DEPAKOTE) 500 MG Tablet Delayed Response  Active   DULoxetine (CYMBALTA) 60 MG Cap DR Particles delayed-release capsule  Active   finasteride (PROSCAR) 5 MG Tab  Active   fluticasone furoate-vilanterol (BREO) 100-25 MCG/ACT AEROSOL POWDER, BREATH ACTIVATED  Active   furosemide (LASIX) 40 MG Tab  Active   hydrALAZINE (APRESOLINE) 25 MG Tab  Active   HYDROcodone-acetaminophen (NORCO) 5-325 MG Tab per tablet  Active   Lidocaine 4 % Patch  Active   lisinopril (PRINIVIL) 40 MG tablet  Active   MENTHOL-METHYL SALICYLATE EX  Active   pantoprazole (PROTONIX) 40 MG Tablet Delayed Response  Active   polyethylene glycol/lytes (MIRALAX) 17 g Pack  Active   potassium chloride SA (KDUR) 20 MEQ Tab CR  Active   pravastatin (PRAVACHOL) 80 MG tablet  Active   Probiotic Product (PROBIOTIC PO)  Active   propranolol (INDERAL) 40 MG Tab  Active   Roflumilast 500 MCG Tab  Active   sennosides (SENOKOT) 8.6 MG Tab  Active   tamsulosin (FLOMAX) 0.4 MG capsule  Active   traZODone (DESYREL) 100 MG Tab  Active   triamcinolone acetonide (KENALOG) 0.1 % Cream  Active   Umeclidinium Bromide (INCRUSE ELLIPTA) 62.5 MCG/ACT AEROSOL POWDER, BREATH ACTIVATED  Active   vitamin D3 (CHOLECALCIFEROL) 1000 Unit (25 mcg) Tab  Active               "      ALLERGIES  Allergies   Allergen Reactions    Metformin     Morphine     Simvastatin     Spironolactone        PHYSICAL EXAM  VITAL SIGNS: BP (!) 149/76   Pulse 76   Temp 36.4 °C (97.5 °F) (Oral)   Resp 17   Ht 1.702 m (5' 7\")   Wt 74.8 kg (165 lb)   SpO2 94%   BMI 25.84 kg/m²        Pulse ox interpretation: I interpret this pulse ox as normal.  Constitutional: Alert in no apparent distress.  HENT:  Bilateral external ears normal, Nose normal.  Small abrasion contusion to left posterior parietal scalp.  Eyes: Pupils are equal and reactive  Neck: Normal range of motion, No tenderness, Supple  Cardiovascular: Regular rate and rhythm, no murmurs.   Thorax & Lungs: Normal breath sounds, No respiratory distress, No wheezing, No chest tenderness.   Abdomen: Bowel sounds normal, Soft, No tenderness  Skin: Warm, Dry, No erythema, No rash.   Extremities: Intact distal pulses, No edema, No tenderness, No cyanosis,  Negative Nicole's sign.   Musculoskeletal: Good range of motion in all major joints. No tenderness to palpation or major deformities noted.   Neurologic: Alert , Normal motor function, Normal sensory function, No focal deficits noted.   Psychiatric: Affect normal, Judgment normal, Mood normal.         DIAGNOSTIC STUDIES / PROCEDURES      LABS  Results for orders placed or performed during the hospital encounter of 04/02/23   CBC WITH DIFFERENTIAL   Result Value Ref Range    WBC 8.2 4.8 - 10.8 K/uL    RBC 3.62 (L) 4.70 - 6.10 M/uL    Hemoglobin 11.6 (L) 14.0 - 18.0 g/dL    Hematocrit 33.9 (L) 42.0 - 52.0 %    MCV 93.6 81.4 - 97.8 fL    MCH 32.0 27.0 - 33.0 pg    MCHC 34.2 33.7 - 35.3 g/dL    RDW 44.8 35.9 - 50.0 fL    Platelet Count 168 164 - 446 K/uL    MPV 8.2 (L) 9.0 - 12.9 fL    Neutrophils-Polys 47.20 44.00 - 72.00 %    Lymphocytes 43.30 (H) 22.00 - 41.00 %    Monocytes 6.20 0.00 - 13.40 %    Eosinophils 1.70 0.00 - 6.90 %    Basophils 0.70 0.00 - 1.80 %    Immature Granulocytes 0.90 0.00 - 0.90 %    " Nucleated RBC 0.00 /100 WBC    Neutrophils (Absolute) 3.85 1.82 - 7.42 K/uL    Lymphs (Absolute) 3.54 1.00 - 4.80 K/uL    Monos (Absolute) 0.51 0.00 - 0.85 K/uL    Eos (Absolute) 0.14 0.00 - 0.51 K/uL    Baso (Absolute) 0.06 0.00 - 0.12 K/uL    Immature Granulocytes (abs) 0.07 0.00 - 0.11 K/uL    NRBC (Absolute) 0.00 K/uL   COMP METABOLIC PANEL   Result Value Ref Range    Sodium 135 135 - 145 mmol/L    Potassium 3.5 (L) 3.6 - 5.5 mmol/L    Chloride 99 96 - 112 mmol/L    Co2 22 20 - 33 mmol/L    Anion Gap 14.0 7.0 - 16.0    Glucose 180 (H) 65 - 99 mg/dL    Bun 23 (H) 8 - 22 mg/dL    Creatinine 1.37 0.50 - 1.40 mg/dL    Calcium 9.3 8.5 - 10.5 mg/dL    AST(SGOT) 14 12 - 45 U/L    ALT(SGPT) 5 2 - 50 U/L    Alkaline Phosphatase 64 30 - 99 U/L    Total Bilirubin 0.4 0.1 - 1.5 mg/dL    Albumin 3.3 3.2 - 4.9 g/dL    Total Protein 8.3 (H) 6.0 - 8.2 g/dL    Globulin 5.0 (H) 1.9 - 3.5 g/dL    A-G Ratio 0.7 g/dL   ESTIMATED GFR   Result Value Ref Range    GFR (CKD-EPI) 53 (A) >60 mL/min/1.73 m 2   CORRECTED CALCIUM   Result Value Ref Range    Correct Calcium 9.9 8.5 - 10.5 mg/dL         RADIOLOGY  I have independently interpreted the diagnostic imaging associated with this visit and am waiting the final reading from the radiologist.   My preliminary interpretation is as follows: CT head: No large intracranial bleed or mass  Radiologist interpretation:   CT-HEAD W/O   Final Result      1. No acute intracranial abnormality.   2. Stable chronic changes as detailed.               COURSE & MEDICAL DECISION MAKING    ED Observation Status? No; Patient does not meet criteria for ED Observation.     INITIAL ASSESSMENT, COURSE AND PLAN  Care Narrative: 78-year-old [facility].  Will initiate trauma work-up from an imaging perspective and given additional chart review history of UTI and patient currently being on antibiotics will add additional hematologic work-up.    DISPOSITION AND  DISCUSSIONS  I have discussed management of the patient with the following physicians and DEANNA's: None    Discussion of management with other QHP or appropriate source(s): None     Escalation of care considered, and ultimately not performed:acute inpatient care management, however at this time, the patient is most appropriate for outpatient management    Barriers to care at this time, including but not limited to: Patient does not have established PCP.     Decision tools and prescription drugs considered including, but not limited to: NIH Stroke Scale 0 .    78-year-old male presented [facility] with the above presentation.  Recurrent fall.  Slight downtrending hemoglobin compared to most recent inpatient stay which had a presentation very similar to this.  This point I do not believe that the patient is septic.  His renal function is also improved although still slight predominance for minimally elevated BUN.  Given lack of acute traumatic findings on the above work-up I will have him discharged home.  He should complete his course of current antibiotics for previously diagnosed UTI this week.  Patient be transferred back to his facility at this point.      FINAL DIAGNOSIS  1. Fall, initial encounter    2. Concussion with unknown loss of consciousness status, initial encounter    3. Anemia, unspecified type    4. Elevated BUN           Electronically signed by: Shahab Olivo M.D., 4/2/2023 11:35 AM

## 2023-04-02 NOTE — DISCHARGE PLANNING
Per RN [facility]  CM faxed completed PCS and FS to [facility], called Zelalem @ [facility] to confirm, pt will be picked up @ 3330, RN updated.

## 2023-04-02 NOTE — ED TRIAGE NOTES
"Chief Complaint   Patient presents with    GLF     Pt was BIBA from [facility] after being found on the ground with abrasion to posterior head. Pt was seen 20 minutes prior per facility. Pt has hx of dementia, alzheimer's and DVTs. Pt baseline A+Ox2, pt currently at baseline.         Pt BIBA for above complaint. Pt yelling and crying for daughter. Attempted to call daughter, no answer.     BP (!) 153/87   Pulse 72   Resp 18   Ht 1.702 m (5' 7\")   Wt 74.8 kg (165 lb)   SpO2 98%     "

## 2023-05-06 ENCOUNTER — APPOINTMENT (OUTPATIENT)
Dept: RADIOLOGY | Facility: MEDICAL CENTER | Age: 78
End: 2023-05-06
Attending: EMERGENCY MEDICINE
Payer: COMMERCIAL

## 2023-05-06 ENCOUNTER — HOSPITAL ENCOUNTER (EMERGENCY)
Facility: MEDICAL CENTER | Age: 78
End: 2023-05-06
Attending: EMERGENCY MEDICINE
Payer: COMMERCIAL

## 2023-05-06 VITALS
HEART RATE: 69 BPM | WEIGHT: 165 LBS | HEIGHT: 67 IN | TEMPERATURE: 97.6 F | BODY MASS INDEX: 25.9 KG/M2 | DIASTOLIC BLOOD PRESSURE: 69 MMHG | SYSTOLIC BLOOD PRESSURE: 115 MMHG | OXYGEN SATURATION: 96 % | RESPIRATION RATE: 16 BRPM

## 2023-05-06 DIAGNOSIS — M54.50 LUMBAR BACK PAIN: ICD-10-CM

## 2023-05-06 DIAGNOSIS — F03.B11 MODERATE DEMENTIA WITH AGITATION, UNSPECIFIED DEMENTIA TYPE (HCC): ICD-10-CM

## 2023-05-06 PROCEDURE — 96374 THER/PROPH/DIAG INJ IV PUSH: CPT

## 2023-05-06 PROCEDURE — 700101 HCHG RX REV CODE 250: Performed by: EMERGENCY MEDICINE

## 2023-05-06 PROCEDURE — 72128 CT CHEST SPINE W/O DYE: CPT

## 2023-05-06 PROCEDURE — 96375 TX/PRO/DX INJ NEW DRUG ADDON: CPT

## 2023-05-06 PROCEDURE — 700111 HCHG RX REV CODE 636 W/ 250 OVERRIDE (IP): Performed by: EMERGENCY MEDICINE

## 2023-05-06 PROCEDURE — 72131 CT LUMBAR SPINE W/O DYE: CPT

## 2023-05-06 PROCEDURE — 99285 EMERGENCY DEPT VISIT HI MDM: CPT

## 2023-05-06 RX ORDER — MIDAZOLAM HYDROCHLORIDE 1 MG/ML
1 INJECTION INTRAMUSCULAR; INTRAVENOUS ONCE
Status: COMPLETED | OUTPATIENT
Start: 2023-05-06 | End: 2023-05-06

## 2023-05-06 RX ORDER — HYDROMORPHONE HYDROCHLORIDE 1 MG/ML
0.5 INJECTION, SOLUTION INTRAMUSCULAR; INTRAVENOUS; SUBCUTANEOUS ONCE
Status: COMPLETED | OUTPATIENT
Start: 2023-05-06 | End: 2023-05-06

## 2023-05-06 RX ORDER — ONDANSETRON 2 MG/ML
4 INJECTION INTRAMUSCULAR; INTRAVENOUS ONCE
Status: COMPLETED | OUTPATIENT
Start: 2023-05-06 | End: 2023-05-06

## 2023-05-06 RX ADMIN — HYDROMORPHONE HYDROCHLORIDE 0.5 MG: 1 INJECTION, SOLUTION INTRAMUSCULAR; INTRAVENOUS; SUBCUTANEOUS at 10:13

## 2023-05-06 RX ADMIN — ONDANSETRON 4 MG: 2 INJECTION INTRAMUSCULAR; INTRAVENOUS at 09:53

## 2023-05-06 RX ADMIN — KETAMINE HYDROCHLORIDE 25 MG: 10 INJECTION, SOLUTION INTRAMUSCULAR; INTRAVENOUS at 10:12

## 2023-05-06 RX ADMIN — MIDAZOLAM HYDROCHLORIDE 1 MG: 1 INJECTION, SOLUTION INTRAMUSCULAR; INTRAVENOUS at 10:13

## 2023-05-06 ASSESSMENT — LIFESTYLE VARIABLES
DO YOU DRINK ALCOHOL: NO
CONSUMPTION TOTAL: NEGATIVE
HOW MANY TIMES IN THE PAST YEAR HAVE YOU HAD 5 OR MORE DRINKS IN A DAY: 0
EVER HAD A DRINK FIRST THING IN THE MORNING TO STEADY YOUR NERVES TO GET RID OF A HANGOVER: NO
TOTAL SCORE: 0
TOTAL SCORE: 0
ON A TYPICAL DAY WHEN YOU DRINK ALCOHOL HOW MANY DRINKS DO YOU HAVE: 0
EVER FELT BAD OR GUILTY ABOUT YOUR DRINKING: NO
HAVE YOU EVER FELT YOU SHOULD CUT DOWN ON YOUR DRINKING: NO
TOTAL SCORE: 0
AVERAGE NUMBER OF DAYS PER WEEK YOU HAVE A DRINK CONTAINING ALCOHOL: 0
HAVE PEOPLE ANNOYED YOU BY CRITICIZING YOUR DRINKING: NO

## 2023-05-06 ASSESSMENT — FIBROSIS 4 INDEX: FIB4 SCORE: 2.832352771499733615

## 2023-05-06 NOTE — ED PROVIDER NOTES
ER Provider Note    Scribed for Davon Quinonez M.d. by Zain Gonzalez. 5/6/2023  9:28 AM    Primary Care Provider: Pcp Pt States None    CHIEF COMPLAINT  Chief Complaint   Patient presents with    Back Pain     Pt fell 1 week ago and seen in ED. Pt was combative and refusing Xray's. Hx dementia AOx1 baseline. . At [facility] concerned for compression fxr. Pt able to stand and pivot on EMS scene arrival     EXTERNAL RECORDS REVIEWED  Other Patient is from the [facility]. He fell 1 week ago and staff is concerned for a compression fracture. He has a history of dementia and is A&Ox1 at baseline.    HPI/ROS  LIMITATION TO HISTORY   Select: Altered mental status / Confusion  OUTSIDE HISTORIAN(S):  None    Wale Olivas is a 78 y.o. male with a history of dementia who presents to the ED complaining of lower back pain. [facility] The physician at the [facility] is concerned for a compression fracture. He denies any neck pain. No alleviating or exacerbating factors noted.    HPI limited secondary to altered mental status.    PAST MEDICAL HISTORY  Past Medical History:   Diagnosis Date    Anxiety     BPH (benign prostatic hyperplasia)     CKD (chronic kidney disease)     COPD (chronic obstructive pulmonary disease) (HCC)     COVID-19     Dementia (HCC)     GERD (gastroesophageal reflux disease)     Hyperlipidemia     Insomnia     Major depressive disorder     Type II diabetes mellitus (HCC)        SURGICAL HISTORY  History reviewed. No pertinent surgical history.    FAMILY HISTORY  No family history pertinent.    SOCIAL HISTORY   reports that he has never smoked. He has never used smokeless tobacco. He reports that he does not currently use alcohol. He reports that he does not currently use drugs.    CURRENT MEDICATIONS  Previous Medications    ACETAMINOPHEN (TYLENOL) 325 MG TAB    Take 650 mg by mouth every 6 hours as needed for Mild Pain.    APIXABAN  (ELIQUIS) 5MG TAB    Take 1 Tablet by mouth 2 times a day.    CALCIUM CARBONATE (TUMS) 500 MG CHEW TAB    Chew 500 mg 2 times a day.    CARBOXYMETHYLCELLULOSE (REFRESH TEARS) 0.5 % SOLUTION    Administer 1 Drop into both eyes 4 times a day.    CYANOCOBALAMIN (VITAMIN B-12) 500 MCG TAB    Take 500 mcg by mouth every 48 hours.    DICLOFENAC SODIUM (VOLTAREN) 1 % GEL    Apply  topically 4 times a day. Apply to thumbs for chronic pain syndrome    DIVALPROEX (DEPAKOTE) 500 MG TABLET DELAYED RESPONSE    Take 1,500 mg by mouth at bedtime.    DULOXETINE (CYMBALTA) 60 MG CAP DR PARTICLES DELAYED-RELEASE CAPSULE    Take 60 mg by mouth every day.    FINASTERIDE (PROSCAR) 5 MG TAB    Take 5 mg by mouth every day.    FLUTICASONE FUROATE-VILANTEROL (BREO) 100-25 MCG/ACT AEROSOL POWDER, BREATH ACTIVATED    Inhale 1 Puff every day.    FUROSEMIDE (LASIX) 40 MG TAB    Take 40 mg by mouth every day.    HYDRALAZINE (APRESOLINE) 25 MG TAB    Take 25 mg by mouth 2 times a day.    HYDROCODONE-ACETAMINOPHEN (NORCO) 5-325 MG TAB PER TABLET    Take 1 Tablet by mouth 3 times a day. Scheduled    LIDOCAINE 4 % PATCH    Apply 3 Patches topically at bedtime. Apply to back for chronic pain syndrome    LISINOPRIL (PRINIVIL) 40 MG TABLET    Take 40 mg by mouth every day.    MENTHOL-METHYL SALICYLATE EX    Apply 1 Application topically 2 times a day. Apply to hands for chronic pain syndrome    PANTOPRAZOLE (PROTONIX) 40 MG TABLET DELAYED RESPONSE    Take 40 mg by mouth every day.    POLYETHYLENE GLYCOL/LYTES (MIRALAX) 17 G PACK    Take 17 g by mouth every day.    POTASSIUM CHLORIDE SA (KDUR) 20 MEQ TAB CR    Take 20 mEq by mouth every day.    PRAVASTATIN (PRAVACHOL) 80 MG TABLET    Take 80 mg by mouth every evening.    PROBIOTIC PRODUCT (PROBIOTIC PO)    Take 1 Capsule by mouth 2 times a day.    PROPRANOLOL (INDERAL) 40 MG TAB    Take 40 mg by mouth 2 times a day.    ROFLUMILAST 500 MCG TAB    Take 500 mcg by mouth every day.    SENNOSIDES (SENOKOT)  "8.6 MG TAB    Take 8.6 mg by mouth 2 times a day.    TAMSULOSIN (FLOMAX) 0.4 MG CAPSULE    Take 0.4 mg by mouth every day.    TRAZODONE (DESYREL) 100 MG TAB    Take 150 mg by mouth every evening.    TRIAMCINOLONE ACETONIDE (KENALOG) 0.1 % CREAM    Apply 1 Application topically 2 times a day. Apply to abdomen ( rash)   For 14 days    UMECLIDINIUM BROMIDE (INCRUSE ELLIPTA) 62.5 MCG/ACT AEROSOL POWDER, BREATH ACTIVATED    Inhale 1 Puff every day.    VITAMIN D3 (CHOLECALCIFEROL) 1000 UNIT (25 MCG) TAB    Take 1,000 Units by mouth every day.       ALLERGIES  Metformin, Morphine, Simvastatin, and Spironolactone    PHYSICAL EXAM  BP (!) 173/92   Pulse 80   Temp 35.9 °C (96.6 °F) (Temporal)   Resp 20   Ht 1.702 m (5' 7\")   Wt 74.8 kg (165 lb)   SpO2 99%   BMI 25.84 kg/m²   Constitutional: Well developed, Well nourished, No acute distress, Non-toxic appearance.   HENT: Normocephalic, Atraumatic, Bilateral external ears normal, Oropharynx is clear mucous membranes are moist. No oral exudates or nasal discharge.   Eyes: Pupils are equal round and reactive, EOMI, Conjunctiva normal, No discharge.   Neck: Normal range of motion, No tenderness, Supple, No stridor. No meningismus.  Lymphatic: No lymphadenopathy noted.   Cardiovascular: Regular rate and rhythm without murmur rub or gallop.  Thorax & Lungs: Clear breath sounds bilaterally without wheezes, rhonchi or rales. There is no chest wall tenderness.   Abdomen: Soft non-tender non-distended. There is no rebound or guarding. No organomegaly is appreciated. Bowel sounds are normal.  Skin: Normal without rash.   Back: No CVA or spinal tenderness.   Extremities: Intact distal pulses, No edema, No tenderness, No cyanosis, No clubbing. Capillary refill is less than 2 seconds.  Musculoskeletal: Good range of motion in all major joints. No tenderness to palpation or major deformities noted.   Neurologic: Alert & oriented x 3, Normal motor function, Normal sensory function, No " focal deficits noted. Reflexes are normal.  Psychiatric: Affect normal, Judgment normal, Mood normal. There is no suicidal ideation or patient reported hallucinations.      DIAGNOSTIC STUDIES    Radiology:   The attending emergency physician has independently interpreted the diagnostic imaging associated with this visit and am waiting the final reading from the radiologist.   Preliminary interpretation is a follows: No acute fracture  Radiologist interpretation:   CT-LSPINE W/O PLUS RECONS   Final Result      1.  T12 and L2 compression fracture, unchanged since 1/29/2023      2.  L3 and L4 left transverse process fracture, old      3.  Multilevel facet arthropathy      4.  Aortic atherosclerotic plaque and presence of inferior vena cava filter      CT-TSPINE W/O PLUS RECONS   Final Result      1.  Negative for acute thoracic spine fracture      2.  Old T12 compression fracture      3.  T8 inferior endplate Schmorl's node formation which appears chronic           PROCEDURES    Conscious Sedation Procedure Note    Indication: procedural pain management    Consent: Consent was unable to be obtained due to patient's condition.    Physician Involvement: The attending physician was present and supervising this procedure.    Pre-Sedation Documentation and Exam: I have personally completed a history, physical exam & review of systems for this patient (see notes).  Airway Assessment: normal  f3  Prior History of Anesthesia Complications: none    ASA Classification: Class 2 - A normal healthy patient with mild systemic disease    Sedation/ Anesthesia Plan: intravenous sedation    Medications Used: ketamine intravenously as well as Versed and narcotic    Monitoring and Safety: The patient was placed on a cardiac monitor and vital signs, pulse oximetry and level of consciousness were continuously evaluated throughout the procedure. The patient was closely monitored until recovery from the medications was complete and the  patient had returned to baseline status. Respiratory therapy was on standby at all times during the procedure.      (The following sections must be completed)  Post-Sedation Vital Signs: Vital signs were reviewed and were stable after the procedure (see flow sheet for vitals)            Intraservice Time: 15 minutes    Post-Sedation Exam: Lungs: clear           Complications: none    I provided both the sedation and procedure, a nurse was present at the bedside for the entire procedure.     COURSE & MEDICAL DECISION MAKING     ED Observation Status? Yes; I am placing the patient in to an observation status due to a diagnostic uncertainty as well as therapeutic intensity. Patient placed in observation status at 9:30 AM, 5/6/2023.     Observation plan is as follows: Patient will undergo evaluation with imaging. His condition will be closely monitored for any changes.    Upon Reevaluation, the patient's condition has: Improved; and will be discharged.    Patient discharged from ED Observation status at 11:39 AM (Time) 5/6/2023 (Date).     INITIAL ASSESSMENT, COURSE AND PLAN  Care Narrative: Patient presents and unable to give good history but [facility] that he has quite a bit of back pain after falling a week ago and apparently unable to get imaging performed recently secondary to him refusing however he does have dementia    9:28 AM - Patient seen and examined at bedside. He will be treated with Ketalar 25 mg, Versed 1 mg, Dilaudid 0.5 mg, and Zofran 4 mg. Ordered for CT-Tspine and L-Spine w/o to evaluate his symptoms.     11:37 AM - Radiology results show no new fractures or abnormalities. Patient reevaluated at bedside. He is sleeping comfortably. Nursing staff inform me that he has had no outbursts or episodes of agitation since medication. He will be [facility].    ADDITIONAL PROBLEM LIST  Dementia.  No additional action however this makes his sedation necessary to  obtain the studies    DISPOSITION AND DISCUSSIONS  I have discussed management of the patient with the following physicians and DEANNA's:  None    Discussion of management with other Q or appropriate source(s): None     Patient will be discharged home.    FINAL DIANGOSIS  1. Lumbar back pain    2. Moderate dementia with agitation, unspecified dementia type (HCC)           The note accurately reflects work and decisions made by me.  Davon Quinonez M.D.  5/6/2023  11:54 AM     Zain VALENZUELA (Scribe), am scribing for, and in the presence of, Davon Quinonez M.D..    Electronically signed by: Zain Gonzalez (Elisabeth), 5/6/2023    Davon VALENZUELA M.D. personally performed the services described in this documentation, as scribed by Zain Gonzalez in my presence, and it is both accurate and complete.

## 2023-05-06 NOTE — DISCHARGE PLANNING
Note:  1154: Received information that pt is medically cleared to be discharged back to [facility]. RN CM called but no answer, left voicemail for call back.    1230: RN CM called [facility], spoke to Fernanda, informed about pt's return. Per Fernanda, they are accepting pt back and requested for bedside RN report. ED RN informed about contact number for report. Per chart, pt is confused, has dementia. RN CM called pt's daughter, Jose. Received consent for discharge back to [facility] and transportation. PCS faxed to [facility]. RN CM called [facility]. Confirmed transportation  time at 1315. Pt's daughter updated. ED RN and ERP updated. COBRA, transfer packet given to ED RN.

## 2023-05-06 NOTE — ED NOTES
(Break RN) Report given to [facility] by NAHEDE Leon. Pt transferred to [facility], to be transported back to [facility]. Sent with discharge summary. Stable.

## 2023-05-06 NOTE — ED TRIAGE NOTES
Chief Complaint   Patient presents with    Back Pain     Pt fell 1 week ago and seen in ED. Pt was combative and refusing Xray's. Hx dementia AOx1 baseline.  At [facility] concerned for compression fxr. Pt able to stand and pivot on EMS scene arrival       Pt brought in by PASTOR from [facility] to [place] [id number]. Pt in a gown and on monitor. Chart flagged for ERP to see.

## 2023-08-17 ENCOUNTER — APPOINTMENT (OUTPATIENT)
Dept: RADIOLOGY | Facility: MEDICAL CENTER | Age: 78
End: 2023-08-17
Attending: EMERGENCY MEDICINE
Payer: MEDICARE

## 2023-08-17 ENCOUNTER — HOSPITAL ENCOUNTER (EMERGENCY)
Facility: MEDICAL CENTER | Age: 78
End: 2023-08-17
Attending: EMERGENCY MEDICINE
Payer: MEDICARE

## 2023-08-17 VITALS
HEIGHT: 67 IN | OXYGEN SATURATION: 97 % | HEART RATE: 59 BPM | WEIGHT: 165 LBS | SYSTOLIC BLOOD PRESSURE: 183 MMHG | DIASTOLIC BLOOD PRESSURE: 77 MMHG | BODY MASS INDEX: 25.9 KG/M2 | RESPIRATION RATE: 16 BRPM | TEMPERATURE: 97.7 F

## 2023-08-17 DIAGNOSIS — S09.90XA CLOSED HEAD INJURY, INITIAL ENCOUNTER: ICD-10-CM

## 2023-08-17 DIAGNOSIS — S01.81XA FACIAL LACERATION, INITIAL ENCOUNTER: ICD-10-CM

## 2023-08-17 PROCEDURE — 73130 X-RAY EXAM OF HAND: CPT | Mod: LT

## 2023-08-17 PROCEDURE — 304999 HCHG REPAIR-SIMPLE/INTERMED LEVEL 1

## 2023-08-17 PROCEDURE — 303747 HCHG EXTRA SUTURE

## 2023-08-17 PROCEDURE — 700101 HCHG RX REV CODE 250: Performed by: EMERGENCY MEDICINE

## 2023-08-17 PROCEDURE — 72125 CT NECK SPINE W/O DYE: CPT

## 2023-08-17 PROCEDURE — 304217 HCHG IRRIGATION SYSTEM

## 2023-08-17 PROCEDURE — 99284 EMERGENCY DEPT VISIT MOD MDM: CPT

## 2023-08-17 PROCEDURE — 70450 CT HEAD/BRAIN W/O DYE: CPT

## 2023-08-17 RX ORDER — LIDOCAINE HYDROCHLORIDE AND EPINEPHRINE 10; 10 MG/ML; UG/ML
10 INJECTION, SOLUTION INFILTRATION; PERINEURAL ONCE
Status: COMPLETED | OUTPATIENT
Start: 2023-08-17 | End: 2023-08-17

## 2023-08-17 RX ADMIN — LIDOCAINE HYDROCHLORIDE,EPINEPHRINE BITARTRATE 10 ML: 10; .01 INJECTION, SOLUTION INFILTRATION; PERINEURAL at 06:15

## 2023-08-17 ASSESSMENT — FIBROSIS 4 INDEX: FIB4 SCORE: 2.832352771499733615

## 2023-08-17 NOTE — ED NOTES
Pt resting in gurney, respirations even and unlabored. NAD noted. Pt in direct view of nursing station. Side rails up, call light in reach, bed alarm in place.

## 2023-08-17 NOTE — ED NOTES
Bedside report received from NAHEED Riojas. Pt awake in bed, monitor connected, on room air. VSS. ERP completed stiches to left forehead laceration.

## 2023-08-17 NOTE — DISCHARGE PLANNING
MSW received notification from bedside RN.Pt needs to return to the [facility]. MSW called and spoke with coordinator Albert ([phone]). He will get transport arranged for pt MSW requested bedside RN call report to their charge RN at [phone].     MSW called and left VM for Albert on transport status. MSW placed COBRA and packet on pt's chart.

## 2023-08-17 NOTE — ED NOTES
[facility] here with [facility]. Pt able to stand and pivot to wc. Personal possessions in belongings bag.  made aware.

## 2023-08-17 NOTE — DISCHARGE PLANNING
MSW spoke to Albert at [facility]. He is still working on transport. Will call MSW back with time.     Albert call back and stated they are sending transport out to  pt right now. MSW updated bedside RN.

## 2023-08-17 NOTE — ED NOTES
Pt a [facility] at:  [address] [facility]  [address] [place] [address]  TEL: [phone]  FAX: [phone]

## 2023-08-17 NOTE — ED TRIAGE NOTES
Wale Olivas  78 y.o.  male.  Chief Complaint   Patient presents with    T-5000 FALL     Brought in by derrick from [facility]. Patient found by staff on the ground with laceration (left eye ) and contusion on right forehead. Per report patient rolled out of bed and hit head on a dresser. Hx of dementia.

## 2023-08-17 NOTE — ED NOTES
Bedside report received from NAHEED Sousa. Bed locked and in lowest position. Bed alarm on and appropriately positioned under patient. No oxygen requirements at this time. Call light available and within reach. No distress noted.

## 2023-08-17 NOTE — ED NOTES
Non-adherent dressing applied to left thumb skin tear, covered with 2x2 gauze and wrapped with coband.      Bacitracin applied to left forehead laceration and right head abrasion.

## 2023-08-17 NOTE — ED PROVIDER NOTES
"ED Provider Note    CHIEF COMPLAINT  Chief Complaint   Patient presents with    T-5000 FALL     Brought in by remsa from [facility]. Patient found by staff on the ground with laceration (left eye ) and contusion on right forehead. Per report patient rolled out of bed and hit head on a dresser. Hx of dementia.        EXTERNAL RECORDS REVIEWED  Inpatient Notes January 2023 after a fall, he was noted at that time to be on Eliquis    HPI/ROS  LIMITATION TO HISTORY   Select: Dementia  OUTSIDE HISTORIAN(S):  EMS who report no seizure activity, as well as patient's behavior in route    Wale Olivas is a 78 y.o. male who presents brought in by EMS [facility]  The fall was unwitnessed and patient is unable to provide any history with his dementia.  When asked if anything hurts he will just stare at me, and he will not answer any questions.  He will however swear and say \"leave me alone\" Per EMS report there was no seizure activity in route and patient is at his neurologic baseline.  Per EMS report he is not on any blood thinners.    History is quite limited due to patient's underlying dementia    PAST MEDICAL HISTORY   has a past medical history of Anxiety, BPH (benign prostatic hyperplasia), CKD (chronic kidney disease), COPD (chronic obstructive pulmonary disease) ([facility]), COVID-19, Dementia ([facility]), GERD (gastroesophageal reflux disease), Hyperlipidemia, Insomnia, Major depressive disorder, and Type II diabetes mellitus ([facility]).    SURGICAL HISTORY  patient denies any surgical history    FAMILY HISTORY  History reviewed. No pertinent family history.    SOCIAL HISTORY  Social History     Tobacco Use    Smoking status: Never    Smokeless tobacco: Never   Vaping Use    Vaping Use: Never used   Substance and Sexual Activity    Alcohol use: Not Currently    Drug use: Not Currently    Sexual activity: Not on file       CURRENT MEDICATIONS  Home Medications       Reviewed by Lars" "HANNAH Boogie (Registered Nurse) on 08/17/23 at 0603  Med List Status: Partial     Medication Last Dose Status   acetaminophen (TYLENOL) 325 MG Tab  Active   apixaban (ELIQUIS) 5mg Tab  Active   calcium carbonate (TUMS) 500 MG Chew Tab  Active   carboxymethylcellulose (REFRESH TEARS) 0.5 % Solution  Active   cyanocobalamin (VITAMIN B-12) 500 MCG Tab  Active   diclofenac sodium (VOLTAREN) 1 % Gel  Active   divalproex (DEPAKOTE) 500 MG Tablet Delayed Response  Active   DULoxetine (CYMBALTA) 60 MG Cap DR Particles delayed-release capsule  Active   finasteride (PROSCAR) 5 MG Tab  Active   fluticasone furoate-vilanterol (BREO) 100-25 MCG/ACT AEROSOL POWDER, BREATH ACTIVATED  Active   furosemide (LASIX) 40 MG Tab  Active   hydrALAZINE (APRESOLINE) 25 MG Tab  Active   HYDROcodone-acetaminophen (NORCO) 5-325 MG Tab per tablet  Active   Lidocaine 4 % Patch  Active   lisinopril (PRINIVIL) 40 MG tablet  Active   MENTHOL-METHYL SALICYLATE EX  Active   pantoprazole (PROTONIX) 40 MG Tablet Delayed Response  Active   polyethylene glycol/lytes (MIRALAX) 17 g Pack  Active   potassium chloride SA (KDUR) 20 MEQ Tab CR  Active   pravastatin (PRAVACHOL) 80 MG tablet  Active   Probiotic Product (PROBIOTIC PO)  Active   propranolol (INDERAL) 40 MG Tab  Active   Roflumilast 500 MCG Tab  Active   sennosides (SENOKOT) 8.6 MG Tab  Active   tamsulosin (FLOMAX) 0.4 MG capsule  Active   traZODone (DESYREL) 100 MG Tab  Active   triamcinolone acetonide (KENALOG) 0.1 % Cream  Active   Umeclidinium Bromide (INCRUSE ELLIPTA) 62.5 MCG/ACT AEROSOL POWDER, BREATH ACTIVATED  Active   vitamin D3 (CHOLECALCIFEROL) 1000 Unit (25 mcg) Tab  Active                    ALLERGIES  Allergies   Allergen Reactions    Metformin     Morphine     Simvastatin     Spironolactone        PHYSICAL EXAM  VITAL SIGNS: BP (!) 159/72   Pulse (!) 58   Temp 37 °C (98.6 °F) (Temporal)   Resp 17   Ht 1.702 m (5' 7\")   Wt 74.8 kg (165 lb)   SpO2 97%   BMI 25.84 kg/m²      Pulse " ox interpretation: I interpret this pulse ox as normal.  Constitutional: Alert in no apparent distress.  HENT: There is approximately 2.5 cm laceration above the left eyebrow, no Mccray's, no raccoons, no other findings of trauma bilateral external ears normal, Nose normal.   Eyes: Pupils are equal and reactive, Conjunctiva normal, Non-icteric.   Neck: Normal range of motion, No tenderness, Supple, No stridor.   Cardiovascular: Regular rate and rhythm, no murmurs.   Thorax & Lungs: Normal breath sounds, No respiratory distress, No wheezing, No chest tenderness.   Abdomen: Bowel sounds normal, Soft, No tenderness, No masses, No pulsatile masses. No peritoneal signs.  Skin: Warm, Dry, No erythema, No rash.   Back: No bony tenderness, No CVA tenderness.   Extremities: Intact distal pulses, No edema, No tenderness, No cyanosis,  Negative Nicole's sign.   Musculoskeletal: There is a skin tear of the left thumb without any obvious deformity, no other lacerations noted good range of motion in all major joints. No tenderness to palpation or major deformities noted.   Neurologic: Alert , patient moves all 4 extremities spontaneously although does not follow commands well  Psychiatric: Agitated.               DIAGNOSTIC STUDIES / PROCEDURES    RADIOLOGY  I have independently interpreted the diagnostic imaging associated with this visit and am waiting the final reading from the radiologist.   My preliminary interpretation is as follows: no intracranial bleed  Radiologist interpretation:   DX-HAND 3+ LEFT   Final Result         1.  No acute traumatic bony injury.   2.  Atherosclerosis      CT-CSPINE WITHOUT PLUS RECONS   Final Result         1.  Multilevel degenerative changes of the cervical spine limit diagnostic sensitivity of this examination   2.  Mild anterolisthesis C3 on C4 and C4 on C5, appears most likely due to degenerative changes of traumatic listhesis could have radiographically similar appearance, otherwise no  acute traumatic bony injury of the cervical spine is apparent.   3.  Hazy right upper lobe infiltrate   4.  Atherosclerosis      CT-HEAD W/O   Final Result         1.  No acute intracranial abnormality is identified, there are nonspecific white matter changes, commonly associated with small vessel ischemic disease.  Associated mild cerebral atrophy is noted.   2.  Mild pansinusitis changes   3.  Atherosclerosis.               COURSE & MEDICAL DECISION MAKING    ED Observation Status? Yes; I am placing the patient in to an observation status due to a diagnostic uncertainty as well as therapeutic intensity. Patient placed in observation status at 5:54 AM, 8/17/2023.     Observation plan is as follows: Neurologic monitoring given his head injury    Upon Reevaluation, the patient's condition has: Improved; and will be discharged.    Patient discharged from ED Observation status at 9:30 AM   (Time) 08/17/23   (Date).     INITIAL ASSESSMENT, COURSE AND PLAN  Care Narrative: 5:54 AM  Patient is evaluated at charge desk per TBI protocol.  Will order for CT head and C-spine to evaluate for potential subarachnoid, subdural, epidural, skull fracture or cervical spine fracture.  Additionally we will plan for laceration repair, x-ray of his hand to evaluate for fracture.    Laceration Repair Procedure    Indication: Laceration    Location/Description: Left forehead    Procedure: The patient was placed in the appropriate position and anesthesia around the laceration was obtained by infiltration using 1% Lidocaine with epinephrine. The area was then irrigated with normal saline and draped in a sterile fashion. The laceration was closed with 5-0 Prolene using interrupted sutures. There were no additional lacerations requiring repair. The wound area was then dressed with bacitracin and a sterile dressing.      Total repaired wound length: 3.5 cm.     Other Items: Suture count: 8    The patient tolerated the procedure  well.    Complications: None    7:17 AM  Patient is reevaluated, he is comfortable, well-appearing, only concern is that he wants a sandwich, no change in neurologic status    7:53 AM  Patient is reevaluated again, still comfortable, no change in mental status       PROBLEM LIST    #Closed head injury, resulting from fall.  CT was obtained given his mental status which is apparently his baseline.  No findings of intracranial bleed or skull fracture.    #Laceration, repaired as above, tetanus updated    ADDITIONAL PROBLEM LIST    #Dementia, history of    #Diabetes, history of, primary care follow-up    #Chronic anticoagulation, on Eliquis per her most recent discharge as well as his most recent hospital visit on June 29, appears to be secondary to atrial fibrillation      DISPOSITION AND DISCUSSIONS    Barriers to care at this time, including but not limited to:  none .     Decision tools and prescription drugs considered including, but not limited to:  Patient would not be low risk by [place] CT head criteria  due to his age and anticoagulation so CT was obtained .    The patient will return for new or worsening symptoms and is stable at the time of discharge.    The patient is referred to a primary physician for blood pressure management, diabetic screening, and for all other preventative health concerns.      DISPOSITION:  Patient will be discharged home in stable condition.    FOLLOW UP:  [facility], Emergency Dept  [address] [address] [phone]  In 5 days  For suture removal or with your primary care doctor      OUTPATIENT MEDICATIONS:  New Prescriptions    No medications on file         FINAL DIAGNOSIS  1. Closed head injury, initial encounter    2. Facial laceration, initial encounter           Electronically signed by: Zach Horan M.D., 8/17/2023 5:51 AM

## 2023-08-17 NOTE — ED NOTES
Transport arrived without a wheelchair to transport Pt. ED SW notified. [facility] will send another  with a wheelchair.

## 2023-08-17 NOTE — ED NOTES
PT spitting at this ED Tech while attempting to irrigate lac. Assisting RN at bedside to irrigate lac.

## 2023-09-20 ENCOUNTER — APPOINTMENT (OUTPATIENT)
Dept: RADIOLOGY | Facility: MEDICAL CENTER | Age: 78
DRG: 065 | End: 2023-09-20
Attending: EMERGENCY MEDICINE
Payer: COMMERCIAL

## 2023-09-20 ENCOUNTER — HOSPITAL ENCOUNTER (INPATIENT)
Facility: MEDICAL CENTER | Age: 78
LOS: 3 days | DRG: 065 | End: 2023-09-23
Attending: EMERGENCY MEDICINE | Admitting: INTERNAL MEDICINE
Payer: COMMERCIAL

## 2023-09-20 DIAGNOSIS — R79.89 ELEVATED TROPONIN: ICD-10-CM

## 2023-09-20 DIAGNOSIS — R53.1 LEFT-SIDED WEAKNESS: ICD-10-CM

## 2023-09-20 DIAGNOSIS — R41.82 ALTERED MENTAL STATUS, UNSPECIFIED ALTERED MENTAL STATUS TYPE: ICD-10-CM

## 2023-09-20 DIAGNOSIS — R29.90 STROKE-LIKE EPISODE: ICD-10-CM

## 2023-09-20 LAB
ABO + RH BLD: NORMAL
ABO GROUP BLD: NORMAL
ALBUMIN SERPL BCP-MCNC: 2.8 G/DL (ref 3.2–4.9)
ALBUMIN/GLOB SERPL: 0.7 G/DL
ALP SERPL-CCNC: 63 U/L (ref 30–99)
ALT SERPL-CCNC: 5 U/L (ref 2–50)
AMMONIA PLAS-SCNC: 19 UMOL/L (ref 11–45)
ANION GAP SERPL CALC-SCNC: 9 MMOL/L (ref 7–16)
APPEARANCE UR: CLEAR
APTT PPP: 40.6 SEC (ref 24.7–36)
AST SERPL-CCNC: 9 U/L (ref 12–45)
BASOPHILS # BLD AUTO: 1 % (ref 0–1.8)
BASOPHILS # BLD: 0.07 K/UL (ref 0–0.12)
BILIRUB SERPL-MCNC: 0.3 MG/DL (ref 0.1–1.5)
BILIRUB UR QL STRIP.AUTO: NEGATIVE
BLD GP AB SCN SERPL QL: NORMAL
BUN SERPL-MCNC: 25 MG/DL (ref 8–22)
CALCIUM ALBUM COR SERPL-MCNC: 10 MG/DL (ref 8.5–10.5)
CALCIUM SERPL-MCNC: 9 MG/DL (ref 8.5–10.5)
CHLORIDE SERPL-SCNC: 106 MMOL/L (ref 96–112)
CO2 SERPL-SCNC: 25 MMOL/L (ref 20–33)
COLOR UR: YELLOW
CREAT SERPL-MCNC: 1.37 MG/DL (ref 0.5–1.4)
EOSINOPHIL # BLD AUTO: 0.75 K/UL (ref 0–0.51)
EOSINOPHIL NFR BLD: 11.2 % (ref 0–6.9)
ERYTHROCYTE [DISTWIDTH] IN BLOOD BY AUTOMATED COUNT: 40.8 FL (ref 35.9–50)
GFR SERPLBLD CREATININE-BSD FMLA CKD-EPI: 53 ML/MIN/1.73 M 2
GLOBULIN SER CALC-MCNC: 4.1 G/DL (ref 1.9–3.5)
GLUCOSE BLD STRIP.AUTO-MCNC: 102 MG/DL (ref 65–99)
GLUCOSE SERPL-MCNC: 138 MG/DL (ref 65–99)
GLUCOSE UR STRIP.AUTO-MCNC: 100 MG/DL
HCT VFR BLD AUTO: 34 % (ref 42–52)
HGB BLD-MCNC: 11.8 G/DL (ref 14–18)
IMM GRANULOCYTES # BLD AUTO: 0.05 K/UL (ref 0–0.11)
IMM GRANULOCYTES NFR BLD AUTO: 0.7 % (ref 0–0.9)
INR PPP: 1.27 (ref 0.87–1.13)
KETONES UR STRIP.AUTO-MCNC: NEGATIVE MG/DL
LACTATE SERPL-SCNC: 1.2 MMOL/L (ref 0.5–2)
LEUKOCYTE ESTERASE UR QL STRIP.AUTO: NEGATIVE
LYMPHOCYTES # BLD AUTO: 2.35 K/UL (ref 1–4.8)
LYMPHOCYTES NFR BLD: 35 % (ref 22–41)
MCH RBC QN AUTO: 31.8 PG (ref 27–33)
MCHC RBC AUTO-ENTMCNC: 34.7 G/DL (ref 32.3–36.5)
MCV RBC AUTO: 91.6 FL (ref 81.4–97.8)
MICRO URNS: ABNORMAL
MONOCYTES # BLD AUTO: 0.8 K/UL (ref 0–0.85)
MONOCYTES NFR BLD AUTO: 11.9 % (ref 0–13.4)
NEUTROPHILS # BLD AUTO: 2.7 K/UL (ref 1.82–7.42)
NEUTROPHILS NFR BLD: 40.2 % (ref 44–72)
NITRITE UR QL STRIP.AUTO: NEGATIVE
NRBC # BLD AUTO: 0 K/UL
NRBC BLD-RTO: 0 /100 WBC (ref 0–0.2)
PH UR STRIP.AUTO: 7 [PH] (ref 5–8)
PLATELET # BLD AUTO: 166 K/UL (ref 164–446)
PMV BLD AUTO: 8.6 FL (ref 9–12.9)
POTASSIUM SERPL-SCNC: 3.8 MMOL/L (ref 3.6–5.5)
PROT SERPL-MCNC: 6.9 G/DL (ref 6–8.2)
PROT UR QL STRIP: NEGATIVE MG/DL
PROTHROMBIN TIME: 16 SEC (ref 12–14.6)
RBC # BLD AUTO: 3.71 M/UL (ref 4.7–6.1)
RBC UR QL AUTO: NEGATIVE
RH BLD: NORMAL
SODIUM SERPL-SCNC: 140 MMOL/L (ref 135–145)
SP GR UR STRIP.AUTO: 1.02
TROPONIN T SERPL-MCNC: 30 NG/L (ref 6–19)
UROBILINOGEN UR STRIP.AUTO-MCNC: 1 MG/DL
WBC # BLD AUTO: 6.7 K/UL (ref 4.8–10.8)

## 2023-09-20 PROCEDURE — 86901 BLOOD TYPING SEROLOGIC RH(D): CPT

## 2023-09-20 PROCEDURE — 71045 X-RAY EXAM CHEST 1 VIEW: CPT

## 2023-09-20 PROCEDURE — 87040 BLOOD CULTURE FOR BACTERIA: CPT | Mod: 91

## 2023-09-20 PROCEDURE — 84484 ASSAY OF TROPONIN QUANT: CPT

## 2023-09-20 PROCEDURE — 87086 URINE CULTURE/COLONY COUNT: CPT

## 2023-09-20 PROCEDURE — 96365 THER/PROPH/DIAG IV INF INIT: CPT

## 2023-09-20 PROCEDURE — 70496 CT ANGIOGRAPHY HEAD: CPT

## 2023-09-20 PROCEDURE — 82962 GLUCOSE BLOOD TEST: CPT

## 2023-09-20 PROCEDURE — 80053 COMPREHEN METABOLIC PANEL: CPT

## 2023-09-20 PROCEDURE — 99223 1ST HOSP IP/OBS HIGH 75: CPT | Mod: AI | Performed by: INTERNAL MEDICINE

## 2023-09-20 PROCEDURE — 700101 HCHG RX REV CODE 250: Performed by: INTERNAL MEDICINE

## 2023-09-20 PROCEDURE — 82140 ASSAY OF AMMONIA: CPT

## 2023-09-20 PROCEDURE — 99222 1ST HOSP IP/OBS MODERATE 55: CPT | Mod: FS | Performed by: NURSE PRACTITIONER

## 2023-09-20 PROCEDURE — 96366 THER/PROPH/DIAG IV INF ADDON: CPT

## 2023-09-20 PROCEDURE — 700117 HCHG RX CONTRAST REV CODE 255: Performed by: EMERGENCY MEDICINE

## 2023-09-20 PROCEDURE — 700105 HCHG RX REV CODE 258: Performed by: EMERGENCY MEDICINE

## 2023-09-20 PROCEDURE — 86850 RBC ANTIBODY SCREEN: CPT

## 2023-09-20 PROCEDURE — 36415 COLL VENOUS BLD VENIPUNCTURE: CPT

## 2023-09-20 PROCEDURE — 85025 COMPLETE CBC W/AUTO DIFF WBC: CPT

## 2023-09-20 PROCEDURE — 83605 ASSAY OF LACTIC ACID: CPT

## 2023-09-20 PROCEDURE — 700102 HCHG RX REV CODE 250 W/ 637 OVERRIDE(OP): Performed by: INTERNAL MEDICINE

## 2023-09-20 PROCEDURE — 99285 EMERGENCY DEPT VISIT HI MDM: CPT

## 2023-09-20 PROCEDURE — 70498 CT ANGIOGRAPHY NECK: CPT

## 2023-09-20 PROCEDURE — 81003 URINALYSIS AUTO W/O SCOPE: CPT

## 2023-09-20 PROCEDURE — 85610 PROTHROMBIN TIME: CPT

## 2023-09-20 PROCEDURE — 85730 THROMBOPLASTIN TIME PARTIAL: CPT

## 2023-09-20 PROCEDURE — 70450 CT HEAD/BRAIN W/O DYE: CPT

## 2023-09-20 PROCEDURE — 87186 SC STD MICRODIL/AGAR DIL: CPT

## 2023-09-20 PROCEDURE — 87077 CULTURE AEROBIC IDENTIFY: CPT

## 2023-09-20 PROCEDURE — A9270 NON-COVERED ITEM OR SERVICE: HCPCS | Performed by: INTERNAL MEDICINE

## 2023-09-20 PROCEDURE — 93005 ELECTROCARDIOGRAM TRACING: CPT | Performed by: EMERGENCY MEDICINE

## 2023-09-20 PROCEDURE — 770020 HCHG ROOM/CARE - TELE (206)

## 2023-09-20 PROCEDURE — 0042T CT-CEREBRAL PERFUSION ANALYSIS: CPT

## 2023-09-20 PROCEDURE — 86900 BLOOD TYPING SEROLOGIC ABO: CPT

## 2023-09-20 RX ORDER — ASPIRIN 81 MG/1
81 TABLET, CHEWABLE ORAL DAILY
Status: DISCONTINUED | OUTPATIENT
Start: 2023-09-21 | End: 2023-09-23 | Stop reason: HOSPADM

## 2023-09-20 RX ORDER — BACLOFEN 10 MG/1
10 TABLET ORAL 3 TIMES DAILY
Status: ON HOLD | COMMUNITY
End: 2023-10-14

## 2023-09-20 RX ORDER — INSULIN GLARGINE 100 [IU]/ML
7 INJECTION, SOLUTION SUBCUTANEOUS 2 TIMES DAILY
Status: ON HOLD | COMMUNITY
End: 2023-11-07 | Stop reason: SDUPTHER

## 2023-09-20 RX ORDER — SODIUM CHLORIDE 9 MG/ML
1000 INJECTION, SOLUTION INTRAVENOUS ONCE
Status: COMPLETED | OUTPATIENT
Start: 2023-09-20 | End: 2023-09-20

## 2023-09-20 RX ORDER — PANTOPRAZOLE SODIUM 40 MG/1
40 TABLET, DELAYED RELEASE ORAL DAILY
Status: DISCONTINUED | OUTPATIENT
Start: 2023-09-21 | End: 2023-09-20

## 2023-09-20 RX ORDER — INSULIN ASPART 100 [IU]/ML
2-22 INJECTION, SOLUTION INTRAVENOUS; SUBCUTANEOUS
COMMUNITY
End: 2023-09-26

## 2023-09-20 RX ORDER — PROPRANOLOL HYDROCHLORIDE 10 MG/1
20 TABLET ORAL EVERY 12 HOURS
Status: DISCONTINUED | OUTPATIENT
Start: 2023-09-20 | End: 2023-09-23 | Stop reason: HOSPADM

## 2023-09-20 RX ORDER — RISPERIDONE 1 MG/1
1 TABLET ORAL 3 TIMES DAILY
COMMUNITY

## 2023-09-20 RX ORDER — POLYETHYLENE GLYCOL 3350 17 G/17G
1 POWDER, FOR SOLUTION ORAL
Status: DISCONTINUED | OUTPATIENT
Start: 2023-09-20 | End: 2023-09-23 | Stop reason: HOSPADM

## 2023-09-20 RX ORDER — ACETAMINOPHEN 325 MG/1
650 TABLET ORAL EVERY 6 HOURS PRN
Status: DISCONTINUED | OUTPATIENT
Start: 2023-09-20 | End: 2023-09-23 | Stop reason: HOSPADM

## 2023-09-20 RX ORDER — BISACODYL 10 MG
10 SUPPOSITORY, RECTAL RECTAL
Status: DISCONTINUED | OUTPATIENT
Start: 2023-09-20 | End: 2023-09-23 | Stop reason: HOSPADM

## 2023-09-20 RX ORDER — OMEPRAZOLE 20 MG/1
20 CAPSULE, DELAYED RELEASE ORAL DAILY
Status: DISCONTINUED | OUTPATIENT
Start: 2023-09-21 | End: 2023-09-23 | Stop reason: HOSPADM

## 2023-09-20 RX ORDER — ASPIRIN 300 MG/1
300 SUPPOSITORY RECTAL DAILY
Status: DISCONTINUED | OUTPATIENT
Start: 2023-09-21 | End: 2023-09-23 | Stop reason: HOSPADM

## 2023-09-20 RX ORDER — LABETALOL HYDROCHLORIDE 5 MG/ML
10 INJECTION, SOLUTION INTRAVENOUS
Status: COMPLETED | OUTPATIENT
Start: 2023-09-20 | End: 2023-09-21

## 2023-09-20 RX ORDER — ONDANSETRON 2 MG/ML
4 INJECTION INTRAMUSCULAR; INTRAVENOUS EVERY 4 HOURS PRN
Status: DISCONTINUED | OUTPATIENT
Start: 2023-09-20 | End: 2023-09-23 | Stop reason: HOSPADM

## 2023-09-20 RX ORDER — TAMSULOSIN HYDROCHLORIDE 0.4 MG/1
0.4 CAPSULE ORAL DAILY
Status: DISCONTINUED | OUTPATIENT
Start: 2023-09-21 | End: 2023-09-23 | Stop reason: HOSPADM

## 2023-09-20 RX ORDER — AMOXICILLIN 250 MG
2 CAPSULE ORAL 2 TIMES DAILY
Status: DISCONTINUED | OUTPATIENT
Start: 2023-09-20 | End: 2023-09-23 | Stop reason: HOSPADM

## 2023-09-20 RX ORDER — DULOXETIN HYDROCHLORIDE 30 MG/1
30 CAPSULE, DELAYED RELEASE ORAL DAILY
Status: DISCONTINUED | OUTPATIENT
Start: 2023-09-21 | End: 2023-09-23 | Stop reason: HOSPADM

## 2023-09-20 RX ORDER — HYDROCODONE BITARTRATE AND ACETAMINOPHEN 10; 325 MG/15ML; MG/15ML
15 SOLUTION ORAL EVERY 4 HOURS PRN
COMMUNITY
End: 2023-09-26

## 2023-09-20 RX ORDER — SODIUM CHLORIDE AND POTASSIUM CHLORIDE 150; 900 MG/100ML; MG/100ML
INJECTION, SOLUTION INTRAVENOUS CONTINUOUS
Status: DISCONTINUED | OUTPATIENT
Start: 2023-09-20 | End: 2023-09-21

## 2023-09-20 RX ORDER — SODIUM CHLORIDE 9 MG/ML
500 INJECTION, SOLUTION INTRAVENOUS
Status: DISCONTINUED | OUTPATIENT
Start: 2023-09-20 | End: 2023-09-23 | Stop reason: HOSPADM

## 2023-09-20 RX ORDER — FINASTERIDE 5 MG/1
5 TABLET, FILM COATED ORAL DAILY
Status: DISCONTINUED | OUTPATIENT
Start: 2023-09-21 | End: 2023-09-23 | Stop reason: HOSPADM

## 2023-09-20 RX ORDER — PRAVASTATIN SODIUM 20 MG
80 TABLET ORAL NIGHTLY
Status: DISCONTINUED | OUTPATIENT
Start: 2023-09-20 | End: 2023-09-23 | Stop reason: HOSPADM

## 2023-09-20 RX ORDER — ENOXAPARIN SODIUM 100 MG/ML
40 INJECTION SUBCUTANEOUS DAILY
Status: DISCONTINUED | OUTPATIENT
Start: 2023-09-20 | End: 2023-09-20

## 2023-09-20 RX ORDER — BACLOFEN 10 MG/1
10 TABLET ORAL 3 TIMES DAILY
Status: DISCONTINUED | OUTPATIENT
Start: 2023-09-20 | End: 2023-09-23 | Stop reason: HOSPADM

## 2023-09-20 RX ORDER — HYDRALAZINE HYDROCHLORIDE 20 MG/ML
10 INJECTION INTRAMUSCULAR; INTRAVENOUS
Status: DISCONTINUED | OUTPATIENT
Start: 2023-09-20 | End: 2023-09-23 | Stop reason: HOSPADM

## 2023-09-20 RX ORDER — FLUTICASONE FUROATE AND VILANTEROL 100; 25 UG/1; UG/1
1 POWDER RESPIRATORY (INHALATION) DAILY
Status: DISCONTINUED | OUTPATIENT
Start: 2023-09-21 | End: 2023-09-20

## 2023-09-20 RX ORDER — DIVALPROEX SODIUM 500 MG/1
1000 TABLET, DELAYED RELEASE ORAL
Status: DISCONTINUED | OUTPATIENT
Start: 2023-09-20 | End: 2023-09-23 | Stop reason: HOSPADM

## 2023-09-20 RX ORDER — ONDANSETRON 4 MG/1
4 TABLET, ORALLY DISINTEGRATING ORAL EVERY 4 HOURS PRN
Status: DISCONTINUED | OUTPATIENT
Start: 2023-09-20 | End: 2023-09-23 | Stop reason: HOSPADM

## 2023-09-20 RX ORDER — ALPRAZOLAM 0.25 MG/1
0.5 TABLET ORAL EVERY 8 HOURS PRN
COMMUNITY
End: 2023-11-02

## 2023-09-20 RX ORDER — DEXTROSE MONOHYDRATE 25 G/50ML
25 INJECTION, SOLUTION INTRAVENOUS
Status: DISCONTINUED | OUTPATIENT
Start: 2023-09-20 | End: 2023-09-23 | Stop reason: HOSPADM

## 2023-09-20 RX ORDER — RISPERIDONE 0.5 MG/1
1 TABLET ORAL 3 TIMES DAILY
Status: DISCONTINUED | OUTPATIENT
Start: 2023-09-20 | End: 2023-09-23 | Stop reason: HOSPADM

## 2023-09-20 RX ADMIN — PRAVASTATIN SODIUM 80 MG: 20 TABLET ORAL at 22:13

## 2023-09-20 RX ADMIN — APIXABAN 5 MG: 5 TABLET, FILM COATED ORAL at 19:25

## 2023-09-20 RX ADMIN — DIVALPROEX SODIUM 1000 MG: 500 TABLET, DELAYED RELEASE ORAL at 22:12

## 2023-09-20 RX ADMIN — SODIUM CHLORIDE 1000 ML: 9 INJECTION, SOLUTION INTRAVENOUS at 16:12

## 2023-09-20 RX ADMIN — BACLOFEN 10 MG: 10 TABLET ORAL at 19:25

## 2023-09-20 RX ADMIN — RISPERIDONE 1 MG: 1 TABLET ORAL at 22:12

## 2023-09-20 RX ADMIN — PROPRANOLOL HYDROCHLORIDE 20 MG: 10 TABLET ORAL at 19:25

## 2023-09-20 RX ADMIN — IOHEXOL 130 ML: 350 INJECTION, SOLUTION INTRAVENOUS at 15:25

## 2023-09-20 RX ADMIN — POTASSIUM CHLORIDE AND SODIUM CHLORIDE: 900; 150 INJECTION, SOLUTION INTRAVENOUS at 19:33

## 2023-09-20 ASSESSMENT — FIBROSIS 4 INDEX: FIB4 SCORE: 2.87

## 2023-09-20 NOTE — ED NOTES
Pharmacy Medication Reconciliation      ~Medication reconciliation updated and complete per patient daughter over the phone.   ~Anticoagulants (rivaroxaban, apixaban, edoxaban, dabigatran, warfarin) taken in the last 14 days? Yes  ~Anticoagulant: Eliquis, Last dose: Unknown

## 2023-09-20 NOTE — CONSULTS
Neurology STROKE CODE H&P  Neurohospitalist Service, [facility]    Referring Physician: JEANNINE Anna.*    STROKE CODE:   Chief Complaint   Patient presents with    Possible Stroke     [facility] [address] Patient was normal per staff at breakfast at 0700. When they went to do his bed bath at 9:30-9:45, patient was altered with left sided droop. Patient is altered, with left sided weakness. Patient is on eliquis for hx DVT. BGL - 184. Stroke alert on arrival       To obtain the most accurate data regarding the time called, and time patient seen, refer to the stroke run-sheet and chart.  For time of CT, refer to the radiology report. See A&P below for TPA Decision and door to needle time if and when applicable.    HPI: Wale Olivas is a 78 y.o. male with a past medical history of Alzheimer's, diabetes, hyperlipidemia, DVTs on Eliquis, and [facility] stroke [facility] of left versus right sided weakness. Last seen normal was this morning at approximately 0700. Noncontrast CT head demonstrates encephalomalacia within the right temporal, occipital, and parietal lobes. No acute abnormalities identified. CTA head/neck negative for LVO or critical flow limiting stenoses. Neurology has been consulted for further evaluation of the above.    Review of systems: In addition to what is detailed in the HPI above, all other systems reviewed and are negative.    Past Medical History:    has a past medical history of Anxiety, BPH (benign prostatic hyperplasia), CKD (chronic kidney disease), COPD (chronic obstructive pulmonary disease) ([facility]), COVID-19, Dementia ([facility]), GERD (gastroesophageal reflux disease), Hyperlipidemia, Insomnia, Major depressive disorder, and Type II diabetes mellitus ([facility]).    FHx:  family history is not on file.    SHx:   reports that he has never smoked. He has never used smokeless tobacco. He reports that he does not  currently use alcohol. He reports that he does not currently use drugs.    Allergies:  Allergies   Allergen Reactions    Metformin     Morphine     Simvastatin     Spironolactone        Medications:    Current Facility-Administered Medications:     NS infusion 1,000 mL, 1,000 mL, Intravenous, Once, Frank Abel M.D.    Current Outpatient Medications:     apixaban (ELIQUIS) 5mg Tab, Take 1 Tablet by mouth 2 times a day., Disp: 60 Tablet, Rfl: 3    fluticasone furoate-vilanterol (BREO) 100-25 MCG/ACT AEROSOL POWDER, BREATH ACTIVATED, Inhale 1 Puff every day., Disp: , Rfl:     vitamin D3 (CHOLECALCIFEROL) 1000 Unit (25 mcg) Tab, Take 1,000 Units by mouth every day., Disp: , Rfl:     cyanocobalamin (VITAMIN B-12) 500 MCG Tab, Take 500 mcg by mouth every 48 hours., Disp: , Rfl:     DULoxetine (CYMBALTA) 60 MG Cap DR Particles delayed-release capsule, Take 60 mg by mouth every day., Disp: , Rfl:     divalproex (DEPAKOTE) 500 MG Tablet Delayed Response, Take 1,500 mg by mouth at bedtime., Disp: , Rfl:     finasteride (PROSCAR) 5 MG Tab, Take 5 mg by mouth every day., Disp: , Rfl:     Umeclidinium Bromide (INCRUSE ELLIPTA) 62.5 MCG/ACT AEROSOL POWDER, BREATH ACTIVATED, Inhale 1 Puff every day., Disp: , Rfl:     furosemide (LASIX) 40 MG Tab, Take 40 mg by mouth every day., Disp: , Rfl:     Lidocaine 4 % Patch, Apply 3 Patches topically at bedtime. Apply to back for chronic pain syndrome, Disp: , Rfl:     lisinopril (PRINIVIL) 40 MG tablet, Take 40 mg by mouth every day., Disp: , Rfl:     pantoprazole (PROTONIX) 40 MG Tablet Delayed Response, Take 40 mg by mouth every day., Disp: , Rfl:     polyethylene glycol/lytes (MIRALAX) 17 g Pack, Take 17 g by mouth every day., Disp: , Rfl:     potassium chloride SA (KDUR) 20 MEQ Tab CR, Take 20 mEq by mouth every day., Disp: , Rfl:     pravastatin (PRAVACHOL) 80 MG tablet, Take 80 mg by mouth every evening., Disp: , Rfl:     Roflumilast 500 MCG Tab, Take 500 mcg by mouth every  "day., Disp: , Rfl:     tamsulosin (FLOMAX) 0.4 MG capsule, Take 0.4 mg by mouth every day., Disp: , Rfl:     traZODone (DESYREL) 100 MG Tab, Take 150 mg by mouth every evening., Disp: , Rfl:     calcium carbonate (TUMS) 500 MG Chew Tab, Chew 500 mg 2 times a day., Disp: , Rfl:     hydrALAZINE (APRESOLINE) 25 MG Tab, Take 25 mg by mouth 2 times a day., Disp: , Rfl:     MENTHOL-METHYL SALICYLATE EX, Apply 1 Application topically 2 times a day. Apply to hands for chronic pain syndrome, Disp: , Rfl:     Probiotic Product (PROBIOTIC PO), Take 1 Capsule by mouth 2 times a day., Disp: , Rfl:     propranolol (INDERAL) 40 MG Tab, Take 40 mg by mouth 2 times a day., Disp: , Rfl:     sennosides (SENOKOT) 8.6 MG Tab, Take 8.6 mg by mouth 2 times a day., Disp: , Rfl:     triamcinolone acetonide (KENALOG) 0.1 % Cream, Apply 1 Application topically 2 times a day. Apply to abdomen ( rash)  For 14 days, Disp: , Rfl:     HYDROcodone-acetaminophen (NORCO) 5-325 MG Tab per tablet, Take 1 Tablet by mouth 3 times a day. Scheduled, Disp: , Rfl:     diclofenac sodium (VOLTAREN) 1 % Gel, Apply  topically 4 times a day. Apply to thumbs for chronic pain syndrome, Disp: , Rfl:     carboxymethylcellulose (REFRESH TEARS) 0.5 % Solution, Administer 1 Drop into both eyes 4 times a day., Disp: , Rfl:     acetaminophen (TYLENOL) 325 MG Tab, Take 650 mg by mouth every 6 hours as needed for Mild Pain., Disp: , Rfl:     Physical Examination:    Vitals:    09/20/23 1516 09/20/23 1530 09/20/23 1535   BP: (!) 179/84  (!) 161/74   Pulse: 60  (!) 58   Resp: 16  (!) 11   Temp:   35.9 °C (96.7 °F)   TempSrc:   Temporal   SpO2: 97%  98%   Weight:  74.8 kg (165 lb)    Height:  1.702 m (5' 7\")          General: Patient is somnolent  Eye: Examination of optic disks not indicated at this time given acuity of consult  Neck: There is normal range of motion  CV: Regular rate   Extremities:  Clear, dry, intact, without peripheral edema    NEUROLOGICAL EXAM:     Mental " status: Drowsy, non-communicative   Speech and language: nonverbal  Cranial nerve exam: Pupils are equal, round and reactive to light bilaterally. Visual fields are full. There is no nystagmus. Extraocular muscles are intact. Slight right facial droop. Sensation in the face is intact to light touch. Palate elevates symmetrically. Tongue is midline.  Motor exam: 1/5 in LUE and LLE, 3/5 in RUE and RLE,   Sensory exam:  Reacts to tactile in all 4 distal extremities, there is no neglect to double stim.  Deep tendon reflexes:  2+ throughout. Toes down-going bilaterally.  Coordination: Unable to assess  Gait: Deferred due to patient acuity    NIHSS: National Institutes of Health Stroke Scale    [1] 1a:Level of Consciousness    0-alert 1-drowsy   2-stupor   3-coma  [2] 1b:LOC Questions                  0-both  1-one      2-neither  [1] 1c:LOC Commands                   0-both  1-one      2-neither  [0] 2: Best Gaze                     0-nl    1-partial  2-forced  [0] 3: Visual Fields                   0-nl    1-partial  2-complete 3-bilat  [1] 4: Facial Paresis                0-nl    1-minor    2-partial  3-full  MOTOR                       0-nl  [2] 5: Right Arm           1-drift  [3] 6: Left Arm             2-some effort vs gravity  [2] 7: Right Leg           3-no effort vs gravity  [3] 8: Left Leg             4-no movement                             x-untestable  [0] 9: Limb Ataxia                    0-abs   1-1_limb   2-2+_limbs       x-untestable  [0] 10:Sensory                        0-nl    1-partial  2-dense  [3] 11:Best Language/Aphasia         0-nl    1-mild/mod 2-severe   3-mute  [0] 12:Dysarthria                     0-nl    1-mild/mod 2-severe       x-untestable  [0] 13:Neglect/Inattention            0-none  1-partial  2-complete  [18] TOTAL    Baseline Modified Washburn Scale (MRS): 4 = Moderately severe disability; unable to walk without assistance and unable to attend to own bodily needs without  "assistance    Objective Data:    Labs:  Lab Results   Component Value Date/Time    PROTHROMBTM 16.0 (H) 09/20/2023 03:09 PM    INR 1.27 (H) 09/20/2023 03:09 PM      Lab Results   Component Value Date/Time    WBC 6.7 09/20/2023 03:09 PM    RBC 3.71 (L) 09/20/2023 03:09 PM    HEMOGLOBIN 11.8 (L) 09/20/2023 03:09 PM    HEMATOCRIT 34.0 (L) 09/20/2023 03:09 PM    MCV 91.6 09/20/2023 03:09 PM    MCH 31.8 09/20/2023 03:09 PM    MCHC 34.7 09/20/2023 03:09 PM    MPV 8.6 (L) 09/20/2023 03:09 PM    NEUTSPOLYS 40.20 (L) 09/20/2023 03:09 PM    LYMPHOCYTES 35.00 09/20/2023 03:09 PM    MONOCYTES 11.90 09/20/2023 03:09 PM    EOSINOPHILS 11.20 (H) 09/20/2023 03:09 PM    BASOPHILS 1.00 09/20/2023 03:09 PM      Lab Results   Component Value Date/Time    SODIUM 140 09/20/2023 03:09 PM    POTASSIUM 3.8 09/20/2023 03:09 PM    CHLORIDE 106 09/20/2023 03:09 PM    CO2 25 09/20/2023 03:09 PM    GLUCOSE 138 (H) 09/20/2023 03:09 PM    BUN 25 (H) 09/20/2023 03:09 PM    CREATININE 1.37 09/20/2023 03:09 PM      No results found for: \"CHOLSTRLTOT\", \"LDL\", \"HDL\", \"TRIGLYCERIDE\"    Lab Results   Component Value Date/Time    ALKPHOSPHAT 63 09/20/2023 03:09 PM    ASTSGOT 9 (L) 09/20/2023 03:09 PM    ALTSGPT 5 09/20/2023 03:09 PM    TBILIRUBIN 0.3 09/20/2023 03:09 PM        Imaging/Testing:    I interpreted and/or reviewed the patient's neuroimaging    CT-CEREBRAL PERFUSION ANALYSIS   Final Result      1.  Cerebral blood flow less than 30% likely representing completed infarct = 36 mL.      2.  T Max more than 6 seconds likely representing combination of completed infarct and ischemia = 52 mL.      3.  Mismatched volume likely representing ischemic brain/penumbra = 16 mL      4.  Perfusion abnormalities are primarily in the right lateral and posterior temporal lobe.      Please note that the cerebral perfusion was performed on the limited brain tissue around the basal ganglia region. Infarct/ischemia outside the CT perfusion sections can be missed in " this study.      CT-HEAD W/O   Final Result      1.  Cerebral atrophy.   2.  Extensive macrocystic encephalomalacic change in the right temporal lobe, right lateral occipital lobe as well as microcystic encephalomalacic changes in the right parietal lobe consistent with old infarction. Associated ex vacuo ventricular    dilatation.   3.  No obvious acute infarct or acute hemorrhage.   4.  Right frontal/temporal/parietal craniotomy.   5.  Chronic/active right maxillary and ethmoid sinusitis.         DX-CHEST-PORTABLE (1 VIEW)    (Results Pending)   CT-CTA HEAD WITH & W/O-POST PROCESS    (Results Pending)   CT-CTA NECK WITH & W/O-POST PROCESSING    (Results Pending)       Assessment and Plan:    78 y.o. male with extensive past medical history [facility] that was worse than his baseline, although there is discrepancy in report as to where there patient had right sided weakness. Stroke protocol CT head shows extensive encephalomalacia from a right MCA territory stroke, but there is no acute abnormalities identified. CTA head/neck negative for LVO and critical hemodynamically limiting stenoses. CT perfusion only demonstrates the perfusion defect within the encephalomalacia. The patient was determined to not be a candidate for thrombolytic therapy secondary to Eliquis and prolonged last known well. With no identifiable target, the patient is not a candidate for mechanical thrombectomy. We will obtain a MRI brain without contrast for further evaluation. Would also recommend toxic/metabolic work-up per primary team as this could be stroke recrudescence. Differential also includes seizure as his mentation slowly improved throughout encounter.    Problem list:  Left sided weakness  Chronic right MCA stroke with encephalomalacia    Recommendations:  -No role for required hypertensive response. Blood pressure goal normotension, antihypertensives per primary team  -Obtain MRI without  contrast  -If MRI brain is positive please reconsult neurology for further work-up  -Recommend toxic/metabolic work-up per primary service    Case reviewed and plan created with Dr. Hammad Gonzalez, Acute Neurology. Please call with any questions.      Dylan AUGUSTE  Vascular Neurology, Acute Care Services

## 2023-09-20 NOTE — ED TRIAGE NOTES
Chief Complaint   Patient presents with    Possible Stroke     [facility] [address]. Patient was normal per staff at breakfast at 0700. When they went to do his bed bath at 9:30-9:45, patient was altered with left sided droop. Patient is altered, with left sided weakness. Patient is on eliquis for hx DVT. BGL - 184. Stroke alert on arrival

## 2023-09-20 NOTE — ED PROVIDER NOTES
ER Provider Note    Scribed for Frank Abel M.d. by Maritza Helm. 9/20/2023  3:09 PM    Primary Care Provider: None noted  CHIEF COMPLAINT  Chief Complaint   Patient presents with    Possible Stroke     [facility] [address] Patient was normal per staff at breakfast at 0700. When they went to do his bed bath at 9:30-9:45, patient was altered with left sided droop. Patient is altered, with left sided weakness. Patient is on eliquis for hx DVT. BGL - 184. Stroke alert on arrival     EXTERNAL RECORDS REVIEWED  Outpatient Notes Patient was last seen in ED on 8/17/23 for a closed head injury.    HPI/ROS  LIMITATION TO HISTORY   Select: Altered mental status / Confusion  OUTSIDE HISTORIAN(S):  EMS who brought patient to ED and contributed to medical history    Wale Olivas is a 78 y.o. male who presents to the ED via EMS complaining of altered mental status onset prior to arrival. [facility] am with a normal. They went in around 9:30 am when they noticed he right sided deficits. Patient has a history of Alzheimer, seizure disorder, hyperlipidemia, diabetes, depression and DVT's. Patient is on Eliquis. No alleviating or exacerbating factors noted.     PAST MEDICAL HISTORY  Past Medical History:   Diagnosis Date    Anxiety     BPH (benign prostatic hyperplasia)     CKD (chronic kidney disease)     COPD (chronic obstructive pulmonary disease) (HCC)     COVID-19     Dementia (HCC)     GERD (gastroesophageal reflux disease)     Hyperlipidemia     Insomnia     Major depressive disorder     Type II diabetes mellitus (HCC)      SURGICAL HISTORY  History reviewed. No pertinent surgical history.    FAMILY HISTORY  History reviewed. No pertinent family history.    SOCIAL HISTORY   reports that he has never smoked. He has never used smokeless tobacco. He reports that he does not currently use alcohol. He reports that he does not currently use drugs.    CURRENT  "MEDICATIONS  Previous Medications    ALPRAZOLAM (XANAX) 0.25 MG TAB    Take 0.5 mg by mouth every 8 hours as needed for Anxiety.    APIXABAN (ELIQUIS) 2.5MG TAB    Take 2.5 mg by mouth 2 times a day.    BACLOFEN (LIORESAL) 10 MG TAB    Take 10 mg by mouth 3 times a day.    DIVALPROEX (DEPAKOTE) 125 MG EC TABLET    Take 1,000 mg by mouth at bedtime.    DULOXETINE HCL 30 MG CAPSULE DELAYED RELEASE SPRINKLE    Take 30 mg by mouth every day.    FINASTERIDE (PROSCAR) 5 MG TAB    Take 5 mg by mouth every day.    FLUTICASONE FUROATE-VILANTEROL (BREO) 100-25 MCG/ACT AEROSOL POWDER, BREATH ACTIVATED    Inhale 1 Puff every day.    HYDROCODONE-ACETAMINOPHEN  MG/15ML SOLUTION    Take 15 mL by mouth every four hours as needed (PAIN).    INSULIN ASPART (NOVOLOG) 100 UNIT/ML SOLUTION    Inject 2-22 Units under the skin 3 times a day before meals.    INSULIN GLARGINE (LANTUS) 100 UNIT/ML SC SOLN    Inject 10 Units under the skin at bedtime.    PANTOPRAZOLE (PROTONIX) 40 MG TABLET DELAYED RESPONSE    Take 40 mg by mouth every day.    POLYETHYLENE GLYCOL/LYTES (MIRALAX) 17 G PACK    Take 17 g by mouth every day.    PRAVASTATIN (PRAVACHOL) 20 MG TAB    Take 80 mg by mouth every evening.    PROPRANOLOL (INDERAL) 20 MG TAB    Take 20 mg by mouth every 12 hours.    RISPERIDONE (RISPERDAL) 1 MG TAB    Take 1 mg by mouth 3 times a day.    TAMSULOSIN (FLOMAX) 0.4 MG CAPSULE    Take 0.4 mg by mouth every day.    UMECLIDINIUM BROMIDE (INCRUSE ELLIPTA) 62.5 MCG/ACT AEROSOL POWDER, BREATH ACTIVATED    Inhale 1 Puff every day.     ALLERGIES  Metformin, Morphine, Simvastatin, and Spironolactone    PHYSICAL EXAM  BP (!) 161/74   Pulse (!) 58   Temp 35.9 °C (96.7 °F) (Temporal)   Resp (!) 11   Ht 1.702 m (5' 7\")   Wt 74.8 kg (165 lb)   SpO2 98%   BMI 25.84 kg/m²   Constitutional: Well developed, Well nourished, mild distress.   HENT: Normocephalic, Atraumatic, No oral exudates. Dry mucous membrane.  Facial droop on the right " side  Eyes: Conjunctiva normal, No discharge. Pupils 3 and reactive  Cardiovascular: Normal heart rate, Normal rhythm, No murmurs, equal pulses.   Pulmonary: Normal breath sounds, No respiratory distress, No wheezing, No rales, No rhonchi.  Chest: No chest wall tenderness or deformity.   Abdomen:Soft, No tenderness,   Musculoskeletal: No major deformities noted, No tenderness.   Skin: Warm, Dry, No erythema, No rash.   Neurologic: Alert, patient is nonverbal,  Right facial droop. Drift of right arm.  Weakness bilateral lower extremities  Psychiatric: Affect normal, Judgment normal, Mood normal.     DIAGNOSTIC STUDIES  Labs:   Results for orders placed or performed during the hospital encounter of 09/20/23   CBC WITH DIFFERENTIAL   Result Value Ref Range    WBC 6.7 4.8 - 10.8 K/uL    RBC 3.71 (L) 4.70 - 6.10 M/uL    Hemoglobin 11.8 (L) 14.0 - 18.0 g/dL    Hematocrit 34.0 (L) 42.0 - 52.0 %    MCV 91.6 81.4 - 97.8 fL    MCH 31.8 27.0 - 33.0 pg    MCHC 34.7 32.3 - 36.5 g/dL    RDW 40.8 35.9 - 50.0 fL    Platelet Count 166 164 - 446 K/uL    MPV 8.6 (L) 9.0 - 12.9 fL    Neutrophils-Polys 40.20 (L) 44.00 - 72.00 %    Lymphocytes 35.00 22.00 - 41.00 %    Monocytes 11.90 0.00 - 13.40 %    Eosinophils 11.20 (H) 0.00 - 6.90 %    Basophils 1.00 0.00 - 1.80 %    Immature Granulocytes 0.70 0.00 - 0.90 %    Nucleated RBC 0.00 0.00 - 0.20 /100 WBC    Neutrophils (Absolute) 2.70 1.82 - 7.42 K/uL    Lymphs (Absolute) 2.35 1.00 - 4.80 K/uL    Monos (Absolute) 0.80 0.00 - 0.85 K/uL    Eos (Absolute) 0.75 (H) 0.00 - 0.51 K/uL    Baso (Absolute) 0.07 0.00 - 0.12 K/uL    Immature Granulocytes (abs) 0.05 0.00 - 0.11 K/uL    NRBC (Absolute) 0.00 K/uL   COMP METABOLIC PANEL   Result Value Ref Range    Sodium 140 135 - 145 mmol/L    Potassium 3.8 3.6 - 5.5 mmol/L    Chloride 106 96 - 112 mmol/L    Co2 25 20 - 33 mmol/L    Anion Gap 9.0 7.0 - 16.0    Glucose 138 (H) 65 - 99 mg/dL    Bun 25 (H) 8 - 22 mg/dL    Creatinine 1.37 0.50 - 1.40 mg/dL     Calcium 9.0 8.5 - 10.5 mg/dL    Correct Calcium 10.0 8.5 - 10.5 mg/dL    AST(SGOT) 9 (L) 12 - 45 U/L    ALT(SGPT) 5 2 - 50 U/L    Alkaline Phosphatase 63 30 - 99 U/L    Total Bilirubin 0.3 0.1 - 1.5 mg/dL    Albumin 2.8 (L) 3.2 - 4.9 g/dL    Total Protein 6.9 6.0 - 8.2 g/dL    Globulin 4.1 (H) 1.9 - 3.5 g/dL    A-G Ratio 0.7 g/dL   PROTHROMBIN TIME   Result Value Ref Range    PT 16.0 (H) 12.0 - 14.6 sec    INR 1.27 (H) 0.87 - 1.13   APTT   Result Value Ref Range    APTT 40.6 (H) 24.7 - 36.0 sec   COD (ADULT)   Result Value Ref Range    ABO Grouping Only A     Rh Grouping Only NEG     Antibody Screen-Cod NEG    TROPONIN   Result Value Ref Range    Troponin T 30 (H) 6 - 19 ng/L   URINALYSIS (UA)    Specimen: Urine   Result Value Ref Range    Color Yellow     Character Clear     Specific Gravity 1.018 <1.035    Ph 7.0 5.0 - 8.0    Glucose 100 (A) Negative mg/dL    Ketones Negative Negative mg/dL    Protein Negative Negative mg/dL    Bilirubin Negative Negative    Urobilinogen, Urine 1.0 Negative    Nitrite Negative Negative    Leukocyte Esterase Negative Negative    Occult Blood Negative Negative    Micro Urine Req see below    LACTIC ACID   Result Value Ref Range    Lactic Acid 1.2 0.5 - 2.0 mmol/L   AMMONIA   Result Value Ref Range    Ammonia 19 11 - 45 umol/L   ABO Rh Confirm   Result Value Ref Range    ABO Rh Confirm A NEG    ESTIMATED GFR   Result Value Ref Range    GFR (CKD-EPI) 53 (A) >60 mL/min/1.73 m 2     EK Lead EKG interpreted by me as shown above.    Radiology:   The attending emergency physician has independently interpreted the diagnostic imaging associated with this visit and am waiting the final reading from the radiologist.   Preliminary interpretation is a follows: CT head no intracranial hemorrhage, large old stroke  Radiologist interpretation:   DX-CHEST-PORTABLE (1 VIEW)   Final Result      1.  Low lung volumes without definite acute cardiopulmonary abnormality.      CT-CTA NECK WITH &  W/O-POST PROCESSING   Final Result      1.  Atherosclerotic calcification at the cervical carotid bifurcations with less than 50% stenosis.      CT-CTA HEAD WITH & W/O-POST PROCESS   Final Result      1.  No evidence of acute large vessel occlusion or aneurysm about the [place].   2.  Hypoplastic right posterior cerebral artery P1 segment with a well-developed right posterior communicating artery. Normal variation.      CT-CEREBRAL PERFUSION ANALYSIS   Final Result      1.  Cerebral blood flow less than 30% likely representing completed infarct = 36 mL.      2.  T Max more than 6 seconds likely representing combination of completed infarct and ischemia = 52 mL.      3.  Mismatched volume likely representing ischemic brain/penumbra = 16 mL      4.  Perfusion abnormalities are primarily in the right lateral and posterior temporal lobe.      Please note that the cerebral perfusion was performed on the limited brain tissue around the basal ganglia region. Infarct/ischemia outside the CT perfusion sections can be missed in this study.      CT-HEAD W/O   Final Result      1.  Cerebral atrophy.   2.  Extensive macrocystic encephalomalacic change in the right temporal lobe, right lateral occipital lobe as well as microcystic encephalomalacic changes in the right parietal lobe consistent with old infarction. Associated ex vacuo ventricular    dilatation.   3.  No obvious acute infarct or acute hemorrhage.   4.  Right frontal/temporal/parietal craniotomy.   5.  Chronic/active right maxillary and ethmoid sinusitis.           COURSE & MEDICAL DECISION MAKING     ED Observation Status? Yes; I am placing the patient in to an observation status due to a diagnostic uncertainty as well as therapeutic intensity. Patient placed in observation status at 3:10 PM, 9/20/2023.     Observation plan is as follows: We will manage their symptoms, evaluate with diagnostic testing, and then reassess after results are reviewed     Upon  Reevaluation, the patient's condition has: not improved; and will be escalated to hospitalization.    Patient discharged from ED Observation status at 5:26 PM PM (Time) 9/20/2023 (Date).   INITIAL ASSESSMENT, COURSE AND PLAN  Care Narrative:   3:10 PM - Patient was seen and evaluated by charge desk. Patient presents to the ED via EMS for a traumatic brain injury onset prior to arrival. On exam patient presented with pupils 3 and reactive, a right facial droop and a drift of right arm. After my exam, I discussed with the patient the plan of care, which includes obtaining lab work and imaging for further evaluation. Patient understands and verbalizes agreement to plan of care. Ordered DX-chest, CT head w/o, CT-cerebral perfusion, CT-CTA head with and w/o post process, CT-CTA neck with and w/out post process, EKG, blood culture, lactic acid, ammonia, UA, urine culture, CBC w/ diff, CMP, prothrombin time, APTT, COD and troponin to evaluate. Differential diagnoses include but not limited to: Stroke, seizure, metabolic encephalopathy, intracranial hemorrhage    3:30 PM - Discussed patient's imaging with Dr. Gonzalez (Neurology) who notes that since the patient is not a thrombolytic candidate he recommends MRI.       5:26 PM patient's labs are back no signs of an infectious process.  At this point time I think the patient would benefit from hospitalization for MRI to rule out new stroke    5:38 PM - Hospitalist responded. I discussed the patient's case and the above findings with Dr. Duque  (Hospitalist) who agrees to evaluate the patient for hospitalization.      PROBLEM LIST  Problem #1 right-sided facial droop with questionable new right-sided weakness.  Patient has chronic weakness of the left side.  CT of the head does not show any new intracranial hemorrhage at this point time is unclear if the patient has had a stroke or not.  It was recommended the patient get an MRI.  I do not see a infectious process that may  be contributing to his symptoms.    Problem #2 dementia longstanding patient apparently does have episodes of being nonverbal at baseline.    Problem #3 elevated troponin EKG does not show any ST elevation.  I think this will need to be trended.  At this point time he does not appear to be having a STEMI.  Patient is unable to answer if he has any chest pain.    DISPOSITION AND DISCUSSIONS  I have discussed management of the patient with the following physicians and DEANNA's:  Dr. Gonzalez (Neurology) and Dr. Duque     Discussion of management with other [facility] or appropriate source(s): None     Barriers to care at this time, including but not limited to: None.     Decision tools and prescription drugs considered including, but not limited to: NIH Stroke Scale 4 .    DISPOSITION:  Patient will be hospitalized by Dr. Duque  in guarded condition.    FINAL DIAGNOSIS  1. Altered mental status, unspecified altered mental status type    2. Left-sided weakness    3. Elevated troponin       The note accurately reflects work and decisions made by me.  Frank Abel M.D.  9/20/2023  5:30 PM

## 2023-09-21 LAB
ALBUMIN SERPL BCP-MCNC: 2.8 G/DL (ref 3.2–4.9)
ALBUMIN/GLOB SERPL: 0.6 G/DL
ALP SERPL-CCNC: 70 U/L (ref 30–99)
ALT SERPL-CCNC: 5 U/L (ref 2–50)
ANION GAP SERPL CALC-SCNC: 9 MMOL/L (ref 7–16)
AST SERPL-CCNC: 12 U/L (ref 12–45)
BASOPHILS # BLD AUTO: 0.9 % (ref 0–1.8)
BASOPHILS # BLD: 0.06 K/UL (ref 0–0.12)
BILIRUB SERPL-MCNC: 0.4 MG/DL (ref 0.1–1.5)
BUN SERPL-MCNC: 21 MG/DL (ref 8–22)
CALCIUM ALBUM COR SERPL-MCNC: 9.8 MG/DL (ref 8.5–10.5)
CALCIUM SERPL-MCNC: 8.8 MG/DL (ref 8.5–10.5)
CHLORIDE SERPL-SCNC: 103 MMOL/L (ref 96–112)
CHOLEST SERPL-MCNC: 109 MG/DL (ref 100–199)
CO2 SERPL-SCNC: 26 MMOL/L (ref 20–33)
CREAT SERPL-MCNC: 1.15 MG/DL (ref 0.5–1.4)
EOSINOPHIL # BLD AUTO: 0.75 K/UL (ref 0–0.51)
EOSINOPHIL NFR BLD: 10.8 % (ref 0–6.9)
ERYTHROCYTE [DISTWIDTH] IN BLOOD BY AUTOMATED COUNT: 39.8 FL (ref 35.9–50)
GFR SERPLBLD CREATININE-BSD FMLA CKD-EPI: 66 ML/MIN/1.73 M 2
GLOBULIN SER CALC-MCNC: 4.5 G/DL (ref 1.9–3.5)
GLUCOSE BLD STRIP.AUTO-MCNC: 184 MG/DL (ref 65–99)
GLUCOSE SERPL-MCNC: 172 MG/DL (ref 65–99)
HCT VFR BLD AUTO: 35.7 % (ref 42–52)
HDLC SERPL-MCNC: 30 MG/DL
HGB BLD-MCNC: 11.9 G/DL (ref 14–18)
IMM GRANULOCYTES # BLD AUTO: 0.05 K/UL (ref 0–0.11)
IMM GRANULOCYTES NFR BLD AUTO: 0.7 % (ref 0–0.9)
LDLC SERPL CALC-MCNC: 63 MG/DL
LYMPHOCYTES # BLD AUTO: 2.49 K/UL (ref 1–4.8)
LYMPHOCYTES NFR BLD: 35.7 % (ref 22–41)
MCH RBC QN AUTO: 30.4 PG (ref 27–33)
MCHC RBC AUTO-ENTMCNC: 33.3 G/DL (ref 32.3–36.5)
MCV RBC AUTO: 91.1 FL (ref 81.4–97.8)
MONOCYTES # BLD AUTO: 0.71 K/UL (ref 0–0.85)
MONOCYTES NFR BLD AUTO: 10.2 % (ref 0–13.4)
NEUTROPHILS # BLD AUTO: 2.91 K/UL (ref 1.82–7.42)
NEUTROPHILS NFR BLD: 41.7 % (ref 44–72)
NRBC # BLD AUTO: 0 K/UL
NRBC BLD-RTO: 0 /100 WBC (ref 0–0.2)
PLATELET # BLD AUTO: 187 K/UL (ref 164–446)
PMV BLD AUTO: 8.4 FL (ref 9–12.9)
POTASSIUM SERPL-SCNC: 4.1 MMOL/L (ref 3.6–5.5)
PROT SERPL-MCNC: 7.3 G/DL (ref 6–8.2)
RBC # BLD AUTO: 3.92 M/UL (ref 4.7–6.1)
SODIUM SERPL-SCNC: 138 MMOL/L (ref 135–145)
TRIGL SERPL-MCNC: 82 MG/DL (ref 0–149)
WBC # BLD AUTO: 7 K/UL (ref 4.8–10.8)

## 2023-09-21 PROCEDURE — 36415 COLL VENOUS BLD VENIPUNCTURE: CPT

## 2023-09-21 PROCEDURE — 92610 EVALUATE SWALLOWING FUNCTION: CPT

## 2023-09-21 PROCEDURE — 96372 THER/PROPH/DIAG INJ SC/IM: CPT

## 2023-09-21 PROCEDURE — 80053 COMPREHEN METABOLIC PANEL: CPT

## 2023-09-21 PROCEDURE — 700102 HCHG RX REV CODE 250 W/ 637 OVERRIDE(OP): Performed by: INTERNAL MEDICINE

## 2023-09-21 PROCEDURE — 96375 TX/PRO/DX INJ NEW DRUG ADDON: CPT

## 2023-09-21 PROCEDURE — 82962 GLUCOSE BLOOD TEST: CPT

## 2023-09-21 PROCEDURE — A9270 NON-COVERED ITEM OR SERVICE: HCPCS | Performed by: INTERNAL MEDICINE

## 2023-09-21 PROCEDURE — 96366 THER/PROPH/DIAG IV INF ADDON: CPT

## 2023-09-21 PROCEDURE — 770020 HCHG ROOM/CARE - TELE (206)

## 2023-09-21 PROCEDURE — 99232 SBSQ HOSP IP/OBS MODERATE 35: CPT | Performed by: NURSE PRACTITIONER

## 2023-09-21 PROCEDURE — 80061 LIPID PANEL: CPT

## 2023-09-21 PROCEDURE — 85025 COMPLETE CBC W/AUTO DIFF WBC: CPT

## 2023-09-21 PROCEDURE — 99232 SBSQ HOSP IP/OBS MODERATE 35: CPT | Performed by: STUDENT IN AN ORGANIZED HEALTH CARE EDUCATION/TRAINING PROGRAM

## 2023-09-21 PROCEDURE — 700111 HCHG RX REV CODE 636 W/ 250 OVERRIDE (IP): Performed by: INTERNAL MEDICINE

## 2023-09-21 PROCEDURE — 92612 ENDOSCOPY SWALLOW (FEES) VID: CPT

## 2023-09-21 RX ADMIN — SENNOSIDES AND DOCUSATE SODIUM 2 TABLET: 50; 8.6 TABLET ORAL at 18:16

## 2023-09-21 RX ADMIN — INSULIN HUMAN 1 UNITS: 100 INJECTION, SOLUTION PARENTERAL at 21:26

## 2023-09-21 RX ADMIN — PRAVASTATIN SODIUM 80 MG: 20 TABLET ORAL at 21:25

## 2023-09-21 RX ADMIN — TAMSULOSIN HYDROCHLORIDE 0.4 MG: 0.4 CAPSULE ORAL at 06:56

## 2023-09-21 RX ADMIN — BACLOFEN 10 MG: 10 TABLET ORAL at 18:16

## 2023-09-21 RX ADMIN — BACLOFEN 10 MG: 10 TABLET ORAL at 11:43

## 2023-09-21 RX ADMIN — LABETALOL HYDROCHLORIDE 10 MG: 5 INJECTION INTRAVENOUS at 18:27

## 2023-09-21 RX ADMIN — RISPERIDONE 1 MG: 1 TABLET ORAL at 21:25

## 2023-09-21 RX ADMIN — RISPERIDONE 1 MG: 1 TABLET ORAL at 11:43

## 2023-09-21 RX ADMIN — OMEPRAZOLE 20 MG: 20 CAPSULE, DELAYED RELEASE ORAL at 11:43

## 2023-09-21 RX ADMIN — APIXABAN 5 MG: 5 TABLET, FILM COATED ORAL at 18:17

## 2023-09-21 RX ADMIN — ASPIRIN 81 MG 81 MG: 81 TABLET ORAL at 06:56

## 2023-09-21 RX ADMIN — PROPRANOLOL HYDROCHLORIDE 20 MG: 10 TABLET ORAL at 18:16

## 2023-09-21 RX ADMIN — DULOXETINE HYDROCHLORIDE 30 MG: 30 CAPSULE, DELAYED RELEASE ORAL at 06:57

## 2023-09-21 RX ADMIN — PROPRANOLOL HYDROCHLORIDE 20 MG: 10 TABLET ORAL at 06:56

## 2023-09-21 RX ADMIN — LABETALOL HYDROCHLORIDE 10 MG: 5 INJECTION INTRAVENOUS at 18:47

## 2023-09-21 RX ADMIN — RISPERIDONE 1 MG: 1 TABLET ORAL at 17:12

## 2023-09-21 RX ADMIN — BACLOFEN 10 MG: 10 TABLET ORAL at 06:57

## 2023-09-21 RX ADMIN — FLUTICASONE FUROATE, UMECLIDINIUM BROMIDE AND VILANTEROL TRIFENATATE 1 PUFF: 200; 62.5; 25 POWDER RESPIRATORY (INHALATION) at 07:46

## 2023-09-21 RX ADMIN — APIXABAN 5 MG: 5 TABLET, FILM COATED ORAL at 06:56

## 2023-09-21 RX ADMIN — FINASTERIDE 5 MG: 5 TABLET, FILM COATED ORAL at 06:56

## 2023-09-21 RX ADMIN — DIVALPROEX SODIUM 1000 MG: 500 TABLET, DELAYED RELEASE ORAL at 21:25

## 2023-09-21 ASSESSMENT — ENCOUNTER SYMPTOMS
SORE THROAT: 0
ABDOMINAL PAIN: 0
FOCAL WEAKNESS: 1
SHORTNESS OF BREATH: 0
DIZZINESS: 0
NAUSEA: 0
HEADACHES: 0
MYALGIAS: 0
BLURRED VISION: 0
VOMITING: 0
NECK PAIN: 0
CHILLS: 0
ORTHOPNEA: 0
PALPITATIONS: 0
DOUBLE VISION: 0
DEPRESSION: 0
SPUTUM PRODUCTION: 0
HEARTBURN: 0
FEVER: 0
COUGH: 0

## 2023-09-21 NOTE — ASSESSMENT & PLAN NOTE
Insulin sliding scale  Last A1C 6.7  Repeat A1C requested     Will consider starting long acting if he starts eating well

## 2023-09-21 NOTE — ASSESSMENT & PLAN NOTE
78-year-old male with prior history of right MCA stroke, seizure, dementia, presented with concern for worsening of confusion, last well-known 7 AM  Not a candidate for tPA due to being on Eliquis and outside therapeutic window    CT head without contrast, CTA head and neck reviewed and showed old encephalomalacia in the right temporal lobe, right lateral occipital lobe.  No apparent new acute stroke.  Neurochecks every 4 hours  Obtain MRI of the brain without contrast  Continue Eliquis, aspirin, statin  PT eval pending   OT - SNF   Did quite with speech

## 2023-09-21 NOTE — THERAPY
Speech Language Pathology   Clinical Swallow Evaluation     Patient Name: Wale Olivas  AGE:  78 y.o., SEX:  male  Medical Record #: [id number]  Date of Service: 9/21/2023      History of Present Illness  77 y/o M admit 9/20 w/ right versus left weakness (reports vary[facility] CT unrevealing, MRI pending. Admit CXR w/ low lung volumes, no other abnormality. Hx of Alzheimer's, DM, hyperlipidemia, and previous stroke.  Pt w/ NIHSS 18 on admit, neuro exam improving overnight by 9/21     General Information:  Vitals  O2 Delivery Device: None - Room Air  Level of Consciousness: Alert  Patient Behaviors: Confused  Orientation: Disoriented x 4  Follows Directives: Inconsistent    Prior Living Situation & Level of Function:  Prior Services: Unable To Determine At This Time (from [facility])     Communication: uknown  Swallowing: unknown     Oral Mechanism Evaluation:  Dentition: Natural dentition, Missing posterior dentition, Some missing dentition   Facial Symmetry: Central right facial droop  Facial Sensation: Pt did not follow commands to assess     Labial Observations: Right sided weakness   Lingual Observations: Midline  Motor Speech: mild dysarthria       Laryngeal Function:  Secretion Management: Adequate  Voice Quality: Hoarse (mild)     Subjective: Pt seen A&Ox0, saturating on RA, calm though confused (not following simple commands consistently). D/t bilateral UE weakness and mentation SLP assisted w/ feeding.      Assessment  Current Method of Nutrition: NPO until cleared by speech pathology  Positioning: Arcos's (60-90 degrees)  Bolus Administration: SLP    O2 Delivery Device: None - Room Air  Factor(s) Affecting Performance: Impaired mental status, Impaired command following     Swallowing Trials:  Swallowing Trials  Ice: WFL  Thin Liquid (TN0): Impaired  Mildly Thick Liquid (MT2):  (Intermittent dry throat clearing, unclear whether r/t aspiration)  Pureed (PU4): WFL  Regular (RG7): Impaired (Porlonged  mastication)    Comments: Oral stage c/b prolonged mastication though no oral residue was noted across textures- likely impacted by mentation and slowed response time. Intermittent throat clearing was noted w/ thin liquids; however no coughing or wet vocal quality evident. Unclear whether s/s r/t PO intake.     Clinical Impressions  Clinical signs of oropharyngeal dysphagia, likely acute-on-chronic in the context of altered mentation and new neurological deficits superimposed on chronic deficits, advanced age & debility. Swallow prognosis is unknown; instrumentation is indicated. Recommend pt remain NPO pending FEES.     Recommendations  Diet Consistency: NPO pending instrumentation, ice chips ok  Instrumentation: FEES  Medication: Whole with puree     Oral Care: Q4h     SLP Treatment Plan  Treatment Plan: Dysphagia Treatment  SLP Frequency: 3x Per Week  Estimated Duration: Until Therapy Goals Met      Anticipated Discharge Needs  Discharge Recommendations:  (Recs to follow pending instrumentation and clinical progress)   Therapy Recommendations Upon DC: Dysphagia Training      Patient / Family Goals  Patient / Family Goal #1: Did not state  Short Term Goals  Short Term Goal # 1: Pt will participate in a FEES to define swlalow physiology and therapy need  Short Term Goal # 2: Pt will tolerate the least restrictive PO diet without acute dysphagia-related aspiration EMIL Doss, SLP

## 2023-09-21 NOTE — ED NOTES
Bedside report received from NAHEED Hernadnez. Pt on cardiac monitor, automatic NIBP, pulse OX. Pt on Room air, and bed alarm is in place.

## 2023-09-21 NOTE — ED NOTES
Pt on alisaSavage, continues to shout out to staff passing in halls, and attempting to get out of bed.

## 2023-09-21 NOTE — ED NOTES
Assumed care of pt. Pt updated to POC. On a hospital bed. Fall precautions in place. Call light w/in reach.

## 2023-09-21 NOTE — ED NOTES
Pt brief wet. Dry brief placed on pt. Pt tends to lay on rt side. Pt repositioned w/ pillow onto left. Pt able to roll him self with some help.

## 2023-09-21 NOTE — H&P
Hospital Medicine History & Physical Note    Date of Service  9/20/2023    Primary Care Physician  Pcp Pt States None    Consultants  Neurology    Code Status  Full Code    Chief Complaint  Chief Complaint   Patient presents with    Possible Stroke     [facility] [address] Patient was normal per staff at breakfast at 0700. When they went to do his bed bath at 9:30-9:45, patient was altered with left sided droop. Patient is altered, with left sided weakness. Patient is on eliquis for hx DVT. BGL - 184. Stroke alert on arrival       History of Presenting Illness  Wale Olivas is a 78 y.o. male with prior history of right MCA stroke, debility, DVT on Eliquis, Alzheimer dementia, type 2 diabetes, dyslipidemia, seizure disorder on Depakote, BPH, COPD, GERD, who presented 9/20/2023 with concern for altered mental state.  [facility] where he found earlier today at 9:30 AM altered.  There was concern for possible left facial droop, left facial weakness.  Last known well at 7 AM for breakfast.  Patient was evaluated with CTA head and neck, CT head perfusion scan that showed old encephalomalacia.  No large vessels occlusion.  He was determined not to be a candidate for tPA due to being on Eliquis and prolonged last known well time.  Recommended admission for observation and MRI of the brain without contrast.  Patient presented as poor historian.  He follows some commands.  Minimal verbal response yes or no occasionally.    I discussed the plan of care with patient and ERP, neurology .    Review of Systems  Review of Systems   Unable to perform ROS: Mental status change       Past Medical History   has a past medical history of Anxiety, BPH (benign prostatic hyperplasia), CKD (chronic kidney disease), COPD (chronic obstructive pulmonary disease) ([facility]), COVID-19, Dementia ([facility]), GERD (gastroesophageal reflux disease), Hyperlipidemia, Insomnia, Major depressive  disorder, and Type II diabetes mellitus (HCC).    Surgical History   has no past surgical history on file.     Family History  family history is not on file.   Family history reviewed with patient. There is no family history that is pertinent to the chief complaint.     Social History   reports that he has never smoked. He has never used smokeless tobacco. He reports that he does not currently use alcohol. He reports that he does not currently use drugs.    Allergies  Allergies   Allergen Reactions    Metformin     Morphine     Simvastatin     Spironolactone        Medications  Prior to Admission Medications   Prescriptions Last Dose Informant Patient Reported? Taking?   ALPRAZolam (XANAX) 0.25 MG Tab UNK at UNK Family Member Yes Yes   Sig: Take 0.5 mg by mouth every 8 hours as needed for Anxiety.   DULoxetine HCl 30 MG Capsule Delayed Release Sprinkle UNK at UNK Family Member Yes No   Sig: Take 30 mg by mouth every day.   HYDROcodone-Acetaminophen  MG/15ML Solution UNK at UNK Family Member Yes Yes   Sig: Take 15 mL by mouth every four hours as needed (PAIN).   Umeclidinium Bromide (INCRUSE ELLIPTA) 62.5 MCG/ACT AEROSOL POWDER, BREATH ACTIVATED UNK at UNK Family Member Yes No   Sig: Inhale 1 Puff every day.   apixaban (ELIQUIS) 2.5mg Tab UNK at UNK Family Member Yes Yes   Sig: Take 2.5 mg by mouth 2 times a day.   baclofen (LIORESAL) 10 MG Tab UNK at UNK Family Member Yes Yes   Sig: Take 10 mg by mouth 3 times a day.   divalproex (DEPAKOTE) 125 MG EC tablet UNK at UNK Family Member Yes No   Sig: Take 1,000 mg by mouth at bedtime.   finasteride (PROSCAR) 5 MG Tab UNK at UNK Family Member Yes No   Sig: Take 5 mg by mouth every day.   fluticasone furoate-vilanterol (BREO) 100-25 MCG/ACT AEROSOL POWDER, BREATH ACTIVATED UNK at UNK Family Member Yes No   Sig: Inhale 1 Puff every day.   insulin aspart (NOVOLOG) 100 UNIT/ML Solution UNK at UNK Family Member Yes Yes   Sig: Inject 2-22 Units under the skin 3 times a  day before meals.   insulin glargine (LANTUS) 100 UNIT/ML SC SOLN UNK at UNK Family Member Yes Yes   Sig: Inject 10 Units under the skin at bedtime.   pantoprazole (PROTONIX) 40 MG Tablet Delayed Response UNK at UNK Family Member Yes No   Sig: Take 40 mg by mouth every day.   polyethylene glycol/lytes (MIRALAX) 17 g Pack UNK at UNK Family Member Yes No   Sig: Take 17 g by mouth every day.   pravastatin (PRAVACHOL) 20 MG Tab UNK at UNK Family Member Yes No   Sig: Take 80 mg by mouth every evening.   propranolol (INDERAL) 20 MG Tab UNK at UNK Family Member Yes No   Sig: Take 20 mg by mouth every 12 hours.   risperiDONE (RISPERDAL) 1 MG Tab UNK at UNK Family Member Yes Yes   Sig: Take 1 mg by mouth 3 times a day.   tamsulosin (FLOMAX) 0.4 MG capsule UNK at UNK Family Member Yes No   Sig: Take 0.4 mg by mouth every day.      Facility-Administered Medications: None       Physical Exam  Temp:  [35.9 °C (96.7 °F)] 35.9 °C (96.7 °F)  Pulse:  [58-60] 58  Resp:  [11-16] 11  BP: (161-179)/(74-84) 161/74  SpO2:  [97 %-98 %] 98 %  Blood Pressure : (!) 161/74   Temperature: 35.9 °C (96.7 °F)   Pulse: (!) 58   Respiration: (!) 11   Pulse Oximetry: 98 %       Physical Exam  Vitals and nursing note reviewed.   Constitutional:       General: He is not in acute distress.     Appearance: Normal appearance.   HENT:      Head: Normocephalic and atraumatic.      Nose: Nose normal.      Mouth/Throat:      Mouth: Mucous membranes are moist.   Eyes:      Extraocular Movements: Extraocular movements intact.      Pupils: Pupils are equal, round, and reactive to light.   Cardiovascular:      Rate and Rhythm: Normal rate and regular rhythm.   Pulmonary:      Effort: Pulmonary effort is normal.      Breath sounds: Normal breath sounds.   Abdominal:      General: Abdomen is flat. There is no distension.      Tenderness: There is no abdominal tenderness.   Musculoskeletal:         General: No swelling or deformity. Normal range of motion.       "Cervical back: Normal range of motion and neck supple.   Skin:     General: Skin is warm and dry.   Neurological:      General: No focal deficit present.      Mental Status: He is alert.      Comments: Appears confused.  Minimal verbal response yes or no.  Inconsistently follows commands.   Psychiatric:         Mood and Affect: Mood normal.         Behavior: Behavior normal.         Laboratory:  Recent Labs     09/20/23  1509   WBC 6.7   RBC 3.71*   HEMOGLOBIN 11.8*   HEMATOCRIT 34.0*   MCV 91.6   MCH 31.8   MCHC 34.7   RDW 40.8   PLATELETCT 166   MPV 8.6*     Recent Labs     09/20/23  1509   SODIUM 140   POTASSIUM 3.8   CHLORIDE 106   CO2 25   GLUCOSE 138*   BUN 25*   CREATININE 1.37   CALCIUM 9.0     Recent Labs     09/20/23  1509   ALTSGPT 5   ASTSGOT 9*   ALKPHOSPHAT 63   TBILIRUBIN 0.3   GLUCOSE 138*     Recent Labs     09/20/23  1509   APTT 40.6*   INR 1.27*     No results for input(s): \"NTPROBNP\" in the last 72 hours.      Recent Labs     09/20/23  1509   TROPONINT 30*       Imaging:  DX-CHEST-PORTABLE (1 VIEW)   Final Result      1.  Low lung volumes without definite acute cardiopulmonary abnormality.      CT-CTA NECK WITH & W/O-POST PROCESSING   Final Result      1.  Atherosclerotic calcification at the cervical carotid bifurcations with less than 50% stenosis.      CT-CTA HEAD WITH & W/O-POST PROCESS   Final Result      1.  No evidence of acute large vessel occlusion or aneurysm about the [place].   2.  Hypoplastic right posterior cerebral artery P1 segment with a well-developed right posterior communicating artery. Normal variation.      CT-CEREBRAL PERFUSION ANALYSIS   Final Result      1.  Cerebral blood flow less than 30% likely representing completed infarct = 36 mL.      2.  T Max more than 6 seconds likely representing combination of completed infarct and ischemia = 52 mL.      3.  Mismatched volume likely representing ischemic brain/penumbra = 16 mL      4.  Perfusion abnormalities are " primarily in the right lateral and posterior temporal lobe.      Please note that the cerebral perfusion was performed on the limited brain tissue around the basal ganglia region. Infarct/ischemia outside the CT perfusion sections can be missed in this study.      CT-HEAD W/O   Final Result      1.  Cerebral atrophy.   2.  Extensive macrocystic encephalomalacic change in the right temporal lobe, right lateral occipital lobe as well as microcystic encephalomalacic changes in the right parietal lobe consistent with old infarction. Associated ex vacuo ventricular    dilatation.   3.  No obvious acute infarct or acute hemorrhage.   4.  Right frontal/temporal/parietal craniotomy.   5.  Chronic/active right maxillary and ethmoid sinusitis.         MR-BRAIN-W/O    (Results Pending)   EC-ECHOCARDIOGRAM COMPLETE W/O CONT    (Results Pending)           Assessment/Plan:  Justification for Admission Status  I anticipate this patient will require at least two midnights for appropriate medical management, necessitating inpatient admission because stroke    Patient will need a Telemetry bed on MEDICAL service .  The need is secondary to strokelike episode.    * Stroke-like episode- (present on admission)  Assessment & Plan  78-year-old male with prior history of right MCA stroke, seizure, dementia, presented with concern for worsening of confusion, last well-known 7 AM  Not a candidate for tPA due to being on Eliquis and outside therapeutic window  CT head without contrast, CTA head and neck reviewed and showed old encephalomalacia in the right temporal lobe, right lateral occipital lobe.  No apparent new acute stroke.  Neurochecks every 4 hours  Obtain MRI of the brain without contrast, transthoracic echo  Continue Eliquis, aspirin, statin  PT OT and speech evaluation      Primary hypertension- (present on admission)  Assessment & Plan  Goal is normotension per neurology  Resume propranolol    Chronic anticoagulation- (present on  admission)  Assessment & Plan  Continue Eliquis    COPD (chronic obstructive pulmonary disease) ([facility])- (present on admission)  Assessment & Plan  Not in exacerbation  Resume home inhalers.    Type 2 diabetes mellitus with hyperglycemia, without long-term current use of insulin ([facility])- (present on admission)  Assessment & Plan  Insulin sliding scale        VTE prophylaxis: therapeutic anticoagulation with Eliquis

## 2023-09-21 NOTE — THERAPY
Speech Language Pathology   Flexible Endoscopic Evaluation of Swallowing (FEES)        Patient Name: Wale Olivas  AGE:  78 y.o., SEX:  male  Medical Record #: [id number]  Date of Service: 9/21/2023      History of Present Illness  77 y/o M admit 9/20 w/ right versus left weakness ([facility] vary[facility] CT unrevealing, MRI pending. Admit CXR w/ low lung volumes, no other abnormality. Hx of Alzheimer's, DM, hyperlipidemia, and previous stroke.  Pt w/ NIHSS 18 on admit, neuro exam improving overnight by 9/21     Pertinent Information  Current Method of Nutrition: NPO until cleared by speech pathology  Patient Behaviors: Confused   Dentition: Natural dentition, Missing posterior dentition, Some missing dentition     Factor(s) Affecting Performance: Impaired command following     Discussed the risks, benefits, and alternatives of the FEES procedure. Patient/family acknowledged and agreed to proceed.    Assessment  Flexible Endoscopic Evaluation of Swallowing (FEES) completed at bedside today. The endoscope was passed transnasally via Left nare to evaluate the anatomy and physiology of swallowing. Pt tolerated the procedure with no apparent distress.    Anatomic Findings: WFL  Vocal Fold Motion: Bilateral movement  Secretion Management: Adequate  PO Trials: Thin Liquid, Soft & Bite Sized, Regular Solid      Consistency PAS Score Timing Residue Comments   Thin Liquid 1 N/A Vallecular Residue: None (0%)  Pyriform Sinus Residue: None (0%) 3x cup sips, attempted to trial straw however pt unable to execute straw use d/t mentation   Soft & Bite Sized 1 N/A Vallecular Residue: Mild (5%-25%)  Pyriform Sinus Residue: Trace (1%-5%) 1x trial   Regular Solid 1 N/A Vallecular Residue: Moderate (25-50%)  Pyriform Sinus Residue: Trace (1%-5%) 1x trial; pt becoming fatigued and requiring mod verbal and tactile cueing to complete mastication     Penetration-Aspiration Scale (PAS)  1     No contrast enters airway  2      Contrast enters the airway, remains above the vocal folds, and is ejected from the airway (not seen in the airway at the end of the swallow).  3     Contrast enters the airway, remains above the vocal folds, and is not ejected from the airway (is seen in the airway after the swallow).  4     Contrast enters the airway, contacts the vocal folds, and is ejected from the airway.  5     Contrast enters the airway, contacts the vocal folds, and is not ejected from the airway  6     Contrast enters the airway, crosses the plane of the vocal folds, and is ejected from the airway.  7     Contrast enters the airway, crosses the plane of the vocal folds, and is not ejected from the airway despite effort.  8     Contrast enters the airway, crosses the plane of the vocal folds, is not ejected from the airway and there is no response to aspiration.    Oral phase: Mastication was noted to be more prolonged and disorganized than AM bedside swallow eval, suspect r/t pt fatigue superimposed on cognitive deficits. Pt required verbal and tactile cueing to complete mastication of cracker consistency.     Pharyngeal phase: There was a delay b/w oropharyngeal bolus transit and initiation of airway closure, however it was inconsistent and most noted as the pt's endurance waned towards the end of the exam. With the cracker trial there was passive spillage from the vallecula down the lateral channels. Airway closure appeared timely for liquids and no airway invasion was noted across trials. Pt was able to clear mild pharyngeal residue w/ a spontaneous second swallow.     Compensatory Strategies: Slowed rate, small sips/bites, avoidance of feeding when pt fatigued/drowsy    Severity Rating: Mild     Clinical Impressions  The pt presents with mild oropharyngeal dysphagia likely acute r/t altered mentation superimposed on advanced age, debility and prior neurological deficits from CVA. Temporary diet modification, general swallow precautions  and meal supervision appear indicated.     Recommendations  Diet Consistency: Thin/all Liquids, Soft Bites Solids  Medication: As tolerated  Supervision: Direct supervision during meals  Positioning: Fully upright and midline during oral intake  Strategies: Slow rate of intake (Do not feed if drowsy\)  Oral Care: Q4h  Additional Instrumentation: None     SLP Treatment Plan  Treatment Plan: Dysphagia Treatment  SLP Frequency: 3x Per Week  Estimated Duration: Until Therapy Goals Met    Anticipated Discharge Needs  Discharge Recommendations: Anticipate that the patient will have no further speech therapy needs after discharge from the hospital (ST to f/u w/ pt while hospitalized, but suspect pt will return to baseline once his mentation and alertness improves)   Therapy Recommendations Upon DC: Not Indicated     Patient / Family Goals  Patient / Family Goal #1: Did not state  Short Term Goal # 1: Pt will participate in a FEES to define swlalow physiology and therapy need  Goal Outcome # 1: Goal met  Short Term Goal # 2: Pt will tolerate the least restrictive PO diet without acute dysphagia-related aspiration PNA  Goal Outcome # 2 : Progressing as expected    Tanna Doss, SLP

## 2023-09-21 NOTE — PROGRESS NOTES
Neurology Progress Note  Neurohospitalist Service, [facility]    Referring Physician: JEANNINE Anna.*    STROKE CODE:   Chief Complaint   Patient presents with    Possible Stroke     [facility] [address]. Patient was normal per staff at breakfast at 0700. When they went to do his bed bath at 9:30-9:45, patient was altered with left sided droop. Patient is altered, with left sided weakness. Patient is on eliquis for hx DVT. BGL - 184. Stroke alert on arrival       To obtain the most accurate data regarding the time called, and time patient seen, refer to the stroke run-sheet and chart.  For time of CT, refer to the radiology report. See A&P below for TPA Decision and door to needle time if and when applicable.    HPI: Wale Olivas is a 78 y.o. male with a past medical history of Alzheimer's, diabetes, hyperlipidemia, DVTs on Eliquis, and previous stroke [facility] of left versus right sided weakness. Last seen normal was this morning at approximately 0700. Noncontrast CT head demonstrates encephalomalacia within the right temporal, occipital, and parietal lobes. No acute abnormalities identified. CTA head/neck negative for LVO or critical flow limiting stenoses. Neurology has been consulted for further evaluation of the above.    Interval Update 09/21/2023: Neuro exam mildly improved, although restless and impulsive overnight with nursing staff.    Review of systems: In addition to what is detailed in the HPI above, all other systems reviewed and are negative.    Past Medical History:    has a past medical history of Anxiety, BPH (benign prostatic hyperplasia), CKD (chronic kidney disease), COPD (chronic obstructive pulmonary disease) ([facility]), COVID-19, Dementia ([facility]), GERD (gastroesophageal reflux disease), Hyperlipidemia, Insomnia, Major depressive disorder, and Type II diabetes mellitus ([facility]).    FHx:  family history is not on  file.    SHx:   reports that he has never smoked. He has never used smokeless tobacco. He reports that he does not currently use alcohol. He reports that he does not currently use drugs.    Allergies:  Allergies   Allergen Reactions    Metformin     Morphine     Simvastatin     Spironolactone        Medications:    Current Facility-Administered Medications:     senna-docusate (Pericolace Or Senokot S) 8.6-50 MG per tablet 2 Tablet, 2 Tablet, Oral, BID **AND** polyethylene glycol/lytes (Miralax) PACKET 1 Packet, 1 Packet, Oral, QDAY PRN **AND** magnesium hydroxide (Milk Of Magnesia) suspension 30 mL, 30 mL, Oral, QDAY PRN **AND** bisacodyl (Dulcolax) suppository 10 mg, 10 mg, Rectal, QDAY PRN, Enrike Duque M.D.    0.9 % NaCl with KCl 20 mEq infusion, , Intravenous, Continuous, Enrike Duque M.D., Last Rate: 75 mL/hr at 09/20/23 1933, New Bag at 09/20/23 1933    acetaminophen (Tylenol) tablet 650 mg, 650 mg, Oral, Q6HRS PRN, Enrike Duque M.D.    ondansetron (Zofran) syringe/vial injection 4 mg, 4 mg, Intravenous, Q4HRS PRN, Enrike Duque M.D.    ondansetron (Zofran ODT) dispertab 4 mg, 4 mg, Oral, Q4HRS PRN, Enrike Duque M.D.    insulin regular (HumuLIN R,NovoLIN R) injection, 1-6 Units, Subcutaneous, 4X/DAY ACHS **AND** POC blood glucose manual result, , , Q AC AND BEDTIME(S) **AND** NOTIFY MD and PharmD, , , Once **AND** Administer 20 grams of glucose (approximately 8 ounces of fruit juice) every 15 minutes PRN FSBG less than 70 mg/dL, , , PRN **AND** dextrose 50% (D50W) injection 25 g, 25 g, Intravenous, Q15 MIN PRN, Enrike Duque M.D.    aspirin (Asa) chewable tab 81 mg, 81 mg, Oral, DAILY, 81 mg at 09/21/23 0656 **OR** aspirin (Asa) suppository 300 mg, 300 mg, Rectal, DAILY, Enrike Duque M.D.    Blood pressure control per admin instructions, , Other, PHARMACY TO DOSE, Enrike Duque M.D.    labetalol (Normodyne/Trandate) injection 10 mg, 10 mg, Intravenous, Q10 MIN PRN, Enrike  MARKEL Duque    hydrALAZINE (Apresoline) injection 10 mg, 10 mg, Intravenous, Q2HRS PRN, Enrike Duque M.D.    NS (Bolus) infusion 500 mL, 500 mL, Intravenous, Once PRN, Enrike Duque M.D.    apixaban (Eliquis) tablet 5 mg, 5 mg, Oral, BID, Enrike Duque M.D., 5 mg at 09/21/23 0656    baclofen (Lioresal) tablet 10 mg, 10 mg, Oral, TID, Enrike Duque M.D., 10 mg at 09/21/23 0657    divalproex (Depakote) delayed-release tablet 1,000 mg, 1,000 mg, Oral, QHS, Enrike Duque M.D., 1,000 mg at 09/20/23 2212    DULoxetine (Cymbalta) capsule 30 mg, 30 mg, Oral, DAILY, Enrike Duque M.D., 30 mg at 09/21/23 0657    finasteride (Proscar) tablet 5 mg, 5 mg, Oral, DAILY, Enrike Duque M.D., 5 mg at 09/21/23 0656    pravastatin (Pravachol) tablet 80 mg, 80 mg, Oral, Nightly, Enrike Duque M.D., 80 mg at 09/20/23 2213    propranolol (Inderal) tablet 20 mg, 20 mg, Oral, Q12HRS, Enrike Duque M.D., 20 mg at 09/21/23 0656    risperiDONE (RisperDAL) tablet 1 mg, 1 mg, Oral, TID, Enrike Duque M.D., 1 mg at 09/20/23 2212    tamsulosin (Flomax) capsule 0.4 mg, 0.4 mg, Oral, DAILY, Enrike Dquue M.D., 0.4 mg at 09/21/23 0656    fluticasone-umeclidinium-vilanterol (Trelegy Ellipta) 200-62.5-25 mcg/act inhaler 1 Puff, 1 Puff, Inhalation, DAILY, Enrike Duque M.D., 1 Puff at 09/21/23 0746    omeprazole (PriLOSEC) capsule 20 mg, 20 mg, Oral, DAILY, Enrike Duque M.D.    Current Outpatient Medications:     apixaban (ELIQUIS) 2.5mg Tab, Take 2.5 mg by mouth 2 times a day., Disp: , Rfl:     risperiDONE (RISPERDAL) 1 MG Tab, Take 1 mg by mouth 3 times a day., Disp: , Rfl:     ALPRAZolam (XANAX) 0.25 MG Tab, Take 0.5 mg by mouth every 8 hours as needed for Anxiety., Disp: , Rfl:     insulin glargine (LANTUS) 100 UNIT/ML SC SOLN, Inject 10 Units under the skin at bedtime., Disp: , Rfl:     insulin aspart (NOVOLOG) 100 UNIT/ML Solution, Inject 2-22 Units under the skin 3 times a day before  meals., Disp: , Rfl:     baclofen (LIORESAL) 10 MG Tab, Take 10 mg by mouth 3 times a day., Disp: , Rfl:     HYDROcodone-Acetaminophen  MG/15ML Solution, Take 15 mL by mouth every four hours as needed (PAIN)., Disp: , Rfl:     fluticasone furoate-vilanterol (BREO) 100-25 MCG/ACT AEROSOL POWDER, BREATH ACTIVATED, Inhale 1 Puff every day., Disp: , Rfl:     DULoxetine HCl 30 MG Capsule Delayed Release Sprinkle, Take 30 mg by mouth every day., Disp: , Rfl:     divalproex (DEPAKOTE) 125 MG EC tablet, Take 1,000 mg by mouth at bedtime., Disp: , Rfl:     finasteride (PROSCAR) 5 MG Tab, Take 5 mg by mouth every day., Disp: , Rfl:     Umeclidinium Bromide (INCRUSE ELLIPTA) 62.5 MCG/ACT AEROSOL POWDER, BREATH ACTIVATED, Inhale 1 Puff every day., Disp: , Rfl:     pantoprazole (PROTONIX) 40 MG Tablet Delayed Response, Take 40 mg by mouth every day., Disp: , Rfl:     polyethylene glycol/lytes (MIRALAX) 17 g Pack, Take 17 g by mouth every day., Disp: , Rfl:     pravastatin (PRAVACHOL) 20 MG Tab, Take 80 mg by mouth every evening., Disp: , Rfl:     tamsulosin (FLOMAX) 0.4 MG capsule, Take 0.4 mg by mouth every day., Disp: , Rfl:     propranolol (INDERAL) 20 MG Tab, Take 20 mg by mouth every 12 hours., Disp: , Rfl:     Physical Examination:    Vitals:    09/21/23 0143 09/21/23 0459 09/21/23 0700 09/21/23 0800   BP: (!) 171/74 (!) 167/72 (!) 162/78 (!) 157/66   Pulse: 71 (!) 50 61 69   Resp:   14 16   Temp:       TempSrc:       SpO2:  99% 94% 94%   Weight:       Height:             General: Patient is somnolent  Eye: Examination of optic disks not indicated at this time given acuity of consult  Neck: There is normal range of motion  CV: Regular rate   Extremities:  Clear, dry, intact, without peripheral edema    NEUROLOGICAL EXAM:     Mental status: Drowsy, non-communicative   Speech and language: nonverbal  Cranial nerve exam: Pupils are equal, round and reactive to light bilaterally. Visual fields are full. There is no  nystagmus. Extraocular muscles are intact. Slight right facial droop. Sensation in the face is intact to light touch. Palate elevates symmetrically. Tongue is midline.  Motor exam: 1/5 in LUE and LLE, 3/5 in RUE and RLE,   Sensory exam:  Reacts to tactile in all 4 distal extremities, there is no neglect to double stim.  Deep tendon reflexes:  2+ throughout. Toes down-going bilaterally.  Coordination: Unable to assess  Gait: Deferred due to patient acuity    NIHSS: National Institutes of Health Stroke Scale    [0] 1a:Level of Consciousness    0-alert 1-drowsy   2-stupor   3-coma  [1] 1b:LOC Questions                  0-both  1-one      2-neither  [1] 1c:LOC Commands                   0-both  1-one      2-neither  [0] 2: Best Gaze                     0-nl    1-partial  2-forced  [0] 3: Visual Fields                   0-nl    1-partial  2-complete 3-bilat  [1] 4: Facial Paresis                0-nl    1-minor    2-partial  3-full  MOTOR                       0-nl  [1] 5: Right Arm           1-drift  [2] 6: Left Arm             2-some effort vs gravity  [1] 7: Right Leg           3-no effort vs gravity  [2] 8: Left Leg             4-no movement                             x-untestable  [0] 9: Limb Ataxia                    0-abs   1-1_limb   2-2+_limbs       x-untestable  [0] 10:Sensory                        0-nl    1-partial  2-dense  [0] 11:Best Language/Aphasia         0-nl    1-mild/mod 2-severe   3-mute  [1] 12:Dysarthria                     0-nl    1-mild/mod 2-severe       x-untestable  [0] 13:Neglect/Inattention            0-none  1-partial  2-complete  [10] TOTAL    Baseline Modified [place] Scale (MRS): 4 = Moderately severe disability; unable to walk without assistance and unable to attend to own bodily needs without assistance    Objective Data:    Labs:  Lab Results   Component Value Date/Time    PROTHROMBTM 16.0 (H) 09/20/2023 03:09 PM    INR 1.27 (H) 09/20/2023 03:09 PM      Lab Results   Component Value  Date/Time    WBC 7.0 09/21/2023 06:00 AM    RBC 3.92 (L) 09/21/2023 06:00 AM    HEMOGLOBIN 11.9 (L) 09/21/2023 06:00 AM    HEMATOCRIT 35.7 (L) 09/21/2023 06:00 AM    MCV 91.1 09/21/2023 06:00 AM    MCH 30.4 09/21/2023 06:00 AM    MCHC 33.3 09/21/2023 06:00 AM    MPV 8.4 (L) 09/21/2023 06:00 AM    NEUTSPOLYS 41.70 (L) 09/21/2023 06:00 AM    LYMPHOCYTES 35.70 09/21/2023 06:00 AM    MONOCYTES 10.20 09/21/2023 06:00 AM    EOSINOPHILS 10.80 (H) 09/21/2023 06:00 AM    BASOPHILS 0.90 09/21/2023 06:00 AM      Lab Results   Component Value Date/Time    SODIUM 138 09/21/2023 06:00 AM    POTASSIUM 4.1 09/21/2023 06:00 AM    CHLORIDE 103 09/21/2023 06:00 AM    CO2 26 09/21/2023 06:00 AM    GLUCOSE 172 (H) 09/21/2023 06:00 AM    BUN 21 09/21/2023 06:00 AM    CREATININE 1.15 09/21/2023 06:00 AM      Lab Results   Component Value Date/Time    CHOLSTRLTOT 109 09/21/2023 06:00 AM    LDL 63 09/21/2023 06:00 AM    HDL 30 (A) 09/21/2023 06:00 AM    TRIGLYCERIDE 82 09/21/2023 06:00 AM       Lab Results   Component Value Date/Time    ALKPHOSPHAT 70 09/21/2023 06:00 AM    ASTSGOT 12 09/21/2023 06:00 AM    ALTSGPT 5 09/21/2023 06:00 AM    TBILIRUBIN 0.4 09/21/2023 06:00 AM        Imaging/Testing:    I interpreted and/or reviewed the patient's neuroimaging    DX-CHEST-PORTABLE (1 VIEW)   Final Result      1.  Low lung volumes without definite acute cardiopulmonary abnormality.      CT-CTA NECK WITH & W/O-POST PROCESSING   Final Result      1.  Atherosclerotic calcification at the cervical carotid bifurcations with less than 50% stenosis.      CT-CTA HEAD WITH & W/O-POST PROCESS   Final Result      1.  No evidence of acute large vessel occlusion or aneurysm about the [place].   2.  Hypoplastic right posterior cerebral artery P1 segment with a well-developed right posterior communicating artery. Normal variation.      CT-CEREBRAL PERFUSION ANALYSIS   Final Result      1.  Cerebral blood flow less than 30% likely representing  completed infarct = 36 mL.      2.  T Max more than 6 seconds likely representing combination of completed infarct and ischemia = 52 mL.      3.  Mismatched volume likely representing ischemic brain/penumbra = 16 mL      4.  Perfusion abnormalities are primarily in the right lateral and posterior temporal lobe.      Please note that the cerebral perfusion was performed on the limited brain tissue around the basal ganglia region. Infarct/ischemia outside the CT perfusion sections can be missed in this study.      CT-HEAD W/O   Final Result      1.  Cerebral atrophy.   2.  Extensive macrocystic encephalomalacic change in the right temporal lobe, right lateral occipital lobe as well as microcystic encephalomalacic changes in the right parietal lobe consistent with old infarction. Associated ex vacuo ventricular    dilatation.   3.  No obvious acute infarct or acute hemorrhage.   4.  Right frontal/temporal/parietal craniotomy.   5.  Chronic/active right maxillary and ethmoid sinusitis.         MR-BRAIN-W/O    (Results Pending)   EC-ECHOCARDIOGRAM COMPLETE W/O CONT    (Results Pending)       Assessment and Plan:    78 y.o. male with extensive past medical history who [facility]ided weakness that was worse than his baseline, although there is discrepancy in report as to where there patient had right sided weakness. Stroke protocol CT head shows extensive encephalomalacia from a right MCA territory stroke, but there is no acute abnormalities identified. CTA head/neck negative for LVO and critical hemodynamically limiting stenoses. CT perfusion only demonstrates the perfusion defect within the encephalomalacia. The patient was determined to not be a candidate for thrombolytic therapy secondary to Eliquis and prolonged last known well. With no identifiable target, the patient is not a candidate for mechanical thrombectomy. We will obtain a MRI brain without contrast for further evaluation. Would also  recommend toxic/metabolic work-up per primary team as this could be stroke recrudescence. Differential also includes seizure as his mentation slowly improved throughout encounter.    Problem list:  Left sided weakness  Chronic right MCA stroke with encephalomalacia    Recommendations:  -No role for required hypertensive response. Blood pressure goal normotension, antihypertensives per primary team  -Obtain MRI without contrast- Pending at this time, please reconsult if MRI is positive for acute findings.   -If MRI brain is positive please reconsult neurology for further work-up  -PT/OT/SLP evaluation and treatment    Case reviewed and plan created with Dr. Hammad Gonzalez, Acute Neurology. Please reconsult Neurology if MRI brain results positive for acute findings.       Dylan AUGUSTE  Vascular Neurology, Acute Care Services

## 2023-09-21 NOTE — PROGRESS NOTES
Hospital Medicine Daily Progress Note    Date of Service  2023    Chief Complaint  Wale Olivas is a 78 y.o. male admitted 2023 with Stroke-like symptoms     Hospital Course  Wale Olivas is a 78 y.o. male with prior history of right MCA stroke, debility, DVT on Eliquis, Alzheimer dementia, type 2 diabetes, dyslipidemia, seizure disorder on Depakote, BPH, COPD, GERD, who presented 2023 with concern for altered mental state.  [facility] where he found earlier today at 9:30 AM altered.  There was concern for possible left facial droop, left facial weakness.  Last known well at 7 AM for breakfast.  Patient was evaluated with CTA head and neck, CT head perfusion scan that showed old encephalomalacia.  No large vessels occlusion. He was determined not to be a candidate for tPA due to being on Eliquis and prolonged last known well time.    Neurology recommended admission for observation and MRI of the brain without contrast.  Patient presented as poor historian.  He follows some commands.  Minimal verbal response yes or no occasionally.     Interval Problem Update    Vitals reviewed; afebrile.  The rest of the vitals within normal parameters. BP improves and mild tony  Pain scale reported - none   Blood glucose in last 24hrs - around mid 100s     At bedside, no acute complain from him. Able to follow commands. Answers limited questions - AO to name and . Do not know where he is or why he is here.     Labs done were unrevealing.     Neurology follow up appreciated - pending MRI  PT/OT/SLP eval requested   Pt has a previous echo without noted intracardiac shunt - I do not believe a repeat Echo is warranted at this time.     I have discussed this patient's plan of care and discharge plan at IDT rounds today with Case Management, Nursing, Nursing leadership, and other members of the IDT team.    Consultants/Specialty  neurology    Code Status  Full Code    Disposition  The  patient is not medically cleared for discharge to home or a post-acute facility.  Anticipate discharge to: home with close outpatient follow-up    I have placed the appropriate orders for post-discharge needs.    Review of Systems  Review of Systems   Reason unable to perform ROS: Dementia.   Constitutional:  Positive for malaise/fatigue. Negative for chills and fever.   HENT:  Negative for congestion and sore throat.    Eyes:  Negative for blurred vision and double vision.   Respiratory:  Negative for cough, sputum production and shortness of breath.    Cardiovascular:  Negative for chest pain, palpitations and orthopnea.   Gastrointestinal:  Negative for abdominal pain, heartburn, nausea and vomiting.   Genitourinary:  Negative for dysuria, frequency and urgency.   Musculoskeletal:  Negative for myalgias and neck pain.   Neurological:  Positive for focal weakness. Negative for dizziness and headaches.   Psychiatric/Behavioral:  Negative for depression.         Physical Exam  Temp:  [35.9 °C (96.7 °F)] 35.9 °C (96.7 °F)  Pulse:  [] 52  Resp:  [11-22] 18  BP: (148-185)/() 151/67  SpO2:  [92 %-99 %] 95 %    Physical Exam  Vitals and nursing note reviewed.   Constitutional:       General: He is not in acute distress.     Appearance: He is ill-appearing.   HENT:      Head: Normocephalic and atraumatic.      Mouth/Throat:      Mouth: Mucous membranes are moist.      Pharynx: Oropharynx is clear.   Eyes:      General: No scleral icterus.     Conjunctiva/sclera: Conjunctivae normal.   Cardiovascular:      Rate and Rhythm: Normal rate and regular rhythm.      Pulses: Normal pulses.      Heart sounds: Normal heart sounds.   Pulmonary:      Effort: Pulmonary effort is normal. No respiratory distress.      Breath sounds: Normal breath sounds. No wheezing.   Abdominal:      General: Bowel sounds are normal. There is no distension.      Palpations: Abdomen is soft.      Tenderness: There is no abdominal tenderness.    Musculoskeletal:         General: No swelling or tenderness. Normal range of motion.   Skin:     General: Skin is warm and dry.      Capillary Refill: Capillary refill takes less than 2 seconds.   Neurological:      Mental Status: He is alert. Mental status is at baseline.      Motor: Weakness (LUE and LLE about 4/5 on the exam) present.   Psychiatric:         Mood and Affect: Mood normal.         Fluids  No intake or output data in the 24 hours ending 09/21/23 1521    Laboratory  Recent Labs     09/20/23  1509 09/21/23  0600   WBC 6.7 7.0   RBC 3.71* 3.92*   HEMOGLOBIN 11.8* 11.9*   HEMATOCRIT 34.0* 35.7*   MCV 91.6 91.1   MCH 31.8 30.4   MCHC 34.7 33.3   RDW 40.8 39.8   PLATELETCT 166 187   MPV 8.6* 8.4*     Recent Labs     09/20/23  1509 09/21/23  0600   SODIUM 140 138   POTASSIUM 3.8 4.1   CHLORIDE 106 103   CO2 25 26   GLUCOSE 138* 172*   BUN 25* 21   CREATININE 1.37 1.15   CALCIUM 9.0 8.8     Recent Labs     09/20/23  1509   APTT 40.6*   INR 1.27*         Recent Labs     09/21/23  0600   TRIGLYCERIDE 82   HDL 30*   LDL 63       Imaging  DX-CHEST-PORTABLE (1 VIEW)   Final Result      1.  Low lung volumes without definite acute cardiopulmonary abnormality.      CT-CTA NECK WITH & W/O-POST PROCESSING   Final Result      1.  Atherosclerotic calcification at the cervical carotid bifurcations with less than 50% stenosis.      CT-CTA HEAD WITH & W/O-POST PROCESS   Final Result      1.  No evidence of acute large vessel occlusion or aneurysm about the [place].   2.  Hypoplastic right posterior cerebral artery P1 segment with a well-developed right posterior communicating artery. Normal variation.      CT-CEREBRAL PERFUSION ANALYSIS   Final Result      1.  Cerebral blood flow less than 30% likely representing completed infarct = 36 mL.      2.  T Max more than 6 seconds likely representing combination of completed infarct and ischemia = 52 mL.      3.  Mismatched volume likely representing ischemic  brain/penumbra = 16 mL      4.  Perfusion abnormalities are primarily in the right lateral and posterior temporal lobe.      Please note that the cerebral perfusion was performed on the limited brain tissue around the basal ganglia region. Infarct/ischemia outside the CT perfusion sections can be missed in this study.      CT-HEAD W/O   Final Result      1.  Cerebral atrophy.   2.  Extensive macrocystic encephalomalacic change in the right temporal lobe, right lateral occipital lobe as well as microcystic encephalomalacic changes in the right parietal lobe consistent with old infarction. Associated ex vacuo ventricular    dilatation.   3.  No obvious acute infarct or acute hemorrhage.   4.  Right frontal/temporal/parietal craniotomy.   5.  Chronic/active right maxillary and ethmoid sinusitis.         MR-BRAIN-W/O    (Results Pending)        Assessment/Plan  * Stroke-like episode- (present on admission)  Assessment & Plan  78-year-old male with prior history of right MCA stroke, seizure, dementia, presented with concern for worsening of confusion, last well-known 7 AM  Not a candidate for tPA due to being on Eliquis and outside therapeutic window  CT head without contrast, CTA head and neck reviewed and showed old encephalomalacia in the right temporal lobe, right lateral occipital lobe.  No apparent new acute stroke.  Neurochecks every 4 hours  Obtain MRI of the brain without contrast  Continue Eliquis, aspirin, statin  PT OT and speech evaluation      Primary hypertension- (present on admission)  Assessment & Plan  Goal is normotension per neurology  Resume propranolol    Chronic anticoagulation- (present on admission)  Assessment & Plan  Continue Eliquis    COPD (chronic obstructive pulmonary disease) ([facility])- (present on admission)  Assessment & Plan  Not in exacerbation  Resume home inhalers.    Type 2 diabetes mellitus with hyperglycemia, without long-term current use of insulin ([facility])- (present on  admission)  Assessment & Plan  Insulin sliding scale  Last A1C 6.7  Repeat A1C requested     Will consider starting long acting if he starts eating well          VTE prophylaxis:    therapeutic anticoagulation with eliquis 5 mg BID      I have performed a physical exam and reviewed and updated ROS and Plan today (9/21/2023).

## 2023-09-22 ENCOUNTER — APPOINTMENT (OUTPATIENT)
Dept: RADIOLOGY | Facility: MEDICAL CENTER | Age: 78
DRG: 065 | End: 2023-09-22
Attending: STUDENT IN AN ORGANIZED HEALTH CARE EDUCATION/TRAINING PROGRAM
Payer: COMMERCIAL

## 2023-09-22 PROBLEM — N30.00 ACUTE CYSTITIS WITHOUT HEMATURIA: Status: ACTIVE | Noted: 2023-09-22

## 2023-09-22 LAB
ANION GAP SERPL CALC-SCNC: 11 MMOL/L (ref 7–16)
BACTERIA UR CULT: ABNORMAL
BACTERIA UR CULT: ABNORMAL
BASE EXCESS BLDV CALC-SCNC: -1 MMOL/L
BASOPHILS # BLD AUTO: 0.4 % (ref 0–1.8)
BASOPHILS # BLD: 0.03 K/UL (ref 0–0.12)
BODY TEMPERATURE: 36.4 CENTIGRADE
BUN SERPL-MCNC: 23 MG/DL (ref 8–22)
CALCIUM SERPL-MCNC: 8.6 MG/DL (ref 8.5–10.5)
CHLORIDE SERPL-SCNC: 104 MMOL/L (ref 96–112)
CK SERPL-CCNC: 30 U/L (ref 0–154)
CO2 SERPL-SCNC: 22 MMOL/L (ref 20–33)
CREAT SERPL-MCNC: 1.28 MG/DL (ref 0.5–1.4)
EOSINOPHIL # BLD AUTO: 0.71 K/UL (ref 0–0.51)
EOSINOPHIL NFR BLD: 10.2 % (ref 0–6.9)
ERYTHROCYTE [DISTWIDTH] IN BLOOD BY AUTOMATED COUNT: 38.3 FL (ref 35.9–50)
EST. AVERAGE GLUCOSE BLD GHB EST-MCNC: 146 MG/DL
GFR SERPLBLD CREATININE-BSD FMLA CKD-EPI: 58 ML/MIN/1.73 M 2
GLUCOSE BLD STRIP.AUTO-MCNC: 191 MG/DL (ref 65–99)
GLUCOSE BLD STRIP.AUTO-MCNC: 258 MG/DL (ref 65–99)
GLUCOSE BLD STRIP.AUTO-MCNC: 273 MG/DL (ref 65–99)
GLUCOSE BLD STRIP.AUTO-MCNC: 281 MG/DL (ref 65–99)
GLUCOSE SERPL-MCNC: 219 MG/DL (ref 65–99)
HBA1C MFR BLD: 6.7 % (ref 4–5.6)
HCO3 BLDV-SCNC: 24 MMOL/L (ref 24–28)
HCT VFR BLD AUTO: 35.7 % (ref 42–52)
HGB BLD-MCNC: 12.4 G/DL (ref 14–18)
IMM GRANULOCYTES # BLD AUTO: 0.04 K/UL (ref 0–0.11)
IMM GRANULOCYTES NFR BLD AUTO: 0.6 % (ref 0–0.9)
INHALED O2 FLOW RATE: NORMAL L/MIN
LACTATE SERPL-SCNC: 1.6 MMOL/L (ref 0.5–2)
LYMPHOCYTES # BLD AUTO: 1.82 K/UL (ref 1–4.8)
LYMPHOCYTES NFR BLD: 26.3 % (ref 22–41)
MCH RBC QN AUTO: 30.8 PG (ref 27–33)
MCHC RBC AUTO-ENTMCNC: 34.7 G/DL (ref 32.3–36.5)
MCV RBC AUTO: 88.8 FL (ref 81.4–97.8)
MONOCYTES # BLD AUTO: 0.87 K/UL (ref 0–0.85)
MONOCYTES NFR BLD AUTO: 12.6 % (ref 0–13.4)
NEUTROPHILS # BLD AUTO: 3.46 K/UL (ref 1.82–7.42)
NEUTROPHILS NFR BLD: 49.9 % (ref 44–72)
NRBC # BLD AUTO: 0 K/UL
NRBC BLD-RTO: 0 /100 WBC (ref 0–0.2)
PCO2 BLDV: 41.1 MMHG (ref 41–51)
PH BLDV: 7.38 [PH] (ref 7.31–7.45)
PLATELET # BLD AUTO: 170 K/UL (ref 164–446)
PMV BLD AUTO: 8.3 FL (ref 9–12.9)
PO2 BLDV: 34 MMHG (ref 25–40)
POTASSIUM SERPL-SCNC: 3.7 MMOL/L (ref 3.6–5.5)
RBC # BLD AUTO: 4.02 M/UL (ref 4.7–6.1)
SAO2 % BLDV: 60.2 %
SIGNIFICANT IND 70042: ABNORMAL
SITE SITE: ABNORMAL
SODIUM SERPL-SCNC: 137 MMOL/L (ref 135–145)
SOURCE SOURCE: ABNORMAL
TSH SERPL DL<=0.005 MIU/L-ACNC: 1.38 UIU/ML (ref 0.38–5.33)
VALPROATE SERPL-MCNC: 55.2 UG/ML (ref 50–100)
WBC # BLD AUTO: 6.9 K/UL (ref 4.8–10.8)

## 2023-09-22 PROCEDURE — 80048 BASIC METABOLIC PNL TOTAL CA: CPT

## 2023-09-22 PROCEDURE — A9270 NON-COVERED ITEM OR SERVICE: HCPCS | Performed by: STUDENT IN AN ORGANIZED HEALTH CARE EDUCATION/TRAINING PROGRAM

## 2023-09-22 PROCEDURE — 97166 OT EVAL MOD COMPLEX 45 MIN: CPT

## 2023-09-22 PROCEDURE — 70551 MRI BRAIN STEM W/O DYE: CPT

## 2023-09-22 PROCEDURE — 80164 ASSAY DIPROPYLACETIC ACD TOT: CPT

## 2023-09-22 PROCEDURE — 92526 ORAL FUNCTION THERAPY: CPT

## 2023-09-22 PROCEDURE — 83036 HEMOGLOBIN GLYCOSYLATED A1C: CPT

## 2023-09-22 PROCEDURE — 700102 HCHG RX REV CODE 250 W/ 637 OVERRIDE(OP): Performed by: STUDENT IN AN ORGANIZED HEALTH CARE EDUCATION/TRAINING PROGRAM

## 2023-09-22 PROCEDURE — 83605 ASSAY OF LACTIC ACID: CPT

## 2023-09-22 PROCEDURE — 770020 HCHG ROOM/CARE - TELE (206)

## 2023-09-22 PROCEDURE — 85025 COMPLETE CBC W/AUTO DIFF WBC: CPT

## 2023-09-22 PROCEDURE — 97163 PT EVAL HIGH COMPLEX 45 MIN: CPT

## 2023-09-22 PROCEDURE — 36415 COLL VENOUS BLD VENIPUNCTURE: CPT

## 2023-09-22 PROCEDURE — 700102 HCHG RX REV CODE 250 W/ 637 OVERRIDE(OP): Performed by: INTERNAL MEDICINE

## 2023-09-22 PROCEDURE — 99232 SBSQ HOSP IP/OBS MODERATE 35: CPT | Performed by: STUDENT IN AN ORGANIZED HEALTH CARE EDUCATION/TRAINING PROGRAM

## 2023-09-22 PROCEDURE — 82550 ASSAY OF CK (CPK): CPT

## 2023-09-22 PROCEDURE — 82803 BLOOD GASES ANY COMBINATION: CPT

## 2023-09-22 PROCEDURE — A9270 NON-COVERED ITEM OR SERVICE: HCPCS | Performed by: INTERNAL MEDICINE

## 2023-09-22 PROCEDURE — 82962 GLUCOSE BLOOD TEST: CPT | Mod: 91

## 2023-09-22 PROCEDURE — 84443 ASSAY THYROID STIM HORMONE: CPT

## 2023-09-22 RX ORDER — AMOXICILLIN 500 MG/1
500 CAPSULE ORAL EVERY 8 HOURS
Status: DISCONTINUED | OUTPATIENT
Start: 2023-09-22 | End: 2023-09-23 | Stop reason: HOSPADM

## 2023-09-22 RX ADMIN — RISPERIDONE 1 MG: 1 TABLET ORAL at 20:15

## 2023-09-22 RX ADMIN — APIXABAN 5 MG: 5 TABLET, FILM COATED ORAL at 17:41

## 2023-09-22 RX ADMIN — INSULIN HUMAN 1 UNITS: 100 INJECTION, SOLUTION PARENTERAL at 12:33

## 2023-09-22 RX ADMIN — INSULIN HUMAN 3 UNITS: 100 INJECTION, SOLUTION PARENTERAL at 08:22

## 2023-09-22 RX ADMIN — RISPERIDONE 1 MG: 1 TABLET ORAL at 14:30

## 2023-09-22 RX ADMIN — AMOXICILLIN 500 MG: 500 CAPSULE ORAL at 14:31

## 2023-09-22 RX ADMIN — INSULIN HUMAN 3 UNITS: 100 INJECTION, SOLUTION PARENTERAL at 17:38

## 2023-09-22 RX ADMIN — AMOXICILLIN 500 MG: 500 CAPSULE ORAL at 20:15

## 2023-09-22 RX ADMIN — PROPRANOLOL HYDROCHLORIDE 20 MG: 10 TABLET ORAL at 17:41

## 2023-09-22 RX ADMIN — FLUTICASONE FUROATE, UMECLIDINIUM BROMIDE AND VILANTEROL TRIFENATATE 1 PUFF: 200; 62.5; 25 POWDER RESPIRATORY (INHALATION) at 06:15

## 2023-09-22 RX ADMIN — PROPRANOLOL HYDROCHLORIDE 20 MG: 10 TABLET ORAL at 05:26

## 2023-09-22 RX ADMIN — RISPERIDONE 1 MG: 1 TABLET ORAL at 08:23

## 2023-09-22 RX ADMIN — BACLOFEN 10 MG: 10 TABLET ORAL at 05:25

## 2023-09-22 RX ADMIN — INSULIN HUMAN 3 UNITS: 100 INJECTION, SOLUTION PARENTERAL at 20:55

## 2023-09-22 RX ADMIN — AMOXICILLIN 500 MG: 500 CAPSULE ORAL at 08:17

## 2023-09-22 RX ADMIN — TAMSULOSIN HYDROCHLORIDE 0.4 MG: 0.4 CAPSULE ORAL at 05:26

## 2023-09-22 RX ADMIN — DULOXETINE HYDROCHLORIDE 30 MG: 30 CAPSULE, DELAYED RELEASE ORAL at 06:15

## 2023-09-22 RX ADMIN — SENNOSIDES AND DOCUSATE SODIUM 2 TABLET: 50; 8.6 TABLET ORAL at 05:25

## 2023-09-22 RX ADMIN — OMEPRAZOLE 20 MG: 20 CAPSULE, DELAYED RELEASE ORAL at 05:26

## 2023-09-22 RX ADMIN — SENNOSIDES AND DOCUSATE SODIUM 2 TABLET: 50; 8.6 TABLET ORAL at 17:41

## 2023-09-22 RX ADMIN — PRAVASTATIN SODIUM 80 MG: 20 TABLET ORAL at 20:15

## 2023-09-22 RX ADMIN — FINASTERIDE 5 MG: 5 TABLET, FILM COATED ORAL at 05:25

## 2023-09-22 RX ADMIN — DIVALPROEX SODIUM 1000 MG: 500 TABLET, DELAYED RELEASE ORAL at 20:15

## 2023-09-22 RX ADMIN — ASPIRIN 81 MG 81 MG: 81 TABLET ORAL at 05:26

## 2023-09-22 RX ADMIN — BACLOFEN 10 MG: 10 TABLET ORAL at 12:19

## 2023-09-22 RX ADMIN — APIXABAN 5 MG: 5 TABLET, FILM COATED ORAL at 05:26

## 2023-09-22 ASSESSMENT — PAIN DESCRIPTION - PAIN TYPE
TYPE: ACUTE PAIN
TYPE: ACUTE PAIN
TYPE: CHRONIC PAIN;ACUTE PAIN

## 2023-09-22 ASSESSMENT — ENCOUNTER SYMPTOMS
HEADACHES: 0
FOCAL WEAKNESS: 1
DEPRESSION: 0
ABDOMINAL PAIN: 0
NAUSEA: 0
NECK PAIN: 0
COUGH: 0
DOUBLE VISION: 0
SORE THROAT: 0
FEVER: 0
MYALGIAS: 0
ORTHOPNEA: 0
DIZZINESS: 0
PALPITATIONS: 0
SHORTNESS OF BREATH: 0
SPUTUM PRODUCTION: 0
HEARTBURN: 0
VOMITING: 0
BLURRED VISION: 0
CHILLS: 0

## 2023-09-22 ASSESSMENT — COGNITIVE AND FUNCTIONAL STATUS - GENERAL
TURNING FROM BACK TO SIDE WHILE IN FLAT BAD: UNABLE
EATING MEALS: A LITTLE
SUGGESTED CMS G CODE MODIFIER MOBILITY: CN
TOILETING: A LOT
CLIMB 3 TO 5 STEPS WITH RAILING: TOTAL
MOVING FROM LYING ON BACK TO SITTING ON SIDE OF FLAT BED: UNABLE
WALKING IN HOSPITAL ROOM: TOTAL
PERSONAL GROOMING: A LITTLE
DAILY ACTIVITIY SCORE: 15
HELP NEEDED FOR BATHING: A LOT
MOBILITY SCORE: 6
DRESSING REGULAR UPPER BODY CLOTHING: A LITTLE
SUGGESTED CMS G CODE MODIFIER DAILY ACTIVITY: CK
DRESSING REGULAR LOWER BODY CLOTHING: A LOT
STANDING UP FROM CHAIR USING ARMS: TOTAL
MOVING TO AND FROM BED TO CHAIR: UNABLE

## 2023-09-22 ASSESSMENT — FIBROSIS 4 INDEX: FIB4 SCORE: 2.18

## 2023-09-22 ASSESSMENT — ACTIVITIES OF DAILY LIVING (ADL): TOILETING: REQUIRES ASSIST

## 2023-09-22 ASSESSMENT — GAIT ASSESSMENTS: GAIT LEVEL OF ASSIST: UNABLE TO PARTICIPATE

## 2023-09-22 NOTE — ED NOTES
Pt BP elevated. Medicated as ordered.  Dry brief placed on pt.   Pt lays on left side, repositioned w/ pillows.   Heal float pillows in place, large blister on left heal noted.

## 2023-09-22 NOTE — PROGRESS NOTES
Patient transferred to unit via bed with this RN and charge RNSheron. Patient on monitor, RA, A&Ox0.

## 2023-09-22 NOTE — THERAPY
Physical Therapy   Initial Evaluation     Patient Name: Wale Olivas  Age:  78 y.o., Sex:  male  Medical Record #: [id number]  Today's Date: 9/22/2023     Precautions  Precautions: Fall Risk;Swallow Precautions; dementia     Assessment  Pt presents with impaired activity tolerance, cognition, balance and strength associated with chief complaint of stroke like symptoms/AMS in setting of hx of right MCA CVA, dementia, DM II, DL, seizure d/o, COPD, GERD. Pending MRI. Pt is unable to answer PLOF/social questions, though he is quite jovial. Per chart review, pt was able to transfer independently 1/2023 but used wheelchair for ambulation; unclear immediate prior baseline; [facility]; should have 24/7 available given long term resident. Pt is currently max assist with mixed presentation of weakness from left to right; deferred standing today as pt has a signficiant blood blister on weight bearing surface of left leg and will likely pop with weight bearing; may have not transferred in some time. Will follow to progress to baseline of transfers unless it is clarified that pt has been dependent care at facility.     Plan    Physical Therapy Initial Treatment Plan   Treatment Plan : Equipment, Bed Mobility, Gait Training, Manual Therapy, Neuro Re-Education / Balance, Self Care / Home Evaluation, Therapeutic Activities, Therapeutic Exercise  Treatment Frequency: 3 Times per Week  Duration: Until Therapy Goals Met    DC Equipment Recommendations: assume all equipment is at his long term care facility   Discharge Recommendations: Other - [facility] if [facility] are able to provide max assist; given pt's cognition, he is likely near his baseline from there;        Abridged Subjective/Objective     09/22/23 1015   Prior Living Situation   Prior Services Continuous (24 Hour) Care Giving Per Service   Housing / Facility Long Term Care Facility;Skilled Nursing Facility   Lives with -  Patient's Self Care Capacity Attendant / Paid Care Giver   Comments pt unable to give PLOF; per prior chart review was able to transfer to w/c and able to propek;   Prior Level of Functional Mobility   Bed Mobility Required Assist   Transfer Status Required Assist   Ambulation Dependent   Assistive Devices Used Wheelchair   Wheelchair Required Assist   Cognition    Cognition / Consciousness X   Speech/ Communication Delayed Responses   Level of Consciousness Alert   Ability To Follow Commands 1 Step  (mostly function based, no dual tasking; two step commands)   Safety Awareness Impaired   New Learning Impaired   Attention Impaired   Initiation Impaired   Comments giggling throughout, less responsive as session continued; sp02 and HR stable throughout; mostly stating 'no' to things; reporting he went to the [employer] last night   Passive ROM Lower Body   Passive ROM Lower Body X   Comments shortened hamstrings   Strength Lower Body   Lower Body Strength  X   Gross Strength Generalized Weakness, Equal Bilaterally   Comments preference for moving left but intermittently both are weak   Sensation Lower Body   Lower Extremity Sensation   Not Tested   Balance Assessment   Sitting Balance (Static) Poor +   Sitting Balance (Dynamic) Poor   Comments holding on to railing wiht both hands initially; leaning right (toward HOB) then eventually able to support self edge of bed wiht occasionally lateral loss of balance; deferred standing to significant accumation of blood in friction blister of left heel   Bed Mobility    Supine to Sit Maximal Assist   Sit to Supine Maximal Assist   Gait Analysis   Gait Level Of Assist Unable to Participate   Functional Mobility   Sit to Stand Unable to Participate   Edema / Skin Assessment   Edema / Skin  X   Comments blood filled blister about 3 inches in diameter, filled with blood the more pt sat; deferred standing due to popping risk, has wound care consult per BSRN   Patient / Family Goals     Patient / Family Goal #1 none stated   Short Term Goals    Short Term Goal # 1 Pt will perform supine<>sit from raised HOB/use of raling with min A within 6 visits to reduce caregiver burden.   Short Term Goal # 2 Pt will perform squat pivot transfer with min A wtihin 6 visits to progress toward baseline.   Short Term Goal # 3 Pt will self propel x 150ft with supervision within 6 visits to progress to baseline.   Education Group   Role of Physical Therapist Patient Response Patient;Acceptance;Explanation;Demonstration;Verbal Demonstration;Action Demonstration

## 2023-09-22 NOTE — DISCHARGE PLANNING
Case Management Discharge Planning    Admission Date: 9/20/2023  GMLOS: 1.9  ALOS: 2    6-Clicks ADL Score: 15  6-Clicks Mobility Score:    PT and/or OT Eval ordered: Yes  Post-acute Referrals Ordered: No  Post-acute Choice Obtained: Yes  Has referral(s) been sent to post-acute provider:  No      Anticipated Discharge Dispo: Discharge Disposition: D/T to SNF with Medicare cert in anticipation of skilled care (03)   (Return to [facility])    DME Needed: No    Action(s) Taken: Chart reviewed.  Patient confused; CM called patient's daughterJose #[phone], to complete assessment & discuss DCP; Per Jose, post hospital discharge patient is to return to [facility] where he is a LT resident.    CM called Albert @ [facility] #[phone] to inquire about weekend acceptance as patient is not yet medically cleared for DC; No answer, left voice message requesting callback.    Escalations Completed: None    Medically Clear: No    Next Steps: CM will f/u on medical clearance & transfer back to [facility].    Barriers to Discharge: Medical clearance    Is the patient up for discharge tomorrow:  Potentially    **1621 Hrs - SNF choice form faxed to DPA.      Care Transition Team Assessment    Information Source  Orientation Level: Oriented to person  Information Given By: Relative, Other (Comments) (DaughterJose, and chart review)  Who is responsible for making decisions for patient? : Adult child  Name(s) of Primary Decision Maker: Jose Olivas #[phone]    Readmission Evaluation  Is this a readmission?: No    Elopement Risk  Legal Hold: No  Ambulatory or Self Mobile in Wheelchair: No-Not an Elopement Risk  Elopement Risk: Not at Risk for Elopement    Interdisciplinary Discharge Planning  Lives with - Patient's Self Care Capacity: Attendant / Paid Care Giver  Housing / Facility: Long Term Care Facility, Skilled Nursing Facility  Prior Services:  Continuous (24 Hour) Care Giving Per Service    Discharge Preparedness  What is your plan after discharge?: Skilled nursing facility (Return to [facility])  What are your discharge supports?: Other (comment) (Facility staff)  Prior Functional Level: Needs Assist with Activities of Daily Living    Functional Assesment  Prior Functional Level: Needs Assist with Activities of Daily Living    Finances  Financial Barriers to Discharge: No  Prescription Coverage: Yes     Domestic Abuse  Have you ever been the victim of abuse or violence?: No    Psychological Assessment  History of Psychiatric Problems: Yes  Non-compliant with Treatment: No  Newly Diagnosed Illness: Yes    Discharge Risks or Barriers  Discharge risks or barriers?: Complex medical needs  Patient risk factors: Cognitive / sensory / physical deficit, Complex medical needs    Anticipated Discharge Information  Discharge Disposition: D/T to SNF with Medicare cert in anticipation of skilled care (03)  Discharge Address: [facility] [address] [place] [address]  Discharge Contact Phone Number: [phone]

## 2023-09-22 NOTE — PROGRESS NOTES
Monitor summary: SR 69, NH .13, QRS .07, QT .42 (F)PVC, bigeminy per strip from monitor room.

## 2023-09-22 NOTE — ED NOTES
Patient had dinner; tolerated food well; no coughing and difficulty swallowing noted including night time meds

## 2023-09-22 NOTE — ED NOTES
1500> Pt brief changed. Dry linens on bed.  1600> [facility] friend at  for a visit.

## 2023-09-22 NOTE — PROGRESS NOTES
4 Eyes Skin Assessment Completed by NAHEED Floyd and NAHEED Holbrook.    Head Redness above left brow  Ears WDL  Nose WDL  Mouth WDL  Neck WDL  Breast/Chest Redness and Scab  Shoulder Blades Redness and Blanching  Spine WDL  (R) Arm/Elbow/Hand Redness, Blanching, Bruising, and Scab  (L) Arm/Elbow/Hand Redness, Blanching, Bruising, and Scab  Abdomen Redness and Scab  Groin Pink on tip of penis  Scrotum/Coccyx/Buttocks Blanching  (R) Leg Bruising  (L) Leg Bruising  (R) Heel/Foot/Toe redness, blanching  (L) Heel/Foot/Toe Bruising, large blister on heel          Devices In Places Tele Box, Blood Pressure Cuff, Pulse Ox, and SCD's      Interventions In Place Pillows, Elbow Mepilex, Q2 Turns, Barrier Cream, Heels Loaded W/Pillows, and Pressure Redistribution Mattress, heel float boots    Possible Skin Injury Yes    Pictures Uploaded Into Epic Yes, uploaded by ED  Wound Consult Placed Yes, placed by ED   RN Wound Prevention Protocol Ordered No

## 2023-09-22 NOTE — ASSESSMENT & PLAN NOTE
Possibly with behavioral disturbances   On depakote, risperdal and xanax prn   Monitor for signs of delirium     Nonpharmacologic prevention of delirium    -Re-orient and re-direct patient (time/place/situation)  -Mobilize early and promote daytime activity (Lights ON and blinds OPEN during the day, no naps after 4 PM)  -Promote night time sleep (Lights OFF, TV OFF, cluster nursing care to minimize awakenings, reduce noise, warm blankets and document hours of sleep)  -Manage toileting and provide bowel care (monitor for constipation and/or urinary retention).   -Remove lines/tubing that is not needed  -Educate and involve family (encourage family to be at bedside during visiting hours, provide delirium education and request family bring in familiar objects from home).

## 2023-09-22 NOTE — RESPIRATORY CARE
COPD EDUCATION by COPD CLINICAL EDUCATOR  9/22/2023 at 7:58 AM by Rosaura Sauceda, RRT     Patient reviewed by COPD education team. Patient does not qualify for the COPD program due to dementia history.

## 2023-09-22 NOTE — PROGRESS NOTES
[facility] Medicine Daily Progress Note    Date of Service  2023    Chief Complaint  Wale Olivas is a 78 y.o. male admitted 2023 with Stroke-like symptoms     Hospital Course  Wale Olivas is a 78 y.o. male with prior history of right MCA stroke, debility, DVT on Eliquis, Alzheimer dementia, type 2 diabetes, dyslipidemia, seizure disorder on Depakote, BPH, COPD, GERD, who presented 2023 with concern for altered mental state.  [facility] found earlier today at 9:30 AM altered.  There was concern for possible left facial droop, left facial weakness.  Last known well at 7 AM for breakfast.  Patient was evaluated with CTA head and neck, CT head perfusion scan that showed old encephalomalacia.  No large vessels occlusion. He was determined not to be a candidate for tPA due to being on Eliquis and prolonged last known well time.    Neurology recommended admission for observation and MRI of the brain without contrast.  Patient presented as poor historian.  He follows some commands.  Minimal verbal response yes or no occasionally.     : BP improves and mild tony; no acute complain from him. Able to follow commands. Answers limited questions - AO to name and . Neuro exam with some improvement in left sided weakness. Pt did well with SLP and FEEs exam     Interval Problem Update    Vitals reviewed; afebrile.  The rest of the vitals within normal parameters. BP essentially in normal range  Blood glucose in last 24hrs - around mid - high 100s     At bedside, appeared a bit more lethargic than yesterday. Per RN, Pt was able to participate with OT and able to do oral hygiene earlier. (?Tired from therapy)    Labs done were unrevealing. A new lab requested due to change in clinical status.   UCX grew Enteroccous - difficult to interpret symptoms in his current mental status; hence, po Abx started.     Pending MRI  PT eval pending    With slight change in clinical  status, baclofen will be on hold as well to avoid over sedation.     I have discussed this patient's plan of care and discharge plan at IDT rounds today with Case Management, Nursing, Nursing leadership, and other members of the IDT team.    Consultants/Specialty  neurology    Code Status  Full Code    Disposition  The patient is not medically cleared for discharge to home or a post-acute facility.  Anticipate discharge to: skilled nursing facility    I have placed the appropriate orders for post-discharge needs.    Review of Systems  Review of Systems   Reason unable to perform ROS: Dementia.   Constitutional:  Positive for malaise/fatigue. Negative for chills and fever.   HENT:  Negative for congestion and sore throat.    Eyes:  Negative for blurred vision and double vision.   Respiratory:  Negative for cough, sputum production and shortness of breath.    Cardiovascular:  Negative for chest pain, palpitations and orthopnea.   Gastrointestinal:  Negative for abdominal pain, heartburn, nausea and vomiting.   Genitourinary:  Negative for dysuria, frequency and urgency.   Musculoskeletal:  Negative for myalgias and neck pain.   Neurological:  Positive for focal weakness. Negative for dizziness and headaches.   Psychiatric/Behavioral:  Negative for depression.         Physical Exam  Temp:  [36.1 °C (97 °F)-36.9 °C (98.4 °F)] 36.1 °C (97 °F)  Pulse:  [56-78] 57  Resp:  [16-18] 16  BP: (126-166)/(50-89) 150/76  SpO2:  [94 %-97 %] 97 %    Physical Exam  Vitals and nursing note reviewed.   Constitutional:       General: He is not in acute distress.     Appearance: He is ill-appearing.   HENT:      Head: Normocephalic and atraumatic.      Mouth/Throat:      Mouth: Mucous membranes are moist.      Pharynx: Oropharynx is clear.   Eyes:      General: No scleral icterus.     Conjunctiva/sclera: Conjunctivae normal.   Cardiovascular:      Rate and Rhythm: Normal rate and regular rhythm.      Pulses: Normal pulses.      Heart  sounds: Normal heart sounds.   Pulmonary:      Effort: Pulmonary effort is normal. No respiratory distress.      Breath sounds: Normal breath sounds. No wheezing.   Abdominal:      General: Bowel sounds are normal. There is no distension.      Palpations: Abdomen is soft.      Tenderness: There is no abdominal tenderness.   Musculoskeletal:         General: No swelling or tenderness. Normal range of motion.   Skin:     General: Skin is warm and dry.      Capillary Refill: Capillary refill takes less than 2 seconds.   Neurological:      Mental Status: Mental status is at baseline. He is lethargic.      Motor: Weakness (LUE and LLE about 4/5 on the exam) present.   Psychiatric:         Mood and Affect: Mood normal.         Fluids    Intake/Output Summary (Last 24 hours) at 9/22/2023 2143  Last data filed at 9/22/2023 1800  Gross per 24 hour   Intake 120 ml   Output 900 ml   Net -780 ml       Laboratory  Recent Labs     09/20/23  1509 09/21/23  0600 09/22/23  1312   WBC 6.7 7.0 6.9   RBC 3.71* 3.92* 4.02*   HEMOGLOBIN 11.8* 11.9* 12.4*   HEMATOCRIT 34.0* 35.7* 35.7*   MCV 91.6 91.1 88.8   MCH 31.8 30.4 30.8   MCHC 34.7 33.3 34.7   RDW 40.8 39.8 38.3   PLATELETCT 166 187 170   MPV 8.6* 8.4* 8.3*     Recent Labs     09/20/23  1509 09/21/23  0600 09/22/23  1312   SODIUM 140 138 137   POTASSIUM 3.8 4.1 3.7   CHLORIDE 106 103 104   CO2 25 26 22   GLUCOSE 138* 172* 219*   BUN 25* 21 23*   CREATININE 1.37 1.15 1.28   CALCIUM 9.0 8.8 8.6     Recent Labs     09/20/23  1509   APTT 40.6*   INR 1.27*         Recent Labs     09/21/23  0600   TRIGLYCERIDE 82   HDL 30*   LDL 63       Imaging  DX-CHEST-PORTABLE (1 VIEW)   Final Result      1.  Low lung volumes without definite acute cardiopulmonary abnormality.      CT-CTA NECK WITH & W/O-POST PROCESSING   Final Result      1.  Atherosclerotic calcification at the cervical carotid bifurcations with less than 50% stenosis.      CT-CTA HEAD WITH & W/O-POST PROCESS   Final Result      1.   No evidence of acute large vessel occlusion or aneurysm about the [place].   2.  Hypoplastic right posterior cerebral artery P1 segment with a well-developed right posterior communicating artery. Normal variation.      CT-CEREBRAL PERFUSION ANALYSIS   Final Result      1.  Cerebral blood flow less than 30% likely representing completed infarct = 36 mL.      2.  T Max more than 6 seconds likely representing combination of completed infarct and ischemia = 52 mL.      3.  Mismatched volume likely representing ischemic brain/penumbra = 16 mL      4.  Perfusion abnormalities are primarily in the right lateral and posterior temporal lobe.      Please note that the cerebral perfusion was performed on the limited brain tissue around the basal ganglia region. Infarct/ischemia outside the CT perfusion sections can be missed in this study.      CT-HEAD W/O   Final Result      1.  Cerebral atrophy.   2.  Extensive macrocystic encephalomalacic change in the right temporal lobe, right lateral occipital lobe as well as microcystic encephalomalacic changes in the right parietal lobe consistent with old infarction. Associated ex vacuo ventricular    dilatation.   3.  No obvious acute infarct or acute hemorrhage.   4.  Right frontal/temporal/parietal craniotomy.   5.  Chronic/active right maxillary and ethmoid sinusitis.         MR-BRAIN-W/O    (Results Pending)        Assessment/Plan  * Stroke-like episode- (present on admission)  Assessment & Plan  78-year-old male with prior history of right MCA stroke, seizure, dementia, presented with concern for worsening of confusion, last well-known 7 AM  Not a candidate for tPA due to being on Eliquis and outside therapeutic window    CT head without contrast, CTA head and neck reviewed and showed old encephalomalacia in the right temporal lobe, right lateral occipital lobe.  No apparent new acute stroke.  Neurochecks every 4 hours  Obtain MRI of the brain without  contrast  Continue Eliquis, aspirin, statin  PT eval pending   OT - SNF   Did quite with speech      Acute cystitis without hematuria- (present on admission)  Assessment & Plan  UCX grew Enteroccous - difficult to interpret symptoms in his current mental status; hence, po Abx started.     Dementia (HCC)- (present on admission)  Assessment & Plan  Possibly with behavioral disturbances   On depakote, risperdal and xanax prn   Monitor for signs of delirium     Nonpharmacologic prevention of delirium    -Re-orient and re-direct patient (time/place/situation)  -Mobilize early and promote daytime activity (Lights ON and blinds OPEN during the day, no naps after 4 PM)  -Promote night time sleep (Lights OFF, TV OFF, cluster nursing care to minimize awakenings, reduce noise, warm blankets and document hours of sleep)  -Manage toileting and provide bowel care (monitor for constipation and/or urinary retention).   -Remove lines/tubing that is not needed  -Educate and involve family (encourage family to be at bedside during visiting hours, provide delirium education and request family bring in familiar objects from home).        Primary hypertension- (present on admission)  Assessment & Plan  Goal is normotension per neurology  Resume propranolol    Chronic anticoagulation- (present on admission)  Assessment & Plan  Continue Eliquis    COPD (chronic obstructive pulmonary disease) ([facility])- (present on admission)  Assessment & Plan  Not in exacerbation  Resume home inhalers.    Type 2 diabetes mellitus with hyperglycemia, without long-term current use of insulin ([facility])- (present on admission)  Assessment & Plan  Insulin sliding scale  Last A1C 6.7  Repeat A1C requested     Will consider starting long acting if he starts eating well          VTE prophylaxis:   SCDs/TEDs   therapeutic anticoagulation with eliquis 5 mg BID      I have performed a physical exam and reviewed and updated ROS and Plan today (9/22/2023).

## 2023-09-22 NOTE — CARE PLAN
The patient is Stable - Low risk of patient condition declining or worsening    Shift Goals  Clinical Goals: MRI, improved neuro status  Patient Goals: chas  Family Goals: chas    Progress made toward(s) clinical / shift goals:    Problem: Optimal Care of the Stroke Patient  Goal: Optimal emergency care for the stroke patient  Outcome: Progressing     Problem: Knowledge Deficit - Stroke Education  Goal: Patient's knowledge of stroke and risk factors will improve  Outcome: Progressing     Problem: Psychosocial - Patient Condition  Goal: Patient's ability to verbalize feelings about condition will improve  Outcome: Progressing     Problem: Discharge Planning - Stroke  Goal: Ensure Stroke Core Measures are met prior to discharge  Outcome: Progressing     Problem: Neuro Status  Goal: Neuro status will remain stable or improve  Outcome: Progressing     Problem: Hemodynamic Monitoring  Goal: Patient's hemodynamics, fluid balance and neurologic status will be stable or improve  Outcome: Progressing       Patient is not progressing towards the following goals:

## 2023-09-22 NOTE — ED NOTES
To floor with receiving RN; via gurney; on monitor; on room air; on bed alarm AOX0; for neuro check every 4hrs and NIH check every shift

## 2023-09-22 NOTE — THERAPY
Speech Language Pathology   Daily Treatment     Patient Name: Wale Olivas  AGE:  78 y.o., SEX:  male  Medical Record #: [id number]  Date of Service: 9/22/2023    Precautions:  Precautions: Fall Risk, Swallow Precautions    Subjective  Pt seen A&Ox1, nursing student reporting that pt's alertness and mentation have been waxing and waning throughout the day. Educated that pt should only be fed when maximally alert.     Assessment  Pt able to maintain alertness and attention to PO intake for a short period of time during which 3 oz of thin liquids and 1 oz of puree were presented w/ 1:1 feeding. However, as pt's mental status waned, bolus holding became more evident and further trials were discontinued. No overt s/s of aspiration were noted.     Clinical Impressions  Continued clinical signs of mild oropharyngeal dysphagia likely acute r/t altered mentation superimposed on advanced age, debility and prior neurological deficits from CVA. Temporary diet modification continues to be indicated. Pt remaining unchanged from 9/21 w/ regard to mentation and alertness- continue to recommend high 1:1 feeding supervision and defer meals if pt is not alert.       Recommendations   Dysphagia Treatment  Diet Consistency: Thin/all Liquids, Soft Bites Solids  Instrumentation: None indicated at this time  Medication: Whole with puree  Supervision: Direct supervision during meals  Positioning: Fully upright and midline during oral intake  Oral Care: Q4h    SLP Treatment Plan  Treatment Plan: Dysphagia Treatment  SLP Frequency: 3x Per Week  Estimated Duration: Until Therapy Goals Met      Anticipated Discharge Needs  Discharge Recommendations: Anticipate that the patient will have no further speech therapy needs after discharge from the hospital  Therapy Recommendations Upon DC: Not Indicated    Patient / Family Goals  Patient / Family Goal #1: Did not state  Short Term Goals  Short Term Goal # 1: Pt will participate in a FEES to define  josee physiology and therapy need  Goal Outcome # 1: Goal met  Short Term Goal # 2: Pt will tolerate the least restrictive PO diet without acute dysphagia-related aspiration PNA  Goal Outcome # 2 : Progressing slower than expected    Tanna Doss, SLP

## 2023-09-22 NOTE — PROGRESS NOTES
1000: Called pt's daughter to complete his MRI screening, no answered, left VM to call back. Will CTM

## 2023-09-22 NOTE — ED NOTES
Bedside report received from Geraldo RN; awake AOX1; on room air attached to cardiac monitor; assumed patient care; will continue to monitor

## 2023-09-22 NOTE — THERAPY
Occupational Therapy   Initial Evaluation     Patient Name: Wale Olivas  Age:  78 y.o., Sex:  male  Medical Record #: [id number]  Today's Date: 9/22/2023     Precautions  Precautions: Fall Risk, Swallow Precautions    Assessment    Patient is 78 y.o. male admitted for stroke like symptoms, AMS, left facial weakness. Pt unable to provide history/PLOF due mentation/baseline cognition, per EMR pt is a resident at [facility] notes from January pt independent with self feeding and G/H otherwise requires assistance with ADLs/IADLs uses a WC for mobility. Pt required maxA for bed mobility and upper body dressing, Eamon for G/H, standing deferred due to left heel blister. Anticipate pt is close to his functional baseline, would recommend return to [facility] as a LTC resident to maintain familar environment.       Plan    Occupational Therapy Initial Treatment Plan   Treatment Interventions: (P) Self Care / Activities of Daily Living, Adaptive Equipment, Manual Therapy Techniques, Neuro Re-Education / Balance, Therapeutic Exercises, Therapeutic Activity  Treatment Frequency: (P) 3 Times per Week  Duration: (P) Until Therapy Goals Met    DC Equipment Recommendations: (P) Unable to determine at this time  Discharge Recommendations: (P) Other - (Return to [facility] as LTC)     Objective       09/22/23 1004   Prior Living Situation   Prior Services Continuous (24 Hour) Care Giving Per Service   Housing / Facility Long Term Care Facility;Skilled Nursing Facility   Lives with - Patient's Self Care Capacity Attendant / Paid Care Giver   Prior Level of ADL Function   Self Feeding Independent   Grooming / Hygiene Independent   Bathing Requires Assist   Dressing Requires Assist   Toileting Requires Assist   Prior Level of IADL Function   Medication Management Dependent   Laundry Dependent   Kitchen Mobility Dependent   Finances Dependent   Home Management Dependent   Shopping Dependent   Prior Level Of Mobility Uses Wheel Chair for in Home Mobility    History of Falls   History of Falls Yes   Date of Last Fall   (per EMR multiple falls)   Precautions   Precautions Fall Risk;Swallow Precautions   Cognition    Cognition / Consciousness X   Speech/ Communication Delayed Responses   Orientation Level Not Oriented to Time;Not Oriented to Place;Not Oriented to Reason   Level of Consciousness Alert   Ability To Follow Commands 1 Step  (intermittent ~50% of time)   Safety Awareness Impaired   New Learning Impaired   Attention Impaired   Initiation Impaired   Comments Delayed, pleasant, intermittently following commands   Active ROM Upper Body   Active ROM Upper Body  X   Comments limited due to weakness   Strength Upper Body   Upper Body Strength  X   Gross Strength Generalized Weakness, Equal Bilaterally.    Sensation Upper Body   Upper Extremity Sensation  WDL   Coordination Upper Body   Coordination X   Fine Motor Coordination BUE impaired   Gross Motor Coordination BUE impaired   Balance Assessment   Sitting Balance (Static) Poor +   Sitting Balance (Dynamic) Poor -   Bed Mobility    Supine to Sit Maximal Assist   Sit to Supine Maximal Assist   Scooting Maximal Assist   ADL Assessment   Grooming Minimal Assist;Seated   Upper Body Dressing Minimal Assist   Lower Body Dressing Maximal Assist   Toileting Maximal Assist   How much help from another person does the patient currently need...   Putting on and taking off regular lower body clothing? 2   Bathing (including washing, rinsing, and drying)? 2   Toileting, which includes using a toilet, bedpan, or urinal? 2   Putting on and taking off regular upper body clothing? 3   Taking care of personal grooming such as brushing teeth? 3   Eating meals? 3   6 Clicks Daily Activity Score 15   mRS Prior to admission   Prior to admission mRS 0   Modified [place] (mRS)   Modified Cecilia Score 4   Functional Mobility   Sit to Stand Unable to Participate   Mobility bed mobility, seated ADLs at bedside, returned to supine   Activity  Tolerance   Sitting Edge of Bed 15 min   Short Term Goals   Short Term Goal # 1 Pt will complete ADL transfers with Eamon   Short Term Goal # 2 Pt will complete seated G/H with set up   Short Term Goal # 3 Pt will complete UB dressing with supervision   Education Group   Education Provided Role of Occupational Therapist   Role of Occupational Therapist Patient Response Patient;Nonacceptance;Explanation   Occupational Therapy Initial Treatment Plan    Treatment Interventions Self Care / Activities of Daily Living;Adaptive Equipment;Manual Therapy Techniques;Neuro Re-Education / Balance;Therapeutic Exercises;Therapeutic Activity   Treatment Frequency 3 Times per Week   Duration Until Therapy Goals Met   Problem List   Problem List Decreased Active Daily Living Skills;Decreased Homemaking Skills;Decreased Upper Extremity Strength Right;Decreased Upper Extremity Strength Left;Decreased Functional Mobility;Decreased Activity Tolerance;Impaired Postural Control / Balance;Impaired Cognitive Function;Safety Awareness Deficits / Cognition   Anticipated Discharge Equipment and Recommendations   DC Equipment Recommendations Unable to determine at this time   Discharge Recommendations Other -  (Return to [facility] as LTC)   Interdisciplinary Plan of Care Collaboration   IDT Collaboration with  Nursing   Patient Position at End of Therapy In Bed;Bed Alarm On;Call Light within Reach;Tray Table within Reach;Phone within Reach   Collaboration Comments RN updated

## 2023-09-23 ENCOUNTER — PHARMACY VISIT (OUTPATIENT)
Dept: PHARMACY | Facility: MEDICAL CENTER | Age: 78
End: 2023-09-23
Payer: COMMERCIAL

## 2023-09-23 VITALS
WEIGHT: 173.06 LBS | RESPIRATION RATE: 18 BRPM | SYSTOLIC BLOOD PRESSURE: 149 MMHG | HEIGHT: 67 IN | OXYGEN SATURATION: 98 % | TEMPERATURE: 97.7 F | DIASTOLIC BLOOD PRESSURE: 74 MMHG | BODY MASS INDEX: 27.16 KG/M2 | HEART RATE: 62 BPM

## 2023-09-23 LAB
GLUCOSE BLD STRIP.AUTO-MCNC: 196 MG/DL (ref 65–99)
GLUCOSE BLD STRIP.AUTO-MCNC: 237 MG/DL (ref 65–99)

## 2023-09-23 PROCEDURE — 99239 HOSP IP/OBS DSCHRG MGMT >30: CPT | Performed by: STUDENT IN AN ORGANIZED HEALTH CARE EDUCATION/TRAINING PROGRAM

## 2023-09-23 PROCEDURE — 700102 HCHG RX REV CODE 250 W/ 637 OVERRIDE(OP): Performed by: INTERNAL MEDICINE

## 2023-09-23 PROCEDURE — A9270 NON-COVERED ITEM OR SERVICE: HCPCS | Performed by: STUDENT IN AN ORGANIZED HEALTH CARE EDUCATION/TRAINING PROGRAM

## 2023-09-23 PROCEDURE — 82962 GLUCOSE BLOOD TEST: CPT | Mod: 91

## 2023-09-23 PROCEDURE — RXMED WILLOW AMBULATORY MEDICATION CHARGE: Performed by: STUDENT IN AN ORGANIZED HEALTH CARE EDUCATION/TRAINING PROGRAM

## 2023-09-23 PROCEDURE — 700102 HCHG RX REV CODE 250 W/ 637 OVERRIDE(OP): Performed by: STUDENT IN AN ORGANIZED HEALTH CARE EDUCATION/TRAINING PROGRAM

## 2023-09-23 PROCEDURE — 97597 DBRDMT OPN WND 1ST 20 CM/<: CPT

## 2023-09-23 PROCEDURE — A9270 NON-COVERED ITEM OR SERVICE: HCPCS | Performed by: INTERNAL MEDICINE

## 2023-09-23 RX ORDER — AMOXICILLIN 500 MG/1
500 CAPSULE ORAL EVERY 8 HOURS
Qty: 30 CAPSULE | Refills: 0 | Status: ACTIVE | OUTPATIENT
Start: 2023-09-23 | End: 2023-09-26

## 2023-09-23 RX ADMIN — PROPRANOLOL HYDROCHLORIDE 20 MG: 10 TABLET ORAL at 04:31

## 2023-09-23 RX ADMIN — INSULIN HUMAN 1 UNITS: 100 INJECTION, SOLUTION PARENTERAL at 09:36

## 2023-09-23 RX ADMIN — AMOXICILLIN 500 MG: 500 CAPSULE ORAL at 14:39

## 2023-09-23 RX ADMIN — RISPERIDONE 1 MG: 1 TABLET ORAL at 09:46

## 2023-09-23 RX ADMIN — FLUTICASONE FUROATE, UMECLIDINIUM BROMIDE AND VILANTEROL TRIFENATATE 1 PUFF: 200; 62.5; 25 POWDER RESPIRATORY (INHALATION) at 04:31

## 2023-09-23 RX ADMIN — AMOXICILLIN 500 MG: 500 CAPSULE ORAL at 04:30

## 2023-09-23 RX ADMIN — APIXABAN 5 MG: 5 TABLET, FILM COATED ORAL at 04:31

## 2023-09-23 RX ADMIN — DULOXETINE HYDROCHLORIDE 30 MG: 30 CAPSULE, DELAYED RELEASE ORAL at 04:30

## 2023-09-23 RX ADMIN — INSULIN HUMAN 2 UNITS: 100 INJECTION, SOLUTION PARENTERAL at 12:14

## 2023-09-23 RX ADMIN — FINASTERIDE 5 MG: 5 TABLET, FILM COATED ORAL at 04:31

## 2023-09-23 RX ADMIN — ASPIRIN 81 MG 81 MG: 81 TABLET ORAL at 04:30

## 2023-09-23 RX ADMIN — TAMSULOSIN HYDROCHLORIDE 0.4 MG: 0.4 CAPSULE ORAL at 04:31

## 2023-09-23 RX ADMIN — RISPERIDONE 1 MG: 1 TABLET ORAL at 14:39

## 2023-09-23 RX ADMIN — POLYETHYLENE GLYCOL 3350 1 PACKET: 17 POWDER, FOR SOLUTION ORAL at 04:31

## 2023-09-23 RX ADMIN — SENNOSIDES AND DOCUSATE SODIUM 2 TABLET: 50; 8.6 TABLET ORAL at 04:31

## 2023-09-23 RX ADMIN — OMEPRAZOLE 20 MG: 20 CAPSULE, DELAYED RELEASE ORAL at 04:31

## 2023-09-23 ASSESSMENT — PAIN DESCRIPTION - PAIN TYPE: TYPE: ACUTE PAIN

## 2023-09-23 ASSESSMENT — FIBROSIS 4 INDEX: FIB4 SCORE: 2.4

## 2023-09-23 NOTE — ASSESSMENT & PLAN NOTE
UCX grew Enteroccous - difficult to interpret symptoms in his current mental status; hence, po Abx started.

## 2023-09-23 NOTE — PROGRESS NOTES
Monitor summary: SB/SR 54-70, MI -0.08, QRS -0.10, QT -0.48, per strip from the monitor room.

## 2023-09-23 NOTE — DISCHARGE PLANNING
Pt accepted back to [facility] for today: [facility] confirmed for 1430    RNCM left message for p's daughter regarding DC plan for today    Transfer packet handed to bedside RN

## 2023-09-23 NOTE — DISCHARGE SUMMARY
Discharge Summary    CHIEF COMPLAINT ON ADMISSION  Chief Complaint   Patient presents with    Possible Stroke     [facility] [address] Patient was normal per staff at breakfast at 0700. When they went to do his bed bath at 9:30-9:45, patient was altered with left sided droop. Patient is altered, with left sided weakness. Patient is on eliquis for hx DVT. BGL - 184. Stroke alert on arrival       Reason for Admission  Stroke-like symptoms and acute encephalopathy     Admission Date  2023    CODE STATUS  Full Code    HPI & HOSPITAL COURSE  Wale Olivas is a 78 y.o. male with prior history of right MCA stroke, debility, DVT on Eliquis, Alzheimer dementia, type 2 diabetes, dyslipidemia, seizure disorder on Depakote, BPH, COPD, GERD, who presented 2023 with concern for altered mental state.  [facility] where he found earlier today at 9:30 AM altered.  There was concern for possible left facial droop, left facial weakness.  Last known well at 7 AM for breakfast.  Patient was evaluated with CTA head and neck, CT head perfusion scan that showed old encephalomalacia.  No large vessels occlusion. He was determined not to be a candidate for tPA due to being on Eliquis and prolonged last known well time.     Neurology recommended admission for observation and MRI of the brain without contrast.  Patient presented as poor historian.  He follows some commands.  Minimal verbal response yes or no occasionally.     : BP improves and mild tony; no acute complain from him. Able to follow commands. Answers limited questions - AO to name and . Neuro exam with some improvement in left sided weakness. Pt did well with SLP and FEEs exam      : vitals stable and afebrile. Appeared a bit more lethargic than yesterday. Per RN, Pt was able to participate with OT and able to do oral hygiene earlier. (?Tired from therapy). Labs done were unrevealing. A new lab requested due to change  in clinical status. UCX grew Enteroccous - difficult to interpret symptoms in his current mental status; hence, po Abx started.     9/23: no acute events overnight and mentation appears baseline. MRI done was without acute ischemic changes or acute intracranial abnormalities. Wound care evaluated the patient - blister deflated and removed non-viable skin; covered with merited one and xeroform (recommend daily dressing change). At this point, the patient is deemed stable for DC back to his long term care facility. Care plan updated to Pt's daughter Jose over the phone.     Therefore, he is discharged in fair and stable condition to home with organized home healthcare and close outpatient follow-up.    The patient met 2-midnight criteria for an inpatient stay at the time of discharge.    Discharge Date  09/23/23    FOLLOW UP ITEMS POST DISCHARGE  N/A    DISCHARGE DIAGNOSES  Principal Problem:    Stroke-like episode (POA: Yes)  Active Problems:    Type 2 diabetes mellitus with hyperglycemia, without long-term current use of insulin (HCC) (POA: Yes)    COPD (chronic obstructive pulmonary disease) (HCC) (POA: Yes)    Chronic anticoagulation (POA: Yes)    Primary hypertension (POA: Yes)    Dementia (HCC) (POA: Yes)    Acute cystitis without hematuria (POA: Yes)  Resolved Problems:    * No resolved hospital problems. *      FOLLOW UP  [facility]  [address] [facility] [address] [phone]          MEDICATIONS ON DISCHARGE     Medication List        START taking these medications        Instructions   amoxicillin 500 MG Caps  Commonly known as: Amoxil   Take 1 Capsule by mouth every 8 hours.  Dose: 500 mg            CONTINUE taking these medications        Instructions   ALPRAZolam 0.25 MG Tabs  Commonly known as: Xanax   Take 0.5 mg by mouth every 8 hours as needed for Anxiety.  Dose: 0.5 mg     baclofen 10 MG Tabs  Commonly known as: Lioresal   Take 10 mg by mouth 3 times a  day.  Dose: 10 mg     divalproex 125 MG EC tablet  Commonly known as: Depakote   Take 1,000 mg by mouth at bedtime.  Dose: 1,000 mg     DULoxetine HCl 30 MG Csdr   Take 30 mg by mouth every day.  Dose: 30 mg     Eliquis 2.5mg Tabs  Generic drug: apixaban   Take 2.5 mg by mouth 2 times a day.  Dose: 2.5 mg     finasteride 5 MG Tabs  Commonly known as: Proscar   Take 5 mg by mouth every day.  Dose: 5 mg     fluticasone furoate-vilanterol 100-25 MCG/ACT Aepb  Commonly known as: Breo   Inhale 1 Puff every day.  Dose: 1 Puff     HYDROcodone-Acetaminophen  MG/15ML Soln   Take 15 mL by mouth every four hours as needed (PAIN).  Dose: 15 mL     Incruse Ellipta 62.5 MCG/ACT Aepb inhaler  Generic drug: umeclidinium bromide   Inhale 1 Puff every day.  Dose: 1 Puff     Lantus 100 UNIT/ML Soln  Generic drug: insulin glargine   Inject 10 Units under the skin at bedtime.  Dose: 10 Units     NovoLOG 100 UNIT/ML Soln  Generic drug: insulin aspart   Inject 2-22 Units under the skin 3 times a day before meals.  Dose: 2-22 Units     pantoprazole 40 MG Tbec  Commonly known as: Protonix   Take 40 mg by mouth every day.  Dose: 40 mg     polyethylene glycol/lytes 17 g Pack  Commonly known as: Miralax   Take 17 g by mouth every day.  Dose: 17 g     pravastatin 20 MG Tabs  Commonly known as: Pravachol   Take 80 mg by mouth every evening.  Dose: 80 mg     propranolol 20 MG Tabs  Commonly known as: Inderal   Take 20 mg by mouth every 12 hours.  Dose: 20 mg     risperiDONE 1 MG Tabs  Commonly known as: RisperDAL   Take 1 mg by mouth 3 times a day.  Dose: 1 mg     tamsulosin 0.4 MG capsule  Commonly known as: Flomax   Take 0.4 mg by mouth every day.  Dose: 0.4 mg              Allergies  Allergies   Allergen Reactions    Metformin     Morphine     Simvastatin     Spironolactone        DIET  Orders Placed This Encounter   Procedures    Diet Order Diet: Level 6 - Soft and Bite Sized; Liquid level: Level 0 - Thin; Tray Modifications (optional):  SLP - 1:1 Supervision by Nursing     Standing Status:   Standing     Number of Occurrences:   1     Order Specific Question:   Diet:     Answer:   Level 6 - Soft and Bite Sized [23]     Order Specific Question:   Liquid level     Answer:   Level 0 - Thin     Order Specific Question:   Tray Modifications (optional)     Answer:   SLP - 1:1 Supervision by Nursing       ACTIVITY  As tolerated.  Weight bearing as tolerated    CONSULTATIONS  Neurology    PROCEDURES  N/A    LABORATORY  Lab Results   Component Value Date    SODIUM 137 09/22/2023    POTASSIUM 3.7 09/22/2023    CHLORIDE 104 09/22/2023    CO2 22 09/22/2023    GLUCOSE 219 (H) 09/22/2023    BUN 23 (H) 09/22/2023    CREATININE 1.28 09/22/2023        Lab Results   Component Value Date    WBC 6.9 09/22/2023    HEMOGLOBIN 12.4 (L) 09/22/2023    HEMATOCRIT 35.7 (L) 09/22/2023    PLATELETCT 170 09/22/2023        Total time of the discharge process exceeds 36 minutes.

## 2023-09-23 NOTE — CARE PLAN
"The patient is Stable - Low risk of patient condition declining or worsening    Shift Goals  Clinical Goals: Improved neuro status, safety, maintain skin integrity  Patient Goals: \"I want a snack\"  Family Goals: CJ    Progress made toward(s) clinical / shift goals:  Patient is A&Ox1, to self only. Condom cath in place for voiding. Q2 hour turns. 1:1 feeding in place. Patient is redirectable. Bed is low and locked. Bed alarm on. Call light within reach. Hourly rounding continues.     Patient is not progressing towards the following goals:      Problem: Knowledge Deficit - Stroke Education  Goal: Patient's knowledge of stroke and risk factors will improve  Outcome: Not Progressing  Note: Patient demonstrates no evidence of learning.      Problem: Neuro Status  Goal: Neuro status will remain stable or improve  Outcome: Not Progressing  Note: Patients neurological status waxes and wanes.      Problem: Knowledge Deficit - Standard  Goal: Patient and family/care givers will demonstrate understanding of plan of care, disease process/condition, diagnostic tests and medications  Outcome: Not Progressing  Note: Patient demonstrates no evidence of learning.      "

## 2023-09-23 NOTE — WOUND TEAM
[facility]  Inpatient Services  Initial Wound and Skin Care Evaluation    Admission Date: 9/20/2023     Last order of IP CONSULT TO WOUND CARE was found on 9/22/2023 from Hospital Encounter on 9/20/2023     HPI, PMH, SH: Reviewed    History reviewed. No pertinent surgical history.  Social History     Tobacco Use    Smoking status: Never    Smokeless tobacco: Never   Substance Use Topics    Alcohol use: Not Currently     Chief Complaint   Patient presents with    Possible Stroke     [facility] [address] Patient was normal per staff at breakfast at 0700. When they went to do his bed bath at 9:30-9:45, patient was altered with left sided droop. Patient is altered, with left sided weakness. Patient is on eliquis for hx DVT. BGL - 184. Stroke alert on arrival     Diagnosis: Stroke-like episode [R29.90]    Unit where seen by Wound Team: S196/02     WOUND CONSULT RELATED TO:  L heel    WOUND TEAM PLAN OF CARE - Frequency of Follow-up:   Nursing to follow dressing orders written for wound care. Contact wound team if area fails to progress, deteriorates or with any questions/concerns if something comes up before next scheduled follow up (See below as to whether wound is following and frequency of wound follow up)   Weekly - L Heel    WOUND HISTORY:   78 y.o. male admitted for AMS & possible stroke. PMH Alzheimers dementia, DM, dyslipidemia, seizures, DVT (on Eliquis), COPD, and GERD.  Patient unable to provide wound history.       WOUND ASSESSMENT/LDA  Wound 09/22/23 Pressure Injury Heel Left POA Stage 3 (Active)   Date First Assessed/Time First Assessed: 09/22/23 0255   Present on Original Admission: Yes  Primary Wound Type: Pressure Injury  Location: Heel  Laterality: Left  Wound Description (Comments): POA Stage 3      Assessments 9/23/2023 11:00 AM   Wound Image        Site Assessment Red;Purple   Periwound Assessment Fragile   Margins Defined edges;Attached edges   Closure Secondary  intention   Drainage Amount Moderate   Drainage Description Serosanguineous   Treatments Cleansed;CSWD - Conservative Sharp Wound Debridement   Offloading/DME Heel float boot   Wound Cleansing Normal Saline Irrigation   Periwound Protectant Skin Protectant Wipes to Periwound   Dressing Status Clean;Dry;Intact   Dressing Changed Changed   Dressing Cleansing/Solutions Not Applicable   Dressing Options Mepitel One;Petrolatum Gauze (yellow);Offloading Dressing - Heel   Dressing Change/Treatment Frequency Daily, and As Needed   NEXT Dressing Change/Treatment Date 09/24/23   NEXT Weekly Photo (Inpatient Only) 09/30/23   Wound Team Following Weekly   Pressure Injury Stage Stage 3   Wound Length (cm) 7 cm   Wound Width (cm) 8.5 cm   Wound Surface Area (cm^2) 59.5 cm^2   Shape Round   Wound Odor None   Pulses 2+;Left;DP   Exposed Structures Connective tissue   WOUND NURSE ONLY - Time Spent with Patient (mins) 30          Vascular:    MARSHALL:   No results found.    Lab Values:    Lab Results   Component Value Date/Time    WBC 6.9 09/22/2023 01:12 PM    RBC 4.02 (L) 09/22/2023 01:12 PM    HEMOGLOBIN 12.4 (L) 09/22/2023 01:12 PM    HEMATOCRIT 35.7 (L) 09/22/2023 01:12 PM    HBA1C 6.7 (H) 09/22/2023 04:39 AM         Culture Results show:  No results found for this or any previous visit (from the past 720 hour(s)).    Pain Level/Medicated:  Patient denies pain       INTERVENTIONS BY WOUND TEAM:  Chart and images reviewed. Discussed with bedside RN. All areas of concern (based on picture review, LDA review and discussion with bedside RN) have been thoroughly assessed. Documentation of areas based on significant findings. This RN in to assess patient. Performed standard wound care which includes appropriate positioning, dressing removal and non-selective debridement. Pictures and measurements obtained weekly if/when required.    Wound:  L Heel  Preparation for Dressing removal: Open to air  Cleansed/Non-selectively Debrided with:   Normal Saline and Gauze  Non-Excisional Conservative Sharp debridement: Bullae debrided away using scissors and forceps < 20cm2 debrided down to viable tissue.  No Bleeding noted.  Fadia wound: Cleansed with Normal Saline and Gauze, Prepped with No Sting  Primary Dressing:  Mepitel One and Xeroform  Secondary (Outer) Dressing: Offloading adhesive foam    Advanced Wound Care Discharge Planning  Number of Clinicians necessary to complete wound care: 1  Is patient requiring IV pain medications for dressing changes:  No   Length of time for dressing change 10 min. (This does not include chart review, pre-medication time, set up, clean up or time spent charting.)    Interdisciplinary consultation: Patient, Bedside RN, Dr. Kumar    EVALUATION / RATIONALE FOR TREATMENT:     Date:  09/23/23  Wound Status:  Initial evaluation    Large blister overlying L heel. 30mL of serosanguinous fluid drained from bullae, excess nonviable skin removed revealing POA stage 3 pressure injury to heel. Mepitel one applied as a non-adhesive layer to decrease potential pain from dressing changes. Xeroform (yellow) applied to provide an occlusive dressing that incorporates bacteriostatic protection.   Recommend continuing daily dressing changes with Mepitel and Xeroform, updated Dr. Kumar & RN Luisa on recommendations. [facility]  R heel intact and blanching, offloading adhesive foam already in place.         Goals: Steady decrease in wound area and depth weekly.    NURSING PLAN OF CARE ORDERS:  Dressing changes: See Dressing Care orders  RN Prevention Protocol    NUTRITION RECOMMENDATIONS   Wound Team Recommendations:  N/A    DIET ORDERS (From admission to next 24h)       Start     Ordered    09/21/23 1215  Diet Order Diet: Level 6 - Soft and Bite Sized; Liquid level: Level 0 - Thin; Tray Modifications (optional): SLP - 1:1 Supervision by Nursing  ALL MEALS        Question Answer Comment   Diet: Level 6 -  Soft and Bite Sized    Liquid level Level 0 - Thin    Tray Modifications (optional) SLP - 1:1 Supervision by Nursing        09/21/23 8317                    PREVENTATIVE INTERVENTIONS:    Q shift Hector - performed per nursing policy  Q shift pressure point assessments - performed per nursing policy    Surface/Positioning  Standard/trauma mattress - Currently in Place  Reposition q 2 hours - Currently in Place  Waffle overlay  - Currently in Place    Offloading/Redistribution  Heel offloading dressing (Silicone dressing) - Currently in Place  Heel float boots (Prevalon boot) - In room, patient currently turned onto side and offloading heel without usage of boot.    Anticipated discharge plans:  [facility]. Recommend wound care to be continued to be done there.         Vac Discharge Needs:  Vac Discharge plan is purely a recommendation from wound team and not a requirement for discharge unless otherwise stated by physician.  Not Applicable Pt not on a wound vac

## 2023-09-23 NOTE — DISCHARGE PLANNING
Received choice form @: [id number]  Agency/Facility name: [facility], [place]  Sent referral per choice form @: [id number]

## 2023-09-25 LAB
BACTERIA BLD CULT: NORMAL
BACTERIA BLD CULT: NORMAL
SIGNIFICANT IND 70042: NORMAL
SIGNIFICANT IND 70042: NORMAL
SITE SITE: NORMAL
SITE SITE: NORMAL
SOURCE SOURCE: NORMAL
SOURCE SOURCE: NORMAL

## 2023-09-26 ENCOUNTER — APPOINTMENT (OUTPATIENT)
Dept: RADIOLOGY | Facility: MEDICAL CENTER | Age: 78
DRG: 871 | End: 2023-09-26
Attending: EMERGENCY MEDICINE
Payer: COMMERCIAL

## 2023-09-26 ENCOUNTER — APPOINTMENT (OUTPATIENT)
Dept: RADIOLOGY | Facility: MEDICAL CENTER | Age: 78
DRG: 871 | End: 2023-09-26
Attending: INTERNAL MEDICINE
Payer: COMMERCIAL

## 2023-09-26 ENCOUNTER — HOSPITAL ENCOUNTER (INPATIENT)
Facility: MEDICAL CENTER | Age: 78
LOS: 3 days | DRG: 871 | End: 2023-09-29
Attending: EMERGENCY MEDICINE | Admitting: INTERNAL MEDICINE
Payer: COMMERCIAL

## 2023-09-26 DIAGNOSIS — N39.0 URINARY TRACT INFECTION WITHOUT HEMATURIA, SITE UNSPECIFIED: ICD-10-CM

## 2023-09-26 DIAGNOSIS — R50.9 FEBRILE ILLNESS: ICD-10-CM

## 2023-09-26 DIAGNOSIS — E86.0 DEHYDRATION: ICD-10-CM

## 2023-09-26 DIAGNOSIS — N17.9 AKI (ACUTE KIDNEY INJURY) (HCC): ICD-10-CM

## 2023-09-26 PROBLEM — Z71.89 ADVANCE CARE PLANNING: Status: ACTIVE | Noted: 2023-09-26

## 2023-09-26 PROBLEM — E87.20 LACTIC ACIDOSIS: Status: ACTIVE | Noted: 2023-09-26

## 2023-09-26 PROBLEM — D72.829 LEUKOCYTOSIS: Status: ACTIVE | Noted: 2023-09-26

## 2023-09-26 PROBLEM — A41.9 SEVERE SEPSIS (HCC): Status: ACTIVE | Noted: 2023-09-26

## 2023-09-26 PROBLEM — R65.20 SEVERE SEPSIS (HCC): Status: ACTIVE | Noted: 2023-09-26

## 2023-09-26 LAB
ALBUMIN SERPL BCP-MCNC: 3 G/DL (ref 3.2–4.9)
ALBUMIN/GLOB SERPL: 0.8 G/DL
ALP SERPL-CCNC: 67 U/L (ref 30–99)
ALT SERPL-CCNC: 5 U/L (ref 2–50)
ANION GAP SERPL CALC-SCNC: 10 MMOL/L (ref 7–16)
APPEARANCE UR: ABNORMAL
AST SERPL-CCNC: 15 U/L (ref 12–45)
BACTERIA #/AREA URNS HPF: ABNORMAL /HPF
BASOPHILS # BLD AUTO: 0.4 % (ref 0–1.8)
BASOPHILS # BLD: 0.05 K/UL (ref 0–0.12)
BILIRUB SERPL-MCNC: 0.3 MG/DL (ref 0.1–1.5)
BILIRUB UR QL STRIP.AUTO: NEGATIVE
BUN SERPL-MCNC: 34 MG/DL (ref 8–22)
CALCIUM ALBUM COR SERPL-MCNC: 9.2 MG/DL (ref 8.5–10.5)
CALCIUM SERPL-MCNC: 8.4 MG/DL (ref 8.5–10.5)
CHLORIDE SERPL-SCNC: 107 MMOL/L (ref 96–112)
CO2 SERPL-SCNC: 22 MMOL/L (ref 20–33)
COLOR UR: YELLOW
CREAT SERPL-MCNC: 1.57 MG/DL (ref 0.5–1.4)
EOSINOPHIL # BLD AUTO: 0.66 K/UL (ref 0–0.51)
EOSINOPHIL NFR BLD: 5.5 % (ref 0–6.9)
EPI CELLS #/AREA URNS HPF: ABNORMAL /HPF
ERYTHROCYTE [DISTWIDTH] IN BLOOD BY AUTOMATED COUNT: 38.8 FL (ref 35.9–50)
FLUAV RNA SPEC QL NAA+PROBE: NEGATIVE
FLUBV RNA SPEC QL NAA+PROBE: NEGATIVE
GFR SERPLBLD CREATININE-BSD FMLA CKD-EPI: 45 ML/MIN/1.73 M 2
GLOBULIN SER CALC-MCNC: 3.7 G/DL (ref 1.9–3.5)
GLUCOSE BLD STRIP.AUTO-MCNC: 226 MG/DL (ref 65–99)
GLUCOSE SERPL-MCNC: 178 MG/DL (ref 65–99)
GLUCOSE UR STRIP.AUTO-MCNC: NEGATIVE MG/DL
HCT VFR BLD AUTO: 31.7 % (ref 42–52)
HGB BLD-MCNC: 11 G/DL (ref 14–18)
HYALINE CASTS #/AREA URNS LPF: ABNORMAL /LPF
IMM GRANULOCYTES # BLD AUTO: 0.1 K/UL (ref 0–0.11)
IMM GRANULOCYTES NFR BLD AUTO: 0.8 % (ref 0–0.9)
KETONES UR STRIP.AUTO-MCNC: ABNORMAL MG/DL
LACTATE SERPL-SCNC: 1.3 MMOL/L (ref 0.5–2)
LACTATE SERPL-SCNC: 1.8 MMOL/L (ref 0.5–2)
LACTATE SERPL-SCNC: 2.2 MMOL/L (ref 0.5–2)
LEUKOCYTE ESTERASE UR QL STRIP.AUTO: ABNORMAL
LYMPHOCYTES # BLD AUTO: 1.95 K/UL (ref 1–4.8)
LYMPHOCYTES NFR BLD: 16.3 % (ref 22–41)
MCH RBC QN AUTO: 30.8 PG (ref 27–33)
MCHC RBC AUTO-ENTMCNC: 34.7 G/DL (ref 32.3–36.5)
MCV RBC AUTO: 88.8 FL (ref 81.4–97.8)
MICRO URNS: ABNORMAL
MONOCYTES # BLD AUTO: 0.83 K/UL (ref 0–0.85)
MONOCYTES NFR BLD AUTO: 6.9 % (ref 0–13.4)
NEUTROPHILS # BLD AUTO: 8.38 K/UL (ref 1.82–7.42)
NEUTROPHILS NFR BLD: 70.1 % (ref 44–72)
NITRITE UR QL STRIP.AUTO: NEGATIVE
NRBC # BLD AUTO: 0 K/UL
NRBC BLD-RTO: 0 /100 WBC (ref 0–0.2)
PH UR STRIP.AUTO: 6.5 [PH] (ref 5–8)
PLATELET # BLD AUTO: 136 K/UL (ref 164–446)
PMV BLD AUTO: 8.6 FL (ref 9–12.9)
POTASSIUM SERPL-SCNC: 4.3 MMOL/L (ref 3.6–5.5)
PROT SERPL-MCNC: 6.7 G/DL (ref 6–8.2)
PROT UR QL STRIP: 30 MG/DL
RBC # BLD AUTO: 3.57 M/UL (ref 4.7–6.1)
RBC # URNS HPF: ABNORMAL /HPF
RBC UR QL AUTO: ABNORMAL
RSV RNA SPEC QL NAA+PROBE: NEGATIVE
SARS-COV-2 RNA RESP QL NAA+PROBE: NOTDETECTED
SODIUM SERPL-SCNC: 139 MMOL/L (ref 135–145)
SP GR UR STRIP.AUTO: 1.02
SPECIMEN SOURCE: NORMAL
UROBILINOGEN UR STRIP.AUTO-MCNC: 1 MG/DL
WBC # BLD AUTO: 12 K/UL (ref 4.8–10.8)
WBC #/AREA URNS HPF: ABNORMAL /HPF

## 2023-09-26 PROCEDURE — 85025 COMPLETE CBC W/AUTO DIFF WBC: CPT

## 2023-09-26 PROCEDURE — 99223 1ST HOSP IP/OBS HIGH 75: CPT | Mod: 25 | Performed by: INTERNAL MEDICINE

## 2023-09-26 PROCEDURE — 770020 HCHG ROOM/CARE - TELE (206)

## 2023-09-26 PROCEDURE — 87186 SC STD MICRODIL/AGAR DIL: CPT

## 2023-09-26 PROCEDURE — 82962 GLUCOSE BLOOD TEST: CPT

## 2023-09-26 PROCEDURE — 96365 THER/PROPH/DIAG IV INF INIT: CPT

## 2023-09-26 PROCEDURE — 700102 HCHG RX REV CODE 250 W/ 637 OVERRIDE(OP): Performed by: INTERNAL MEDICINE

## 2023-09-26 PROCEDURE — 99497 ADVNCD CARE PLAN 30 MIN: CPT | Performed by: INTERNAL MEDICINE

## 2023-09-26 PROCEDURE — A9270 NON-COVERED ITEM OR SERVICE: HCPCS | Performed by: INTERNAL MEDICINE

## 2023-09-26 PROCEDURE — 92610 EVALUATE SWALLOWING FUNCTION: CPT

## 2023-09-26 PROCEDURE — C9803 HOPD COVID-19 SPEC COLLECT: HCPCS | Performed by: EMERGENCY MEDICINE

## 2023-09-26 PROCEDURE — 96375 TX/PRO/DX INJ NEW DRUG ADDON: CPT

## 2023-09-26 PROCEDURE — 87077 CULTURE AEROBIC IDENTIFY: CPT | Mod: 91

## 2023-09-26 PROCEDURE — A9270 NON-COVERED ITEM OR SERVICE: HCPCS | Performed by: EMERGENCY MEDICINE

## 2023-09-26 PROCEDURE — 81001 URINALYSIS AUTO W/SCOPE: CPT

## 2023-09-26 PROCEDURE — 36415 COLL VENOUS BLD VENIPUNCTURE: CPT

## 2023-09-26 PROCEDURE — 700111 HCHG RX REV CODE 636 W/ 250 OVERRIDE (IP): Mod: JZ | Performed by: EMERGENCY MEDICINE

## 2023-09-26 PROCEDURE — 80053 COMPREHEN METABOLIC PANEL: CPT

## 2023-09-26 PROCEDURE — 0241U HCHG SARS-COV-2 COVID-19 NFCT DS RESP RNA 4 TRGT MIC: CPT

## 2023-09-26 PROCEDURE — 700105 HCHG RX REV CODE 258: Performed by: INTERNAL MEDICINE

## 2023-09-26 PROCEDURE — 700102 HCHG RX REV CODE 250 W/ 637 OVERRIDE(OP): Performed by: EMERGENCY MEDICINE

## 2023-09-26 PROCEDURE — 87040 BLOOD CULTURE FOR BACTERIA: CPT

## 2023-09-26 PROCEDURE — 700111 HCHG RX REV CODE 636 W/ 250 OVERRIDE (IP): Mod: JZ | Performed by: INTERNAL MEDICINE

## 2023-09-26 PROCEDURE — 73502 X-RAY EXAM HIP UNI 2-3 VIEWS: CPT | Mod: LT

## 2023-09-26 PROCEDURE — 83605 ASSAY OF LACTIC ACID: CPT

## 2023-09-26 PROCEDURE — 87086 URINE CULTURE/COLONY COUNT: CPT

## 2023-09-26 PROCEDURE — 73560 X-RAY EXAM OF KNEE 1 OR 2: CPT | Mod: LT

## 2023-09-26 PROCEDURE — 700105 HCHG RX REV CODE 258: Performed by: EMERGENCY MEDICINE

## 2023-09-26 PROCEDURE — 71045 X-RAY EXAM CHEST 1 VIEW: CPT

## 2023-09-26 PROCEDURE — 87150 DNA/RNA AMPLIFIED PROBE: CPT | Mod: 91

## 2023-09-26 PROCEDURE — 96361 HYDRATE IV INFUSION ADD-ON: CPT

## 2023-09-26 PROCEDURE — 51701 INSERT BLADDER CATHETER: CPT

## 2023-09-26 PROCEDURE — 99285 EMERGENCY DEPT VISIT HI MDM: CPT

## 2023-09-26 RX ORDER — SODIUM CHLORIDE, SODIUM LACTATE, POTASSIUM CHLORIDE, AND CALCIUM CHLORIDE .6; .31; .03; .02 G/100ML; G/100ML; G/100ML; G/100ML
1000 INJECTION, SOLUTION INTRAVENOUS ONCE
Status: COMPLETED | OUTPATIENT
Start: 2023-09-26 | End: 2023-09-26

## 2023-09-26 RX ORDER — RISPERIDONE 0.5 MG/1
1 TABLET ORAL EVERY 8 HOURS
Status: DISCONTINUED | OUTPATIENT
Start: 2023-09-26 | End: 2023-09-29 | Stop reason: HOSPADM

## 2023-09-26 RX ORDER — ALPRAZOLAM 0.5 MG/1
0.5 TABLET ORAL EVERY 8 HOURS PRN
Status: DISCONTINUED | OUTPATIENT
Start: 2023-09-26 | End: 2023-09-29 | Stop reason: HOSPADM

## 2023-09-26 RX ORDER — SODIUM CHLORIDE 9 MG/ML
1000 INJECTION, SOLUTION INTRAVENOUS ONCE
Status: COMPLETED | OUTPATIENT
Start: 2023-09-26 | End: 2023-09-26

## 2023-09-26 RX ORDER — TAMSULOSIN HYDROCHLORIDE 0.4 MG/1
0.4 CAPSULE ORAL DAILY
Status: DISCONTINUED | OUTPATIENT
Start: 2023-09-26 | End: 2023-09-29 | Stop reason: HOSPADM

## 2023-09-26 RX ORDER — AMOXICILLIN 500 MG/1
500 CAPSULE ORAL EVERY 8 HOURS
Status: ON HOLD | COMMUNITY
Start: 2023-09-24 | End: 2023-09-29

## 2023-09-26 RX ORDER — BACLOFEN 10 MG/1
10 TABLET ORAL EVERY 8 HOURS
Status: DISCONTINUED | OUTPATIENT
Start: 2023-09-26 | End: 2023-09-29 | Stop reason: HOSPADM

## 2023-09-26 RX ORDER — SODIUM CHLORIDE, SODIUM LACTATE, POTASSIUM CHLORIDE, AND CALCIUM CHLORIDE .6; .31; .03; .02 G/100ML; G/100ML; G/100ML; G/100ML
500 INJECTION, SOLUTION INTRAVENOUS
Status: DISCONTINUED | OUTPATIENT
Start: 2023-09-26 | End: 2023-09-29 | Stop reason: HOSPADM

## 2023-09-26 RX ORDER — OMEPRAZOLE 20 MG/1
20 CAPSULE, DELAYED RELEASE ORAL DAILY
Status: DISCONTINUED | OUTPATIENT
Start: 2023-09-27 | End: 2023-09-29 | Stop reason: HOSPADM

## 2023-09-26 RX ORDER — DIVALPROEX SODIUM 500 MG/1
1000 TABLET, DELAYED RELEASE ORAL
Status: DISCONTINUED | OUTPATIENT
Start: 2023-09-26 | End: 2023-09-29 | Stop reason: HOSPADM

## 2023-09-26 RX ORDER — SODIUM CHLORIDE, SODIUM LACTATE, POTASSIUM CHLORIDE, CALCIUM CHLORIDE 600; 310; 30; 20 MG/100ML; MG/100ML; MG/100ML; MG/100ML
INJECTION, SOLUTION INTRAVENOUS CONTINUOUS
Status: DISCONTINUED | OUTPATIENT
Start: 2023-09-26 | End: 2023-09-29 | Stop reason: HOSPADM

## 2023-09-26 RX ORDER — DEXTROSE MONOHYDRATE 25 G/50ML
25 INJECTION, SOLUTION INTRAVENOUS
Status: DISCONTINUED | OUTPATIENT
Start: 2023-09-26 | End: 2023-09-29 | Stop reason: HOSPADM

## 2023-09-26 RX ORDER — ACETAMINOPHEN 325 MG/1
650 TABLET ORAL EVERY 6 HOURS PRN
Status: DISCONTINUED | OUTPATIENT
Start: 2023-09-26 | End: 2023-09-29 | Stop reason: HOSPADM

## 2023-09-26 RX ORDER — DULOXETIN HYDROCHLORIDE 30 MG/1
30 CAPSULE, DELAYED RELEASE ORAL DAILY
Status: DISCONTINUED | OUTPATIENT
Start: 2023-09-26 | End: 2023-09-29 | Stop reason: HOSPADM

## 2023-09-26 RX ORDER — BISACODYL 10 MG
10 SUPPOSITORY, RECTAL RECTAL
Status: DISCONTINUED | OUTPATIENT
Start: 2023-09-26 | End: 2023-09-29 | Stop reason: HOSPADM

## 2023-09-26 RX ORDER — ACETAMINOPHEN 325 MG/1
650 TABLET ORAL EVERY 4 HOURS PRN
COMMUNITY
End: 2023-11-02

## 2023-09-26 RX ORDER — AMOXICILLIN 250 MG
2 CAPSULE ORAL 2 TIMES DAILY
Status: DISCONTINUED | OUTPATIENT
Start: 2023-09-26 | End: 2023-09-29 | Stop reason: HOSPADM

## 2023-09-26 RX ORDER — AMLODIPINE BESYLATE 5 MG/1
5 TABLET ORAL DAILY
Status: DISCONTINUED | OUTPATIENT
Start: 2023-09-26 | End: 2023-09-29 | Stop reason: HOSPADM

## 2023-09-26 RX ORDER — FINASTERIDE 5 MG/1
5 TABLET, FILM COATED ORAL DAILY
Status: DISCONTINUED | OUTPATIENT
Start: 2023-09-26 | End: 2023-09-29 | Stop reason: HOSPADM

## 2023-09-26 RX ORDER — NYSTATIN 100000 [USP'U]/G
1 POWDER TOPICAL 3 TIMES DAILY
COMMUNITY
End: 2023-11-02

## 2023-09-26 RX ORDER — PANTOPRAZOLE SODIUM 40 MG/1
40 TABLET, DELAYED RELEASE ORAL DAILY
Status: DISCONTINUED | OUTPATIENT
Start: 2023-09-26 | End: 2023-09-26

## 2023-09-26 RX ORDER — AMOXICILLIN 250 MG
1 CAPSULE ORAL 2 TIMES DAILY
COMMUNITY
End: 2024-01-28

## 2023-09-26 RX ORDER — AMLODIPINE BESYLATE 5 MG/1
5 TABLET ORAL DAILY
COMMUNITY

## 2023-09-26 RX ORDER — PRAVASTATIN SODIUM 20 MG
80 TABLET ORAL NIGHTLY
Status: DISCONTINUED | OUTPATIENT
Start: 2023-09-26 | End: 2023-09-29 | Stop reason: HOSPADM

## 2023-09-26 RX ORDER — HYDRALAZINE HYDROCHLORIDE 20 MG/ML
10 INJECTION INTRAMUSCULAR; INTRAVENOUS EVERY 4 HOURS PRN
Status: DISCONTINUED | OUTPATIENT
Start: 2023-09-26 | End: 2023-09-29 | Stop reason: HOSPADM

## 2023-09-26 RX ORDER — ACETAMINOPHEN 650 MG/1
650 SUPPOSITORY RECTAL ONCE
Status: COMPLETED | OUTPATIENT
Start: 2023-09-26 | End: 2023-09-26

## 2023-09-26 RX ORDER — POLYETHYLENE GLYCOL 3350 17 G/17G
1 POWDER, FOR SOLUTION ORAL
Status: DISCONTINUED | OUTPATIENT
Start: 2023-09-26 | End: 2023-09-29 | Stop reason: HOSPADM

## 2023-09-26 RX ADMIN — RISPERIDONE 1 MG: 0.5 TABLET ORAL at 22:37

## 2023-09-26 RX ADMIN — HYDRALAZINE HYDROCHLORIDE 10 MG: 20 INJECTION, SOLUTION INTRAMUSCULAR; INTRAVENOUS at 18:28

## 2023-09-26 RX ADMIN — SODIUM CHLORIDE, POTASSIUM CHLORIDE, SODIUM LACTATE AND CALCIUM CHLORIDE 1000 ML: 600; 310; 30; 20 INJECTION, SOLUTION INTRAVENOUS at 09:21

## 2023-09-26 RX ADMIN — SODIUM CHLORIDE 1000 ML: 9 INJECTION, SOLUTION INTRAVENOUS at 12:40

## 2023-09-26 RX ADMIN — INSULIN GLARGINE-YFGN 10 UNITS: 100 INJECTION, SOLUTION SUBCUTANEOUS at 21:29

## 2023-09-26 RX ADMIN — ACETAMINOPHEN 650 MG: 650 SUPPOSITORY RECTAL at 09:22

## 2023-09-26 RX ADMIN — AMPICILLIN AND SULBACTAM 3 G: 1; 2 INJECTION, POWDER, FOR SOLUTION INTRAMUSCULAR; INTRAVENOUS at 17:06

## 2023-09-26 RX ADMIN — SODIUM CHLORIDE, POTASSIUM CHLORIDE, SODIUM LACTATE AND CALCIUM CHLORIDE: 600; 310; 30; 20 INJECTION, SOLUTION INTRAVENOUS at 14:43

## 2023-09-26 RX ADMIN — PRAVASTATIN SODIUM 80 MG: 20 TABLET ORAL at 21:33

## 2023-09-26 RX ADMIN — SODIUM CHLORIDE, POTASSIUM CHLORIDE, SODIUM LACTATE AND CALCIUM CHLORIDE: 600; 310; 30; 20 INJECTION, SOLUTION INTRAVENOUS at 20:06

## 2023-09-26 RX ADMIN — AMPICILLIN AND SULBACTAM 3 G: 1; 2 INJECTION, POWDER, FOR SOLUTION INTRAMUSCULAR; INTRAVENOUS at 22:43

## 2023-09-26 RX ADMIN — AMPICILLIN AND SULBACTAM 3 G: 1; 2 INJECTION, POWDER, FOR SOLUTION INTRAMUSCULAR; INTRAVENOUS at 09:59

## 2023-09-26 RX ADMIN — INSULIN HUMAN 2 UNITS: 100 INJECTION, SOLUTION PARENTERAL at 21:28

## 2023-09-26 RX ADMIN — DIVALPROEX SODIUM 1000 MG: 500 TABLET, DELAYED RELEASE ORAL at 21:33

## 2023-09-26 RX ADMIN — APIXABAN 2.5 MG: 5 TABLET, FILM COATED ORAL at 18:29

## 2023-09-26 RX ADMIN — BACLOFEN 10 MG: 10 TABLET ORAL at 22:38

## 2023-09-26 ASSESSMENT — FIBROSIS 4 INDEX
FIB4 SCORE: 3.75
FIB4 SCORE: 2.4

## 2023-09-26 ASSESSMENT — PAIN DESCRIPTION - PAIN TYPE: TYPE: ACUTE PAIN

## 2023-09-26 NOTE — ASSESSMENT & PLAN NOTE
BUN/creatinine elevated from prior evaluation. Most likely prerenal due to sepsis.  S/p Iv fluids.   -maintenance LR 75 cc/hr

## 2023-09-26 NOTE — ED NOTES
Waiting for MAR from [facility] to be faxed over to complete med rec     Only part of the MAR was sent over with pt

## 2023-09-26 NOTE — ED NOTES
Pt incontinent of urine. Linens changed and pt repositioned on [facility]. Heel wound dressed and wound care consult placed

## 2023-09-26 NOTE — ED PROVIDER NOTES
ED Provider Note    CHIEF COMPLAINT  Chief Complaint   Patient presents with    ALOC       EXTERNAL RECORDS REVIEWED  Patient's last encounter was a hospital admission just 3 days ago he was evaluated for possible stroke, brought in from his care home, noted to have a left facial droop.  At that time, patient was noted to be on Eliquis for DVT.  Extensive past medical history including right MCA stroke, debility, DVT, Alzheimer's dementia, type 2 diabetes, dyslipidemia, seizure disorder, BPH, COPD and GERD.  Patient underwent evaluation with CTA of the head and neck.  Showed old encephalomalacia but no LVO.  Determined not to be a candidate for tPA secondary to being on anticoagulation, Eliquis.  He was admitted for observation and MRI of the brain.  He was noted at that time, to be able to follow commands answer limited questions.  He was oriented to name and date of birth.  Urine culture grew Enterococcus.  He was started on p.o. antibiotics, amoxicillin.    HPI/ROS  LIMITATION TO HISTORY   Select: medical condition  OUTSIDE HISTORIAN(S):  Run sheet reviewed.  Patient ANO x0.  Blood pressure 144/72, pulse 93, respirations 25, 98% on room air.EMS blood sugar 305.  Temperature noted by EMS to be 101.9.  Peripheral IV started.  Baseline, patient is ANO x1 with a history of dementia and noted to have altered level of consciousness for the last few days.    Wale Olvias is a 78 y.o. male who presents.  There is no history available from the patient.  He is febrile vital signs are otherwise reassuring.  There is no increased work of breathing.  He is on room air.  He is in a diaper.  Bandage to the left heel where the patient has a pressure ulcer.  Nursing home documents reviewed that accompany the patient.    POLST - Full Code, Full treatment    PAST MEDICAL HISTORY   has a past medical history of Anxiety, BPH (benign prostatic hyperplasia), CKD (chronic kidney disease), COPD (chronic obstructive pulmonary disease)  (HCC), COVID-19, Dementia ([facility]), GERD (gastroesophageal reflux disease), Hyperlipidemia, Insomnia, Major depressive disorder, and Type II diabetes mellitus ([facility]).    SURGICAL HISTORY  patient denies any surgical history    FAMILY HISTORY  No family history on file.    SOCIAL HISTORY  Social History     Tobacco Use    Smoking status: Never    Smokeless tobacco: Never   Vaping Use    Vaping Use: Never used   Substance and Sexual Activity    Alcohol use: Not Currently    Drug use: Not Currently    Sexual activity: Not on file       CURRENT MEDICATIONS  Home Medications       Reviewed by Valerio Goins (Pharmacy Tech) on 09/26/23 at 1107  Med List Status: Complete     Medication Last Dose Status   acetaminophen (TYLENOL) 325 MG Tab 9/19/2023 Active   ALPRAZolam (XANAX) 0.25 MG Tab 9/17/2023 Active   amLODIPine (NORVASC) 5 MG Tab 9/24/2023 Active   amoxicillin (AMOXIL) 500 MG Cap 9/26/2023 Active   apixaban (ELIQUIS) 2.5mg Tab 9/25/2023 Active   baclofen (LIORESAL) 10 MG Tab 9/25/2023 Active   divalproex (DEPAKOTE) 125 MG EC tablet 9/25/2023 Active   DULoxetine HCl 30 MG Capsule Delayed Release Sprinkle 9/25/2023 Active   finasteride (PROSCAR) 5 MG Tab 9/25/2023 Active   fluticasone furoate-vilanterol (BREO) 100-25 MCG/ACT AEROSOL POWDER, BREATH ACTIVATED 9/25/2023 Active   insulin glargine (LANTUS) 100 UNIT/ML SC SOLN 9/25/2023 Active   insulin regular (NOVOLIN R) 100 Unit/mL Solution 9/25/2023 Active   nystatin (MYCOSTATIN) powder 9/25/2023 Active   pantoprazole (PROTONIX) 40 MG Tablet Delayed Response 9/26/2023 Active   polyethylene glycol/lytes (MIRALAX) 17 g Pack 9/25/2023 Active   pravastatin (PRAVACHOL) 20 MG Tab 9/25/2023 Active   propranolol (INDERAL) 20 MG Tab 9/25/2023 Active   risperiDONE (RISPERDAL) 1 MG Tab 9/25/2023 Active   senna-docusate (PERICOLACE OR SENOKOT S) 8.6-50 MG Tab 9/25/2023 Active   tamsulosin (FLOMAX) 0.4 MG capsule 9/25/2023 Active   Umeclidinium Bromide (INCRUSE ELLIPTA) 62.5 MCG/ACT  AEROSOL POWDER, BREATH ACTIVATED 9/24/2023 Active                    ALLERGIES  Allergies   Allergen Reactions    Metformin Unspecified     Unknown reaction, listed on MAR     Morphine Unspecified     Unknown reaction, listed on MAR     Simvastatin Unspecified     Unknown reaction, listed on MAR     Spironolactone Unspecified     Unknown reaction, listed on MAR        PHYSICAL EXAM  VITAL SIGNS: /62   Pulse 76   Temp 37.7 °C (99.8 °F) (Temporal)   Resp 18   Wt 79.4 kg (175 lb)   SpO2 95%   BMI 27.41 kg/m²    Vitals reviewed.  Constitutional: Moans, calls out with movement of his lower extremities.  Unable to follow commands, answer questions.  Ill-appearing.  No distress.    Head: Normocephalic and atraumatic.   Mouth/Throat: Oropharynx is clear with significantly dry mucous membranes.  Eyes: Conjunctivae are normal. Pupils are equal, round, and reactive to light.   Neck: Normal range of motion. Neck supple.  Cardiovascular: Normal rate, regular rhythm and normal heart sounds. Normal peripheral pulses.  Pulmonary/Chest: Effort normal and breath sounds normal. No respiratory distress, no wheezes, rhonchi, or rales.   Abdominal: Soft. Bowel sounds are normal. There is no obvious tenderness, or grimace with palpation.  No guarding, or peritoneal signs.  Musculoskeletal: No edema and no tenderness.   Neurological: No cranial nerve deficits, on passive examination. Normal motor and sensory exam. No focal deficits.   Skin: Skin is warm and dry. No erythema. No pallor.  Right heel ulcer  Psychiatric: Patient has a normal mood and affect.     DIAGNOSTIC STUDIES / PROCEDURES  EKG  I have independently interpreted this EKG    LABS  Results for orders placed or performed during the hospital encounter of 09/26/23   Lactic acid (lactate)   Result Value Ref Range    Lactic Acid 2.2 (H) 0.5 - 2.0 mmol/L   Lactic acid (lactate): Repeat if initial lactic acid result is greater than 2   Result Value Ref Range    Lactic  Acid 1.8 0.5 - 2.0 mmol/L   CBC With Differential   Result Value Ref Range    WBC 12.0 (H) 4.8 - 10.8 K/uL    RBC 3.57 (L) 4.70 - 6.10 M/uL    Hemoglobin 11.0 (L) 14.0 - 18.0 g/dL    Hematocrit 31.7 (L) 42.0 - 52.0 %    MCV 88.8 81.4 - 97.8 fL    MCH 30.8 27.0 - 33.0 pg    MCHC 34.7 32.3 - 36.5 g/dL    RDW 38.8 35.9 - 50.0 fL    Platelet Count 136 (L) 164 - 446 K/uL    MPV 8.6 (L) 9.0 - 12.9 fL    Neutrophils-Polys 70.10 44.00 - 72.00 %    Lymphocytes 16.30 (L) 22.00 - 41.00 %    Monocytes 6.90 0.00 - 13.40 %    Eosinophils 5.50 0.00 - 6.90 %    Basophils 0.40 0.00 - 1.80 %    Immature Granulocytes 0.80 0.00 - 0.90 %    Nucleated RBC 0.00 0.00 - 0.20 /100 WBC    Neutrophils (Absolute) 8.38 (H) 1.82 - 7.42 K/uL    Lymphs (Absolute) 1.95 1.00 - 4.80 K/uL    Monos (Absolute) 0.83 0.00 - 0.85 K/uL    Eos (Absolute) 0.66 (H) 0.00 - 0.51 K/uL    Baso (Absolute) 0.05 0.00 - 0.12 K/uL    Immature Granulocytes (abs) 0.10 0.00 - 0.11 K/uL    NRBC (Absolute) 0.00 K/uL   Comp Metabolic Panel   Result Value Ref Range    Sodium 139 135 - 145 mmol/L    Potassium 4.3 3.6 - 5.5 mmol/L    Chloride 107 96 - 112 mmol/L    Co2 22 20 - 33 mmol/L    Anion Gap 10.0 7.0 - 16.0    Glucose 178 (H) 65 - 99 mg/dL    Bun 34 (H) 8 - 22 mg/dL    Creatinine 1.57 (H) 0.50 - 1.40 mg/dL    Calcium 8.4 (L) 8.5 - 10.5 mg/dL    Correct Calcium 9.2 8.5 - 10.5 mg/dL    AST(SGOT) 15 12 - 45 U/L    ALT(SGPT) 5 2 - 50 U/L    Alkaline Phosphatase 67 30 - 99 U/L    Total Bilirubin 0.3 0.1 - 1.5 mg/dL    Albumin 3.0 (L) 3.2 - 4.9 g/dL    Total Protein 6.7 6.0 - 8.2 g/dL    Globulin 3.7 (H) 1.9 - 3.5 g/dL    A-G Ratio 0.8 g/dL   Urinalysis    Specimen: Urine   Result Value Ref Range    Color Yellow     Character Turbid (A)     Specific Gravity 1.019 <1.035    Ph 6.5 5.0 - 8.0    Glucose Negative Negative mg/dL    Ketones Trace (A) Negative mg/dL    Protein 30 (A) Negative mg/dL    Bilirubin Negative Negative    Urobilinogen, Urine 1.0 Negative    Nitrite Negative  Negative    Leukocyte Esterase Large (A) Negative    Occult Blood Moderate (A) Negative    Micro Urine Req Microscopic    URINE MICROSCOPIC (W/UA)   Result Value Ref Range    WBC Packed (A) /hpf    RBC 5-10 (A) /hpf    Bacteria Moderate (A) None /hpf    Epithelial Cells Few /hpf    Hyaline Cast 0-2 /lpf   ESTIMATED GFR   Result Value Ref Range    GFR (CKD-EPI) 45 (A) >60 mL/min/1.73 m 2   CoV-2, Flu A/B, And RSV by PCR ([facility])    Specimen: Respirate   Result Value Ref Range    Influenza virus A RNA Negative Negative    Influenza virus B, PCR Negative Negative    RSV, PCR Negative Negative    SARS-CoV-2 by PCR NotDetected     SARS-CoV-2 Source NP Swab        RADIOLOGY  I have independently interpreted the diagnostic imaging associated with this visit and am waiting the final reading from the radiologist.   My preliminary interpretation is as follows: No significant change when compared to prior film    Radiologist interpretation:   DX-CHEST-PORTABLE (1 VIEW)   Final Result      No acute cardiac or pulmonary abnormalities are identified. Lung volumes are low.      DX-HIP-COMPLETE - UNILATERAL 2+ LEFT    (Results Pending)   DX-KNEE 2- LEFT    (Results Pending)       COURSE & MEDICAL DECISION MAKING    ED Observation Status? No, will require hospitalization    INITIAL ASSESSMENT, COURSE AND PLAN  Care Narrative:     This is an elderly gentleman who lives at a nursing home.  Presents with altered level of consciousness.  He was just evaluated in the emergency department, admitted to the hospital earlier this month, just 3 days ago for possible stroke.  Work-up was overall unrevealing.  He presents today with a fever and concern for sepsis.  Sepsis protocol was initiated I have included IV fluids and IV antibiotics.    9:42 AM data reviewed.  White blood cell count is elevated 12, previously normal.  H&H 11 and 31.  Chemistry shows an elevated glucose of 178.  Previously has been similarly elevated.  Creatinine 1.57  which is an increase from prior values, most recently 1.28.  Urine shows evidence of occult blood, large leukocyte esterase, packed with WBCs and elevated bacteria.    10:08 AM discussed with the hospitalist regarding patient's presentation, course here in the ED and suspicion for urine as source of infection as well as my discussion with pharmacy.  He agrees to admit the patient to their service.    HYDRATION: Based on the patient's presentation of Dehydration and Sepsis the patient was given IV fluids. IV Hydration was used because oral hydration was not adequate alone. Upon recheck following hydration, the patient was improved but will need hospitalization.      DISPOSITION AND DISCUSSIONS  I have discussed management of the patient with the following physicians and DEANNA's:  Pharmacy and like to redo all of them    Discussion of management with other QHP or appropriate source(s): Pharmacy regarding antibiotic choice.  Recent urine cultures have grown Enterococcus, will switch from Rocephin to Unasyn based on UA findings and prior culture.      FINAL DIAGNOSIS  1. Urinary tract infection without hematuria, site unspecified    2. Dehydration    3. JOSE ENRIQUE (acute kidney injury) (HCC)    4. Febrile illness           Electronically signed by: Meghana Owen D.O., 9/26/2023 9:11 AM

## 2023-09-26 NOTE — ASSESSMENT & PLAN NOTE
Due to dementia. He does not show signs of acute meningitis.   Appears at baseline; clinically consistent with prior neurological exams.

## 2023-09-26 NOTE — ED NOTES
Attempted to medicate pt. Pt offered single pill at a time. Pt holding pill in mouth. Pt not swallowing water. Falling asleep. Pill removed.

## 2023-09-26 NOTE — ASSESSMENT & PLAN NOTE
He found to have leukocytosis most likely secondary to new tract infection and sepsis. Resolving.   CTM CBC

## 2023-09-26 NOTE — ED NOTES
Antibiotics infusing. COVID swab collected and sent. Pt continues to holler out incomprehensible sounds.

## 2023-09-26 NOTE — ASSESSMENT & PLAN NOTE
I reviewed patient POLST and prior hospital records that indicates full CODE STATUS.  I discussed with patient's daughter over phone regarding patient CODE STATUS and discussed with her regarding POLST that indicates full code.  Patient's daughter Jose requested to keep him full code as he made this decision by himself.  As per POLST and patient daughter's wishes I placed full code orders.    Time spent 16 minutes

## 2023-09-26 NOTE — ED NOTES
Med rec completed per pt's MAR   Allergies reviewed     Pt started a 7 day course of Amoxicillin on 9/24/2023    Pt takes Eliquis 2.5 mg twice a day   Last dose 9/25/2023 PM

## 2023-09-26 NOTE — ED NOTES
Family at bedside and more history obtained. Family stating normally pt is only a&ox1 and usually in a notarized scooter. Pt here has been noted to be on his left side with R leg contracted and pain with movement. Family stating this is new for pt. Spoke with admit MD and he will order a xray.

## 2023-09-26 NOTE — H&P
Hospital Medicine History & Physical Note    Date of Service  9/26/2023    Primary Care Physician  Pcp Pt States None    Consultants  None    Specialist Names: N/A    Code Status  Full Code    Chief Complaint  Chief Complaint   Patient presents with    ALOC       History of Presenting Illness    Wale Olivas is a 78 y.o. male with past medical history of right MCA stroke, debility, DVT on Eliquis, Alzheimer dementia, type 2 diabetes, dyslipidemia, seizure disorder on Depakote, BPH, COPD, GERD who presented to the hospital on 9/26/2023 with fever and confusion.  [facility] has severe baseline dementia and I was unable to obtain any information from this patient.  As per chart review and per ER physician patient has dementia and [facility].  Patient recently underwent work-up for possible stroke and discharged from the hospital on September 23, 2023.    Due to patient's severe dementia HPI is very limited as I was unable to obtain information from him.    I called patient's daughter Jose and discussed plan of care with her that Mr. Olivas has sepsis most likely secondary to urinary tract infection and updated her regarding plan of care.    I discussed the plan of care with family.    Review of Systems  Review of Systems   Unable to perform ROS: Dementia       Past Medical History   has a past medical history of Anxiety, BPH (benign prostatic hyperplasia), CKD (chronic kidney disease), COPD (chronic obstructive pulmonary disease) ([facility]), COVID-19, Dementia ([facility]), GERD (gastroesophageal reflux disease), Hyperlipidemia, Insomnia, Major depressive disorder, and Type II diabetes mellitus ([facility]).    Surgical History   has no past surgical history on file.     Family History     Family history reviewed with patient. There is no family history that is pertinent to the chief complaint.     Social History   reports that he has never smoked. He has never used  smokeless tobacco. He reports that he does not currently use alcohol. He reports that he does not currently use drugs.    Allergies  Allergies   Allergen Reactions    Metformin Unspecified     Unknown reaction, listed on MAR     Morphine Unspecified     Unknown reaction, listed on MAR     Simvastatin Unspecified     Unknown reaction, listed on MAR     Spironolactone Unspecified     Unknown reaction, listed on MAR        Medications  Prior to Admission Medications   Prescriptions Last Dose Informant Patient Reported? Taking?   ALPRAZolam (XANAX) 0.25 MG Tab  Family Member Yes No   Sig: Take 0.5 mg by mouth every 8 hours as needed for Anxiety.   DULoxetine HCl 30 MG Capsule Delayed Release Sprinkle  Family Member Yes No   Sig: Take 30 mg by mouth every day.   HYDROcodone-Acetaminophen  MG/15ML Solution  Family Member Yes No   Sig: Take 15 mL by mouth every four hours as needed (PAIN).   Umeclidinium Bromide (INCRUSE ELLIPTA) 62.5 MCG/ACT AEROSOL POWDER, BREATH ACTIVATED  Family Member Yes No   Sig: Inhale 1 Puff every day.   amoxicillin (AMOXIL) 500 MG Cap   No No   Sig: Take 1 Capsule by mouth every 8 hours.   apixaban (ELIQUIS) 2.5mg Tab  Family Member Yes No   Sig: Take 2.5 mg by mouth 2 times a day.   baclofen (LIORESAL) 10 MG Tab  Family Member Yes No   Sig: Take 10 mg by mouth 3 times a day.   divalproex (DEPAKOTE) 125 MG EC tablet  Family Member Yes No   Sig: Take 1,000 mg by mouth at bedtime.   finasteride (PROSCAR) 5 MG Tab  Family Member Yes No   Sig: Take 5 mg by mouth every day.   fluticasone furoate-vilanterol (BREO) 100-25 MCG/ACT AEROSOL POWDER, BREATH ACTIVATED  Family Member Yes No   Sig: Inhale 1 Puff every day.   insulin glargine (LANTUS) 100 UNIT/ML SC SOLN 9/25/2023 at PM Family Member Yes No   Sig: Inject 10 Units under the skin every evening.   insulin regular (NOVOLIN R) 100 Unit/mL Solution 9/25/2023 at 2100  Yes Yes   Sig: Inject  under the skin 4 Times a Day,Before Meals and at  Bedtime. Sliding Scale  201-250= 2 units  251-300= 4 units  301-350= 6 units  351-400= 8 units  Greater than 400 call MD   pantoprazole (PROTONIX) 40 MG Tablet Delayed Response  Family Member Yes No   Sig: Take 40 mg by mouth every day.   polyethylene glycol/lytes (MIRALAX) 17 g Pack  Family Member Yes No   Sig: Take 17 g by mouth every day.   pravastatin (PRAVACHOL) 20 MG Tab  Family Member Yes No   Sig: Take 80 mg by mouth every evening.   propranolol (INDERAL) 20 MG Tab  Family Member Yes No   Sig: Take 20 mg by mouth every 12 hours.   risperiDONE (RISPERDAL) 1 MG Tab  Family Member Yes No   Sig: Take 1 mg by mouth 3 times a day.   tamsulosin (FLOMAX) 0.4 MG capsule  Family Member Yes No   Sig: Take 0.4 mg by mouth every day.      Facility-Administered Medications: None       Physical Exam  Temp:  [38.1 °C (100.5 °F)] 38.1 °C (100.5 °F)  Pulse:  [84] 84  Resp:  [24] 24  BP: (142)/(70) 142/70  SpO2:  [95 %] 95 %  Blood Pressure : (!) 142/70   Temperature: (!) 38.1 °C (100.5 °F)   Pulse: 84   Respiration: (!) 24   Pulse Oximetry: 95 %       Physical Exam  Vitals reviewed.   Constitutional:       General: He is not in acute distress.     Appearance: He is ill-appearing.   HENT:      Head: Normocephalic and atraumatic. No contusion.      Right Ear: External ear normal.      Left Ear: External ear normal.      Nose: Nose normal.      Mouth/Throat:      Mouth: Mucous membranes are dry.      Pharynx: No oropharyngeal exudate.   Eyes:      General:         Right eye: No discharge.         Left eye: No discharge.      Pupils: Pupils are equal, round, and reactive to light.   Neck:      Comments: He was moving his neck   Cardiovascular:      Rate and Rhythm: Normal rate and regular rhythm.      Heart sounds: No murmur heard.     No friction rub. No gallop.   Pulmonary:      Effort: Pulmonary effort is normal.      Breath sounds: No wheezing or rhonchi.   Abdominal:      General: Bowel sounds are normal. There is no  "distension.      Palpations: Abdomen is soft.      Tenderness: There is no abdominal tenderness. There is no rebound.   Musculoskeletal:         General: No swelling or tenderness. Normal range of motion.      Cervical back: No muscular tenderness.   Skin:     General: Skin is warm and dry.      Coloration: Skin is not jaundiced.   Neurological:      General: No focal deficit present.      Mental Status: He is alert. He is disoriented.      Cranial Nerves: No cranial nerve deficit.      Sensory: No sensory deficit.   Psychiatric:         Mood and Affect: Mood normal.         Cognition and Memory: Cognition is impaired. Memory is impaired. He exhibits impaired recent memory and impaired remote memory.         Laboratory:  Recent Labs     09/26/23  0832   WBC 12.0*   RBC 3.57*   HEMOGLOBIN 11.0*   HEMATOCRIT 31.7*   MCV 88.8   MCH 30.8   MCHC 34.7   RDW 38.8   PLATELETCT 136*   MPV 8.6*     Recent Labs     09/26/23  0832   SODIUM 139   POTASSIUM 4.3   CHLORIDE 107   CO2 22   GLUCOSE 178*   BUN 34*   CREATININE 1.57*   CALCIUM 8.4*     Recent Labs     09/26/23  0832   ALTSGPT 5   ASTSGOT 15   ALKPHOSPHAT 67   TBILIRUBIN 0.3   GLUCOSE 178*         No results for input(s): \"NTPROBNP\" in the last 72 hours.      No results for input(s): \"TROPONINT\" in the last 72 hours.    Imaging:  DX-CHEST-PORTABLE (1 VIEW)   Final Result      No acute cardiac or pulmonary abnormalities are identified. Lung volumes are low.          X-Ray:  My impression is: Chest x-ray my interpretation did not show any acute abnormalities    Assessment/Plan:  Justification for Admission Status  I anticipate this patient will require at least two midnights for appropriate medical management, necessitating inpatient admission because for severe sepsis that will require administration of IV antibiotic and follow-up on culture results.    Patient will need a Telemetry bed on MEDICAL service .  The need is secondary to severe sepsis.    * Severe sepsis " (HCC)- (present on admission)  Assessment & Plan  This is Sepsis Present on admission  SIRS criteria identified on my evaluation include: Tachypnea, with respirations greater than 20 per minute and Leukocytosis, with WBC greater than 12,000  Clinical indicators of end organ dysfunction include Lactic Acid greater than 2  Source is urinary tract infection  Sepsis protocol initiated  Crystalloid Fluid Administration: Fluid resuscitation ordered per standard protocol - 30 mL/kg per current or ideal body weight  IV antibiotics as appropriate for source of sepsis  Reassessment: I have reassessed the patient's hemodynamic status    He received 1000 cc of IV fluid bolus I ordered additional 1000 cc of IV fluid bolus.  Due to his recent urine culture results I am continuing his Unasyn that was started in ER.      Advance care planning  Assessment & Plan  I reviewed patient POLST and prior hospital records that indicates full CODE STATUS.  I discussed with patient's daughter over phone regarding patient CODE STATUS and discussed with her regarding POLST that indicates full code.  Patient's daughter Jose requested to keep him full code as he made this decision by himself.  As per POLST and patient daughter's wishes I placed full code orders.    Time spent 16 minutes    Leukocytosis  Assessment & Plan  He found to have leukocytosis most likely secondary to new tract infection and sepsis  Ordered CBC for tomorrow.    Lactic acidosis  Assessment & Plan  He found to have lactic acidosis most likely secondary to sepsis  I started him on sepsis order set.  Every 4 hours lactic acid evaluation.    Encephalopathy chronic- (present on admission)  Assessment & Plan  Due to dementia.  He does not show signs of acute meningitis.    Dementia (HCC)- (present on admission)  Assessment & Plan  Patient has severe dementia at baseline per  Discussed plan of care with patient's daughter over phone.  I reviewed his last discharge summary and as  per prior medical record patient has dementia and does not follow commands very well.    Dyslipidemia- (present on admission)  Assessment & Plan  Continue outpatient statin.    Primary hypertension- (present on admission)  Assessment & Plan  I resume outpatient amlodipine with holding parameters   I started him on IV hydralazine as needed with parameters.  Admit to telemetry.    Acute kidney injury superimposed on chronic kidney disease (HCC)- (present on admission)  Assessment & Plan  BUN/creatinine elevated from prior evaluation.  Most likely prerenal due to sepsis.  I started him on IV fluid.      Type 2 diabetes mellitus with hyperglycemia, without long-term current use of insulin (HCC)- (present on admission)  Assessment & Plan  Resume outpatient insulin Lantus 10 units nightly.  I ordered insulin sliding scale with hypoglycemia protocol.      I reviewed large discharge summary from September 23, 2023    I discussed about this admission with ER physician Dr. Owen.    VTE prophylaxis: therapeutic anticoagulation with Eliquis

## 2023-09-26 NOTE — THERAPY
Speech Language Pathology   Clinical Swallow Evaluation     Patient Name: Wale Olivas  AGE:  78 y.o., SEX:  male  Medical Record #: [id number]  Date of Service: 9/26/2023      History of Present Illness  Pt is a 78 y.o. M  w/ PMHx of R MCA stroke, debility, DVT on Eliquis, Alzheimer dementia, type 2 diabetes, dyslipidemia, seizure disorder on Depakote, BPH, COPD, GERD who presented to the hospital on 9/26/2023 with fever and confusion.  As per chart review and per ER physician patient has dementia and [facility]  Patient recently underwent work-up for possible stroke and discharged from the hospital on September 23, 2023.    CMHx: Severe sepsis, chronic encephalopathy, lactic acidosis, leukocytosis    Pt most recently seen by  at [facility] on 9/22/23. FEES evaluation was completed. The pt presented with a mild oropharyngeal dysphagia where soft and bite size solid/thin liquid diet was recommending with 1:1 supv during meals.     CXR 9/26/23:   1. No acute cardiac or pulmonary abnormalities are identified. Lung volumes are low.    General Information:  Vitals  O2 (LPM): 0  O2 Delivery Device: None - Room Air  Level of Consciousness: Lethargic  Patient Behaviors: Confused, Sedated, Agitated  Orientation:  (AAOx0 -inconsistent response to name)  Follows Directives: Inconsistent    Prior Living Situation & Level of Function:  Prior Services: Continuous (24 Hour) Care Giving Per Service  Housing / Facility:  ([facility])  Lives with - Patient's Self Care Capacity: Attendant / Paid Care Giver  Comments: P'ts dtr, Jose, lives locally and provides A as needed  Communication: Pt with Hx of CVA/Alzheimer's dementia at baseline, per dtr pt is typically AAOx1.  Swallowing: Mild oropharyngeal dysphagia, see above for details     Oral Mechanism Evaluation:  Dentition: Fair, Natural dentition, Some missing dentition   Facial Symmetry: Equal  Facial Sensation: Pt did not follow commands to  assess     Labial Observations: WFL   Lingual Observations: Midline  Motor Speech: mild dysarthria       Laryngeal Function:  Secretion Management: Adequate  Voice Quality: WFL  Cough: Perceptually WNL     Subjective  Pt was cleared by RN for clinical bedside swallow evaluation. Pt was recieved asleep and required mod-max verbal/tactile cues to maintain appropriate levels of alertness for PO trials. Pt was disoriented- inconsistently responding to name. Additionally, pt was inconsistently following commands despite max multimodal cues. Pt's dtr was present at bedside and served as his primary historian. Per dtr, pt tolerated last meal well without any overt s/sx of aspiration. Additionally, dtr endorsed no hx of dysphagia. Pt was observed to frequently repeat back SLPs verbal output and other speech was nonsensical. Pt observed to shout in pain- moving R leg suspect d/t wound. RN aware.    Assessment  Current Method of Nutrition: Oral diet (regular solids/thin liquids -cardiac + CHO)  Positioning: Semi-Jalloh's (30-45 degrees)  Bolus Administration: SLP  O2 (LPM): 0 O2 Delivery Device: None - Room Air  Factor(s) Affecting Performance: Impaired endurance, Impaired mental status, Impaired command following    Swallowing Trials:  Swallowing Trials  Ice: WFL  Thin Liquid (TN0): WFL  Liquidised (LQ3): WFL    Comments:   SLP positioned pt in semi-jalloh's prior to PO trials, pt unable to tolerate HOB higher d/t pain as evidenced via shouting. Pt unable to rate/localize pain. SLP fed trials of ice chips, TN0 (via tsp/straw), and liquidized completed. Due to fluctuating alertness, additional PO trials not completed. Pt demo'd inconsistent labial seal/productive sucking via straw. Pt with fair stripping from tsp. Pt masticated 6/6 ice chips prior to swallow trigger. A/P transport inferred to be WNL as evidenced via lack of oral residue upon inspection. HLE was palpated and present; single swallow per bolus. Single cough  "appreciated x1 following sequential sips of TN0 via straw concerning for airway invasion, however, suspect d/t fast rate and large volume. No other overt s/sx of aspiration appreciated across all PO trials. Pt's vocal quality remained clear and strong.     Clinical Impressions  Per recent FEES completed on 9/21/23, pt presents with a mild oropharyngeal dysphagia consistent with bedside presentation today. Dysphagia likely acute 2/2 alerted mentation superimposed on advanced age, debility and prior neurological deficits from CVA/dementia. SLP recommending careful initiation of soft and bite size solid with thin liquid diet with 1:1 feeding and supv during mealtimes d/t mentation and limited alertness. Please defer meals if patient is not alert. Risk of aspiration or related sequela can be mitigated with frequent, thorough, oral care. Should s/sx of aspiration or distress be appreciated on PO, please hold PO and consult SLP. Swallow precautions hung at [facility].     Recommendations  Diet Consistency: Soft and bite size solids (SB6), Thin liquids (TN0)   -PT MUST BE AWAKE/ALERT FOR PO-   Instrumentation: Instrumental swallow study pending clinical progress  Medication: As tolerated  Supervision: 1:1 feeding with constant supervision  Positioning: Fully upright and midline during oral intake  Risk Management : Small bites/sips, Slow rate of intake  Oral Care: Q4h    SLP Treatment Plan  Treatment Plan: Dysphagia Treatment, Patient/Family/Caregiver Training  SLP Frequency: 3x Per Week  Estimated Duration: Until Therapy Goals Met    Anticipated Discharge Needs  Discharge Recommendations: Anticipate that the patient will have no further speech therapy needs after discharge from the hospital   Therapy Recommendations Upon DC: Not Indicated      Patient / Family Goals  Patient / Family Goal #1: \"we just want him to eat\"  Short Term Goals  Short Term Goal # 1: Pt will consume soft and bite size solid and thin liquid diet without " any overt s/sx of aspiration or decline in respiratory status.    Nury Gilbert, SLP

## 2023-09-26 NOTE — ED TRIAGE NOTES
Pt MALENA meza from the [facility] with c/c of ALOC. Per EMS pt recently diagnosed with a UTI. Staff stating normally pt is talkative and a&ox1. Pt with hx of dementia. Pt arrives here mumbling and altered. Sepsis protocol started.

## 2023-09-26 NOTE — ASSESSMENT & PLAN NOTE
Resume outpatient insulin Lantus 10 units nightly.  Continue insulin sliding scale with hypoglycemia protocol.

## 2023-09-26 NOTE — ASSESSMENT & PLAN NOTE
Patient has severe dementia at baseline per. Discussions regarding plan of care with patient's daughter over phone.  I reviewed his last discharge summary and as per prior medical record patient has dementia and does not follow commands very well.

## 2023-09-26 NOTE — ASSESSMENT & PLAN NOTE
This is Sepsis Present on admission  SIRS criteria identified on my evaluation include: Tachypnea, with respirations greater than 20 per minute and Leukocytosis, with WBC greater than 12,000  Clinical indicators of end organ dysfunction include Lactic Acid greater than 2  Source is urinary tract infection  Sepsis protocol initiated  Crystalloid Fluid Administration: Fluid resuscitation ordered per standard protocol - 30 mL/kg per current or ideal body weight  IV antibiotics as appropriate for source of sepsis  Reassessment: I have reassessed the patient's hemodynamic status  S/p 2L fluids and IV Unasyn  Blood cx grew gram (+) cocci  Urine cx grew proteus with intermediate sensitivity to augmentin. As patient is improving on Augmentin, will continue him on it for total 7 days  Infection appears to be resolving. Improvement in labs. Patient back to baseline neurologically  Unasyn transitioned to augmentin on 9/27 as patient tolerating PO  --continue augmentin

## 2023-09-27 LAB
ALBUMIN SERPL BCP-MCNC: 2.7 G/DL (ref 3.2–4.9)
ALBUMIN/GLOB SERPL: 0.7 G/DL
ALP SERPL-CCNC: 67 U/L (ref 30–99)
ALT SERPL-CCNC: 6 U/L (ref 2–50)
ANION GAP SERPL CALC-SCNC: 11 MMOL/L (ref 7–16)
AST SERPL-CCNC: 14 U/L (ref 12–45)
BASOPHILS # BLD AUTO: 0.7 % (ref 0–1.8)
BASOPHILS # BLD: 0.07 K/UL (ref 0–0.12)
BILIRUB SERPL-MCNC: 0.4 MG/DL (ref 0.1–1.5)
BUN SERPL-MCNC: 23 MG/DL (ref 8–22)
CALCIUM ALBUM COR SERPL-MCNC: 9.5 MG/DL (ref 8.5–10.5)
CALCIUM SERPL-MCNC: 8.5 MG/DL (ref 8.5–10.5)
CHLORIDE SERPL-SCNC: 105 MMOL/L (ref 96–112)
CO2 SERPL-SCNC: 23 MMOL/L (ref 20–33)
CREAT SERPL-MCNC: 1.42 MG/DL (ref 0.5–1.4)
EOSINOPHIL # BLD AUTO: 0.53 K/UL (ref 0–0.51)
EOSINOPHIL NFR BLD: 5.4 % (ref 0–6.9)
ERYTHROCYTE [DISTWIDTH] IN BLOOD BY AUTOMATED COUNT: 41.1 FL (ref 35.9–50)
GFR SERPLBLD CREATININE-BSD FMLA CKD-EPI: 51 ML/MIN/1.73 M 2
GLOBULIN SER CALC-MCNC: 4 G/DL (ref 1.9–3.5)
GLUCOSE BLD STRIP.AUTO-MCNC: 168 MG/DL (ref 65–99)
GLUCOSE BLD STRIP.AUTO-MCNC: 189 MG/DL (ref 65–99)
GLUCOSE BLD STRIP.AUTO-MCNC: 279 MG/DL (ref 65–99)
GLUCOSE BLD STRIP.AUTO-MCNC: 371 MG/DL (ref 65–99)
GLUCOSE SERPL-MCNC: 247 MG/DL (ref 65–99)
HCT VFR BLD AUTO: 33.5 % (ref 42–52)
HGB BLD-MCNC: 11.4 G/DL (ref 14–18)
IMM GRANULOCYTES # BLD AUTO: 0.11 K/UL (ref 0–0.11)
IMM GRANULOCYTES NFR BLD AUTO: 1.1 % (ref 0–0.9)
LYMPHOCYTES # BLD AUTO: 2.5 K/UL (ref 1–4.8)
LYMPHOCYTES NFR BLD: 25.7 % (ref 22–41)
MAGNESIUM SERPL-MCNC: 1.6 MG/DL (ref 1.5–2.5)
MCH RBC QN AUTO: 31.3 PG (ref 27–33)
MCHC RBC AUTO-ENTMCNC: 34 G/DL (ref 32.3–36.5)
MCV RBC AUTO: 92 FL (ref 81.4–97.8)
MONOCYTES # BLD AUTO: 0.73 K/UL (ref 0–0.85)
MONOCYTES NFR BLD AUTO: 7.5 % (ref 0–13.4)
NEUTROPHILS # BLD AUTO: 5.79 K/UL (ref 1.82–7.42)
NEUTROPHILS NFR BLD: 59.6 % (ref 44–72)
NRBC # BLD AUTO: 0 K/UL
NRBC BLD-RTO: 0 /100 WBC (ref 0–0.2)
PLATELET # BLD AUTO: 121 K/UL (ref 164–446)
PMV BLD AUTO: 8.5 FL (ref 9–12.9)
POTASSIUM SERPL-SCNC: 3.8 MMOL/L (ref 3.6–5.5)
PROT SERPL-MCNC: 6.7 G/DL (ref 6–8.2)
RBC # BLD AUTO: 3.64 M/UL (ref 4.7–6.1)
SODIUM SERPL-SCNC: 139 MMOL/L (ref 135–145)
WBC # BLD AUTO: 9.7 K/UL (ref 4.8–10.8)

## 2023-09-27 PROCEDURE — 82962 GLUCOSE BLOOD TEST: CPT | Mod: 91

## 2023-09-27 PROCEDURE — 83735 ASSAY OF MAGNESIUM: CPT

## 2023-09-27 PROCEDURE — 94760 N-INVAS EAR/PLS OXIMETRY 1: CPT

## 2023-09-27 PROCEDURE — 97602 WOUND(S) CARE NON-SELECTIVE: CPT

## 2023-09-27 PROCEDURE — 80053 COMPREHEN METABOLIC PANEL: CPT

## 2023-09-27 PROCEDURE — A9270 NON-COVERED ITEM OR SERVICE: HCPCS | Performed by: INTERNAL MEDICINE

## 2023-09-27 PROCEDURE — 99233 SBSQ HOSP IP/OBS HIGH 50: CPT | Mod: GC | Performed by: INTERNAL MEDICINE

## 2023-09-27 PROCEDURE — 36415 COLL VENOUS BLD VENIPUNCTURE: CPT

## 2023-09-27 PROCEDURE — 700111 HCHG RX REV CODE 636 W/ 250 OVERRIDE (IP): Mod: JZ | Performed by: INTERNAL MEDICINE

## 2023-09-27 PROCEDURE — 700102 HCHG RX REV CODE 250 W/ 637 OVERRIDE(OP): Performed by: INTERNAL MEDICINE

## 2023-09-27 PROCEDURE — 94640 AIRWAY INHALATION TREATMENT: CPT

## 2023-09-27 PROCEDURE — 700111 HCHG RX REV CODE 636 W/ 250 OVERRIDE (IP)

## 2023-09-27 PROCEDURE — 700102 HCHG RX REV CODE 250 W/ 637 OVERRIDE(OP)

## 2023-09-27 PROCEDURE — 97535 SELF CARE MNGMENT TRAINING: CPT

## 2023-09-27 PROCEDURE — 85025 COMPLETE CBC W/AUTO DIFF WBC: CPT

## 2023-09-27 PROCEDURE — 700105 HCHG RX REV CODE 258: Performed by: INTERNAL MEDICINE

## 2023-09-27 PROCEDURE — 97167 OT EVAL HIGH COMPLEX 60 MIN: CPT

## 2023-09-27 PROCEDURE — 770020 HCHG ROOM/CARE - TELE (206)

## 2023-09-27 PROCEDURE — A9270 NON-COVERED ITEM OR SERVICE: HCPCS

## 2023-09-27 RX ORDER — MAGNESIUM SULFATE HEPTAHYDRATE 40 MG/ML
4 INJECTION, SOLUTION INTRAVENOUS ONCE
Status: COMPLETED | OUTPATIENT
Start: 2023-09-27 | End: 2023-09-27

## 2023-09-27 RX ORDER — AMOXICILLIN AND CLAVULANATE POTASSIUM 875; 125 MG/1; MG/1
1 TABLET, FILM COATED ORAL EVERY 12 HOURS
Status: DISCONTINUED | OUTPATIENT
Start: 2023-09-27 | End: 2023-09-29 | Stop reason: HOSPADM

## 2023-09-27 RX ORDER — POTASSIUM CHLORIDE 20 MEQ/1
20 TABLET, EXTENDED RELEASE ORAL ONCE
Status: COMPLETED | OUTPATIENT
Start: 2023-09-27 | End: 2023-09-27

## 2023-09-27 RX ADMIN — BACLOFEN 10 MG: 10 TABLET ORAL at 05:58

## 2023-09-27 RX ADMIN — AMPICILLIN AND SULBACTAM 3 G: 1; 2 INJECTION, POWDER, FOR SOLUTION INTRAMUSCULAR; INTRAVENOUS at 04:25

## 2023-09-27 RX ADMIN — INSULIN GLARGINE-YFGN 10 UNITS: 100 INJECTION, SOLUTION SUBCUTANEOUS at 22:18

## 2023-09-27 RX ADMIN — INSULIN HUMAN 5 UNITS: 100 INJECTION, SOLUTION PARENTERAL at 22:18

## 2023-09-27 RX ADMIN — SENNOSIDES AND DOCUSATE SODIUM 2 TABLET: 50; 8.6 TABLET ORAL at 17:24

## 2023-09-27 RX ADMIN — RISPERIDONE 1 MG: 0.5 TABLET ORAL at 22:10

## 2023-09-27 RX ADMIN — OMEPRAZOLE 20 MG: 20 CAPSULE, DELAYED RELEASE ORAL at 05:57

## 2023-09-27 RX ADMIN — APIXABAN 2.5 MG: 5 TABLET, FILM COATED ORAL at 17:25

## 2023-09-27 RX ADMIN — DIVALPROEX SODIUM 1000 MG: 500 TABLET, DELAYED RELEASE ORAL at 22:10

## 2023-09-27 RX ADMIN — TIOTROPIUM BROMIDE INHALATION SPRAY 5 MCG: 3.12 SPRAY, METERED RESPIRATORY (INHALATION) at 06:31

## 2023-09-27 RX ADMIN — BACLOFEN 10 MG: 10 TABLET ORAL at 22:10

## 2023-09-27 RX ADMIN — AMOXICILLIN AND CLAVULANATE POTASSIUM 1 TABLET: 875; 125 TABLET, FILM COATED ORAL at 17:24

## 2023-09-27 RX ADMIN — BACLOFEN 10 MG: 10 TABLET ORAL at 14:15

## 2023-09-27 RX ADMIN — APIXABAN 2.5 MG: 5 TABLET, FILM COATED ORAL at 05:57

## 2023-09-27 RX ADMIN — SENNOSIDES AND DOCUSATE SODIUM 2 TABLET: 50; 8.6 TABLET ORAL at 05:58

## 2023-09-27 RX ADMIN — TAMSULOSIN HYDROCHLORIDE 0.4 MG: 0.4 CAPSULE ORAL at 05:57

## 2023-09-27 RX ADMIN — MAGNESIUM SULFATE HEPTAHYDRATE 4 G: 4 INJECTION, SOLUTION INTRAVENOUS at 08:09

## 2023-09-27 RX ADMIN — INSULIN HUMAN 1 UNITS: 100 INJECTION, SOLUTION PARENTERAL at 07:57

## 2023-09-27 RX ADMIN — PRAVASTATIN SODIUM 80 MG: 20 TABLET ORAL at 22:10

## 2023-09-27 RX ADMIN — SODIUM CHLORIDE, POTASSIUM CHLORIDE, SODIUM LACTATE AND CALCIUM CHLORIDE: 600; 310; 30; 20 INJECTION, SOLUTION INTRAVENOUS at 19:37

## 2023-09-27 RX ADMIN — RISPERIDONE 1 MG: 0.5 TABLET ORAL at 05:57

## 2023-09-27 RX ADMIN — DULOXETINE HYDROCHLORIDE 30 MG: 30 CAPSULE, DELAYED RELEASE ORAL at 05:58

## 2023-09-27 RX ADMIN — FINASTERIDE 5 MG: 5 TABLET, FILM COATED ORAL at 05:58

## 2023-09-27 RX ADMIN — INSULIN HUMAN 3 UNITS: 100 INJECTION, SOLUTION PARENTERAL at 17:34

## 2023-09-27 RX ADMIN — AMPICILLIN AND SULBACTAM 3 G: 1; 2 INJECTION, POWDER, FOR SOLUTION INTRAMUSCULAR; INTRAVENOUS at 11:35

## 2023-09-27 RX ADMIN — RISPERIDONE 1 MG: 0.5 TABLET ORAL at 14:16

## 2023-09-27 RX ADMIN — POTASSIUM CHLORIDE 20 MEQ: 1500 TABLET, EXTENDED RELEASE ORAL at 07:51

## 2023-09-27 RX ADMIN — AMLODIPINE BESYLATE 5 MG: 5 TABLET ORAL at 05:58

## 2023-09-27 RX ADMIN — INSULIN HUMAN 1 UNITS: 100 INJECTION, SOLUTION PARENTERAL at 11:53

## 2023-09-27 ASSESSMENT — COGNITIVE AND FUNCTIONAL STATUS - GENERAL
STANDING UP FROM CHAIR USING ARMS: TOTAL
EATING MEALS: A LOT
TURNING FROM BACK TO SIDE WHILE IN FLAT BAD: A LOT
DRESSING REGULAR UPPER BODY CLOTHING: A LOT
MOVING FROM LYING ON BACK TO SITTING ON SIDE OF FLAT BED: A LOT
DRESSING REGULAR LOWER BODY CLOTHING: TOTAL
MOVING TO AND FROM BED TO CHAIR: A LOT
HELP NEEDED FOR BATHING: TOTAL
SUGGESTED CMS G CODE MODIFIER DAILY ACTIVITY: CL
TOILETING: TOTAL
DAILY ACTIVITIY SCORE: 9
TOILETING: A LOT
SUGGESTED CMS G CODE MODIFIER MOBILITY: CM
MOBILITY SCORE: 9
PERSONAL GROOMING: A LOT
CLIMB 3 TO 5 STEPS WITH RAILING: TOTAL
DRESSING REGULAR LOWER BODY CLOTHING: A LOT
PERSONAL GROOMING: A LOT
SUGGESTED CMS G CODE MODIFIER DAILY ACTIVITY: CL
DRESSING REGULAR UPPER BODY CLOTHING: A LOT
EATING MEALS: A LOT
WALKING IN HOSPITAL ROOM: TOTAL
HELP NEEDED FOR BATHING: A LOT
DAILY ACTIVITIY SCORE: 12

## 2023-09-27 ASSESSMENT — FIBROSIS 4 INDEX: FIB4 SCORE: 3.59

## 2023-09-27 ASSESSMENT — ACTIVITIES OF DAILY LIVING (ADL): TOILETING: REQUIRES ASSIST

## 2023-09-27 ASSESSMENT — PAIN DESCRIPTION - PAIN TYPE
TYPE: ACUTE PAIN
TYPE: ACUTE PAIN

## 2023-09-27 NOTE — CARE PLAN
The patient is Stable - Low risk of patient condition declining or worsening    Shift Goals  Clinical Goals: Skin integrity,Safety  Patient Goals: Eat, Adequate rest  Family Goals: CJ    Progress made toward(s) clinical / shift goals:      Problem: Skin Integrity  Goal: Skin integrity is maintained or improved  Description: Target End Date:  Prior to discharge or change in level of care    Document interventions on Skin Risk/Hector flowsheet groups and corresponding LDA    1.  Assess and monitor skin integrity, appearance and/or temperature  2.  Assess risk factors for impaired skin integrity and/or pressures ulcers  3.  Implement precautions to protect skin integrity in collaboration with interdisciplinary team  4.  Implement pressure ulcer prevention protocol if at risk for skin breakdown  5.  Confirm wound care consult if at risk for skin breakdown  6.  Ensure patient use of pressure relieving devices  (Low air loss bed, waffle overlay, heel protectors, ROHO cushion, etc)  Outcome: Progressing  Note: Ordered and place as Taps system in patient's bed to help with repositioning. Placed a condom cath on patient as patient is incontinent and scrotal area was red. Observed dressing on wound on left heel, dressing was clean, dry, and intact with no signs of drainage. Patient currently on a low air loss mattress. Patient currently has moon boots on both feet. Checked patient frequently to make sure that he did not have a bowel movement.     Problem: Fall Risk  Goal: Patient will remain free from falls  Description: Target End Date:  Prior to discharge or change in level of care    Document interventions on the [place] Fall Risk Assessment    1.  Assess for fall risk factors  2.  Implement fall precautions  Outcome: Progressing  Note: Made sure bed alarm frame was on as patient is on a low air loss mattress. Sat outside of patient's room as he is currently a high fall risk and a room near the nurses station is not  available.Patient is only oriented to self sometimes to place so he had difficulty understanding the concept of using his call light. Checked on patient frequently and asked if he needed any assistance. Bed set to lowest position and locked.

## 2023-09-27 NOTE — DISCHARGE PLANNING
Case Management Discharge Planning    Admission Date: 9/26/2023  GMLOS: 3.5  ALOS: 1    6-Clicks ADL Score: 12  6-Clicks Mobility Score: 9  PT and/or OT Eval ordered: Yes  Post-acute Referrals Ordered: Yes  Post-acute Choice Obtained: Yes  Has referral(s) been sent to post-acute provider:  Yes      Anticipated Discharge Dispo: Discharge Disposition: D/T to SNF with Medicare cert in anticipation of skilled care (03)  Discharge Address: [address] [place] [address]    DME Needed: No    Action(s) Taken: DC Assessment Complete (See below)    Escalations Completed: None    Medically Clear: No    Barriers to Discharge: Medical clearance    **0836  Patient is a long-term resident at [facility]. Patient's daughter, Jose [phone], is legal next of kin. [facility] Contact: Albert [phone]. LSW following. Choice form for [facility] faxed to [facility].    **1036  Call from Albert with [facility]. Albert confirmed patient is a resident at their facility. LSW to keep Albert updated on plan of care.    **1115  Patient discussed during IDT rounds. Pending blood cultures for ID recommendations.    Care Transition Team Assessment    Information Source  Orientation Level: Oriented to person, Disoriented to time, Disoriented to situation, Disoriented to place  Who is responsible for making decisions for patient? : Adult child  Name(s) of Primary Decision Maker: Jose Olivas    Readmission Evaluation  Is this a readmission?: Yes - unplanned readmission    Elopement Risk  Legal Hold: No  Ambulatory or Self Mobile in Wheelchair: No-Not an Elopement Risk    Interdisciplinary Discharge Planning  Lives with - Patient's Self Care Capacity: Attendant / Paid Care Giver  Housing / Facility:  ([facility])  Prior Services: Continuous (24 Hour) Care Giving Per Service    Discharge Preparedness  What is your plan after discharge?: Skilled nursing facility  What are your discharge supports?: Other  (comment)    Finances  Financial Barriers to Discharge: No  Prescription Coverage: Yes    Vision / Hearing Impairment  Hearing Impairment: Both Ears    Advance Directive  Advance Directive?: POLST    Domestic Abuse  Have you ever been the victim of abuse or violence?: No    Discharge Risks or Barriers  Discharge risks or barriers?: No    Anticipated Discharge Information  Discharge Disposition: D/T to SNF with Medicare cert in anticipation of skilled care (03)  Discharge Address: [address] [place] [address]

## 2023-09-27 NOTE — PROGRESS NOTES
..4 Eyes Skin Assessment Completed by NAHEED Berry and NAHEED Delgado.    Head WDL  Ears WDL  Nose WDL  Mouth WDL  Neck WDL  Breast/Chest WDL  Shoulder Blades WDL  Spine WDL  (R) Arm/Elbow/Hand Bruising and Scab  (L) Arm/Elbow/Hand Bruising and Scab  Abdomen WDL  Groin Redness  Scrotum/Coccyx/Buttocks WDL  (R) Leg Bruising, scabs  (L) Leg Scab and Bruising  (R) Heel/Foot/Toe Abrasion/ulcer  (L) Heel/Foot/Toe Boggy          Devices In Places Tele Box and Blood Pressure Cuff      Interventions In Place Heel Mepilex, Heel Float Boots, TAP System, Pillows, and Low Air Loss Mattress    Possible Skin Injury Yes    Pictures Uploaded Into Epic Yes  Wound Consult Placed Yes  RN Wound Prevention Protocol Ordered Yes

## 2023-09-27 NOTE — THERAPY
"Physical Therapy Contact Note    Patient Name: Wale Olivas  Age:  78 y.o., Sex:  male  Medical Record #: [id number]  Today's Date: 9/27/2023    PT consult received, evaluation attempted. Spent 12 minutes with pt as he was initally agreeable to mobility with prior prompting, then became resistant and yelled out in pain when initiating mobility. Per OT's discussion with dtr, pt is able to assist with bed mobility and [facility]. Pt appears to be below PLOF baseline and appears to be experiencing pain in L LE limiting his participation. Will reattempt PT eval as able.        09/27/23 1219   Charge Group   PT Self Care / Home Evaluation (Units) 1   Total Time Spent   PT Self Care/Home Evaluation Time Spent (Mins) 12   PT Total Time Spent (Calculated) 12   Initial Contact Note    Initial Contact Note Order Received and Verified, Physical Therapy Evaluation in Progress with Full Report to Follow.   Precautions   Precautions Fall Risk   Comments hx of demetia   Vitals   O2 Delivery Device None - Room Air   Pain 0 - 10 Group   Therapist Pain Assessment Nurse Notified  (repeated yelled out in pain when L LE was touched. When asked to describe said \"it hurts all over\")   Prior Living Situation   Prior Services Continuous (24 Hour) Care Giving Per Service   Equipment Owned Wheelchair   Lives with - Patient's Self Care Capacity Attendant / Paid Care Giver   Comments obtained per OT discussion with Dtr and EMR. Lives at [facility]   Prior Level of Functional Mobility   Bed Mobility Independent   Transfer Status Required Assist   Ambulation Dependent   Assistive Devices Used Wheelchair  (stand pivot to WC)   Wheelchair Required Assist   Comments per Dtr, pt is quite strong at baseline. able to follow commands and assist with bed mobility and functional transfers to . Pt is a primary WC user with assist from staff.   Cognition    Cognition / Consciousness X   Level of " Consciousness Alert   Comments baseline dementia. Alerted by OT that it helps to verbally prep/state task prior to initiating mobility.   Active ROM Lower Body    Active ROM Lower Body  X   Comments unable to assess due to pain   Strength Lower Body   Lower Body Strength  X   Comments able to lift L LE off bed to remove heel float boot, then screamed in pain and appeared to shut down   Bed Mobility    Comments unable/unwilling to allow transition to EOB   Interdisciplinary Plan of Care Collaboration   IDT Collaboration with  Nursing;Occupational Therapist   Patient Position at End of Therapy In Bed;Call Light within Reach;Tray Table within Reach   Collaboration Comments aware of PT eval attempt   Session Information   Date / Session Number  9/27- ed only  (EVAL)

## 2023-09-27 NOTE — ED NOTES
Pt more alert. Pt able to take xarelto safely. Pt incontinent of urine and cleaned. New dressing placed on heel

## 2023-09-27 NOTE — THERAPY
Occupational Therapy   Initial Evaluation     Patient Name: Wale Olivas  Age:  78 y.o., Sex:  male  Medical Record #: [id number]  Today's Date: 9/27/2023     Precautions: (P) Fall Risk  Comments: dementia    Assessment  Patient is 78 y.o. male admitted with fever and confusion with concern of sepsis 2/2 UTI with PMHx of right MCA stroke, debility, DVT, Alzheimer dementia, type 2 diabetes, dyslipidemia, seizure disorder, COPD, GERD seen for OT evaluation.     Pt presented with severe cognitive impairments with difficulty following basic commands, delayed response, and yelling when touched. Pt appeared to demonstrate pain in LLE based on grimacing and yelling when attempted to mobilize. Pt able to long sit without assist, UE strength and ROM WFL.    Discussed with dtr, who reported pt's cognition is worse than baseline. [facility] and [facility] to [facility] WC with assist. He does receive assist with most self cares. Dtr reports concern of a fall at some point but unclear when. As pt is below baseline and is able to transfer with assist will follow for skilled OT services.    Plan    Occupational Therapy Initial Treatment Plan   Treatment Interventions: (P) Self Care / Activities of Daily Living, Cognitive Skill Development, Neuro Re-Education / Balance, Therapeutic Exercises, Therapeutic Activity, Adaptive Equipment  Treatment Frequency: (P) 3 Times per Week  Duration: (P) Until Therapy Goals Met    DC Equipment Recommendations: (P) Unable to determine at this time  Discharge Recommendations: (P)  ([facility] to [facility] with all OOB activities and self cares)        09/27/23 0901   Prior Living Situation   Housing / Facility   [facility]   Equipment Owned Wheelchair   Lives with - Patient's Self Care Capacity Attendant / Paid Care Giver   Comments Discussed PLOF with dtr, Jose via phone. She reports that pt lives at the [facility] and is able to transfer to his WC with assist, and  "attendants report \"he is really strong\". He recieves assist with most self cares at baseline, and has round the clock care   Prior Level of ADL Function   Self Feeding Independent   Grooming / Hygiene Requires Assist   Bathing Requires Assist   Dressing Requires Assist   Toileting Requires Assist   Prior Level of IADL Function   Medication Management Dependent   Laundry Dependent   Kitchen Mobility Dependent   Finances Dependent   Home Management Dependent   Shopping Dependent   Precautions   Precautions Fall Risk   Pain 0 - 10 Group   Therapist Pain Assessment   (appears to have L leg pain, pt unable to articulate)   Cognition    Cognition / Consciousness X   Comments Pt with baseline dementia. Per dtr his cognition is currently worse than baseline, yesterday he was able to state \"pain\", \"hurts\" etc, today pt yelling when touched, agreeing to mobility and then exclaiming \"fuck\" when attempting to mobilize. Pt minimally able to follow commands today, per dtr pt baseline with delayed command following   Active ROM Upper Body   Active ROM Upper Body  WDL   Strength Upper Body   Upper Body Strength  WDL   Bed Mobility    Comments Pt able to sit up in long sitting without assist, yelled when attempted to assist with LE, particularly LLE. Able to move RLE laterally   ADL Assessment   Eating Moderate Assist   Grooming Moderate Assist   Upper Body Dressing Maximal Assist   Lower Body Dressing Maximal Assist   How much help from another person does the patient currently need...   6 Clicks Daily Activity Score 9   Functional Mobility   Sit to Stand Unable to Participate   Bed, Chair, Wheelchair Transfer Unable to Participate   Toilet Transfers Unable to Participate   Patient / Family Goals   Patient / Family Goal #1 to eat his applesauce   Short Term Goals   Short Term Goal # 1 Pt will demo toilet txf min A   Short Term Goal # 2 Pt will follow 1 step commands with 50% accuracy   Short Term Goal # 3 Pt will tolerate 4-5 min " seated EOB ADLs   Education Group   Role of Occupational Therapist Patient Response Patient;Acceptance;Explanation   Occupational Therapy Initial Treatment Plan    Treatment Interventions Self Care / Activities of Daily Living;Cognitive Skill Development;Neuro Re-Education / Balance;Therapeutic Exercises;Therapeutic Activity;Adaptive Equipment   Treatment Frequency 3 Times per Week   Duration Until Therapy Goals Met   Problem List   Problem List Decreased Active Daily Living Skills;Decreased Homemaking Skills;Decreased Activity Tolerance;Decreased Functional Mobility;Safety Awareness Deficits / Cognition;Impaired Postural Control / Balance   Anticipated Discharge Equipment and Recommendations   DC Equipment Recommendations Unable to determine at this time   Discharge Recommendations   ([facility] if able to [facility] with all OOB activities and self cares)

## 2023-09-27 NOTE — DISCHARGE PLANNING
Received Choice form @: [id number]  [facility]  Referral sent per Choice form @: [id number]

## 2023-09-27 NOTE — CARE PLAN
The patient is Stable - Low risk of patient condition declining or worsening    Shift Goals  Clinical Goals: Patient will remain free from falls by end of shift  Patient Goals: breakfast  Family Goals: not present, CJ    Progress made toward(s) clinical / shift goals:      Problem: Skin Integrity  Goal: Skin integrity is maintained or improved  Outcome: Progressing  Note: Skin assessed for impairment and breakdown, all pressure relieving devices I place, wound care done, Skin integrity maintained     Problem: Fall Risk  Goal: Patient will remain free from falls  Outcome: Progressing  Note: All fall risk factors accessed, fall precautions implemented.

## 2023-09-27 NOTE — ED NOTES
.Bedside report received from off going RN/tech: Radha, assumed care of patient.  POC discussed with patient. Call light within reach, all needs addressed at this time.       Fall risk interventions in place: Move the patient closer to the nurse's station, Patient's personal possessions are with in their safe reach, Place socks on patient, Place fall risk sign on patient's door, Give patient urinal if applicable, and Keep floor surfaces clean and dry (all applicable per [place] Fall risk assessment)   Continuous monitoring: Cardiac Leads, Pulse Ox, or Blood Pressure  IVF/IV medications: Not Applicable   Oxygen: Room Air  Bedside sitter: Not Applicable   Isolation: Not Applicable

## 2023-09-27 NOTE — PROGRESS NOTES
[facility] (formerly [facility]) Internal Medicine Daily Progress Note    Date of Service  9/27/2023    [facility] Team: R IM Green Team   Attending: Christian Shepherd M.d.  Senior Resident: Dr. Sandip Hurley  Intern:  Dr. Bird Herrera  Contact Number: [phone]    Chief Complaint  Wale Olivas is a 78 y.o. male admitted 9/26/2023 with Sepsis due to urinary source    Hospital Course  No notes on file    Interval Problem Update  -no acute events overnight  -Improvement in labs. Patient is at baseline neurological status.     I have discussed this patient's plan of care and discharge plan at IDT rounds today with Case Management, Nursing, Nursing leadership, and other members of the IDT team.    Consultants/Specialty  N/a    Code Status  Full Code    Disposition  The patient is not medically cleared for discharge to home or a post-acute facility.  Anticipate discharge to: skilled nursing facility    I have placed the appropriate orders for post-discharge needs.    Review of Systems  Review of Systems   Unable to perform ROS: Dementia        Physical Exam  Temp:  [36.4 °C (97.5 °F)-36.8 °C (98.3 °F)] 36.7 °C (98 °F)  Pulse:  [60-86] 60  Resp:  [13-18] 18  BP: (100-193)/() 100/59  SpO2:  [94 %-98 %] 98 %    Physical Exam  Vitals and nursing note reviewed.   Constitutional:       General: He is not in acute distress.     Appearance: Normal appearance. He is not ill-appearing.   HENT:      Head: Normocephalic and atraumatic.      Right Ear: External ear normal.      Left Ear: External ear normal.   Eyes:      Extraocular Movements: Extraocular movements intact.      Conjunctiva/sclera: Conjunctivae normal.   Pulmonary:      Effort: Pulmonary effort is normal. No respiratory distress.   Abdominal:      General: Abdomen is flat. There is no distension.      Tenderness: There is no abdominal tenderness. There is no guarding or rebound.      Hernia: No hernia is present.   Musculoskeletal:         General: No deformity.      Right lower leg: No edema.       Left lower leg: No edema.   Skin:     General: Skin is warm and dry.      Coloration: Skin is not jaundiced.   Neurological:      Mental Status: He is alert. He is disoriented and confused.      Cranial Nerves: No cranial nerve deficit, dysarthria or facial asymmetry.      Coordination: Romberg sign negative.      Comments: Patient unable to follow commands.          Fluids    Intake/Output Summary (Last 24 hours) at 9/27/2023 1613  Last data filed at 9/27/2023 1350  Gross per 24 hour   Intake 2950.83 ml   Output 1900 ml   Net 1050.83 ml       Laboratory  Recent Labs     09/26/23  0832 09/27/23  0219   WBC 12.0* 9.7   RBC 3.57* 3.64*   HEMOGLOBIN 11.0* 11.4*   HEMATOCRIT 31.7* 33.5*   MCV 88.8 92.0   MCH 30.8 31.3   MCHC 34.7 34.0   RDW 38.8 41.1   PLATELETCT 136* 121*   MPV 8.6* 8.5*     Recent Labs     09/26/23  0832 09/27/23  0219   SODIUM 139 139   POTASSIUM 4.3 3.8   CHLORIDE 107 105   CO2 22 23   GLUCOSE 178* 247*   BUN 34* 23*   CREATININE 1.57* 1.42*   CALCIUM 8.4* 8.5                   Imaging  DX-KNEE 2- LEFT   Final Result      1.  No acute fracture or dislocation.   2.  Calcific arteriopathy.      DX-HIP-COMPLETE - UNILATERAL 2+ LEFT   Final Result         1.  No radiographic evidence of acute traumatic injury.      2.  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      DX-CHEST-PORTABLE (1 VIEW)   Final Result      No acute cardiac or pulmonary abnormalities are identified. Lung volumes are low.           Assessment/Plan  Problem Representation:    * Severe sepsis (HCC)- (present on admission)  Assessment & Plan  This is Sepsis Present on admission  SIRS criteria identified on my evaluation include: Tachypnea, with respirations greater than 20 per minute and Leukocytosis, with WBC greater than 12,000  Clinical indicators of end organ dysfunction include Lactic Acid greater than 2  Source is urinary tract infection  Sepsis protocol initiated  Crystalloid Fluid Administration: Fluid  resuscitation ordered per standard protocol - 30 mL/kg per current or ideal body weight  IV antibiotics as appropriate for source of sepsis  Reassessment: I have reassessed the patient's hemodynamic status    S/p 2L fluids given prior.   --Transitioning Unasyn to augmentin as tolerating PO. Will reassess necessity later in hospital course. Infection appears to be resolving well.       Advance care planning  Assessment & Plan  I reviewed patient POLST and prior hospital records that indicates full CODE STATUS.  I discussed with patient's daughter over phone regarding patient CODE STATUS and discussed with her regarding POLST that indicates full code.  Patient's daughter Jose requested to keep him full code as he made this decision by himself.  As per POLST and patient daughter's wishes I placed full code orders.    Time spent 16 minutes    Leukocytosis  Assessment & Plan  He found to have leukocytosis most likely secondary to new tract infection and sepsis. Resolving.   CTM CBC    Lactic acidosis  Assessment & Plan  He found to have lactic acidosis most likely secondary to sepsis. Resolved.       Acute cystitis without hematuria- (present on admission)  Assessment & Plan  See sepsis problem above    Encephalopathy chronic- (present on admission)  Assessment & Plan  Due to dementia. He does not show signs of acute meningitis.   Appears at baseline; clinically consistent with prior neurological exams.    Dementia (HCC)- (present on admission)  Assessment & Plan  Patient has severe dementia at baseline per. Discussions regarding plan of care with patient's daughter over phone.  I reviewed his last discharge summary and as per prior medical record patient has dementia and does not follow commands very well.    Dyslipidemia- (present on admission)  Assessment & Plan  Continue outpatient statin.    Primary hypertension- (present on admission)  Assessment & Plan  I resume outpatient amlodipine with holding parameters. I  started him on IV hydralazine as needed with parameters.  Telemetry.    Acute kidney injury superimposed on chronic kidney disease (HCC)- (present on admission)  Assessment & Plan  BUN/creatinine elevated from prior evaluation. Most likely prerenal due to sepsis.  S/p Iv fluids.         Type 2 diabetes mellitus with hyperglycemia, without long-term current use of insulin (HCC)- (present on admission)  Assessment & Plan  Resume outpatient insulin Lantus 10 units nightly.  Continue insulin sliding scale with hypoglycemia protocol.         VTE prophylaxis: therapeutic anticoagulation with eliquis    I have performed a physical exam and reviewed and updated ROS and Plan today (9/27/2023). In review of yesterday's note (9/26/2023), there are no changes except as documented above.

## 2023-09-28 LAB
ALBUMIN SERPL BCP-MCNC: 2.9 G/DL (ref 3.2–4.9)
ALBUMIN/GLOB SERPL: 0.7 G/DL
ALP SERPL-CCNC: 69 U/L (ref 30–99)
ALT SERPL-CCNC: <5 U/L (ref 2–50)
AMMONIA PLAS-SCNC: 14 UMOL/L (ref 11–45)
ANION GAP SERPL CALC-SCNC: 10 MMOL/L (ref 7–16)
AST SERPL-CCNC: 8 U/L (ref 12–45)
BACTERIA UR CULT: ABNORMAL
BACTERIA UR CULT: ABNORMAL
BILIRUB SERPL-MCNC: 0.4 MG/DL (ref 0.1–1.5)
BUN SERPL-MCNC: 20 MG/DL (ref 8–22)
CALCIUM ALBUM COR SERPL-MCNC: 9.7 MG/DL (ref 8.5–10.5)
CALCIUM SERPL-MCNC: 8.8 MG/DL (ref 8.5–10.5)
CHLORIDE SERPL-SCNC: 103 MMOL/L (ref 96–112)
CO2 SERPL-SCNC: 24 MMOL/L (ref 20–33)
CREAT SERPL-MCNC: 1.43 MG/DL (ref 0.5–1.4)
ERYTHROCYTE [DISTWIDTH] IN BLOOD BY AUTOMATED COUNT: 40.8 FL (ref 35.9–50)
GFR SERPLBLD CREATININE-BSD FMLA CKD-EPI: 51 ML/MIN/1.73 M 2
GLOBULIN SER CALC-MCNC: 4.2 G/DL (ref 1.9–3.5)
GLUCOSE BLD STRIP.AUTO-MCNC: 187 MG/DL (ref 65–99)
GLUCOSE BLD STRIP.AUTO-MCNC: 219 MG/DL (ref 65–99)
GLUCOSE BLD STRIP.AUTO-MCNC: 260 MG/DL (ref 65–99)
GLUCOSE BLD STRIP.AUTO-MCNC: 270 MG/DL (ref 65–99)
GLUCOSE SERPL-MCNC: 268 MG/DL (ref 65–99)
HCT VFR BLD AUTO: 33.3 % (ref 42–52)
HGB BLD-MCNC: 11.4 G/DL (ref 14–18)
MCH RBC QN AUTO: 31.2 PG (ref 27–33)
MCHC RBC AUTO-ENTMCNC: 34.2 G/DL (ref 32.3–36.5)
MCV RBC AUTO: 91.2 FL (ref 81.4–97.8)
PLATELET # BLD AUTO: 128 K/UL (ref 164–446)
PMV BLD AUTO: 8.8 FL (ref 9–12.9)
POTASSIUM SERPL-SCNC: 4.2 MMOL/L (ref 3.6–5.5)
PROT SERPL-MCNC: 7.1 G/DL (ref 6–8.2)
RBC # BLD AUTO: 3.65 M/UL (ref 4.7–6.1)
SIGNIFICANT IND 70042: ABNORMAL
SITE SITE: ABNORMAL
SODIUM SERPL-SCNC: 137 MMOL/L (ref 135–145)
SOURCE SOURCE: ABNORMAL
WBC # BLD AUTO: 8.2 K/UL (ref 4.8–10.8)

## 2023-09-28 PROCEDURE — 82962 GLUCOSE BLOOD TEST: CPT

## 2023-09-28 PROCEDURE — 770020 HCHG ROOM/CARE - TELE (206)

## 2023-09-28 PROCEDURE — 85027 COMPLETE CBC AUTOMATED: CPT

## 2023-09-28 PROCEDURE — 92526 ORAL FUNCTION THERAPY: CPT

## 2023-09-28 PROCEDURE — 700102 HCHG RX REV CODE 250 W/ 637 OVERRIDE(OP)

## 2023-09-28 PROCEDURE — A9270 NON-COVERED ITEM OR SERVICE: HCPCS | Performed by: INTERNAL MEDICINE

## 2023-09-28 PROCEDURE — 80053 COMPREHEN METABOLIC PANEL: CPT

## 2023-09-28 PROCEDURE — A9270 NON-COVERED ITEM OR SERVICE: HCPCS

## 2023-09-28 PROCEDURE — 82140 ASSAY OF AMMONIA: CPT

## 2023-09-28 PROCEDURE — 700111 HCHG RX REV CODE 636 W/ 250 OVERRIDE (IP): Performed by: INTERNAL MEDICINE

## 2023-09-28 PROCEDURE — 700105 HCHG RX REV CODE 258: Performed by: INTERNAL MEDICINE

## 2023-09-28 PROCEDURE — 99232 SBSQ HOSP IP/OBS MODERATE 35: CPT | Mod: GC | Performed by: INTERNAL MEDICINE

## 2023-09-28 PROCEDURE — 700102 HCHG RX REV CODE 250 W/ 637 OVERRIDE(OP): Performed by: INTERNAL MEDICINE

## 2023-09-28 PROCEDURE — 36415 COLL VENOUS BLD VENIPUNCTURE: CPT

## 2023-09-28 RX ADMIN — HYDRALAZINE HYDROCHLORIDE 10 MG: 20 INJECTION, SOLUTION INTRAMUSCULAR; INTRAVENOUS at 20:12

## 2023-09-28 RX ADMIN — BACLOFEN 10 MG: 10 TABLET ORAL at 20:49

## 2023-09-28 RX ADMIN — OMEPRAZOLE 20 MG: 20 CAPSULE, DELAYED RELEASE ORAL at 06:09

## 2023-09-28 RX ADMIN — BACLOFEN 10 MG: 10 TABLET ORAL at 06:09

## 2023-09-28 RX ADMIN — RISPERIDONE 1 MG: 0.5 TABLET ORAL at 06:09

## 2023-09-28 RX ADMIN — SODIUM CHLORIDE, POTASSIUM CHLORIDE, SODIUM LACTATE AND CALCIUM CHLORIDE: 600; 310; 30; 20 INJECTION, SOLUTION INTRAVENOUS at 06:24

## 2023-09-28 RX ADMIN — DIVALPROEX SODIUM 1000 MG: 500 TABLET, DELAYED RELEASE ORAL at 20:52

## 2023-09-28 RX ADMIN — TIOTROPIUM BROMIDE INHALATION SPRAY 5 MCG: 3.12 SPRAY, METERED RESPIRATORY (INHALATION) at 06:10

## 2023-09-28 RX ADMIN — SENNOSIDES AND DOCUSATE SODIUM 2 TABLET: 50; 8.6 TABLET ORAL at 06:10

## 2023-09-28 RX ADMIN — APIXABAN 2.5 MG: 5 TABLET, FILM COATED ORAL at 06:10

## 2023-09-28 RX ADMIN — APIXABAN 2.5 MG: 5 TABLET, FILM COATED ORAL at 16:34

## 2023-09-28 RX ADMIN — INSULIN HUMAN 3 UNITS: 100 INJECTION, SOLUTION PARENTERAL at 20:57

## 2023-09-28 RX ADMIN — SENNOSIDES AND DOCUSATE SODIUM 2 TABLET: 50; 8.6 TABLET ORAL at 16:34

## 2023-09-28 RX ADMIN — RISPERIDONE 1 MG: 0.5 TABLET ORAL at 13:36

## 2023-09-28 RX ADMIN — AMOXICILLIN AND CLAVULANATE POTASSIUM 1 TABLET: 875; 125 TABLET, FILM COATED ORAL at 06:09

## 2023-09-28 RX ADMIN — INSULIN HUMAN 1 UNITS: 100 INJECTION, SOLUTION PARENTERAL at 08:49

## 2023-09-28 RX ADMIN — DULOXETINE HYDROCHLORIDE 30 MG: 30 CAPSULE, DELAYED RELEASE ORAL at 06:09

## 2023-09-28 RX ADMIN — AMOXICILLIN AND CLAVULANATE POTASSIUM 1 TABLET: 875; 125 TABLET, FILM COATED ORAL at 16:34

## 2023-09-28 RX ADMIN — TAMSULOSIN HYDROCHLORIDE 0.4 MG: 0.4 CAPSULE ORAL at 06:10

## 2023-09-28 RX ADMIN — PRAVASTATIN SODIUM 80 MG: 20 TABLET ORAL at 20:49

## 2023-09-28 RX ADMIN — INSULIN HUMAN 3 UNITS: 100 INJECTION, SOLUTION PARENTERAL at 17:37

## 2023-09-28 RX ADMIN — INSULIN HUMAN 2 UNITS: 100 INJECTION, SOLUTION PARENTERAL at 13:31

## 2023-09-28 RX ADMIN — AMLODIPINE BESYLATE 5 MG: 5 TABLET ORAL at 06:26

## 2023-09-28 RX ADMIN — FINASTERIDE 5 MG: 5 TABLET, FILM COATED ORAL at 06:09

## 2023-09-28 RX ADMIN — BACLOFEN 10 MG: 10 TABLET ORAL at 13:36

## 2023-09-28 RX ADMIN — RISPERIDONE 1 MG: 0.5 TABLET ORAL at 20:51

## 2023-09-28 RX ADMIN — INSULIN GLARGINE-YFGN 10 UNITS: 100 INJECTION, SOLUTION SUBCUTANEOUS at 20:57

## 2023-09-28 ASSESSMENT — PAIN DESCRIPTION - PAIN TYPE: TYPE: ACUTE PAIN

## 2023-09-28 ASSESSMENT — FIBROSIS 4 INDEX: FIB4 SCORE: 3.39

## 2023-09-28 NOTE — PROGRESS NOTES
Patient is alert to self only, unable to do a full Neurological assessment, due to patient's confusion.

## 2023-09-28 NOTE — PROGRESS NOTES
Notified provider Sandip Hurley that patient's blood pressure was 179/76 asked if he wanted me to administer hydralazine. Provider had me recheck blood pressure as he expected he was agitated. Rechecked blood pressure it was 175/75 notified provider. Per provider hydralazine was not neccessary. Notified oncoming nurse Lorenza.

## 2023-09-28 NOTE — THERAPY
"Speech Language Pathology   Daily Treatment     Patient Name: Wale Olivas  AGE:  78 y.o., SEX:  male  Medical Record #: [id number]  Date of Service: 9/28/2023    Precautions:  Precautions: Fall Risk, Swallow Precautions     Subjective  Pt sleeping upon entry but awoke to mod verbal cueing. A&Ox1. CNA reported pt had been intermittently coughing w/ thin liquids during lunch    Assessment  Pt requiring 1:1 feeding. Noted w/ slowed response time and required min cueing to maintain alertness consistently throughout tx. Pt intermittent coughing w/ larger volume straw sips; appeared to be mitigated w/ use of single cup sips. No overt s/s of aspiration w/ solids.    Clinical Impressions  Continued clinical signs of mild oropharyngeal dysphagia (as per FEES on 9/21)- dysphagia appears to fluctuate with pt's altered mental status superimposed on advanced age, debility and prior neurological deficits from CVA/dementia. Recommend continued diet modification, aspiration precautions and 1:1 feeding. Swallow prognosis is good pending an improvement in the pt's alertness; feed only when alert.    Recommendations   Dysphagia Treatment  Diet Consistency: Soft and bite size solids (SB6), Thin liquids (TN0)  Instrumentation: None indicated at this time  Medication: As tolerated  Supervision: 1:1 feeding with constant supervision  Positioning: Fully upright and midline during oral intake, Meals sitting upright in a chair, as tolerated  Risk Management : Small bites/sips, Slow rate of intake  Oral Care: Q4h    SLP Treatment Plan  Treatment Plan: Dysphagia Treatment  SLP Frequency: 3x Per Week  Estimated Duration: Until Therapy Goals Met    Anticipated Discharge Needs  Discharge Recommendations: Anticipate that the patient will have no further speech therapy needs after discharge from the hospital  Therapy Recommendations Upon DC: Not Indicated    Patient / Family Goals  Patient / Family Goal #1: \"we just want him to eat\"  Goal #1 " Outcome: Progressing as expected  Short Term Goals  Short Term Goal # 1: Pt will consume soft and bite size solid and thin liquid diet without any overt s/sx of aspiration or decline in respiratory status.  Goal Outcome # 1: Progressing as expected    Tanna Doss, SLP

## 2023-09-28 NOTE — WOUND TEAM
[facility]  Inpatient Services  Initial Wound and Skin Care Evaluation    Admission Date: 9/26/2023     Last order of IP CONSULT TO WOUND CARE was found on 9/26/2023 from Hospital Encounter on 9/26/2023     HPI, PMH, SH: Reviewed    No past surgical history on file.  Social History     Tobacco Use    Smoking status: Never    Smokeless tobacco: Never   Substance Use Topics    Alcohol use: Not Currently     Chief Complaint   Patient presents with    ALOC     Diagnosis: Severe sepsis (HCC) [A41.9, R65.20]    Unit where seen by Wound Team: S153/00     WOUND CONSULT RELATED TO:  L heel    WOUND TEAM PLAN OF CARE - Frequency of Follow-up:   Nursing to follow dressing orders written for wound care. Contact wound team if area fails to progress, deteriorates or with any questions/concerns if something comes up before next scheduled follow up (See below as to whether wound is following and frequency of wound follow up)   Bi-Monthly - L heel    WOUND HISTORY:   78 y.o. male admitted for AMS & possible stroke. PMH Alzheimers dementia, DM, dyslipidemia, seizures, DVT (on Eliquis), COPD, and GERD.  Patient unable to provide wound history. Pt had wound last admit.       WOUND ASSESSMENT/LDA  Wound 09/22/23 Pressure Injury Heel Left POA Stage 3 (Active)   Date First Assessed/Time First Assessed: 09/22/23 0255   Present on Original Admission: Yes  Primary Wound Type: Pressure Injury  Location: Heel  Laterality: Left  Wound Description (Comments): POA Stage 3                              Assessments 9/27/2023  1:40 PM   Site Assessment Red;Pink;Dark edges   Periwound Assessment Maceration   Margins Defined edges   Closure Secondary intention   Drainage Amount Scant   Drainage Description Serosanguineous   Treatments Cleansed   Offloading/DME Heel float boot   Wound Cleansing Normal Saline Irrigation   Dressing Status Intact   Dressing Changed Changed   Dressing Cleansing/Solutions Not Applicable   Dressing Options  Mepitel One;Hydrofiber;Offloading Dressing - Heel   Dressing Change/Treatment Frequency Every 48 hrs, and As Needed   NEXT Dressing Change/Treatment Date 09/29/23   NEXT Weekly Photo (Inpatient Only) 10/03/23   Wound Team Following Bi-Monthly   Pressure Injury Stage Stage 3   Wound Length (cm) 6.5 cm   Wound Width (cm) 6.3 cm   Wound Depth (cm) 0.3 cm   Wound Surface Area (cm^2) 40.95 cm^2   Wound Volume (cm^3) 12.285 cm^3   Shape oval   Wound Odor None   Pulses 1+        Vascular:    MARSHALL:   No results found.    Lab Values:    Lab Results   Component Value Date/Time    WBC 9.7 09/27/2023 02:19 AM    RBC 3.64 (L) 09/27/2023 02:19 AM    HEMOGLOBIN 11.4 (L) 09/27/2023 02:19 AM    HEMATOCRIT 33.5 (L) 09/27/2023 02:19 AM    HBA1C 6.7 (H) 09/22/2023 04:39 AM         Culture Results show:  No results found for this or any previous visit (from the past 720 hour(s)).    Pain Level/Medicated:  None, Tolerated without pain medication       INTERVENTIONS BY WOUND TEAM:  Chart and images reviewed. Discussed with bedside RN. All areas of concern (based on picture review, LDA review and discussion with bedside RN) have been thoroughly assessed. Documentation of areas based on significant findings. This RN in to assess patient. Performed standard wound care which includes appropriate positioning, dressing removal and non-selective debridement. Pictures and measurements obtained weekly if/when required.    Wound:  L heel  Preparation for Dressing removal: Removed without difficulty  Cleansed/Non-selectively Debrided with:  Normal Saline and Gauze  Fadia wound: Cleansed with Normal Saline and Gauze, Prepped with No Sting  Primary Dressing:  mepitel one with hydrofiber  Secondary (Outer) Dressing: heel offloading drsg    Advanced Wound Care Discharge Planning  Number of Clinicians necessary to complete wound care: 2 one to help lift leg  Is patient requiring IV pain medications for dressing changes:  No   Length of time for dressing  change 35 min. (This does not include chart review, pre-medication time, set up, clean up or time spent charting.)    Interdisciplinary consultation: Patient, Bedside RN (Kerry)    EVALUATION / RATIONALE FOR TREATMENT:     Date:  09/27/23  Wound Status:  Initial evaluation    Pt has POA St 3 to L heel that was present last admit. He yells and swears very loudly while performing care and even when he isn't being touched. Mepitel to wound bed for no-adherence with hydrofiber for drainage. Heel offloading drsg and heel float boots to offload area.          Goals: Steady decrease in wound area and depth weekly.    NURSING PLAN OF CARE ORDERS:  Dressing changes: See Dressing Care orders  RN Prevention Protocol    NUTRITION RECOMMENDATIONS   Wound Team Recommendations:  N/A    DIET ORDERS (From admission to next 24h)       Start     Ordered    09/26/23 1345  Diet Order Diet: Level 6 - Soft and Bite Sized (1:1 supv, MUST BE ALERT); Liquid level: Level 0 - Thin; Second Modifier: (optional): Consistent CHO (Diabetic); Tray Modifications (optional): SLP - 1:1 Supervision by Nursing, SLP - Deliver to Nursi...  ALL MEALS        Question Answer Comment   Diet: Level 6 - Soft and Bite Sized 1:1 supv, MUST BE ALERT   Liquid level Level 0 - Thin    Second Modifier: (optional) Consistent CHO (Diabetic)    Tray Modifications (optional) SLP - 1:1 Supervision by Nursing    Tray Modifications (optional) SLP - Deliver to Nursing Station        09/26/23 1345                    PREVENTATIVE INTERVENTIONS:    Q shift Hector - performed per nursing policy  Q shift pressure point assessments - performed per nursing policy    Surface/Positioning  Low Airloss - Currently in Place  Reposition q 2 hours - Currently in Place    Offloading/Redistribution  Heel offloading dressing (Silicone dressing) - Currently in Place  Heel float boots (Prevalon boot) - Currently in Place      Respiratory  N/A    Containment/Moisture Prevention    Purwick/Condom  Cath - Currently in Place  Barrier paste - Currently in Place    Anticipated discharge plans:  Skilled Nursing        Vac Discharge Needs:  Vac Discharge plan is purely a recommendation from wound team and not a requirement for discharge unless otherwise stated by physician.  Not Applicable Pt not on a wound vac

## 2023-09-28 NOTE — CARE PLAN
The patient is Stable - Low risk of patient condition declining or worsening    Shift Goals  Clinical Goals: (P)  (Simultaneous filing. User may not have seen previous data.)  Patient Goals: (P)  (Simultaneous filing. User may not have seen previous data.)  Family Goals: (P)  (Simultaneous filing. User may not have seen previous data.)    Progress made toward(s) clinical / shift goals:        Problem: Urinary - Renal Perfusion  Goal: Ability to achieve and maintain adequate renal perfusion and functioning will improve  Outcome: Progressing  Note: Signs and symptoms of renal dysfunction assessed, Urine output and characteristic documented in I/O patient on IV fluids, eating and drinking appropriately.      Problem: Fall Risk  Goal: Patient will remain free from falls  Outcome: Progressing  Note: All fall risk factors assessed, all fall precautions implemented. Patient no longer trying to get out of bed, tele sitter removed.

## 2023-09-28 NOTE — DISCHARGE PLANNING
DC Transport Scheduled    Received request at: 2010 9/28/2023    Transport Company Scheduled:  [employer]  Spoke with celine at [facility] to schedule transport.      Scheduled Date: 9/29/2023  Scheduled Time: 1300    Destination: [address]    Notified care team of scheduled transport via [employer].     If there are any changes needed to the DC transportation scheduled, please contact [facility] at ext. [phone] between the hours of 4483-5300 Mon-Fri. If outside those hours, contact the ED Case Manager at ext. [phone].

## 2023-09-28 NOTE — PROGRESS NOTES
[facility] Internal Medicine Daily Progress Note    Date of Service  9/28/2023    R Team: R IM Green Team   Attending: Christian Shepherd M.d.  Senior Resident: Dr. Sandip Hurley  Intern:  Dr. Bird Herrera  Contact Number: [phone]    Chief Complaint  Wale Olivas is a 78 y.o. male admitted 9/26/2023 with Sepsis due to urinary source    Hospital Course  Wale Olivas is a 78-year-old male with past medical history of right MCA stroke, debility, DVT on Eliquis, Alzheimer's mention, type 2 diabetes, dyslipidemia, seizures on Depakote, BPH, COPD, GERD who was admitted on 9/26 for sepsis.  Patient has severe baseline dementia at baseline. [facility] and confusion. On admission, patient was tachypneic and had leukocytosis with white count of 12. U/A was positive for UTI.  Patient received fluid bolus and was started on IV Unasyn. Infection appears to be resolving, white count improving. Patient is afebrile. Unasyn transitioned to Augmentin as patient is tolerating oral intake.     Interval Problem Update  No acute events overnight. Patient does not appear to be in acute distress. Sitting up in bed. Appears to be at his baseline neurologically. Spoke with patient's daughter who states that patient's baseline is he knows his name and birthday and can respond to simple commands although needs a lot of redirection. Patient's daughter is concerned about patient going back to [facility] home so soon and would like patient to stay 1 more day.  -continue augmentin    I have discussed this patient's plan of care and discharge plan at IDT rounds today with Case Management, Nursing, Nursing leadership, and other members of the IDT team.    Consultants/Specialty  N/a    Code Status  Full Code    Disposition  The patient is not medically cleared for discharge to home or a post-acute facility.      I have placed the appropriate orders for post-discharge needs.    Review of Systems  Review  of Systems   Unable to perform ROS: Dementia        Physical Exam  Temp:  [36 °C (96.8 °F)-36.7 °C (98 °F)] 36.4 °C (97.5 °F)  Pulse:  [60-91] 84  Resp:  [16-18] 16  BP: (100-179)/(59-79) 131/73  SpO2:  [92 %-100 %] 95 %    Physical Exam  Vitals and nursing note reviewed.   Constitutional:       General: He is not in acute distress.     Appearance: Normal appearance. He is not ill-appearing.   HENT:      Head: Normocephalic and atraumatic.      Right Ear: External ear normal.      Left Ear: External ear normal.   Eyes:      Extraocular Movements: Extraocular movements intact.      Conjunctiva/sclera: Conjunctivae normal.   Pulmonary:      Effort: Pulmonary effort is normal. No respiratory distress.   Abdominal:      General: Abdomen is flat. There is no distension.      Tenderness: There is no abdominal tenderness. There is no guarding or rebound.      Hernia: No hernia is present.   Musculoskeletal:         General: No deformity.      Right lower leg: No edema.      Left lower leg: No edema.   Skin:     General: Skin is warm and dry.      Coloration: Skin is not jaundiced.   Neurological:      Mental Status: He is alert. He is disoriented and confused.      Cranial Nerves: No cranial nerve deficit, dysarthria or facial asymmetry.      Coordination: Romberg sign negative.      Comments: Patient unable to follow commands.          Fluids    Intake/Output Summary (Last 24 hours) at 9/28/2023 1545  Last data filed at 9/28/2023 1239  Gross per 24 hour   Intake 1675.17 ml   Output 2230 ml   Net -554.83 ml       Laboratory  Recent Labs     09/26/23  0832 09/27/23  0219 09/28/23  0305   WBC 12.0* 9.7 8.2   RBC 3.57* 3.64* 3.65*   HEMOGLOBIN 11.0* 11.4* 11.4*   HEMATOCRIT 31.7* 33.5* 33.3*   MCV 88.8 92.0 91.2   MCH 30.8 31.3 31.2   MCHC 34.7 34.0 34.2   RDW 38.8 41.1 40.8   PLATELETCT 136* 121* 128*   MPV 8.6* 8.5* 8.8*     Recent Labs     09/26/23  0832 09/27/23  0219 09/28/23  0305   SODIUM 139 139 137   POTASSIUM 4.3  3.8 4.2   CHLORIDE 107 105 103   CO2 22 23 24   GLUCOSE 178* 247* 268*   BUN 34* 23* 20   CREATININE 1.57* 1.42* 1.43*   CALCIUM 8.4* 8.5 8.8                   Imaging  DX-KNEE 2- LEFT   Final Result      1.  No acute fracture or dislocation.   2.  Calcific arteriopathy.      DX-HIP-COMPLETE - UNILATERAL 2+ LEFT   Final Result         1.  No radiographic evidence of acute traumatic injury.      2.  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      DX-CHEST-PORTABLE (1 VIEW)   Final Result      No acute cardiac or pulmonary abnormalities are identified. Lung volumes are low.           Assessment/Plan  Problem Representation:    * Severe sepsis (HCC)- (present on admission)  Assessment & Plan  This is Sepsis Present on admission  SIRS criteria identified on my evaluation include: Tachypnea, with respirations greater than 20 per minute and Leukocytosis, with WBC greater than 12,000  Clinical indicators of end organ dysfunction include Lactic Acid greater than 2  Source is urinary tract infection  Sepsis protocol initiated  Crystalloid Fluid Administration: Fluid resuscitation ordered per standard protocol - 30 mL/kg per current or ideal body weight  IV antibiotics as appropriate for source of sepsis  Reassessment: I have reassessed the patient's hemodynamic status  S/p 2L fluids and IV Unasyn  Blood cx grew gram (+) cocci  Urine cx grew proteus with intermediate sensitivity to augmentin. As patient is improving on Augmentin, will continue him on it for total 7 days  Infection appears to be resolving. Improvement in labs. Patient back to baseline neurologically  Unasyn transitioned to augmentin on 9/27 as patient tolerating PO  --continue augmentin      Advance care planning  Assessment & Plan  I reviewed patient POLST and prior hospital records that indicates full CODE STATUS.  I discussed with patient's daughter over phone regarding patient CODE STATUS and discussed with her regarding POLST that indicates full  code.  Patient's daughter Jose requested to keep him full code as he made this decision by himself.  As per POLST and patient daughter's wishes I placed full code orders.    Time spent 16 minutes    Leukocytosis  Assessment & Plan  He found to have leukocytosis most likely secondary to new tract infection and sepsis. Resolving.   CTM CBC    Lactic acidosis  Assessment & Plan  He found to have lactic acidosis most likely secondary to sepsis. Resolved.       Acute cystitis without hematuria- (present on admission)  Assessment & Plan  See sepsis problem above    Encephalopathy chronic- (present on admission)  Assessment & Plan  Due to dementia. He does not show signs of acute meningitis.   Appears at baseline; clinically consistent with prior neurological exams.    Dementia (HCC)- (present on admission)  Assessment & Plan  Patient has severe dementia at baseline per. Discussions regarding plan of care with patient's daughter over phone.  I reviewed his last discharge summary and as per prior medical record patient has dementia and does not follow commands very well.    Dyslipidemia- (present on admission)  Assessment & Plan  Continue pravastatin 80 mg daily    Primary hypertension- (present on admission)  Assessment & Plan  -continue home amlodipine 5 mg daily  -IV hydralazine prn     Acute kidney injury superimposed on chronic kidney disease (HCC)- (present on admission)  Assessment & Plan  BUN/creatinine elevated from prior evaluation. Most likely prerenal due to sepsis.  S/p Iv fluids.   -maintenance LR 75 cc/hr        Type 2 diabetes mellitus with hyperglycemia, without long-term current use of insulin (HCC)- (present on admission)  Assessment & Plan  Resume outpatient insulin Lantus 10 units nightly.  Continue insulin sliding scale with hypoglycemia protocol.         VTE prophylaxis: therapeutic anticoagulation with eliquis    I have performed a physical exam and reviewed and updated ROS and Plan today  (9/28/2023). In review of yesterday's note (9/27/2023), there are no changes except as documented above.

## 2023-09-28 NOTE — PROGRESS NOTES
Notified provider Tamia Sanchez that lab called stating that one of his lab cultures came back positive for gram positive cocci with clusters.

## 2023-09-28 NOTE — DISCHARGE PLANNING
[facility] return [facility] from [facility] can accept pt tomorrow @ 1300  REMSA form completed and faxed to maria a    [facility]- Called pt's daughter to notify of transfer time tomorrow @ 1300. Dtr is in agreement with d/c. Discussed IMM.

## 2023-09-28 NOTE — CARE PLAN
The patient is Stable - Low risk of patient condition declining or worsening    Shift Goals  Clinical Goals: Safety  Patient Goals: Sleep  Family Goals: CJ    Progress made toward(s) clinical / shift goals:      Problem: Fluid Volume  Goal: Fluid volume balance will be maintained  Description: Target End Date:  Prior to discharge or change in level of care    Document on I/O flowsheet    1.  Monitor intake and output as ordered  2.  Promote oral intake as appropriate  3.  Report inadequate intake or output to physician  4.  Administer IV therapy as ordered  5.  Weights per provider order  6.  Assess for signs and symptoms of bleeding  7.  Monitor for signs of fluid overload (respiratory changes, edema, weight gain, increased abdominal girth)  8.  Monitor of signs for inadequate fluid volume (poor skin turgor, dry mucous membranes)  9.  Instruct patient on adherence to fluid restrictions  Outcome: Progressing     Problem: Fall Risk  Goal: Patient will remain free from falls  Description: Target End Date:  Prior to discharge or change in level of care    Document interventions on the Keri Up Fall Risk Assessment    1.  Assess for fall risk factors  2.  Implement fall precautions  Outcome: Progressing  Note: Bed frame alarm on. Requested a tele sitter for patient as he was trying to get out of bed and he is only alert to self so he has a hard time understanding how to use the call light when he needs assistance.

## 2023-09-28 NOTE — DISCHARGE PLANNING
Case Management Discharge Planning    Admission Date: 9/26/2023  GMLOS: 5  ALOS: 2      Anticipated Discharge Dispo: Discharge Disposition: D/T to SNF with Medicare cert in anticipation of skilled care (03)  Discharge Address: [address] [place] [address]    DME Needed: No    Action(s) Taken:     Spoke with pt's daughter, Tarun. Confirmed plan is for pt to return to [facility] where he's a long term resident.     Called and left  for Albert requesting return call     Pt currently has a tele sitter and will need to be removed for 24 hours prior to transfer     Escalations Completed: None    Medically Clear: Yes    Next Steps: F/U with SNF    Barriers to Discharge: None    Is the patient up for discharge tomorrow: No

## 2023-09-29 ENCOUNTER — PATIENT OUTREACH (OUTPATIENT)
Dept: SCHEDULING | Facility: IMAGING CENTER | Age: 78
End: 2023-09-29
Payer: COMMERCIAL

## 2023-09-29 VITALS
RESPIRATION RATE: 17 BRPM | HEART RATE: 95 BPM | TEMPERATURE: 97 F | OXYGEN SATURATION: 94 % | BODY MASS INDEX: 26.9 KG/M2 | DIASTOLIC BLOOD PRESSURE: 83 MMHG | WEIGHT: 171.74 LBS | SYSTOLIC BLOOD PRESSURE: 136 MMHG

## 2023-09-29 PROBLEM — Z71.89 ADVANCE CARE PLANNING: Status: RESOLVED | Noted: 2023-09-26 | Resolved: 2023-09-29

## 2023-09-29 PROBLEM — A41.9 SEVERE SEPSIS (HCC): Status: RESOLVED | Noted: 2023-09-26 | Resolved: 2023-09-29

## 2023-09-29 PROBLEM — N17.9 ACUTE KIDNEY INJURY SUPERIMPOSED ON CHRONIC KIDNEY DISEASE (HCC): Status: RESOLVED | Noted: 2023-01-29 | Resolved: 2023-09-29

## 2023-09-29 PROBLEM — R65.20 SEVERE SEPSIS (HCC): Status: RESOLVED | Noted: 2023-09-26 | Resolved: 2023-09-29

## 2023-09-29 PROBLEM — D72.829 LEUKOCYTOSIS: Status: RESOLVED | Noted: 2023-09-26 | Resolved: 2023-09-29

## 2023-09-29 PROBLEM — N30.00 ACUTE CYSTITIS WITHOUT HEMATURIA: Status: RESOLVED | Noted: 2023-09-22 | Resolved: 2023-09-29

## 2023-09-29 PROBLEM — N18.9 ACUTE KIDNEY INJURY SUPERIMPOSED ON CHRONIC KIDNEY DISEASE (HCC): Status: RESOLVED | Noted: 2023-01-29 | Resolved: 2023-09-29

## 2023-09-29 PROBLEM — E87.20 LACTIC ACIDOSIS: Status: RESOLVED | Noted: 2023-09-26 | Resolved: 2023-09-29

## 2023-09-29 LAB
ALBUMIN SERPL BCP-MCNC: 2.9 G/DL (ref 3.2–4.9)
ALBUMIN/GLOB SERPL: 0.6 G/DL
ALP SERPL-CCNC: 73 U/L (ref 30–99)
ALT SERPL-CCNC: <5 U/L (ref 2–50)
ANION GAP SERPL CALC-SCNC: 11 MMOL/L (ref 7–16)
AST SERPL-CCNC: 8 U/L (ref 12–45)
BACTERIA BLD CULT: ABNORMAL
BACTERIA BLD CULT: ABNORMAL
BILIRUB SERPL-MCNC: 0.4 MG/DL (ref 0.1–1.5)
BUN SERPL-MCNC: 15 MG/DL (ref 8–22)
CALCIUM ALBUM COR SERPL-MCNC: 9.9 MG/DL (ref 8.5–10.5)
CALCIUM SERPL-MCNC: 9 MG/DL (ref 8.5–10.5)
CHLORIDE SERPL-SCNC: 103 MMOL/L (ref 96–112)
CO2 SERPL-SCNC: 24 MMOL/L (ref 20–33)
CREAT SERPL-MCNC: 1.14 MG/DL (ref 0.5–1.4)
ERYTHROCYTE [DISTWIDTH] IN BLOOD BY AUTOMATED COUNT: 39.4 FL (ref 35.9–50)
GFR SERPLBLD CREATININE-BSD FMLA CKD-EPI: 66 ML/MIN/1.73 M 2
GLOBULIN SER CALC-MCNC: 4.6 G/DL (ref 1.9–3.5)
GLUCOSE BLD STRIP.AUTO-MCNC: 171 MG/DL (ref 65–99)
GLUCOSE BLD STRIP.AUTO-MCNC: 224 MG/DL (ref 65–99)
GLUCOSE SERPL-MCNC: 242 MG/DL (ref 65–99)
HCT VFR BLD AUTO: 36.4 % (ref 42–52)
HGB BLD-MCNC: 12.9 G/DL (ref 14–18)
MCH RBC QN AUTO: 31.7 PG (ref 27–33)
MCHC RBC AUTO-ENTMCNC: 35.4 G/DL (ref 32.3–36.5)
MCV RBC AUTO: 89.4 FL (ref 81.4–97.8)
PLATELET # BLD AUTO: 128 K/UL (ref 164–446)
PMV BLD AUTO: 8.6 FL (ref 9–12.9)
POTASSIUM SERPL-SCNC: 3.5 MMOL/L (ref 3.6–5.5)
PROT SERPL-MCNC: 7.5 G/DL (ref 6–8.2)
RBC # BLD AUTO: 4.07 M/UL (ref 4.7–6.1)
SIGNIFICANT IND 70042: ABNORMAL
SITE SITE: ABNORMAL
SODIUM SERPL-SCNC: 138 MMOL/L (ref 135–145)
SOURCE SOURCE: ABNORMAL
WBC # BLD AUTO: 8 K/UL (ref 4.8–10.8)

## 2023-09-29 PROCEDURE — A9270 NON-COVERED ITEM OR SERVICE: HCPCS

## 2023-09-29 PROCEDURE — 700102 HCHG RX REV CODE 250 W/ 637 OVERRIDE(OP)

## 2023-09-29 PROCEDURE — 80053 COMPREHEN METABOLIC PANEL: CPT

## 2023-09-29 PROCEDURE — A9270 NON-COVERED ITEM OR SERVICE: HCPCS | Performed by: INTERNAL MEDICINE

## 2023-09-29 PROCEDURE — 700102 HCHG RX REV CODE 250 W/ 637 OVERRIDE(OP): Mod: JZ

## 2023-09-29 PROCEDURE — 82962 GLUCOSE BLOOD TEST: CPT

## 2023-09-29 PROCEDURE — A9270 NON-COVERED ITEM OR SERVICE: HCPCS | Mod: JZ

## 2023-09-29 PROCEDURE — 36415 COLL VENOUS BLD VENIPUNCTURE: CPT

## 2023-09-29 PROCEDURE — 85027 COMPLETE CBC AUTOMATED: CPT

## 2023-09-29 PROCEDURE — 99239 HOSP IP/OBS DSCHRG MGMT >30: CPT | Mod: GC | Performed by: HOSPITALIST

## 2023-09-29 PROCEDURE — 700102 HCHG RX REV CODE 250 W/ 637 OVERRIDE(OP): Performed by: INTERNAL MEDICINE

## 2023-09-29 RX ORDER — AMOXICILLIN AND CLAVULANATE POTASSIUM 875; 125 MG/1; MG/1
1 TABLET, FILM COATED ORAL EVERY 12 HOURS
Qty: 7 TABLET | Refills: 0 | Status: ACTIVE | OUTPATIENT
Start: 2023-09-29 | End: 2023-10-12

## 2023-09-29 RX ORDER — POTASSIUM CHLORIDE 20 MEQ/1
40 TABLET, EXTENDED RELEASE ORAL ONCE
Status: COMPLETED | OUTPATIENT
Start: 2023-09-29 | End: 2023-09-29

## 2023-09-29 RX ADMIN — TAMSULOSIN HYDROCHLORIDE 0.4 MG: 0.4 CAPSULE ORAL at 05:01

## 2023-09-29 RX ADMIN — AMLODIPINE BESYLATE 5 MG: 5 TABLET ORAL at 05:03

## 2023-09-29 RX ADMIN — AMOXICILLIN AND CLAVULANATE POTASSIUM 1 TABLET: 875; 125 TABLET, FILM COATED ORAL at 05:03

## 2023-09-29 RX ADMIN — OMEPRAZOLE 20 MG: 20 CAPSULE, DELAYED RELEASE ORAL at 05:01

## 2023-09-29 RX ADMIN — SENNOSIDES AND DOCUSATE SODIUM 2 TABLET: 50; 8.6 TABLET ORAL at 05:01

## 2023-09-29 RX ADMIN — INSULIN HUMAN 1 UNITS: 100 INJECTION, SOLUTION PARENTERAL at 13:05

## 2023-09-29 RX ADMIN — DULOXETINE HYDROCHLORIDE 30 MG: 30 CAPSULE, DELAYED RELEASE ORAL at 05:00

## 2023-09-29 RX ADMIN — FINASTERIDE 5 MG: 5 TABLET, FILM COATED ORAL at 05:02

## 2023-09-29 RX ADMIN — BACLOFEN 10 MG: 10 TABLET ORAL at 05:01

## 2023-09-29 RX ADMIN — POTASSIUM CHLORIDE 40 MEQ: 1500 TABLET, EXTENDED RELEASE ORAL at 09:05

## 2023-09-29 RX ADMIN — APIXABAN 2.5 MG: 5 TABLET, FILM COATED ORAL at 05:02

## 2023-09-29 RX ADMIN — INSULIN HUMAN 2 UNITS: 100 INJECTION, SOLUTION PARENTERAL at 08:05

## 2023-09-29 RX ADMIN — RISPERIDONE 1 MG: 0.5 TABLET ORAL at 05:01

## 2023-09-29 ASSESSMENT — FIBROSIS 4 INDEX: FIB4 SCORE: 2.24

## 2023-09-29 NOTE — CARE PLAN
Problem: Fall Risk  Goal: Patient will remain free from falls  Description: Target End Date:  Prior to discharge or change in level of care    Document interventions on the Keri Jeovanny Fall Risk Assessment    1.  Assess for fall risk factors  2.  Implement fall precautions  Outcome: Progressing   The patient is Watcher - Medium risk of patient condition declining or worsening    Shift Goals  Clinical Goals: Safety  Patient Goals: Sleep  Family Goals: CJ    Progress made toward(s) clinical / shift goals:  PT is aao to self only > bed alarm for additional safety as pt has history of being impulsive     Patient is not progressing towards the following goals:

## 2023-09-29 NOTE — RESPIRATORY CARE
COPD EDUCATION by COPD CLINICAL EDUCATOR  9/29/2023 at 9:47 AM by Shoshana Guadarrama RRT     Patient reviewed by COPD education team. Patient unable to engage in education due to dementia, therefore does not qualify for the COPD program.

## 2023-09-29 NOTE — DISCHARGE SUMMARY
[facility] Internal Medicine Discharge Summary    Attending: ALEJO Ivy M.d.  Senior Resident: Dr. Sandip Hurley  Intern:  Dr. Bird Herrera  Contact Number: [phone]    CHIEF COMPLAINT ON ADMISSION  Chief Complaint   Patient presents with    ALOC       Reason for Admission  Acute encephalopathy    Admission Date  9/26/2023    CODE STATUS  Full Code    HPI & HOSPITAL COURSE  Wale Olivas is a 78-year-old male with past medical history of right MCA stroke, debility, DVT on Eliquis, Alzheimer's dementia, type 2 diabetes, dyslipidemia, seizures on Depakote, BPH, COPD, GERD who was admitted on 9/26 for sepsis.  Patient has severe baseline dementia at baseline. [facility] and increased confusion not at his baseline. At baseline, patient knows his name and birthday and is able to follow some simple commands. On admission, patient was tachypneic and leukocytotic with white count of 12. Urinalysis was positive for UTI and urine cultures grew proteus. He received fluid boluses and IV Unasyn which later was transitioned to Augmentin. His infection is resolving with white count improving. Patient is afebrile and is back to his baseline neurologically. [facility]     Therefore, he is discharged in fair and stable condition to skilled nursing facility.    The patient met 2-midnight criteria for an inpatient stay at the time of discharge.    Discharge Date  09/29/23    Physical Exam on Day of Discharge  Physical Exam  Vitals and nursing note reviewed.   Constitutional:       General: He is not in acute distress.     Appearance: Normal appearance. He is not ill-appearing.   HENT:      Head: Normocephalic and atraumatic.      Right Ear: External ear normal.      Left Ear: External ear normal.   Eyes:      Extraocular Movements: Extraocular movements intact.      Conjunctiva/sclera: Conjunctivae normal.   Pulmonary:      Effort:  Pulmonary effort is normal. No respiratory distress.   Abdominal:      General: Abdomen is flat. There is no distension.      Tenderness: There is no abdominal tenderness. There is no guarding or rebound.      Hernia: No hernia is present.   Musculoskeletal:         General: No deformity.      Right lower leg: No edema.      Left lower leg: No edema.   Skin:     General: Skin is warm and dry.      Coloration: Skin is not jaundiced.   Neurological:      Mental Status: He is alert. Mental status is at baseline.      Cranial Nerves: No cranial nerve deficit, dysarthria or facial asymmetry.      Coordination: Romberg sign negative.         FOLLOW UP ITEMS POST DISCHARGE  Routine health maintenance    DISCHARGE DIAGNOSES  Principal Problem (Resolved):    Severe sepsis (HCC) (POA: Yes)  Active Problems:    Type 2 diabetes mellitus with hyperglycemia, without long-term current use of insulin (HCC) (POA: Yes)    Primary hypertension (POA: Yes)    Dyslipidemia (POA: Yes)    Dementia (HCC) (POA: Yes)    Encephalopathy chronic (POA: Yes)  Resolved Problems:    Acute kidney injury superimposed on chronic kidney disease (HCC) (POA: Yes)    Acute cystitis without hematuria (POA: Yes)    Lactic acidosis (POA: Unknown)    Leukocytosis (POA: Unknown)    Advance care planning (POA: Unknown)      FOLLOW UP  No future appointments.  [facility]  [address] [facility] [address] [phone]        primary care provider    Follow up  Please call your primary care provider to schedule a hospital follow up.   Thank you.      MEDICATIONS ON DISCHARGE     Medication List        START taking these medications        Instructions   amoxicillin-clavulanate 875-125 MG Tabs  Commonly known as: Augmentin   Take 1 Tablet by mouth every 12 hours.  Dose: 1 Tablet            CONTINUE taking these medications        Instructions   acetaminophen 325 MG Tabs  Commonly known as: Tylenol   Take 650 mg by mouth every  four hours as needed. Indications: Fever, Pain  Dose: 650 mg     ALPRAZolam 0.25 MG Tabs  Commonly known as: Xanax   Take 0.5 mg by mouth every 8 hours as needed for Anxiety.  Dose: 0.5 mg     amLODIPine 5 MG Tabs  Commonly known as: Norvasc   Take 5 mg by mouth every day.  Dose: 5 mg     baclofen 10 MG Tabs  Commonly known as: Lioresal   Take 10 mg by mouth 3 times a day.  Dose: 10 mg     divalproex 125 MG EC tablet  Commonly known as: Depakote   Take 1,000 mg by mouth at bedtime.  Dose: 1,000 mg     DULoxetine HCl 30 MG Csdr   Take 30 mg by mouth every day.  Dose: 30 mg     Eliquis 2.5mg Tabs  Generic drug: apixaban   Take 2.5 mg by mouth 2 times a day.  Dose: 2.5 mg     finasteride 5 MG Tabs  Commonly known as: Proscar   Take 5 mg by mouth every day.  Dose: 5 mg     fluticasone furoate-vilanterol 100-25 MCG/ACT Aepb  Commonly known as: Breo   Inhale 1 Puff every day.  Dose: 1 Puff     Incruse Ellipta 62.5 MCG/ACT Aepb inhaler  Generic drug: umeclidinium bromide   Inhale 1 Puff every day.  Dose: 1 Puff     Lantus 100 UNIT/ML Soln  Generic drug: insulin glargine   Inject 10 Units under the skin every evening.  Dose: 10 Units     NovoLIN R 100 Unit/mL Soln  Generic drug: insulin regular   Inject  under the skin 4 Times a Day,Before Meals and at Bedtime. Sliding Scale  201-250= 2 units  251-300= 4 units  301-350= 6 units  351-400= 8 units  Greater than 400 call MD     nystatin powder  Commonly known as: Mycostatin   Apply 1 Application topically 3 times a day. Apply to groin  Dose: 1 Application     pantoprazole 40 MG Tbec  Commonly known as: Protonix   Take 40 mg by mouth every day.  Dose: 40 mg     polyethylene glycol/lytes 17 g Pack  Commonly known as: Miralax   Take 17 g by mouth every day.  Dose: 17 g     pravastatin 20 MG Tabs  Commonly known as: Pravachol   Take 80 mg by mouth every evening.  Dose: 80 mg     propranolol 20 MG Tabs  Commonly known as: Inderal   Take 20 mg by mouth every 12 hours.  Dose: 20 mg      risperiDONE 1 MG Tabs  Commonly known as: RisperDAL   Take 1 mg by mouth 3 times a day.  Dose: 1 mg     senna-docusate 8.6-50 MG Tabs  Commonly known as: Pericolace Or Senokot S   Take 1 Tablet by mouth 2 times a day.  Dose: 1 Tablet     tamsulosin 0.4 MG capsule  Commonly known as: Flomax   Take 0.4 mg by mouth every day.  Dose: 0.4 mg            STOP taking these medications      amoxicillin 500 MG Caps  Commonly known as: Amoxil              Allergies  Allergies   Allergen Reactions    Metformin Unspecified     Unknown reaction, listed on MAR     Morphine Unspecified     Unknown reaction, listed on MAR     Simvastatin Unspecified     Unknown reaction, listed on MAR     Spironolactone Unspecified     Unknown reaction, listed on MAR        DIET  Orders Placed This Encounter   Procedures    Diet Order Diet: Level 6 - Soft and Bite Sized (1:1 supv, MUST BE ALERT); Liquid level: Level 0 - Thin; Second Modifier: (optional): Consistent CHO (Diabetic); Tray Modifications (optional): SLP - 1:1 Supervision by Nursing, SLP - Deliver to Nursi...     Standing Status:   Standing     Number of Occurrences:   1     Order Specific Question:   Diet:     Answer:   Level 6 - Soft and Bite Sized [23]     Comments:   1:1 supv, MUST BE ALERT     Order Specific Question:   Liquid level     Answer:   Level 0 - Thin     Order Specific Question:   Second Modifier: (optional)     Answer:   Consistent CHO (Diabetic) [4]     Order Specific Question:   Tray Modifications (optional)     Answer:   SLP - 1:1 Supervision by Nursing     Order Specific Question:   Tray Modifications (optional)     Answer:   SLP - Deliver to Nursing Station       ACTIVITY  As tolerated and directed by skilled nursing.  Weight bearing as tolerated    CONSULTATIONS  N/a    PROCEDURES  N/a    LABORATORY  Lab Results   Component Value Date    SODIUM 138 09/29/2023    POTASSIUM 3.5 (L) 09/29/2023    CHLORIDE 103 09/29/2023    CO2 24 09/29/2023    GLUCOSE 242 (H)  09/29/2023    BUN 15 09/29/2023    CREATININE 1.14 09/29/2023        Lab Results   Component Value Date    WBC 8.0 09/29/2023    HEMOGLOBIN 12.9 (L) 09/29/2023    HEMATOCRIT 36.4 (L) 09/29/2023    PLATELETCT 128 (L) 09/29/2023        Total time of the discharge process exceeds 45 minutes.

## 2023-09-29 NOTE — PROGRESS NOTES
Patient discharged via Ambulance to [facility], IV removed, no belongings in the room. Discharge package provided to ambulance personnel

## 2023-10-01 LAB
BACTERIA BLD CULT: NORMAL
SIGNIFICANT IND 70042: NORMAL
SITE SITE: NORMAL
SOURCE SOURCE: NORMAL

## 2023-10-12 ENCOUNTER — APPOINTMENT (OUTPATIENT)
Dept: RADIOLOGY | Facility: MEDICAL CENTER | Age: 78
DRG: 091 | End: 2023-10-12
Attending: EMERGENCY MEDICINE
Payer: COMMERCIAL

## 2023-10-12 ENCOUNTER — HOSPITAL ENCOUNTER (INPATIENT)
Facility: MEDICAL CENTER | Age: 78
LOS: 2 days | DRG: 091 | End: 2023-10-14
Attending: EMERGENCY MEDICINE | Admitting: STUDENT IN AN ORGANIZED HEALTH CARE EDUCATION/TRAINING PROGRAM
Payer: COMMERCIAL

## 2023-10-12 ENCOUNTER — APPOINTMENT (OUTPATIENT)
Dept: RADIOLOGY | Facility: MEDICAL CENTER | Age: 78
DRG: 091 | End: 2023-10-12
Attending: STUDENT IN AN ORGANIZED HEALTH CARE EDUCATION/TRAINING PROGRAM
Payer: COMMERCIAL

## 2023-10-12 DIAGNOSIS — G93.40 ENCEPHALOPATHY: ICD-10-CM

## 2023-10-12 PROBLEM — G92.9 TOXIC ENCEPHALOPATHY: Status: ACTIVE | Noted: 2023-10-12

## 2023-10-12 PROBLEM — F02.C0 SEVERE EARLY ONSET ALZHEIMER'S DEMENTIA (HCC): Status: ACTIVE | Noted: 2023-01-29

## 2023-10-12 PROBLEM — G30.0 SEVERE EARLY ONSET ALZHEIMER'S DEMENTIA (HCC): Status: ACTIVE | Noted: 2023-01-29

## 2023-10-12 PROBLEM — Z78.9 FULL CODE STATUS: Status: ACTIVE | Noted: 2023-10-12

## 2023-10-12 PROBLEM — Z79.899 POLYPHARMACY: Status: ACTIVE | Noted: 2023-10-12

## 2023-10-12 LAB
ALBUMIN SERPL BCP-MCNC: 3.1 G/DL (ref 3.2–4.9)
ALBUMIN/GLOB SERPL: 0.7 G/DL
ALP SERPL-CCNC: 67 U/L (ref 30–99)
ALT SERPL-CCNC: 5 U/L (ref 2–50)
AMMONIA PLAS-SCNC: 10 UMOL/L (ref 11–45)
AMMONIA PLAS-SCNC: 12 UMOL/L (ref 11–45)
ANION GAP SERPL CALC-SCNC: 8 MMOL/L (ref 7–16)
APPEARANCE UR: CLEAR
AST SERPL-CCNC: 11 U/L (ref 12–45)
BASOPHILS # BLD AUTO: 1 % (ref 0–1.8)
BASOPHILS # BLD: 0.06 K/UL (ref 0–0.12)
BILIRUB SERPL-MCNC: 0.3 MG/DL (ref 0.1–1.5)
BILIRUB UR QL STRIP.AUTO: NEGATIVE
BUN SERPL-MCNC: 19 MG/DL (ref 8–22)
CALCIUM ALBUM COR SERPL-MCNC: 9.6 MG/DL (ref 8.5–10.5)
CALCIUM SERPL-MCNC: 8.9 MG/DL (ref 8.5–10.5)
CHLORIDE SERPL-SCNC: 106 MMOL/L (ref 96–112)
CO2 SERPL-SCNC: 27 MMOL/L (ref 20–33)
COLOR UR: YELLOW
CREAT SERPL-MCNC: 1.27 MG/DL (ref 0.5–1.4)
EOSINOPHIL # BLD AUTO: 1.11 K/UL (ref 0–0.51)
EOSINOPHIL NFR BLD: 18.3 % (ref 0–6.9)
ERYTHROCYTE [DISTWIDTH] IN BLOOD BY AUTOMATED COUNT: 45.3 FL (ref 35.9–50)
GFR SERPLBLD CREATININE-BSD FMLA CKD-EPI: 58 ML/MIN/1.73 M 2
GLOBULIN SER CALC-MCNC: 4.2 G/DL (ref 1.9–3.5)
GLUCOSE BLD STRIP.AUTO-MCNC: 140 MG/DL (ref 65–99)
GLUCOSE BLD STRIP.AUTO-MCNC: 150 MG/DL (ref 65–99)
GLUCOSE SERPL-MCNC: 124 MG/DL (ref 65–99)
GLUCOSE UR STRIP.AUTO-MCNC: 100 MG/DL
HCT VFR BLD AUTO: 32.7 % (ref 42–52)
HGB BLD-MCNC: 10.8 G/DL (ref 14–18)
IMM GRANULOCYTES # BLD AUTO: 0.03 K/UL (ref 0–0.11)
IMM GRANULOCYTES NFR BLD AUTO: 0.5 % (ref 0–0.9)
KETONES UR STRIP.AUTO-MCNC: NEGATIVE MG/DL
LACTATE SERPL-SCNC: 1.2 MMOL/L (ref 0.5–2)
LEUKOCYTE ESTERASE UR QL STRIP.AUTO: NEGATIVE
LYMPHOCYTES # BLD AUTO: 2.46 K/UL (ref 1–4.8)
LYMPHOCYTES NFR BLD: 40.5 % (ref 22–41)
MCH RBC QN AUTO: 30.3 PG (ref 27–33)
MCHC RBC AUTO-ENTMCNC: 33 G/DL (ref 32.3–36.5)
MCV RBC AUTO: 91.9 FL (ref 81.4–97.8)
MICRO URNS: ABNORMAL
MONOCYTES # BLD AUTO: 0.51 K/UL (ref 0–0.85)
MONOCYTES NFR BLD AUTO: 8.4 % (ref 0–13.4)
NEUTROPHILS # BLD AUTO: 1.91 K/UL (ref 1.82–7.42)
NEUTROPHILS NFR BLD: 31.3 % (ref 44–72)
NITRITE UR QL STRIP.AUTO: NEGATIVE
NRBC # BLD AUTO: 0 K/UL
NRBC BLD-RTO: 0 /100 WBC (ref 0–0.2)
PH UR STRIP.AUTO: 7.5 [PH] (ref 5–8)
PLATELET # BLD AUTO: 181 K/UL (ref 164–446)
PMV BLD AUTO: 8.4 FL (ref 9–12.9)
POTASSIUM SERPL-SCNC: 3.6 MMOL/L (ref 3.6–5.5)
PROT SERPL-MCNC: 7.3 G/DL (ref 6–8.2)
PROT UR QL STRIP: NEGATIVE MG/DL
RBC # BLD AUTO: 3.56 M/UL (ref 4.7–6.1)
RBC UR QL AUTO: NEGATIVE
SODIUM SERPL-SCNC: 141 MMOL/L (ref 135–145)
SP GR UR STRIP.AUTO: 1.01
UROBILINOGEN UR STRIP.AUTO-MCNC: 0.2 MG/DL
VALPROATE SERPL-MCNC: 24 UG/ML (ref 50–100)
WBC # BLD AUTO: 6.1 K/UL (ref 4.8–10.8)

## 2023-10-12 PROCEDURE — 80053 COMPREHEN METABOLIC PANEL: CPT

## 2023-10-12 PROCEDURE — 87086 URINE CULTURE/COLONY COUNT: CPT

## 2023-10-12 PROCEDURE — 700102 HCHG RX REV CODE 250 W/ 637 OVERRIDE(OP): Performed by: STUDENT IN AN ORGANIZED HEALTH CARE EDUCATION/TRAINING PROGRAM

## 2023-10-12 PROCEDURE — 74018 RADEX ABDOMEN 1 VIEW: CPT

## 2023-10-12 PROCEDURE — 71045 X-RAY EXAM CHEST 1 VIEW: CPT

## 2023-10-12 PROCEDURE — 85025 COMPLETE CBC W/AUTO DIFF WBC: CPT

## 2023-10-12 PROCEDURE — 70450 CT HEAD/BRAIN W/O DYE: CPT

## 2023-10-12 PROCEDURE — 80164 ASSAY DIPROPYLACETIC ACD TOT: CPT

## 2023-10-12 PROCEDURE — 87040 BLOOD CULTURE FOR BACTERIA: CPT

## 2023-10-12 PROCEDURE — 83605 ASSAY OF LACTIC ACID: CPT

## 2023-10-12 PROCEDURE — 82962 GLUCOSE BLOOD TEST: CPT | Mod: 91

## 2023-10-12 PROCEDURE — 82140 ASSAY OF AMMONIA: CPT | Mod: 91

## 2023-10-12 PROCEDURE — 770001 HCHG ROOM/CARE - MED/SURG/GYN PRIV*

## 2023-10-12 PROCEDURE — 700105 HCHG RX REV CODE 258: Performed by: STUDENT IN AN ORGANIZED HEALTH CARE EDUCATION/TRAINING PROGRAM

## 2023-10-12 PROCEDURE — 99285 EMERGENCY DEPT VISIT HI MDM: CPT

## 2023-10-12 PROCEDURE — 99223 1ST HOSP IP/OBS HIGH 75: CPT | Mod: AI | Performed by: STUDENT IN AN ORGANIZED HEALTH CARE EDUCATION/TRAINING PROGRAM

## 2023-10-12 PROCEDURE — 81003 URINALYSIS AUTO W/O SCOPE: CPT

## 2023-10-12 PROCEDURE — 36415 COLL VENOUS BLD VENIPUNCTURE: CPT

## 2023-10-12 PROCEDURE — 51701 INSERT BLADDER CATHETER: CPT

## 2023-10-12 RX ORDER — POLYETHYLENE GLYCOL 3350 17 G/17G
1 POWDER, FOR SOLUTION ORAL
Status: DISCONTINUED | OUTPATIENT
Start: 2023-10-12 | End: 2023-10-14 | Stop reason: HOSPADM

## 2023-10-12 RX ORDER — PANTOPRAZOLE SODIUM 40 MG/1
40 TABLET, DELAYED RELEASE ORAL DAILY
Status: DISCONTINUED | OUTPATIENT
Start: 2023-10-12 | End: 2023-10-12

## 2023-10-12 RX ORDER — AMLODIPINE BESYLATE 5 MG/1
5 TABLET ORAL DAILY
Status: DISCONTINUED | OUTPATIENT
Start: 2023-10-12 | End: 2023-10-14 | Stop reason: HOSPADM

## 2023-10-12 RX ORDER — FINASTERIDE 5 MG/1
5 TABLET, FILM COATED ORAL DAILY
Status: DISCONTINUED | OUTPATIENT
Start: 2023-10-12 | End: 2023-10-14 | Stop reason: HOSPADM

## 2023-10-12 RX ORDER — SODIUM CHLORIDE, SODIUM LACTATE, POTASSIUM CHLORIDE, CALCIUM CHLORIDE 600; 310; 30; 20 MG/100ML; MG/100ML; MG/100ML; MG/100ML
INJECTION, SOLUTION INTRAVENOUS CONTINUOUS
Status: DISCONTINUED | OUTPATIENT
Start: 2023-10-12 | End: 2023-10-13

## 2023-10-12 RX ORDER — BISACODYL 10 MG
10 SUPPOSITORY, RECTAL RECTAL
Status: DISCONTINUED | OUTPATIENT
Start: 2023-10-12 | End: 2023-10-12

## 2023-10-12 RX ORDER — AMOXICILLIN 500 MG/1
500 CAPSULE ORAL 3 TIMES DAILY
Status: ON HOLD | COMMUNITY
Start: 2023-09-29 | End: 2023-10-14

## 2023-10-12 RX ORDER — DEXTROSE MONOHYDRATE 25 G/50ML
25 INJECTION, SOLUTION INTRAVENOUS
Status: DISCONTINUED | OUTPATIENT
Start: 2023-10-12 | End: 2023-10-14 | Stop reason: HOSPADM

## 2023-10-12 RX ORDER — INFLUENZA A VIRUS A/BRISBANE/02/2018 IVR-190 (H1N1) ANTIGEN (FORMALDEHYDE INACTIVATED), INFLUENZA A VIRUS A/KANSAS/14/2017 X-327 (H3N2) ANTIGEN (FORMALDEHYDE INACTIVATED), INFLUENZA B VIRUS B/PHUKET/3073/2013 ANTIGEN (FORMALDEHYDE INACTIVATED), AND INFLUENZA B VIRUS B/MARYLAND/15/2016 BX-69A ANTIGEN (FORMALDEHYDE INACTIVATED) 15; 15; 15; 15 UG/.5ML; UG/.5ML; UG/.5ML; UG/.5ML
0.5 INJECTION, SUSPENSION INTRAMUSCULAR ONCE
COMMUNITY
End: 2023-11-02

## 2023-10-12 RX ORDER — AMOXICILLIN AND CLAVULANATE POTASSIUM 875; 125 MG/1; MG/1
1 TABLET, FILM COATED ORAL 2 TIMES DAILY
Status: ON HOLD | COMMUNITY
Start: 2023-10-04 | End: 2023-10-14 | Stop reason: SDUPTHER

## 2023-10-12 RX ORDER — INSULIN LISPRO 100 [IU]/ML
2-9 INJECTION, SOLUTION INTRAVENOUS; SUBCUTANEOUS
Status: DISCONTINUED | OUTPATIENT
Start: 2023-10-12 | End: 2023-10-14 | Stop reason: HOSPADM

## 2023-10-12 RX ORDER — ACETAMINOPHEN 325 MG/1
650 TABLET ORAL EVERY 6 HOURS PRN
Status: DISCONTINUED | OUTPATIENT
Start: 2023-10-12 | End: 2023-10-14 | Stop reason: HOSPADM

## 2023-10-12 RX ORDER — ONDANSETRON 4 MG/1
4 TABLET, ORALLY DISINTEGRATING ORAL PRN
COMMUNITY
End: 2023-11-02

## 2023-10-12 RX ORDER — BISACODYL 10 MG
10 SUPPOSITORY, RECTAL RECTAL
Status: DISCONTINUED | OUTPATIENT
Start: 2023-10-12 | End: 2023-10-14 | Stop reason: HOSPADM

## 2023-10-12 RX ORDER — INSULIN LISPRO 100 [IU]/ML
2-9 INJECTION, SOLUTION INTRAVENOUS; SUBCUTANEOUS
Status: DISCONTINUED | OUTPATIENT
Start: 2023-10-12 | End: 2023-10-12

## 2023-10-12 RX ORDER — DEXTROSE MONOHYDRATE 25 G/50ML
25 INJECTION, SOLUTION INTRAVENOUS
Status: DISCONTINUED | OUTPATIENT
Start: 2023-10-12 | End: 2023-10-12

## 2023-10-12 RX ORDER — FLUTICASONE FUROATE AND VILANTEROL 100; 25 UG/1; UG/1
1 POWDER RESPIRATORY (INHALATION) DAILY
Status: DISCONTINUED | OUTPATIENT
Start: 2023-10-12 | End: 2023-10-12

## 2023-10-12 RX ORDER — NYSTATIN 100000 [USP'U]/G
POWDER TOPICAL 3 TIMES DAILY
Status: DISCONTINUED | OUTPATIENT
Start: 2023-10-13 | End: 2023-10-14 | Stop reason: HOSPADM

## 2023-10-12 RX ORDER — AMOXICILLIN 250 MG
2 CAPSULE ORAL 2 TIMES DAILY
Status: DISCONTINUED | OUTPATIENT
Start: 2023-10-12 | End: 2023-10-14 | Stop reason: HOSPADM

## 2023-10-12 RX ORDER — BISACODYL 10 MG
10 SUPPOSITORY, RECTAL RECTAL
COMMUNITY
End: 2023-11-02

## 2023-10-12 RX ORDER — INSULIN LISPRO 100 [IU]/ML
0.2 INJECTION, SOLUTION INTRAVENOUS; SUBCUTANEOUS
Status: DISCONTINUED | OUTPATIENT
Start: 2023-10-12 | End: 2023-10-12

## 2023-10-12 RX ORDER — AMOXICILLIN 250 MG
1 CAPSULE ORAL 2 TIMES DAILY
Status: DISCONTINUED | OUTPATIENT
Start: 2023-10-12 | End: 2023-10-12

## 2023-10-12 RX ORDER — OMEPRAZOLE 20 MG/1
20 CAPSULE, DELAYED RELEASE ORAL DAILY
Status: DISCONTINUED | OUTPATIENT
Start: 2023-10-13 | End: 2023-10-14 | Stop reason: HOSPADM

## 2023-10-12 RX ORDER — PRAVASTATIN SODIUM 20 MG
80 TABLET ORAL NIGHTLY
Status: DISCONTINUED | OUTPATIENT
Start: 2023-10-12 | End: 2023-10-14 | Stop reason: HOSPADM

## 2023-10-12 RX ORDER — ENEMA 19; 7 G/133ML; G/133ML
1 ENEMA RECTAL PRN
COMMUNITY
End: 2023-11-02

## 2023-10-12 RX ORDER — TAMSULOSIN HYDROCHLORIDE 0.4 MG/1
0.4 CAPSULE ORAL DAILY
Status: DISCONTINUED | OUTPATIENT
Start: 2023-10-12 | End: 2023-10-14 | Stop reason: HOSPADM

## 2023-10-12 RX ORDER — DIVALPROEX SODIUM 500 MG/1
1000 TABLET, DELAYED RELEASE ORAL
Status: DISCONTINUED | OUTPATIENT
Start: 2023-10-12 | End: 2023-10-13

## 2023-10-12 RX ORDER — ALBUTEROL SULFATE 90 UG/1
2 AEROSOL, METERED RESPIRATORY (INHALATION) EVERY 6 HOURS PRN
COMMUNITY
End: 2024-01-10

## 2023-10-12 RX ORDER — PROPRANOLOL HYDROCHLORIDE 10 MG/1
20 TABLET ORAL EVERY 12 HOURS
Status: DISCONTINUED | OUTPATIENT
Start: 2023-10-12 | End: 2023-10-14 | Stop reason: HOSPADM

## 2023-10-12 RX ORDER — CEFDINIR 300 MG/1
300 CAPSULE ORAL 2 TIMES DAILY
Status: ON HOLD | COMMUNITY
Start: 2023-10-06 | End: 2023-10-14

## 2023-10-12 RX ORDER — POLYETHYLENE GLYCOL 3350 17 G/17G
1 POWDER, FOR SOLUTION ORAL DAILY
Status: DISCONTINUED | OUTPATIENT
Start: 2023-10-12 | End: 2023-10-12

## 2023-10-12 RX ORDER — NYSTATIN 100000 [USP'U]/G
1 POWDER TOPICAL 3 TIMES DAILY
Status: DISCONTINUED | OUTPATIENT
Start: 2023-10-12 | End: 2023-10-12

## 2023-10-12 RX ADMIN — INSULIN GLARGINE-YFGN 7 UNITS: 100 INJECTION, SOLUTION SUBCUTANEOUS at 23:01

## 2023-10-12 RX ADMIN — SODIUM CHLORIDE, POTASSIUM CHLORIDE, SODIUM LACTATE AND CALCIUM CHLORIDE: 600; 310; 30; 20 INJECTION, SOLUTION INTRAVENOUS at 22:51

## 2023-10-12 ASSESSMENT — COGNITIVE AND FUNCTIONAL STATUS - GENERAL
CLIMB 3 TO 5 STEPS WITH RAILING: TOTAL
WALKING IN HOSPITAL ROOM: A LOT
MOVING TO AND FROM BED TO CHAIR: A LOT
STANDING UP FROM CHAIR USING ARMS: A LOT
TOILETING: TOTAL
HELP NEEDED FOR BATHING: TOTAL
TOILETING: A LOT
MOBILITY SCORE: 9
PERSONAL GROOMING: A LOT
EATING MEALS: TOTAL
DRESSING REGULAR UPPER BODY CLOTHING: A LOT
DRESSING REGULAR UPPER BODY CLOTHING: TOTAL
SUGGESTED CMS G CODE MODIFIER MOBILITY: CM
DRESSING REGULAR LOWER BODY CLOTHING: A LOT
MOVING FROM LYING ON BACK TO SITTING ON SIDE OF FLAT BED: A LOT
HELP NEEDED FOR BATHING: A LOT
PERSONAL GROOMING: TOTAL
EATING MEALS: A LOT
TURNING FROM BACK TO SIDE WHILE IN FLAT BAD: A LOT
SUGGESTED CMS G CODE MODIFIER DAILY ACTIVITY: CL
MOVING FROM LYING ON BACK TO SITTING ON SIDE OF FLAT BED: A LOT
WALKING IN HOSPITAL ROOM: TOTAL
STANDING UP FROM CHAIR USING ARMS: TOTAL
DAILY ACTIVITIY SCORE: 12
CLIMB 3 TO 5 STEPS WITH RAILING: TOTAL
TURNING FROM BACK TO SIDE WHILE IN FLAT BAD: A LOT

## 2023-10-12 ASSESSMENT — LIFESTYLE VARIABLES
TOTAL SCORE: 0
TOTAL SCORE: 0
DOES PATIENT WANT TO STOP DRINKING: CANNOT ASSESS
HAVE PEOPLE ANNOYED YOU BY CRITICIZING YOUR DRINKING: NO
ALCOHOL_USE: NO
TOTAL SCORE: 0
EVER HAD A DRINK FIRST THING IN THE MORNING TO STEADY YOUR NERVES TO GET RID OF A HANGOVER: NO
ON A TYPICAL DAY WHEN YOU DRINK ALCOHOL HOW MANY DRINKS DO YOU HAVE: 0
HAVE YOU EVER FELT YOU SHOULD CUT DOWN ON YOUR DRINKING: NO
CONSUMPTION TOTAL: NEGATIVE
HOW MANY TIMES IN THE PAST YEAR HAVE YOU HAD 5 OR MORE DRINKS IN A DAY: 0
EVER FELT BAD OR GUILTY ABOUT YOUR DRINKING: NO
AVERAGE NUMBER OF DAYS PER WEEK YOU HAVE A DRINK CONTAINING ALCOHOL: 0

## 2023-10-12 ASSESSMENT — FIBROSIS 4 INDEX: FIB4 SCORE: 2.24

## 2023-10-12 NOTE — ED NOTES
"Repeat Ammonia drawn and sent on Ice.   Pt states \" Hello, I am temperature\"   I asked if pt wanted a blanket, pt stated yes. Given.   "

## 2023-10-12 NOTE — ED NOTES
"Red top collected for Valproic acid. Sent to lab.     Pt states    \"I want to sit up more'   When asked if that's ok, pt states yes.   "

## 2023-10-12 NOTE — ED TRIAGE NOTES
Chief Complaint   Patient presents with    ALOC     Pt BIB EMS from [facility] where staff report pt MORE altered than his usual Aox1-2 when staff went to check on pt this morning.   Currently Aox0 with occasional moans.   Hx. Of Subdural in past, no falls or trauma reported to EMS from [facility].

## 2023-10-12 NOTE — H&P
Hospital Medicine History & Physical Note    Date of Service  10/12/2023    Primary Care Physician  Pcp Pt States None    Consultants       Code Status  Full Code    Chief Complaint  Chief Complaint   Patient presents with    ALOC     Pt BIB EMS from [facility] where staff report pt MORE altered than his usual Aox1-2 when staff went to check on pt this morning.   Currently Aox0 with occasional moans.   Hx. Of Subdural in past, no falls or trauma reported to EMS from [facility].        History of Presenting Illness  Wale Olivas is a 78 y.o. male who presented 10/12/2023 with altered mental status.  Patient is currently minimally verbal unable to provide history.  History was gathered from discussion with ER physician as well as record review.  This is a pleasant  currently residing at the [facility].  He was brought in by ambulance because of worsening mental status along with moaning.  He has a history of previous right MCA stroke, DVT on anticoagulation, Alzheimer's disease, GERD, seizures on Depakote, and COPD.  He is on multiple medications for pain.    Per review of patient's discharge summary from 9/29/2023, patient presented with acute encephalopathy believed to be due to a urinary tract infection and was treated with IV Unasyn and IV fluids.  He was discharged with Augmentin with his infection white count improving.  Apparently his neurological status improved back to baseline.    I discussed with the patient's daughter Jose Olivas, who states that the patient requested that he be full code before his dementia setting and wants to honor that.  At baseline, he talks to her like a toddler and is oriented to himself.  Currently he is alert and oriented x0 with pinpoint pupils suggestive of narcotic pain medication intake.  However, the daughter insists that he has not been getting the [place] and that the caretakers are supposed to notified him if he  receives it.  Daughter states that he was on a lot more medications but was decreased to almost half the doses, which he is currently on.      I discussed the plan of care with patient, family, bedside RN, and ER physician, Dr. Adkins .    Review of Systems  Review of Systems   Unable to perform ROS: Mental acuity       Past Medical History   has a past medical history of Anxiety, BPH (benign prostatic hyperplasia), CKD (chronic kidney disease), COPD (chronic obstructive pulmonary disease) ([facility]), COVID-19, Dementia ([facility]), GERD (gastroesophageal reflux disease), Hyperlipidemia, Insomnia, Major depressive disorder, and Type II diabetes mellitus ([facility]).    Surgical History   has no past surgical history on file.     Family History  family history is not on file.   Family history reviewed with patient. There is no family history that is pertinent to the chief complaint.     Social History   reports that he has never smoked. He has never used smokeless tobacco. He reports that he does not currently use alcohol. He reports that he does not currently use drugs.    Allergies  Allergies   Allergen Reactions    Metformin Unspecified     Unknown reaction, listed on MAR     Morphine Unspecified     Unknown reaction, listed on MAR     Simvastatin Unspecified     Unknown reaction, listed on MAR     Spironolactone Unspecified     Unknown reaction, listed on MAR        Medications  Prior to Admission Medications   Prescriptions Last Dose Informant Patient Reported? Taking?   ALPRAZolam (XANAX) 0.25 MG Tab  MAR from Other Facility Yes No   Sig: Take 0.5 mg by mouth every 8 hours as needed for Anxiety.   DULoxetine HCl 30 MG Capsule Delayed Release Sprinkle  MAR from Other Facility Yes No   Sig: Take 30 mg by mouth every day.   Umeclidinium Bromide (INCRUSE ELLIPTA) 62.5 MCG/ACT AEROSOL POWDER, BREATH ACTIVATED  MAR from Other Facility Yes No   Sig: Inhale 1 Puff every day.   acetaminophen (TYLENOL) 325 MG Tab  MAR from Other  Facility Yes No   Sig: Take 650 mg by mouth every four hours as needed. Indications: Fever, Pain   amLODIPine (NORVASC) 5 MG Tab  MAR from Other Facility Yes No   Sig: Take 5 mg by mouth every day.   amoxicillin-clavulanate (AUGMENTIN) 875-125 MG Tab   No No   Sig: Take 1 Tablet by mouth every 12 hours.   apixaban (ELIQUIS) 2.5mg Tab  MAR from Other Facility Yes No   Sig: Take 2.5 mg by mouth 2 times a day.   baclofen (LIORESAL) 10 MG Tab  MAR from Other Facility Yes No   Sig: Take 10 mg by mouth 3 times a day.   divalproex (DEPAKOTE) 125 MG EC tablet  MAR from Other Facility Yes No   Sig: Take 1,000 mg by mouth at bedtime.   finasteride (PROSCAR) 5 MG Tab  MAR from Other Facility Yes No   Sig: Take 5 mg by mouth every day.   fluticasone furoate-vilanterol (BREO) 100-25 MCG/ACT AEROSOL POWDER, BREATH ACTIVATED  MAR from Other Facility Yes No   Sig: Inhale 1 Puff every day.   insulin glargine (LANTUS) 100 UNIT/ML SC SOLN  MAR from Other Facility Yes No   Sig: Inject 10 Units under the skin every evening.   insulin regular (NOVOLIN R) 100 Unit/mL Solution  MAR from Other Facility Yes No   Sig: Inject  under the skin 4 Times a Day,Before Meals and at Bedtime. Sliding Scale  201-250= 2 units  251-300= 4 units  301-350= 6 units  351-400= 8 units  Greater than 400 call MD   nystatin (MYCOSTATIN) powder  MAR from Other Facility Yes No   Sig: Apply 1 Application topically 3 times a day. Apply to groin   pantoprazole (PROTONIX) 40 MG Tablet Delayed Response  MAR from Other Facility Yes No   Sig: Take 40 mg by mouth every day.   polyethylene glycol/lytes (MIRALAX) 17 g Pack  MAR from Other Facility Yes No   Sig: Take 17 g by mouth every day.   pravastatin (PRAVACHOL) 20 MG Tab  MAR from Other Facility Yes No   Sig: Take 80 mg by mouth every evening.   propranolol (INDERAL) 20 MG Tab  MAR from Other Facility Yes No   Sig: Take 20 mg by mouth every 12 hours.   risperiDONE (RISPERDAL) 1 MG Tab  MAR from Other Facility Yes No    Sig: Take 1 mg by mouth 3 times a day.   senna-docusate (PERICOLACE OR SENOKOT S) 8.6-50 MG Tab  MAR from Other Facility Yes No   Sig: Take 1 Tablet by mouth 2 times a day.   tamsulosin (FLOMAX) 0.4 MG capsule  MAR from Other Facility Yes No   Sig: Take 0.4 mg by mouth every day.      Facility-Administered Medications: None       Physical Exam  Temp:  [35.8 °C (96.5 °F)] 35.8 °C (96.5 °F)  Pulse:  [61-74] 74  Resp:  [19] 19  BP: (107-170)/(52-72) 107/52  SpO2:  [93 %-94 %] 93 %  Blood Pressure : 107/52   Temperature: 35.8 °C (96.5 °F)   Pulse: 74   Respiration: 19   Pulse Oximetry: 93 %       Physical Exam  Vitals and nursing note reviewed.   Constitutional:       General: He is not in acute distress.     Appearance: He is well-developed. He is ill-appearing and toxic-appearing. He is not diaphoretic.   HENT:      Head: Normocephalic and atraumatic.      Right Ear: External ear normal.      Left Ear: External ear normal.      Nose: Nose normal.      Mouth/Throat:      Pharynx: Oropharynx is clear. No oropharyngeal exudate or posterior oropharyngeal erythema.   Eyes:      General: No scleral icterus.        Right eye: No discharge.         Left eye: No discharge.      Conjunctiva/sclera: Conjunctivae normal.      Pupils: Pupils are equal, round, and reactive to light.   Neck:      Thyroid: No thyromegaly.      Vascular: No JVD.      Trachea: No tracheal deviation.   Cardiovascular:      Rate and Rhythm: Normal rate and regular rhythm.      Heart sounds: Normal heart sounds. No murmur heard.     No friction rub. No gallop.   Pulmonary:      Effort: Pulmonary effort is normal. No respiratory distress.      Breath sounds: Normal breath sounds. No stridor. No wheezing or rales.   Abdominal:      General: Bowel sounds are normal. There is no distension.      Palpations: Abdomen is soft. There is no mass.      Tenderness: There is no abdominal tenderness. There is no guarding or rebound.   Musculoskeletal:          "General: No tenderness or deformity.      Cervical back: Neck supple. No tenderness.      Right lower leg: No edema.      Left lower leg: No edema.   Lymphadenopathy:      Cervical: No cervical adenopathy.   Skin:     General: Skin is warm and dry.      Coloration: Skin is not pale.      Findings: No erythema or rash.   Neurological:      Mental Status: He is alert. He is disoriented.      Sensory: No sensory deficit.      Motor: No weakness or abnormal muscle tone.      Gait: Gait normal.      Comments: Does not follow commands   Psychiatric:      Comments: Unable to assess         Laboratory:  Recent Labs     10/12/23  1028   WBC 6.1   RBC 3.56*   HEMOGLOBIN 10.8*   HEMATOCRIT 32.7*   MCV 91.9   MCH 30.3   MCHC 33.0   RDW 45.3   PLATELETCT 181   MPV 8.4*     Recent Labs     10/12/23  1028   SODIUM 141   POTASSIUM 3.6   CHLORIDE 106   CO2 27   GLUCOSE 124*   BUN 19   CREATININE 1.27   CALCIUM 8.9     Recent Labs     10/12/23  1028   ALTSGPT 5   ASTSGOT 11*   ALKPHOSPHAT 67   TBILIRUBIN 0.3   GLUCOSE 124*         No results for input(s): \"NTPROBNP\" in the last 72 hours.      No results for input(s): \"TROPONINT\" in the last 72 hours.    Imaging:  CT-HEAD W/O   Final Result      1.  Diffuse atrophy and white matter changes.   2.  No acute intracranial hemorrhage or territorial infarct.   3.  Prior RIGHT frontoparietal craniotomy with underlying temporal and parietal encephalomalacia, unchanged.   4.  Chronic pansinusitis.         DX-CHEST-PORTABLE (1 VIEW)   Final Result      No acute cardiopulmonary abnormality.          I have personally reviewed the patient's chest x-ray.  Per my read, clear lung volumes bilaterally with no significant interstitial or focal infiltrates.  Sharp costophrenic angles bilaterally.  Elevated right hemidiaphragm noted      Head CT per my review shows encephalomalacia along the right parietal occipital lobe likely from old stroke.  No intracranial hemorrhages or acute " hydrocephalus.      Assessment/Plan:  Justification for Admission Status  I anticipate this patient will require at least two midnights for appropriate medical management, necessitating inpatient admission because encephalopathy likely suspect due to multiple medication effects.  This will require over 2 midnights for it to washout of his system and for him to improve in his mental status.    Patient will need a Med/Surg bed on MEDICAL service .  The need is secondary to toxic encephalopathy.    * Toxic encephalopathy- (present on admission)  Assessment & Plan  History of end-stage Alzheimer disease.    Patient was noted to have pinpoint pupils that were nonreactive to light indicating presence of narcotic medications.  Given that he is not hypoxic or falling asleep, I will hold off administering Narcan at this time.  Suspect polypharmacy contributing.  Stroke is unlikely given that the patient has been taking apixaban    Admit to medical floor inpatient status  Neurochecks every 4 hours  Fall and aspiration cautions  Hold baclofen, Xanax, and [place]  Minimize polypharmacy  If no significant improvement in the next 48 hours, can consider proceeding with MRI of the brain without without contrast  Check ammonia level given that he is taking valproic acid  Abdominal x-ray to assess for constipation that may affect patient's metabolism of medications    Polypharmacy- (present on admission)  Assessment & Plan  Suspect this is contributing to patient's encephalopathy.    Severe early onset Alzheimer's dementia (HCC)- (present on admission)  Assessment & Plan  Interventions to be considered in all patients in order to minimize the risk of delirium.   -do not disturb patient (vitals or lab draws) between the hours of 10 PM and 6 AM.  -ideally the patient should not sleep during the day and we should avoid day time naps.   -up in chair for meals  -ambulate at least three times daily, as able  -watch for constipation  -timed  voiding - ask patient is she would like to go to the bathroom q 2-3 hours, except during the do not disturb hours.   -remove all necessary lines (central lines, peripheral IVs, feeding tubes, resendiz catheters)  -unless patient has shown harm to self or others I would recommend against use of restraints - either chemical or physical (antipsychotics)   -minimize polypharmacy, do not dose medication during sleep hours  - reduce extra noise and stimulation  - glasses, hearing aids         Primary hypertension- (present on admission)  Assessment & Plan  As per history.  Blood pressure goal less than 150.    Continue home propranolol and amlodipine    Chronic pain syndrome- (present on admission)  Assessment & Plan  As per history.  Medications likely contributing to toxic encephalopathy.    Hold home Norco, baclofen, and Xanax    Chronic anticoagulation- (present on admission)  Assessment & Plan  As per history.  Continue home apixaban    COPD (chronic obstructive pulmonary disease) (HCC)- (present on admission)  Assessment & Plan  As per history.  Currently on room air.  Not in acute exacerbation.    Continue home Incruse Ellipta and Breo  Albuterol as needed    Type 2 diabetes mellitus with hyperglycemia, without long-term current use of insulin ([facility])- (present on admission)  Assessment & Plan  Hemoglobin A1c 6.7 2 weeks ago.  Well controlled.    Continue glargine insulin with lispro insulin sign scale  Hypoglycemia protocol  Diabetic diet    Full code status- (present on admission)  Assessment & Plan  I discussed the CODE STATUS with the patient's daughter.  She insists that the patient remains full code per the patient's wishes.        VTE prophylaxis: SCDs/TEDs and therapeutic anticoagulation with apixaban

## 2023-10-12 NOTE — ED NOTES
[facility]    Patient was given Amoxicillin 500 mg three times daily from 09/29 until 10/04, at which point he was then given Augmentin 875-125 mg twice daily from 10/04-10/06. Patient was then given Cefdinir 300 mg twice daily from 10/06 until 10/11. All medications state they are given for a UTI    Patient is on Eliquis 5 mg twice daily, last dose was 10/11 between [id number]    Mar does not give exact times for last doses on most medications.

## 2023-10-12 NOTE — ED NOTES
"Straight cath completed. Pt  then very alert. This RN able to redirect pt and procedure completed without incident.   Pt stated \" I need a blanket'   This Rn asked if it was better after applying blankets. Pt states 'yes, I'm good now'   ERP notified about improving neurological status.   "

## 2023-10-12 NOTE — ED NOTES
Vicky from [facility] called and gave report. Confirming EMS's story of finding pt as is. States wounds to coccyx and back of heel being treated at their facility.   Lab collected second set of cultures.   Pt position adjusted. Pillow placed under R hips to offload cocyx

## 2023-10-12 NOTE — ED NOTES
Pt's daughter Jasmin called. POC discussed with daughter who states pt is encephalopic at baseline however is able to usually tell you his name. This Rn updated Jasmin about pt's Aox0 status however that pt is now able to answer basic needs questions appropriately. Jasmin stated ok, will continue to check in.

## 2023-10-12 NOTE — ED PROVIDER NOTES
ED PHYSICIAN NOTE    CHIEF COMPLAINT  Chief Complaint   Patient presents with    ALOC     Pt BIB EMS from [facility] where staff report pt MORE altered than his usual Aox1-2 when staff went to check on pt this morning.   Currently Aox0 with occasional moans.   Hx. Of Subdural in past, no falls or trauma reported to EMS from [facility].        EXTERNAL RECORDS REVIEWED  Inpatient Notes patient was admitted to this hospital with altered mental status at the end of last month.  He has a history of CVA, DVT on anticoagulant, Alzheimer's disease.  He had a urinary tract infection at that time    HPI/ROS  LIMITATION TO HISTORY   Select: Altered mental status / Confusion  OUTSIDE HISTORIAN(S):  EMS was called to the patient's care facility where he was more disoriented than normal.  He usually is ANO to 1-2.  He would only moan and yell out.  He has not had any trauma or falls that are known.  Has not been known to have a fever.    Wale Olivas is a 78 y.o. male who presents with altered mental status as described above.  The patient cannot provide any history.    PAST MEDICAL HISTORY  Past Medical History:   Diagnosis Date    Anxiety     BPH (benign prostatic hyperplasia)     CKD (chronic kidney disease)     COPD (chronic obstructive pulmonary disease) ([facility])     COVID-19     Dementia ([facility])     GERD (gastroesophageal reflux disease)     Hyperlipidemia     Insomnia     Major depressive disorder     Type II diabetes mellitus ([facility])        SOCIAL HISTORY  Social History     Tobacco Use    Smoking status: Never    Smokeless tobacco: Never   Vaping Use    Vaping Use: Never used   Substance Use Topics    Alcohol use: Not Currently    Drug use: Not Currently       CURRENT MEDICATIONS  Home Medications    **Home medications have not yet been reviewed for this encounter**         ALLERGIES  Allergies   Allergen Reactions    Metformin Unspecified     Unknown reaction, listed on MAR     Morphine  "Unspecified     Unknown reaction, listed on MAR     Simvastatin Unspecified     Unknown reaction, listed on MAR     Spironolactone Unspecified     Unknown reaction, listed on MAR        PHYSICAL EXAM  VITAL SIGNS: /52   Pulse 74   Temp 35.8 °C (96.5 °F) (Temporal)   Resp 19   Ht 1.702 m (5' 7\")   Wt 77.6 kg (171 lb)   SpO2 93%   BMI 26.78 kg/m²    Constitutional: Eyes are closed but wakes up with stimulation and yells  HENT: Normal inspection  Eyes: Small reactive pupils bilaterally equal  Neck: Grossly normal range of motion.  Cardiovascular: Normal heart rate, Normal rhythm.  Symmetric peripheral pulses.   Thorax & Lungs: No respiratory distress, No wheezing, No rales, No rhonchi, No chest tenderness.   Abdomen: Bowel sounds normal, soft, non-distended, nontender, no mass  Extremities: No focal deformity but the patient screams loudly with any movement of any extremity  Neurologic: His face is symmetric.  He yells loudly but does not say any words.  He does appear to have strength throughout.      DIAGNOSTIC STUDIES / PROCEDURES  LABS/EKG  Results for orders placed or performed during the hospital encounter of 10/12/23   Lactic acid (lactate)   Result Value Ref Range    Lactic Acid 1.2 0.5 - 2.0 mmol/L   CBC With Differential   Result Value Ref Range    WBC 6.1 4.8 - 10.8 K/uL    RBC 3.56 (L) 4.70 - 6.10 M/uL    Hemoglobin 10.8 (L) 14.0 - 18.0 g/dL    Hematocrit 32.7 (L) 42.0 - 52.0 %    MCV 91.9 81.4 - 97.8 fL    MCH 30.3 27.0 - 33.0 pg    MCHC 33.0 32.3 - 36.5 g/dL    RDW 45.3 35.9 - 50.0 fL    Platelet Count 181 164 - 446 K/uL    MPV 8.4 (L) 9.0 - 12.9 fL    Neutrophils-Polys 31.30 (L) 44.00 - 72.00 %    Lymphocytes 40.50 22.00 - 41.00 %    Monocytes 8.40 0.00 - 13.40 %    Eosinophils 18.30 (H) 0.00 - 6.90 %    Basophils 1.00 0.00 - 1.80 %    Immature Granulocytes 0.50 0.00 - 0.90 %    Nucleated RBC 0.00 0.00 - 0.20 /100 WBC    Neutrophils (Absolute) 1.91 1.82 - 7.42 K/uL    Lymphs (Absolute) 2.46 " 1.00 - 4.80 K/uL    Monos (Absolute) 0.51 0.00 - 0.85 K/uL    Eos (Absolute) 1.11 (H) 0.00 - 0.51 K/uL    Baso (Absolute) 0.06 0.00 - 0.12 K/uL    Immature Granulocytes (abs) 0.03 0.00 - 0.11 K/uL    NRBC (Absolute) 0.00 K/uL   Comp Metabolic Panel   Result Value Ref Range    Sodium 141 135 - 145 mmol/L    Potassium 3.6 3.6 - 5.5 mmol/L    Chloride 106 96 - 112 mmol/L    Co2 27 20 - 33 mmol/L    Anion Gap 8.0 7.0 - 16.0    Glucose 124 (H) 65 - 99 mg/dL    Bun 19 8 - 22 mg/dL    Creatinine 1.27 0.50 - 1.40 mg/dL    Calcium 8.9 8.5 - 10.5 mg/dL    Correct Calcium 9.6 8.5 - 10.5 mg/dL    AST(SGOT) 11 (L) 12 - 45 U/L    ALT(SGPT) 5 2 - 50 U/L    Alkaline Phosphatase 67 30 - 99 U/L    Total Bilirubin 0.3 0.1 - 1.5 mg/dL    Albumin 3.1 (L) 3.2 - 4.9 g/dL    Total Protein 7.3 6.0 - 8.2 g/dL    Globulin 4.2 (H) 1.9 - 3.5 g/dL    A-G Ratio 0.7 g/dL   Urinalysis    Specimen: Urine   Result Value Ref Range    Color Yellow     Character Clear     Specific Gravity 1.009 <1.035    Ph 7.5 5.0 - 8.0    Glucose 100 (A) Negative mg/dL    Ketones Negative Negative mg/dL    Protein Negative Negative mg/dL    Bilirubin Negative Negative    Urobilinogen, Urine 0.2 Negative    Nitrite Negative Negative    Leukocyte Esterase Negative Negative    Occult Blood Negative Negative    Micro Urine Req see below    ESTIMATED GFR   Result Value Ref Range    GFR (CKD-EPI) 58 (A) >60 mL/min/1.73 m 2   VALPROIC ACID   Result Value Ref Range    Valproic Acid 24.0 (L) 50.0 - 100.0 ug/mL       RADIOLOGY    Radiologist interpretation:   CT-HEAD W/O   Final Result      1.  Diffuse atrophy and white matter changes.   2.  No acute intracranial hemorrhage or territorial infarct.   3.  Prior RIGHT frontoparietal craniotomy with underlying temporal and parietal encephalomalacia, unchanged.   4.  Chronic pansinusitis.         DX-CHEST-PORTABLE (1 VIEW)   Final Result      No acute cardiopulmonary abnormality.            COURSE & MEDICAL DECISION MAKING    ED  Observation Status? Yes; I am placing the patient in to an observation status due to a diagnostic uncertainty as well as therapeutic intensity. Patient placed in observation status at 1016 PM, 10/12/2023.     Observation plan is as follows: Obtain diagnostic testing, monitoring, serial exams    Upon Reevaluation, the patient's condition has: not improved; and will be escalated to hospitalization.    Patient discharged from ED Observation status at 3:10 PM 10/12/2023    INITIAL ASSESSMENT, COURSE AND PLAN  Care Narrative: Patient presents with altered mental status.  He cannot provide any history.  His vital signs were stable.  He does appear to be moving everything but has a history of stroke.  CVA that is within the differential.  Ordered CT head.  Infection was the cause of encephalopathy during previous admission.  Sepsis protocol is initiated.  Evaluate for metabolic derangement.  Obtain valproic acid level.    Laboratory data returned unremarkable.  He does not have a UTI.  There is no leukocytosis or left shift.  Lactic acid is normal.  No significant anemia.  No electrolyte or liver dysfunction.  Valproic acid is subtherapeutic not because of symptoms.    CT head was negative.  Chest x-ray is negative.    Patient seemed to improve slightly over a prolonged observation in the ER.  he did not scream as much with any movement of his arms, but he cannot say words he certainly is not oriented to 1 as is his worst reported baseline.  He remains with more altered mental status than expected.  Because of this is not clear.  CVA or drug reaction are possibilities.  Is not an alteplase candidate because of unknown onset of symptoms.  He will need to be admitted for further work-up and treatment.  Hospitalist paged for admission.        ADDITIONAL PROBLEM LIST  Past Medical History:   Diagnosis Date    Anxiety     BPH (benign prostatic hyperplasia)     CKD (chronic kidney disease)     COPD (chronic obstructive pulmonary  disease) (HCC)     COVID-19     Dementia (HCC)     GERD (gastroesophageal reflux disease)     Hyperlipidemia     Insomnia     Major depressive disorder     Type II diabetes mellitus (HCC)        DISPOSITION AND DISCUSSIONS  I have discussed management of the patient with the following physicians and DEANNA's: Dr. Scanlon    FINAL IMPRESSION  1.  Acute encephalopathy    This dictation was created using voice recognition software. The accuracy of the dictation is limited to the abilities of the software. I expect there may be some errors of grammar and possibly content. The nursing notes were reviewed and certain aspects of this information were incorporated into this note.    Electronically signed by: Isaias Adkins M.D., 10/12/2023

## 2023-10-13 PROBLEM — J69.0 ASPIRATION PNEUMONIA (HCC): Status: ACTIVE | Noted: 2023-10-13

## 2023-10-13 LAB
ALBUMIN SERPL BCP-MCNC: 2.7 G/DL (ref 3.2–4.9)
ALBUMIN/GLOB SERPL: 0.6 G/DL
ALP SERPL-CCNC: 70 U/L (ref 30–99)
ALT SERPL-CCNC: 5 U/L (ref 2–50)
AMPHET UR QL SCN: NEGATIVE
ANION GAP SERPL CALC-SCNC: 9 MMOL/L (ref 7–16)
AST SERPL-CCNC: 12 U/L (ref 12–45)
BARBITURATES UR QL SCN: NEGATIVE
BASOPHILS # BLD AUTO: 1 % (ref 0–1.8)
BASOPHILS # BLD: 0.05 K/UL (ref 0–0.12)
BENZODIAZ UR QL SCN: NEGATIVE
BILIRUB SERPL-MCNC: 0.5 MG/DL (ref 0.1–1.5)
BUN SERPL-MCNC: 15 MG/DL (ref 8–22)
BZE UR QL SCN: NEGATIVE
CALCIUM ALBUM COR SERPL-MCNC: 10 MG/DL (ref 8.5–10.5)
CALCIUM SERPL-MCNC: 9 MG/DL (ref 8.5–10.5)
CANNABINOIDS UR QL SCN: NEGATIVE
CHLORIDE SERPL-SCNC: 104 MMOL/L (ref 96–112)
CO2 SERPL-SCNC: 25 MMOL/L (ref 20–33)
CREAT SERPL-MCNC: 1.15 MG/DL (ref 0.5–1.4)
EOSINOPHIL # BLD AUTO: 0.96 K/UL (ref 0–0.51)
EOSINOPHIL NFR BLD: 19 % (ref 0–6.9)
ERYTHROCYTE [DISTWIDTH] IN BLOOD BY AUTOMATED COUNT: 44.2 FL (ref 35.9–50)
FENTANYL UR QL: NEGATIVE
GFR SERPLBLD CREATININE-BSD FMLA CKD-EPI: 66 ML/MIN/1.73 M 2
GLOBULIN SER CALC-MCNC: 4.2 G/DL (ref 1.9–3.5)
GLUCOSE BLD STRIP.AUTO-MCNC: 139 MG/DL (ref 65–99)
GLUCOSE BLD STRIP.AUTO-MCNC: 148 MG/DL (ref 65–99)
GLUCOSE BLD STRIP.AUTO-MCNC: 178 MG/DL (ref 65–99)
GLUCOSE BLD STRIP.AUTO-MCNC: 193 MG/DL (ref 65–99)
GLUCOSE SERPL-MCNC: 141 MG/DL (ref 65–99)
HCT VFR BLD AUTO: 32.9 % (ref 42–52)
HGB BLD-MCNC: 11.2 G/DL (ref 14–18)
IMM GRANULOCYTES # BLD AUTO: 0.02 K/UL (ref 0–0.11)
IMM GRANULOCYTES NFR BLD AUTO: 0.4 % (ref 0–0.9)
LYMPHOCYTES # BLD AUTO: 1.9 K/UL (ref 1–4.8)
LYMPHOCYTES NFR BLD: 37.7 % (ref 22–41)
MAGNESIUM SERPL-MCNC: 1.7 MG/DL (ref 1.5–2.5)
MCH RBC QN AUTO: 30.4 PG (ref 27–33)
MCHC RBC AUTO-ENTMCNC: 34 G/DL (ref 32.3–36.5)
MCV RBC AUTO: 89.2 FL (ref 81.4–97.8)
METHADONE UR QL SCN: NEGATIVE
MONOCYTES # BLD AUTO: 0.42 K/UL (ref 0–0.85)
MONOCYTES NFR BLD AUTO: 8.3 % (ref 0–13.4)
NEUTROPHILS # BLD AUTO: 1.69 K/UL (ref 1.82–7.42)
NEUTROPHILS NFR BLD: 33.6 % (ref 44–72)
NRBC # BLD AUTO: 0 K/UL
NRBC BLD-RTO: 0 /100 WBC (ref 0–0.2)
OPIATES UR QL SCN: NEGATIVE
OXYCODONE UR QL SCN: NEGATIVE
PCP UR QL SCN: NEGATIVE
PLATELET # BLD AUTO: 159 K/UL (ref 164–446)
PMV BLD AUTO: 8.5 FL (ref 9–12.9)
POTASSIUM SERPL-SCNC: 3.8 MMOL/L (ref 3.6–5.5)
PROPOXYPH UR QL SCN: NEGATIVE
PROT SERPL-MCNC: 6.9 G/DL (ref 6–8.2)
RBC # BLD AUTO: 3.69 M/UL (ref 4.7–6.1)
SODIUM SERPL-SCNC: 138 MMOL/L (ref 135–145)
WBC # BLD AUTO: 5 K/UL (ref 4.8–10.8)

## 2023-10-13 PROCEDURE — 85025 COMPLETE CBC W/AUTO DIFF WBC: CPT

## 2023-10-13 PROCEDURE — A9270 NON-COVERED ITEM OR SERVICE: HCPCS | Performed by: INTERNAL MEDICINE

## 2023-10-13 PROCEDURE — A9270 NON-COVERED ITEM OR SERVICE: HCPCS | Performed by: STUDENT IN AN ORGANIZED HEALTH CARE EDUCATION/TRAINING PROGRAM

## 2023-10-13 PROCEDURE — 700102 HCHG RX REV CODE 250 W/ 637 OVERRIDE(OP): Performed by: INTERNAL MEDICINE

## 2023-10-13 PROCEDURE — 700102 HCHG RX REV CODE 250 W/ 637 OVERRIDE(OP): Performed by: STUDENT IN AN ORGANIZED HEALTH CARE EDUCATION/TRAINING PROGRAM

## 2023-10-13 PROCEDURE — 83735 ASSAY OF MAGNESIUM: CPT

## 2023-10-13 PROCEDURE — 700105 HCHG RX REV CODE 258: Performed by: STUDENT IN AN ORGANIZED HEALTH CARE EDUCATION/TRAINING PROGRAM

## 2023-10-13 PROCEDURE — A9270 NON-COVERED ITEM OR SERVICE: HCPCS

## 2023-10-13 PROCEDURE — 80307 DRUG TEST PRSMV CHEM ANLYZR: CPT

## 2023-10-13 PROCEDURE — 700102 HCHG RX REV CODE 250 W/ 637 OVERRIDE(OP)

## 2023-10-13 PROCEDURE — 770001 HCHG ROOM/CARE - MED/SURG/GYN PRIV*

## 2023-10-13 PROCEDURE — 700111 HCHG RX REV CODE 636 W/ 250 OVERRIDE (IP): Mod: JZ | Performed by: INTERNAL MEDICINE

## 2023-10-13 PROCEDURE — 80053 COMPREHEN METABOLIC PANEL: CPT

## 2023-10-13 PROCEDURE — 99233 SBSQ HOSP IP/OBS HIGH 50: CPT | Performed by: INTERNAL MEDICINE

## 2023-10-13 PROCEDURE — 82962 GLUCOSE BLOOD TEST: CPT | Mod: 91

## 2023-10-13 PROCEDURE — 36415 COLL VENOUS BLD VENIPUNCTURE: CPT

## 2023-10-13 PROCEDURE — 92610 EVALUATE SWALLOWING FUNCTION: CPT

## 2023-10-13 RX ORDER — DULOXETIN HYDROCHLORIDE 30 MG/1
30 CAPSULE, DELAYED RELEASE ORAL DAILY
Status: DISCONTINUED | OUTPATIENT
Start: 2023-10-14 | End: 2023-10-14 | Stop reason: HOSPADM

## 2023-10-13 RX ORDER — DIVALPROEX SODIUM 500 MG/1
500 TABLET, DELAYED RELEASE ORAL EVERY 12 HOURS
Status: DISCONTINUED | OUTPATIENT
Start: 2023-10-13 | End: 2023-10-14 | Stop reason: HOSPADM

## 2023-10-13 RX ORDER — BACLOFEN 10 MG/1
5 TABLET ORAL DAILY
Status: DISCONTINUED | OUTPATIENT
Start: 2023-10-14 | End: 2023-10-14 | Stop reason: HOSPADM

## 2023-10-13 RX ORDER — AMOXICILLIN AND CLAVULANATE POTASSIUM 875; 125 MG/1; MG/1
1 TABLET, FILM COATED ORAL 2 TIMES DAILY
Status: DISCONTINUED | OUTPATIENT
Start: 2023-10-13 | End: 2023-10-14 | Stop reason: HOSPADM

## 2023-10-13 RX ORDER — FUROSEMIDE 10 MG/ML
20 INJECTION INTRAMUSCULAR; INTRAVENOUS ONCE
Status: COMPLETED | OUTPATIENT
Start: 2023-10-13 | End: 2023-10-13

## 2023-10-13 RX ADMIN — NYSTATIN: 100000 POWDER TOPICAL at 05:14

## 2023-10-13 RX ADMIN — OMEPRAZOLE 20 MG: 20 CAPSULE, DELAYED RELEASE ORAL at 09:37

## 2023-10-13 RX ADMIN — AMOXICILLIN AND CLAVULANATE POTASSIUM 1 TABLET: 875; 125 TABLET, FILM COATED ORAL at 20:23

## 2023-10-13 RX ADMIN — TAMSULOSIN HYDROCHLORIDE 0.4 MG: 0.4 CAPSULE ORAL at 09:39

## 2023-10-13 RX ADMIN — PRAVASTATIN SODIUM 80 MG: 20 TABLET ORAL at 20:23

## 2023-10-13 RX ADMIN — TIOTROPIUM BROMIDE INHALATION SPRAY 5 MCG: 3.12 SPRAY, METERED RESPIRATORY (INHALATION) at 09:23

## 2023-10-13 RX ADMIN — INSULIN LISPRO 2 UNITS: 100 INJECTION, SOLUTION INTRAVENOUS; SUBCUTANEOUS at 18:36

## 2023-10-13 RX ADMIN — APIXABAN 2.5 MG: 5 TABLET, FILM COATED ORAL at 09:35

## 2023-10-13 RX ADMIN — PROPRANOLOL HYDROCHLORIDE 20 MG: 10 TABLET ORAL at 09:38

## 2023-10-13 RX ADMIN — DIVALPROEX SODIUM 500 MG: 500 TABLET, DELAYED RELEASE ORAL at 20:23

## 2023-10-13 RX ADMIN — PROPRANOLOL HYDROCHLORIDE 20 MG: 10 TABLET ORAL at 20:23

## 2023-10-13 RX ADMIN — DOCUSATE SODIUM 50 MG AND SENNOSIDES 8.6 MG 2 TABLET: 8.6; 5 TABLET, FILM COATED ORAL at 17:14

## 2023-10-13 RX ADMIN — NYSTATIN: 100000 POWDER TOPICAL at 17:14

## 2023-10-13 RX ADMIN — APIXABAN 2.5 MG: 5 TABLET, FILM COATED ORAL at 20:23

## 2023-10-13 RX ADMIN — FUROSEMIDE 20 MG: 10 INJECTION, SOLUTION INTRAVENOUS at 20:15

## 2023-10-13 RX ADMIN — MOMETASONE FUROATE AND FORMOTEROL FUMARATE DIHYDRATE 2 PUFF: 200; 5 AEROSOL RESPIRATORY (INHALATION) at 09:23

## 2023-10-13 RX ADMIN — NYSTATIN: 100000 POWDER TOPICAL at 13:33

## 2023-10-13 RX ADMIN — FINASTERIDE 5 MG: 5 TABLET, FILM COATED ORAL at 09:36

## 2023-10-13 RX ADMIN — MOMETASONE FUROATE AND FORMOTEROL FUMARATE DIHYDRATE 2 PUFF: 200; 5 AEROSOL RESPIRATORY (INHALATION) at 20:23

## 2023-10-13 RX ADMIN — INSULIN GLARGINE-YFGN 7 UNITS: 100 INJECTION, SOLUTION SUBCUTANEOUS at 17:14

## 2023-10-13 RX ADMIN — ACETAMINOPHEN 650 MG: 325 TABLET, FILM COATED ORAL at 20:22

## 2023-10-13 RX ADMIN — SODIUM CHLORIDE, POTASSIUM CHLORIDE, SODIUM LACTATE AND CALCIUM CHLORIDE: 600; 310; 30; 20 INJECTION, SOLUTION INTRAVENOUS at 09:47

## 2023-10-13 ASSESSMENT — PAIN DESCRIPTION - PAIN TYPE
TYPE: ACUTE PAIN
TYPE: ACUTE PAIN

## 2023-10-13 ASSESSMENT — FIBROSIS 4 INDEX: FIB4 SCORE: 2.07

## 2023-10-13 NOTE — PROGRESS NOTES
..4 Eyes Skin Assessment Completed by NAHEED You and NAHEED Berry.    Head WDL  Ears WDL  Nose WDL  Mouth WDL  Neck WDL  Breast/Chest WDL  Shoulder Blades WDL  Spine Bruise  (R) Arm/Elbow/Hand Bruising/ Redness  (L) Arm/Elbow/Hand Redness and Bruising  Abdomen WDL  Groin WDL  Scrotum/Coccyx/Buttocks Healed Scab, redness  (R) Leg WDL  (L) Leg WDL  (R) Heel/Foot/Toe WDL  (L) Heel/Foot/Toe Redness and Scab          Devices In Places Blood Pressure Cuff      Interventions In Place Heel Mepilex, Waffle Overlay, Pillows, and Barrier Cream    Possible Skin Injury No    Pictures Uploaded Into Epic N/A  Wound Consult Placed N/A  RN Wound Prevention Protocol Ordered No

## 2023-10-13 NOTE — DISCHARGE PLANNING
Case Management Discharge Planning    Admission Date: 10/12/2023  GMLOS: 2.2  ALOS: 1    6-Clicks ADL Score: 12  6-Clicks Mobility Score: 9  PT and/or OT Eval ordered: Yes  Post-acute Referrals Ordered: Yes  Post-acute Choice Obtained: Yes  Has referral(s) been sent to post-acute provider:  Yes      Anticipated Discharge Dispo: Discharge Disposition: D/T to SNF with Medicare cert in anticipation of skilled care (03)  Discharge Address: [address] [place] [id number]  Discharge Contact Phone Number: Albert [phone]    DME Needed: No    Action(s) Taken: Updated Provider/Nurse on Discharge Plan, DC Assessment Complete (See below), Choice obtained, and Referral(s) sent    Escalations Completed: None    Medically Clear: No    Barriers to Discharge: Medical clearance    **0805  Patient is a long-term resident at [facility]. Patient's daughter, Jose [phone], is legal next of kin. [facility] Contact: Albert [phone]. [facility] following. Choice form for S faxed to DPA.              Care Transition Team Assessment    Information Source  Orientation Level: Oriented to person  Informant's Name: Tarun Olivas    Readmission Evaluation  Is this a readmission?: Yes - unplanned readmission    Elopement Risk  Legal Hold: No  Ambulatory or Self Mobile in Wheelchair: No-Not an Elopement Risk  Elopement Risk: Not at Risk for Elopement    Interdisciplinary Discharge Planning  Lives with - Patient's Self Care Capacity: Other (Comments)  Patient or legal guardian wants to designate a caregiver: No (Daughter Tarun is his POA)  Support Systems: Children  Housing / Facility: Other (Comments)  [facility] Care [facility]  Durable Medical Equipment: Other - Specify (Wheelchair)    Discharge Preparedness  What is your plan after discharge?: Skilled nursing facility  What are your discharge supports?: Child    Finances  Financial Barriers to Discharge:  No  Prescription Coverage: Yes    Vision / Hearing Impairment  Vision Impairment : No  Hearing Impairment : Yes  Hearing Impairment: Both Ears  Does Pt Need Special Equipment for the Hearing Impaired?: Yes-But Does not Need for Facility to Arrange Equipment    Advance Directive  Advance Directive?: POLST    Domestic Abuse  Have you ever been the victim of abuse or violence?: No  Physical Abuse or Sexual Abuse: No  Verbal Abuse or Emotional Abuse: No  Possible Abuse/Neglect Reported to:: Not Applicable    Anticipated Discharge Information  Discharge Disposition: D/T to SNF with Medicare cert in anticipation of skilled care (03)  Discharge Address: [address] [place] [id number]  Discharge Contact Phone Number: Albert, [phone]

## 2023-10-13 NOTE — ASSESSMENT & PLAN NOTE
As per history.  Currently on room air.  Not in acute exacerbation.    Continue home Incruse Ellipta and Breo  Albuterol as needed

## 2023-10-13 NOTE — RESPIRATORY CARE
COPD EDUCATION by COPD CLINICAL EDUCATOR  10/13/2023 at 8:10 AM by Itzel Mcneill, RRT     Patient reviewed by COPD education team. Patient unable to engage in education due to dementia, therefore does not qualify for the COPD program

## 2023-10-13 NOTE — ASSESSMENT & PLAN NOTE
History of end-stage Alzheimer disease.    Patient was noted to have pinpoint pupils that were nonreactive to light indicating presence of narcotic medications.  Given that he is not hypoxic or falling asleep, I will hold off administering Narcan at this time.  Suspect polypharmacy contributing.  Stroke is unlikely given that the patient has been taking apixaban    Admit to medical floor inpatient status  Neurochecks every 4 hours  Fall and aspiration cautions  Hold baclofen, Xanax, and Barney  Minimize polypharmacy  If no significant improvement in the next 48 hours, can consider proceeding with MRI of the brain without without contrast  Check ammonia level given that he is taking valproic acid  Abdominal x-ray to assess for constipation that may affect patient's metabolism of medications    10/13- improving. At his baseline. Resume low dose of baclofen. Resume duloxetine. No recent use of benzo. Pt still high risk for aspiration. He is not hypoxic. Ammonia level normal. No active seizure. UA negative . Still polypharmacy most likely. Also Alzheimer progression is another probability

## 2023-10-13 NOTE — DISCHARGE PLANNING
Received choice form at: [id number]  Agency/Facility name: [facility]  Referral sent per choice form at:  [id number]

## 2023-10-13 NOTE — ASSESSMENT & PLAN NOTE
I discussed the CODE STATUS with the patient's daughter.  She insists that the patient remains full code per the patient's wishes.   alert

## 2023-10-13 NOTE — ASSESSMENT & PLAN NOTE
Interventions to be considered in all patients in order to minimize the risk of delirium.   -do not disturb patient (vitals or lab draws) between the hours of 10 PM and 6 AM.  -ideally the patient should not sleep during the day and we should avoid day time naps.   -up in chair for meals  -ambulate at least three times daily, as able  -watch for constipation  -timed voiding - ask patient is she would like to go to the bathroom q 2-3 hours, except during the do not disturb hours.   -remove all necessary lines (central lines, peripheral IVs, feeding tubes, resendiz catheters)  -unless patient has shown harm to self or others I would recommend against use of restraints - either chemical or physical (antipsychotics)   -minimize polypharmacy, do not dose medication during sleep hours  - reduce extra noise and stimulation  - glasses, hearing aids

## 2023-10-13 NOTE — ASSESSMENT & PLAN NOTE
As per history.  Medications likely contributing to toxic encephalopathy.    Hold home Norco, baclofen, and Xanax

## 2023-10-13 NOTE — PROGRESS NOTES
Patient arrived to floor from ED, vital are stable, bed in lowest position, call light within reach. Patient is A & O X 0 , baseline is x 1-2.

## 2023-10-13 NOTE — THERAPY
Speech Language Pathology   Clinical Swallow Evaluation     Patient Name: Wale Olivas  AGE:  78 y.o., SEX:  male  Medical Record #: [id number]  Date of Service: 10/13/2023      History of Present Illness  78 y.o. male who presented 10/12/2023 with altered mental status.    PMHx R MCA stroke, debility, DVT on Eliquis, Alzheimer dementia, type 2 diabetes, dyslipidemia, seizure disorder on Depakote, BPH, COPD, GERD    Speech Therapy:    CSE 9/26/23; rec SB/TN 1:1  FEES 9/21/23; The pt presented with a mild oropharyngeal dysphagia where soft and bite size solid/thin liquid diet was recommending with 1:1 supv during meals.     CMHx Toxic encephalopathy, Severe early onset Alzheimer's dementia    CXR 10/12/23  No acute cardiopulmonary abnormality.      General Information:  Vitals  O2 Delivery Device: None - Room Air  Level of Consciousness: Alert, Awake  Patient Behaviors: Confused  Orientation: Self  Follows Directives: Inconsistent      Prior Living Situation & Level of Function:  Prior Services: Continuous (24 Hour) Care Giving Per Service  Housing / Facility: Other (Comments) ([facility])  Lives with - Patient's Self Care Capacity: Attendant / Paid Care Giver  Comments: Pt lives at [facility]  Communication: Pt with h/o CVA/Alzheimer's dementia at baseline, per EMR pt is typically A&Ox1.  Swallowing: h/o mild oropharyngeal dysphagia       Oral Mechanism Evaluation:  Dentition: Fair, Natural dentition, Some missing dentition   Facial Symmetry: Equal  Facial Sensation: Pt did not follow commands to assess     Labial Observations: WFL   Lingual Observations: Midline  Motor Speech: mild dysarthria ?baseline            Laryngeal Function:  Secretion Management: Expectoration of secretions  Voice Quality: WFL  Cough: Perceptually WNL         Subjective  Patient cleared by RN for evaluation. Pt received asleep, easily roused to environmental/verbal cues. Pt A&Ox1 (self), consistent w/ baseline. Pt unable to  follow commands or provide PLOF, information obtained via chart review.      Assessment  Current Method of Nutrition: NPO until cleared by speech pathology  Positioning: Arcos's (60-90 degrees)  Bolus Administration: SLP    O2 Delivery Device: None - Room Air  Factor(s) Affecting Performance: Impaired endurance, Impaired mental status, Impaired command following  Tracheostomy : No           Swallowing Trials:  Swallowing Trials  Ice: WFL  Thin Liquid (TN0): WFL  Liquidised (LQ3): WFL  Soft & Bite Sized (SB6): Impaired      Comments: Patient needing 1:1 feeding, did not make attempts to hold cup/utensils. Adequate oral bolus/acceptance. Adequate bolus stripping from utensil. Prolonged mastication with trials of soft/bite sized, w/ mild oral residue noted post swallow. Pharyngeal swallow response appeared timely. No cough/ throat clear appreciated with PO trials. No increased WOB or signs of esophageal dysfunction. No overt s/sx of aspiration appreciated.       Clinical Impressions  Patient presents with oral phase deficits, consistent with baseline; however, exacerbated by altered mentation. Short-term diet modification is indicated with 1:1 feeding A. Service will continue to follow to ensure diet tolerance and monitor for changes. HOLD PO with any difficulty or change in respiratory status and contact SLP. RN aware.      Recommendations  Diet Consistency: Minced & moist solids (MM5), thin liquids (TN0)  Instrumentation: None indicated at this time  Medication: As tolerated  Supervision: 1:1 feeding with constant supervision  Positioning: Fully upright and midline during oral intake  Risk Management : Slow rate of intake, Small bites/sips  Oral Care: Q8h         SLP Treatment Plan  Treatment Plan: Dysphagia Treatment, Patient/Family/Caregiver Training  SLP Frequency: 2x Per Week  Estimated Duration: Until Therapy Goals Met      Anticipated Discharge Needs  Discharge Recommendations: Anticipate that the patient will  "have no further speech therapy needs after discharge from the hospital   Therapy Recommendations Upon DC: Patient / Family / Caregiver Education        Patient / Family Goals  Patient / Family Goal #1: \"ice? ok\"  Short Term Goals  Short Term Goal # 1: Patient will consume a MM5/TN0 diet with no overt s/sx of aspiration or decline in respiratory status given 1:1 feeding A      Cheyenne Preston MS,CCC-SLP    "

## 2023-10-13 NOTE — ASSESSMENT & PLAN NOTE
Hemoglobin A1c 6.7 2 weeks ago.  Well controlled.    Continue glargine insulin with lispro insulin sign scale  Hypoglycemia protocol  Diabetic diet

## 2023-10-13 NOTE — CARE PLAN
The patient is Stable - Low risk of patient condition declining or worsening    Shift Goals  Clinical Goals: q4 neuro checks, safety  Patient Goals: sleep  Family Goals: CJ    Progress made toward(s) clinical / shift goals:    Problem: Knowledge Deficit - Standard  Goal: Patient and family/care givers will demonstrate understanding of plan of care, disease process/condition, diagnostic tests and medications  Outcome: Progressing   POC discussed with pt. Pt will need reorienting.   Problem: Fall Risk  Goal: Patient will remain free from falls  Outcome: Progressing   Bed alarm on. Pt instructed on call bell use.     Patient is not progressing towards the following goals:

## 2023-10-14 VITALS
HEIGHT: 67 IN | OXYGEN SATURATION: 90 % | DIASTOLIC BLOOD PRESSURE: 78 MMHG | BODY MASS INDEX: 27.23 KG/M2 | SYSTOLIC BLOOD PRESSURE: 148 MMHG | HEART RATE: 77 BPM | WEIGHT: 173.5 LBS | TEMPERATURE: 98 F | RESPIRATION RATE: 16 BRPM

## 2023-10-14 LAB
ALBUMIN SERPL BCP-MCNC: 3.1 G/DL (ref 3.2–4.9)
BACTERIA UR CULT: NORMAL
BASOPHILS # BLD AUTO: 0.9 % (ref 0–1.8)
BASOPHILS # BLD: 0.08 K/UL (ref 0–0.12)
BUN SERPL-MCNC: 18 MG/DL (ref 8–22)
CALCIUM ALBUM COR SERPL-MCNC: 9.8 MG/DL (ref 8.5–10.5)
CALCIUM SERPL-MCNC: 9.1 MG/DL (ref 8.5–10.5)
CHLORIDE SERPL-SCNC: 101 MMOL/L (ref 96–112)
CO2 SERPL-SCNC: 23 MMOL/L (ref 20–33)
CREAT SERPL-MCNC: 1.48 MG/DL (ref 0.5–1.4)
EOSINOPHIL # BLD AUTO: 1.16 K/UL (ref 0–0.51)
EOSINOPHIL NFR BLD: 12.7 % (ref 0–6.9)
ERYTHROCYTE [DISTWIDTH] IN BLOOD BY AUTOMATED COUNT: 44.7 FL (ref 35.9–50)
GFR SERPLBLD CREATININE-BSD FMLA CKD-EPI: 49 ML/MIN/1.73 M 2
GLUCOSE BLD STRIP.AUTO-MCNC: 148 MG/DL (ref 65–99)
GLUCOSE BLD STRIP.AUTO-MCNC: 149 MG/DL (ref 65–99)
GLUCOSE SERPL-MCNC: 150 MG/DL (ref 65–99)
HCT VFR BLD AUTO: 35.8 % (ref 42–52)
HGB BLD-MCNC: 12.5 G/DL (ref 14–18)
IMM GRANULOCYTES # BLD AUTO: 0.02 K/UL (ref 0–0.11)
IMM GRANULOCYTES NFR BLD AUTO: 0.2 % (ref 0–0.9)
LYMPHOCYTES # BLD AUTO: 3.74 K/UL (ref 1–4.8)
LYMPHOCYTES NFR BLD: 40.9 % (ref 22–41)
MCH RBC QN AUTO: 31.1 PG (ref 27–33)
MCHC RBC AUTO-ENTMCNC: 34.9 G/DL (ref 32.3–36.5)
MCV RBC AUTO: 89.1 FL (ref 81.4–97.8)
MONOCYTES # BLD AUTO: 0.66 K/UL (ref 0–0.85)
MONOCYTES NFR BLD AUTO: 7.2 % (ref 0–13.4)
NEUTROPHILS # BLD AUTO: 3.48 K/UL (ref 1.82–7.42)
NEUTROPHILS NFR BLD: 38.1 % (ref 44–72)
NRBC # BLD AUTO: 0 K/UL
NRBC BLD-RTO: 0 /100 WBC (ref 0–0.2)
NT-PROBNP SERPL IA-MCNC: 593 PG/ML (ref 0–125)
PHOSPHATE SERPL-MCNC: 4.6 MG/DL (ref 2.5–4.5)
PLATELET # BLD AUTO: 174 K/UL (ref 164–446)
PMV BLD AUTO: 8.5 FL (ref 9–12.9)
POTASSIUM SERPL-SCNC: 3.7 MMOL/L (ref 3.6–5.5)
PROCALCITONIN SERPL-MCNC: 0.13 NG/ML
RBC # BLD AUTO: 4.02 M/UL (ref 4.7–6.1)
SIGNIFICANT IND 70042: NORMAL
SITE SITE: NORMAL
SODIUM SERPL-SCNC: 136 MMOL/L (ref 135–145)
SOURCE SOURCE: NORMAL
WBC # BLD AUTO: 9.1 K/UL (ref 4.8–10.8)

## 2023-10-14 PROCEDURE — A9270 NON-COVERED ITEM OR SERVICE: HCPCS

## 2023-10-14 PROCEDURE — 700102 HCHG RX REV CODE 250 W/ 637 OVERRIDE(OP): Performed by: STUDENT IN AN ORGANIZED HEALTH CARE EDUCATION/TRAINING PROGRAM

## 2023-10-14 PROCEDURE — 82962 GLUCOSE BLOOD TEST: CPT | Mod: 91

## 2023-10-14 PROCEDURE — 80069 RENAL FUNCTION PANEL: CPT

## 2023-10-14 PROCEDURE — A9270 NON-COVERED ITEM OR SERVICE: HCPCS | Performed by: INTERNAL MEDICINE

## 2023-10-14 PROCEDURE — 700102 HCHG RX REV CODE 250 W/ 637 OVERRIDE(OP)

## 2023-10-14 PROCEDURE — 83880 ASSAY OF NATRIURETIC PEPTIDE: CPT

## 2023-10-14 PROCEDURE — 36415 COLL VENOUS BLD VENIPUNCTURE: CPT

## 2023-10-14 PROCEDURE — 99239 HOSP IP/OBS DSCHRG MGMT >30: CPT | Mod: FS | Performed by: INTERNAL MEDICINE

## 2023-10-14 PROCEDURE — 84145 PROCALCITONIN (PCT): CPT

## 2023-10-14 PROCEDURE — 700102 HCHG RX REV CODE 250 W/ 637 OVERRIDE(OP): Performed by: INTERNAL MEDICINE

## 2023-10-14 PROCEDURE — 85025 COMPLETE CBC W/AUTO DIFF WBC: CPT

## 2023-10-14 PROCEDURE — A9270 NON-COVERED ITEM OR SERVICE: HCPCS | Performed by: STUDENT IN AN ORGANIZED HEALTH CARE EDUCATION/TRAINING PROGRAM

## 2023-10-14 RX ORDER — BACLOFEN 5 MG/1
5 TABLET ORAL DAILY
Qty: 90 TABLET | Refills: 0 | Status: SHIPPED | OUTPATIENT
Start: 2023-10-15 | End: 2023-11-02

## 2023-10-14 RX ORDER — AMOXICILLIN AND CLAVULANATE POTASSIUM 875; 125 MG/1; MG/1
1 TABLET, FILM COATED ORAL 2 TIMES DAILY
Qty: 10 TABLET | Refills: 0 | Status: ACTIVE | OUTPATIENT
Start: 2023-10-14 | End: 2023-10-19

## 2023-10-14 RX ADMIN — BACLOFEN 5 MG: 10 TABLET ORAL at 06:13

## 2023-10-14 RX ADMIN — TAMSULOSIN HYDROCHLORIDE 0.4 MG: 0.4 CAPSULE ORAL at 06:13

## 2023-10-14 RX ADMIN — OMEPRAZOLE 20 MG: 20 CAPSULE, DELAYED RELEASE ORAL at 06:13

## 2023-10-14 RX ADMIN — NYSTATIN: 100000 POWDER TOPICAL at 11:48

## 2023-10-14 RX ADMIN — AMLODIPINE BESYLATE 5 MG: 5 TABLET ORAL at 06:13

## 2023-10-14 RX ADMIN — DIVALPROEX SODIUM 500 MG: 500 TABLET, DELAYED RELEASE ORAL at 08:10

## 2023-10-14 RX ADMIN — PROPRANOLOL HYDROCHLORIDE 20 MG: 10 TABLET ORAL at 08:09

## 2023-10-14 RX ADMIN — DULOXETINE HYDROCHLORIDE 30 MG: 30 CAPSULE, DELAYED RELEASE ORAL at 06:14

## 2023-10-14 RX ADMIN — FINASTERIDE 5 MG: 5 TABLET, FILM COATED ORAL at 06:13

## 2023-10-14 RX ADMIN — APIXABAN 2.5 MG: 5 TABLET, FILM COATED ORAL at 08:08

## 2023-10-14 RX ADMIN — NYSTATIN: 100000 POWDER TOPICAL at 06:12

## 2023-10-14 RX ADMIN — INSULIN GLARGINE-YFGN 7 UNITS: 100 INJECTION, SOLUTION SUBCUTANEOUS at 06:15

## 2023-10-14 RX ADMIN — DOCUSATE SODIUM 50 MG AND SENNOSIDES 8.6 MG 2 TABLET: 8.6; 5 TABLET, FILM COATED ORAL at 06:13

## 2023-10-14 RX ADMIN — AMOXICILLIN AND CLAVULANATE POTASSIUM 1 TABLET: 875; 125 TABLET, FILM COATED ORAL at 08:09

## 2023-10-14 ASSESSMENT — PAIN DESCRIPTION - PAIN TYPE: TYPE: ACUTE PAIN

## 2023-10-14 ASSESSMENT — FIBROSIS 4 INDEX: FIB4 SCORE: 2.57

## 2023-10-14 NOTE — CARE PLAN
The patient is Watcher - Medium risk of patient condition declining or worsening    Shift Goals  Clinical Goals: send urine, monitor labs, comfort, rest, q2 turns  Patient Goals: CJ  Family Goals: CJ    Progress made toward(s) clinical / shift goals:      Urine sent per MD orders.   IVF discontinued this shift per orders.   Patient quiet hours encouraged to promote rest throughout the night.     Problem: Fall Risk  Goal: Patient will remain free from falls  Outcome: Progressing  Note: Frequent rounding utilized. Bed alarm on, frame alarm on, and room door ajar to promote patient safety.      Problem: Skin Integrity  Goal: Skin integrity is maintained or improved  Outcome: Progressing  Note: Turning completed using pillow support. TAPS system ordered this shift by this RN to further promote patient skin integrity. Condom catheter in place. And patient foam dressings on elbows.      Problem: Pain - Standard  Goal: Alleviation of pain or a reduction in pain to the patient’s comfort goal  Outcome: Progressing  Note: Patient complaining of generalized pain this shift. Prn tylenol given with optimal results. Patient able to rest comfortably through the night.        Patient is not progressing towards the following goals:      Problem: Knowledge Deficit - Standard  Goal: Patient and family/care givers will demonstrate understanding of plan of care, disease process/condition, diagnostic tests and medications  Outcome: Not Progressing  Note: Patient unable to participate in plan of care due to disorientation and frequent required reorientation. However, all plans of care discussed with patient upon entering room and during all ordered procedures.

## 2023-10-14 NOTE — HOSPITAL COURSE
78-year-old male with past medical history of advanced COPD, previous seizures on Depakote, GERD, Alzheimer disease, DVT on anticoagulation, previous MCA stroke presented 10/12/2023 with altered mental status. Per his daughter, she noticed he was also coughing and choking at the [facility] home where he resides. He has been hospitalized 3 times in the last couple months per his daughter, most recently for symptomatic UTI leading to encephalopathy end of September. He was treated and his mental status returned to baseline.     On admission he was oriented to himself only. During admission process, noted to have pinpoint pupils but no respiratory depression or lethargy. Discussed with daughter, she follows his situation at the [facility] closely, and by her account he has not received narcotics or alprazolam since a month ago.     Urine drug screen performed which was negative for all substances screened including benzodiazepines, opiates, oxycodone. Serum ammonia, chemistries were within normal limits for him, his Cr remained at near his baseline. Was noted to have some eosinophilia on CBC.    Started empirically on Augmentin again due to concern for possible aspiration pneumonia given clinical history and poor baseline respiratory status. Had some transient hypoxia but weaned off by morning of 10/14. One time dose of lasix 20 mg IV given as well. His mentation improved to his baseline and he was more alert, responding more. We reduced the dose of his baclofen, but otherwise kept medication regimen the same.     We advised that his rescue inhalers should be used more liberally for changes in respiratory status potentially leading to mentation change.

## 2023-10-14 NOTE — DISCHARGE PLANNING
DPA was unable to each anyone except the night nurse's VM, same number called and same number given to DPA from RN CM- there is an issue with the phone lines there, RN CM is calling

## 2023-10-14 NOTE — THERAPY
Physical Therapy Contact Note    Patient Name: Wale Olivas  Age:  78 y.o., Sex:  male  Medical Record #: [id number]  Today's Date: 10/14/2023    PT consult received, [facility]; no role for acute PT identified; will complete order; please reconsult should formal eval be required for dc;     Tika PAEZ, PT,  [phone]

## 2023-10-14 NOTE — DISCHARGE SUMMARY
[facility] Medicine Discharge Summary    Attending: Kaley Juan M.d.  Senior Resident: Dr. Contreras  Contact Number: [phone]    CHIEF COMPLAINT ON ADMISSION  Chief Complaint   Patient presents with    ALOC     Pt BIB EMS from [facility] where staff report pt MORE altered than his usual Aox1-2 when staff went to check on pt this morning.   Currently Aox0 with occasional moans.   Hx. Of Subdural in past, no falls or trauma reported to EMS from [facility].        Reason for Admission  ems     Admission Date  10/12/2023    CODE STATUS  Full Code    HPI & HOSPITAL COURSE  This is a 78 y.o. male here with altered mental status   78-year-old male with past medical history of advanced COPD, previous seizures on Depakote, GERD, Alzheimer disease, DVT on anticoagulation, previous MCA stroke presented 10/12/2023 with altered mental status. Per his daughter, she noticed he was also coughing and choking at the [facility] home where he resides. He has been hospitalized 3 times in the last couple months per his daughter, most recently for symptomatic UTI leading to encephalopathy end of September. He was treated and his mental status returned to baseline.     On admission he was oriented to himself only. During admission process, noted to have pinpoint pupils but no respiratory depression or lethargy. Discussed with daughter, she follows his situation at the [facility] closely, and by her account he has not received narcotics or alprazolam since a month ago.     Urine drug screen performed which was negative for all substances screened including benzodiazepines, opiates, oxycodone. Serum ammonia, chemistries were within normal limits for him, his Cr remained at near his baseline. Was noted to have some eosinophilia on CBC.    Started empirically on Augmentin again due to concern for possible aspiration pneumonia given clinical history and poor baseline respiratory status. Had some transient  hypoxia but weaned off by morning of 10/14. One time dose of lasix 20 mg IV given as well. His mentation improved to his baseline and he was more alert, responding more. We reduced the dose of his baclofen, but otherwise kept medication regimen the same.     We advised that his rescue inhalers should be used more liberally for changes in respiratory status potentially leading to mentation change.    Therefore, he is discharged in good and stable condition to home with close outpatient follow-up.    The patient met 2-midnight criteria for an inpatient stay at the time of discharge.    Discharge Date  10/14/2023    Physical Exam on Day of Discharge  Physical Exam  Constitutional:       General: He is not in acute distress.     Appearance: Normal appearance. He is not ill-appearing or toxic-appearing.   HENT:      Head: Normocephalic and atraumatic.      Mouth/Throat:      Mouth: Mucous membranes are moist.      Pharynx: Oropharynx is clear. No oropharyngeal exudate or posterior oropharyngeal erythema.   Eyes:      Extraocular Movements: Extraocular movements intact.      Conjunctiva/sclera: Conjunctivae normal.   Cardiovascular:      Rate and Rhythm: Normal rate and regular rhythm.      Pulses: Normal pulses.   Pulmonary:      Effort: Pulmonary effort is normal. No respiratory distress.      Breath sounds: No stridor. Wheezing present. No rales.      Comments: Coarse end-expiratory wheezes bilaterally  Abdominal:      General: Abdomen is flat. Bowel sounds are normal. There is no distension.      Palpations: Abdomen is soft.      Tenderness: There is no abdominal tenderness. There is no guarding.   Musculoskeletal:      Cervical back: Neck supple.      Right lower leg: No edema.      Left lower leg: No edema.   Skin:     General: Skin is warm and dry.      Capillary Refill: Capillary refill takes less than 2 seconds.   Neurological:      Mental Status: He is alert. Mental status is at baseline. He is disoriented.       Comments: Oriented to self and to place   Psychiatric:      Comments: Unable to assess         FOLLOW UP ITEMS POST DISCHARGE  N/A    DISCHARGE DIAGNOSES  Principal Problem:    Toxic encephalopathy (POA: Yes)  Active Problems:    Type 2 diabetes mellitus with hyperglycemia, without long-term current use of insulin (HCC) (POA: Yes)    COPD (chronic obstructive pulmonary disease) (HCC) (POA: Yes)    Chronic anticoagulation (POA: Yes)    Chronic pain syndrome (POA: Yes)    Primary hypertension (POA: Yes)    Severe early onset Alzheimer's dementia (HCC) (POA: Yes)    Polypharmacy (POA: Yes)    Full code status (POA: Yes)    Aspiration pneumonia (HCC) (POA: Unknown)  Resolved Problems:    * No resolved hospital problems. *      FOLLOW UP  No future appointments.  No follow-up provider specified.    MEDICATIONS ON DISCHARGE     Medication List        CHANGE how you take these medications        Instructions   baclofen 5 MG Tabs  Start taking on: October 15, 2023  What changed:   medication strength  how much to take  when to take this  Commonly known as: Lioresal   Take 1 Tablet by mouth every day.  Dose: 5 mg            CONTINUE taking these medications        Instructions   acetaminophen 325 MG Tabs  Commonly known as: Tylenol   Take 650 mg by mouth every four hours as needed. Indications: Fever, Pain  Dose: 650 mg     albuterol 108 (90 Base) MCG/ACT Aers inhalation aerosol   Inhale 2 Puffs every 6 hours as needed for Shortness of Breath.  Dose: 2 Puff     ALPRAZolam 0.25 MG Tabs  Commonly known as: Xanax   Take 0.5 mg by mouth every 8 hours as needed for Anxiety.  Dose: 0.5 mg     amLODIPine 5 MG Tabs  Commonly known as: Norvasc   Take 5 mg by mouth every day.  Dose: 5 mg     amoxicillin-clavulanate 875-125 MG Tabs  Commonly known as: Augmentin   Take 1 Tablet by mouth 2 times a day for 5 days.  Dose: 1 Tablet     bisacodyl 10 MG Supp  Commonly known as: Dulcolax   Insert 10 mg into the rectum 1 time a day as needed  (If no relief from MOM).  Dose: 10 mg     divalproex 125 MG EC tablet  Commonly known as: Depakote   Take 500 mg by mouth 2 times a day. 4 tablets = 500 mg  Dose: 500 mg     DULoxetine HCl 30 MG Csdr   Take 30 mg by mouth every day.  Dose: 30 mg     Eliquis 2.5mg Tabs  Generic drug: apixaban   Take 2.5 mg by mouth 2 times a day.  Dose: 2.5 mg     finasteride 5 MG Tabs  Commonly known as: Proscar   Take 5 mg by mouth every day.  Dose: 5 mg     fluticasone furoate-vilanterol 100-25 MCG/ACT Aepb  Commonly known as: Breo   Inhale 1 Puff every day.  Dose: 1 Puff     Fluzone Quadrivalent 0.5 ML [facility] injection  Generic drug: influenza vaccine quad   Inject 0.5 mL into the shoulder, thigh, or buttocks one time.  Dose: 0.5 mL     Incruse Ellipta 62.5 MCG/ACT Aepb inhaler  Generic drug: umeclidinium bromide   Inhale 1 Puff every day.  Dose: 1 Puff     Lantus 100 UNIT/ML Soln  Generic drug: insulin glargine   Inject 7 Units under the skin 2 times a day.  Dose: 7 Units     magnesium hydroxide 400 MG/5ML Susp  Commonly known as: Milk Of Magnesia   Take 30 mL by mouth 1 time a day as needed (constipate).  Dose: 30 mL     NovoLIN R 100 Unit/mL Soln  Generic drug: insulin regular   Inject 2-10 Units under the skin 4 Times a Day,Before Meals and at Bedtime. Sliding Scale  201-250= 2 units  251-300= 4 units  301-350= 6 units  351-400= 8 units  Greater than 400 call MD  Dose: 2-10 Units     nystatin powder  Commonly known as: Mycostatin   Apply 1 Application topically 3 times a day. Apply to groin  Dose: 1 Application     ondansetron 4 MG Tbdp  Commonly known as: Zofran ODT   Take 4 mg by mouth as needed for Nausea/Vomiting.  Dose: 4 mg     pantoprazole 40 MG Tbec  Commonly known as: Protonix   Take 40 mg by mouth every day.  Dose: 40 mg     polyethylene glycol/lytes 17 g Pack  Commonly known as: Miralax   Take 17 g by mouth every day.  Dose: 17 g     pravastatin 20 MG Tabs  Commonly known as: Pravachol   Take 80 mg by mouth every  evening.  Dose: 80 mg     propranolol 20 MG Tabs  Commonly known as: Inderal   Take 20 mg by mouth every 12 hours.  Dose: 20 mg     risperiDONE 1 MG Tabs  Commonly known as: RisperDAL   Take 1 mg by mouth 3 times a day.  Dose: 1 mg     senna-docusate 8.6-50 MG Tabs  Commonly known as: Pericolace Or Senokot S   Take 1 Tablet by mouth 2 times a day.  Dose: 1 Tablet     sodium phosphate 7-19 GM/118ML Enem   Insert 1 Each into the rectum as needed (If no relief from Dulcolax).  Dose: 1 Each     tamsulosin 0.4 MG capsule  Commonly known as: Flomax   Take 0.4 mg by mouth every day.  Dose: 0.4 mg            STOP taking these medications      amoxicillin 500 MG Caps  Commonly known as: Amoxil     cefdinir 300 MG Caps  Commonly known as: Omnicef     HYDROcodone-acetaminophen 2.5-108 mg/5mL 7.5-325 MG/15ML solution  Commonly known as: Hycet              Allergies  Allergies   Allergen Reactions    Metformin Unspecified     Unknown reaction, listed on MAR     Morphine Unspecified     Unknown reaction, listed on MAR     Simvastatin Unspecified     Unknown reaction, listed on MAR     Spironolactone Unspecified     Unknown reaction, listed on MAR        DIET  Orders Placed This Encounter   Procedures    Diet Order Diet: Level 5 - Minced and Moist; Liquid level: Level 0 - Thin; Second Modifier: (optional): Consistent CHO (Diabetic); Tray Modifications (optional): SLP - 1:1 Supervision by Nursing     Standing Status:   Standing     Number of Occurrences:   1     Order Specific Question:   Diet:     Answer:   Level 5 - Minced and Moist [24]     Order Specific Question:   Liquid level     Answer:   Level 0 - Thin     Order Specific Question:   Second Modifier: (optional)     Answer:   Consistent CHO (Diabetic) [4]     Order Specific Question:   Tray Modifications (optional)     Answer:   SLP - 1:1 Supervision by Nursing       ACTIVITY  As tolerated.  Weight bearing as  tolerated    CONSULTATIONS  NA    PROCEDURES  NA    LABORATORY  Lab Results   Component Value Date    SODIUM 136 10/14/2023    POTASSIUM 3.7 10/14/2023    CHLORIDE 101 10/14/2023    CO2 23 10/14/2023    GLUCOSE 150 (H) 10/14/2023    BUN 18 10/14/2023    CREATININE 1.48 (H) 10/14/2023        Lab Results   Component Value Date    WBC 9.1 10/14/2023    HEMOGLOBIN 12.5 (L) 10/14/2023    HEMATOCRIT 35.8 (L) 10/14/2023    PLATELETCT 174 10/14/2023        Total time of the discharge process exceeds 35 minutes.

## 2023-10-14 NOTE — DISCHARGE PLANNING
[facility] Contreras.   D[facility] to reach [facility].   CM called patient's daughter, Jose #[phone], and updated her on DCP; Jose agreeable to patient returning to [facility], informed CM of facility having issues with phone lines, and provided Nursing Supervisor's phone #[phone].  IM presented to Jose via phone due to patient's cognitive impairment.    CM spoke to Fernanda/Nurse Supervisor @ [facility] who stated that patient can return to facility today & requested for DC Summary to be faxed prior to transfer to #[phone].       N2N report to #[phone]; if unable to reach call Fernanda #[phone].    CM updated Dr. Contreras, Charge Nurse, and bedside nurse on DCP; DC Summary requested from Dr. Contreras.    DPA setting up [facility] transport for 1500 Hrs.      **1448 Hrs - [facility] transport scheduled for 1500 Hrs.   DC Summary faxed to Fernanda @ [facility].  Transfer packet handed to Charge NurseFrancheska.

## 2023-10-14 NOTE — DISCHARGE PLANNING
Transportation information    Scheduled Date: 10/14/2023  Scheduled time:   1500    Scheduled bedside : 1500    Destination: [facility]                        [address], [place] [address]    Both RN BRIAN and lissett have been notified

## 2023-10-14 NOTE — PROGRESS NOTES
"Hospital Medicine Daily Progress Note    Date of Service  10/13/2023    Chief Complaint  Wale Olivas is a 78 y.o. male admitted 10/12/2023 with alter mental status     Hospital Course  78-year-old male with past medical history of advanced COPD, previous seizures on Depakote, GERD, Alzheimer disease, DVT on anticoagulation, previous MCA stroke presented 10/12/2023 with altered mental status.  Per his daughter he was also [facility]  He used to be a prior  and his lung disease are progressed.  Usually he is on inhalers and sometimes he has intermittent antibiotic.  Been hospitalized 3 times in the last couple months per his daughter.  On admission he was oriented to himself only.  Per H&P in moderate pinpoint pupils.  Per chart review patient is not a narcotic and the last time he used the alprazolam was a month ago.  Urine drug toxicity is pending  10/13/2023-patient has some wheezings.  He is on modified diet.  Chest x-ray on arrival is unremarkable.  Procal pending  Started empirically on Augmentin again.  He is mentation has significant improvement following day.  Following day alert and oriented x2    Interval Problem Update  Patient looks calm and happy.  He is alert and oriented x2 self and place.  He tells me that he is in the hospital because he was \"nut\" and he smiles. Called his daughter and she feels that her father doing better today. He is more awake and alert. She is worried for wheezing. Started on empiric antibiotic. Some rales on R bases. One time dose of lasix 20 mg IV. Restart baclofen low dose because of high risk for withdrawal. U-tox pending   If remain stable. Potential discharge 10/14    I have discussed this patient's plan of care and discharge plan at IDT rounds today with Case Management, Nursing, Nursing leadership, and other members of the IDT team.    Consultants/Specialty  none    Code Status  Full " Code    Disposition  The patient is not medically cleared for discharge to home or a post-acute facility.      I have placed the appropriate orders for post-discharge needs.    Review of Systems  Review of Systems   Unable to perform ROS: Dementia        Physical Exam  Temp:  [35.9 °C (96.6 °F)-36.8 °C (98.2 °F)] 36.3 °C (97.4 °F)  Pulse:  [62-72] 62  Resp:  [15-18] 15  BP: (115-160)/(63-79) 160/64  SpO2:  [90 %-93 %] 93 %    Physical Exam  Vitals and nursing note reviewed.   Constitutional:       General: He is not in acute distress.     Appearance: He is not ill-appearing or diaphoretic.   HENT:      Head: Normocephalic and atraumatic.   Eyes:      General: No scleral icterus.  Cardiovascular:      Rate and Rhythm: Normal rate.      Heart sounds: No murmur heard.  Pulmonary:      Breath sounds: Rales present.   Abdominal:      General: There is no distension.      Palpations: Abdomen is soft.   Musculoskeletal:         General: No swelling.   Skin:     Coloration: Skin is pale.   Neurological:      Mental Status: Mental status is at baseline.         Fluids    Intake/Output Summary (Last 24 hours) at 10/13/2023 1853  Last data filed at 10/13/2023 1534  Gross per 24 hour   Intake 844.62 ml   Output 1450 ml   Net -605.38 ml       Laboratory  Recent Labs     10/12/23  1028 10/13/23  0509   WBC 6.1 5.0   RBC 3.56* 3.69*   HEMOGLOBIN 10.8* 11.2*   HEMATOCRIT 32.7* 32.9*   MCV 91.9 89.2   MCH 30.3 30.4   MCHC 33.0 34.0   RDW 45.3 44.2   PLATELETCT 181 159*   MPV 8.4* 8.5*     Recent Labs     10/12/23  1028 10/13/23  0509   SODIUM 141 138   POTASSIUM 3.6 3.8   CHLORIDE 106 104   CO2 27 25   GLUCOSE 124* 141*   BUN 19 15   CREATININE 1.27 1.15   CALCIUM 8.9 9.0                   Imaging  [id number] VIEW   Final Result      Nonobstructive bowel gas pattern. Moderate amount of stool throughout the colon.      CT-HEAD W/O   Final Result      1.  Diffuse atrophy and white matter changes.   2.  No acute intracranial  hemorrhage or territorial infarct.   3.  Prior RIGHT frontoparietal craniotomy with underlying temporal and parietal encephalomalacia, unchanged.   4.  Chronic pansinusitis.         DX-CHEST-PORTABLE (1 VIEW)   Final Result      No acute cardiopulmonary abnormality.           Assessment/Plan  * Toxic encephalopathy- (present on admission)  Assessment & Plan  History of end-stage Alzheimer disease.    Patient was noted to have pinpoint pupils that were nonreactive to light indicating presence of narcotic medications.  Given that he is not hypoxic or falling asleep, I will hold off administering Narcan at this time.  Suspect polypharmacy contributing.  Stroke is unlikely given that the patient has been taking apixaban    Admit to medical floor inpatient status  Neurochecks every 4 hours  Fall and aspiration cautions  Hold baclofen, Xanax, and [place]  Minimize polypharmacy  If no significant improvement in the next 48 hours, can consider proceeding with MRI of the brain without without contrast  Check ammonia level given that he is taking valproic acid  Abdominal x-ray to assess for constipation that may affect patient's metabolism of medications    10/13- improving. At his baseline. Resume low dose of baclofen. Resume duloxetine. No recent use of benzo. Pt still high risk for aspiration. He is not hypoxic. Ammonia level normal. No active seizure. UA negative . Still polypharmacy most likely. Also Alzheimer progression is another probability     Aspiration pneumonia (HCC)  Assessment & Plan  Empiric augmentin   procal to follow       Full code status- (present on admission)  Assessment & Plan  I discussed the CODE STATUS with the patient's daughter.  She insists that the patient remains full code per the patient's wishes.    Polypharmacy- (present on admission)  Assessment & Plan  Suspect this is contributing to patient's encephalopathy.    Severe early onset Alzheimer's dementia (HCC)- (present on  admission)  Assessment & Plan  Interventions to be considered in all patients in order to minimize the risk of delirium.   -do not disturb patient (vitals or lab draws) between the hours of 10 PM and 6 AM.  -ideally the patient should not sleep during the day and we should avoid day time naps.   -up in chair for meals  -ambulate at least three times daily, as able  -watch for constipation  -timed voiding - ask patient is she would like to go to the bathroom q 2-3 hours, except during the do not disturb hours.   -remove all necessary lines (central lines, peripheral IVs, feeding tubes, resendiz catheters)  -unless patient has shown harm to self or others I would recommend against use of restraints - either chemical or physical (antipsychotics)   -minimize polypharmacy, do not dose medication during sleep hours  - reduce extra noise and stimulation  - glasses, hearing aids         Primary hypertension- (present on admission)  Assessment & Plan  As per history.  Blood pressure goal less than 150.    Continue home propranolol and amlodipine    Chronic pain syndrome- (present on admission)  Assessment & Plan  As per history.  Medications likely contributing to toxic encephalopathy.    Hold home Norco, baclofen, and Xanax    Chronic anticoagulation- (present on admission)  Assessment & Plan  As per history.  Continue home apixaban    COPD (chronic obstructive pulmonary disease) (HCC)- (present on admission)  Assessment & Plan  As per history.  Currently on room air.  Not in acute exacerbation.    Continue home Incruse Ellipta and Breo  Albuterol as needed    Type 2 diabetes mellitus with hyperglycemia, without long-term current use of insulin (HCC)- (present on admission)  Assessment & Plan  Hemoglobin A1c 6.7 2 weeks ago.  Well controlled.    Continue glargine insulin with lispro insulin sign scale  Hypoglycemia protocol  Diabetic diet         VTE prophylaxis:    therapeutic anticoagulation with eliquis 2.5 mg BID      I  have performed a physical exam and reviewed and updated ROS and Plan today (10/13/2023). In review of yesterday's note (10/12/2023), there are no changes except as documented above.      Patient is has a high medical complexity, complex decision making and is at high risk for complication, morbidity, and mortality.  My total time spent caring for the patient on the day of the encounter was 54 minutes.   This does not include time spent on separately billable procedures/tests.

## 2023-10-17 ENCOUNTER — APPOINTMENT (OUTPATIENT)
Dept: RADIOLOGY | Facility: MEDICAL CENTER | Age: 78
End: 2023-10-17
Attending: STUDENT IN AN ORGANIZED HEALTH CARE EDUCATION/TRAINING PROGRAM
Payer: COMMERCIAL

## 2023-10-17 ENCOUNTER — HOSPITAL ENCOUNTER (EMERGENCY)
Facility: MEDICAL CENTER | Age: 78
End: 2023-10-18
Attending: STUDENT IN AN ORGANIZED HEALTH CARE EDUCATION/TRAINING PROGRAM
Payer: COMMERCIAL

## 2023-10-17 DIAGNOSIS — R41.82 ALTERED MENTAL STATUS, UNSPECIFIED ALTERED MENTAL STATUS TYPE: ICD-10-CM

## 2023-10-17 LAB
ALBUMIN SERPL BCP-MCNC: 3.1 G/DL (ref 3.2–4.9)
ALBUMIN/GLOB SERPL: 0.7 G/DL
ALP SERPL-CCNC: 70 U/L (ref 30–99)
ALT SERPL-CCNC: 5 U/L (ref 2–50)
AMMONIA PLAS-SCNC: 10 UMOL/L (ref 11–45)
ANION GAP SERPL CALC-SCNC: 9 MMOL/L (ref 7–16)
AST SERPL-CCNC: 16 U/L (ref 12–45)
BACTERIA BLD CULT: NORMAL
BACTERIA BLD CULT: NORMAL
BASOPHILS # BLD AUTO: 0.8 % (ref 0–1.8)
BASOPHILS # BLD: 0.06 K/UL (ref 0–0.12)
BILIRUB SERPL-MCNC: 0.3 MG/DL (ref 0.1–1.5)
BUN SERPL-MCNC: 30 MG/DL (ref 8–22)
CALCIUM ALBUM COR SERPL-MCNC: 9.7 MG/DL (ref 8.5–10.5)
CALCIUM SERPL-MCNC: 9 MG/DL (ref 8.5–10.5)
CHLORIDE SERPL-SCNC: 105 MMOL/L (ref 96–112)
CO2 SERPL-SCNC: 25 MMOL/L (ref 20–33)
CREAT SERPL-MCNC: 1.63 MG/DL (ref 0.5–1.4)
EKG IMPRESSION: NORMAL
EOSINOPHIL # BLD AUTO: 1.32 K/UL (ref 0–0.51)
EOSINOPHIL NFR BLD: 17.9 % (ref 0–6.9)
ERYTHROCYTE [DISTWIDTH] IN BLOOD BY AUTOMATED COUNT: 47.6 FL (ref 35.9–50)
GFR SERPLBLD CREATININE-BSD FMLA CKD-EPI: 43 ML/MIN/1.73 M 2
GLOBULIN SER CALC-MCNC: 4.5 G/DL (ref 1.9–3.5)
GLUCOSE SERPL-MCNC: 263 MG/DL (ref 65–99)
HCT VFR BLD AUTO: 32.4 % (ref 42–52)
HGB BLD-MCNC: 11 G/DL (ref 14–18)
IMM GRANULOCYTES # BLD AUTO: 0.01 K/UL (ref 0–0.11)
IMM GRANULOCYTES NFR BLD AUTO: 0.1 % (ref 0–0.9)
LYMPHOCYTES # BLD AUTO: 3.05 K/UL (ref 1–4.8)
LYMPHOCYTES NFR BLD: 41.3 % (ref 22–41)
MCH RBC QN AUTO: 31.6 PG (ref 27–33)
MCHC RBC AUTO-ENTMCNC: 34 G/DL (ref 32.3–36.5)
MCV RBC AUTO: 93.1 FL (ref 81.4–97.8)
MONOCYTES # BLD AUTO: 0.72 K/UL (ref 0–0.85)
MONOCYTES NFR BLD AUTO: 9.8 % (ref 0–13.4)
NEUTROPHILS # BLD AUTO: 2.22 K/UL (ref 1.82–7.42)
NEUTROPHILS NFR BLD: 30.1 % (ref 44–72)
NRBC # BLD AUTO: 0 K/UL
NRBC BLD-RTO: 0 /100 WBC (ref 0–0.2)
NT-PROBNP SERPL IA-MCNC: 439 PG/ML (ref 0–125)
PLATELET # BLD AUTO: 162 K/UL (ref 164–446)
PMV BLD AUTO: 9.4 FL (ref 9–12.9)
POTASSIUM SERPL-SCNC: 3.7 MMOL/L (ref 3.6–5.5)
PROT SERPL-MCNC: 7.6 G/DL (ref 6–8.2)
RBC # BLD AUTO: 3.48 M/UL (ref 4.7–6.1)
SIGNIFICANT IND 70042: NORMAL
SIGNIFICANT IND 70042: NORMAL
SITE SITE: NORMAL
SITE SITE: NORMAL
SODIUM SERPL-SCNC: 139 MMOL/L (ref 135–145)
SOURCE SOURCE: NORMAL
SOURCE SOURCE: NORMAL
TROPONIN T SERPL-MCNC: 26 NG/L (ref 6–19)
VALPROATE SERPL-MCNC: 39 UG/ML (ref 50–100)
WBC # BLD AUTO: 7.4 K/UL (ref 4.8–10.8)

## 2023-10-17 PROCEDURE — 80053 COMPREHEN METABOLIC PANEL: CPT

## 2023-10-17 PROCEDURE — 93005 ELECTROCARDIOGRAM TRACING: CPT | Performed by: STUDENT IN AN ORGANIZED HEALTH CARE EDUCATION/TRAINING PROGRAM

## 2023-10-17 PROCEDURE — 83880 ASSAY OF NATRIURETIC PEPTIDE: CPT

## 2023-10-17 PROCEDURE — 82140 ASSAY OF AMMONIA: CPT

## 2023-10-17 PROCEDURE — 80164 ASSAY DIPROPYLACETIC ACD TOT: CPT

## 2023-10-17 PROCEDURE — 85025 COMPLETE CBC W/AUTO DIFF WBC: CPT

## 2023-10-17 PROCEDURE — 99285 EMERGENCY DEPT VISIT HI MDM: CPT

## 2023-10-17 PROCEDURE — 36415 COLL VENOUS BLD VENIPUNCTURE: CPT

## 2023-10-17 PROCEDURE — 84484 ASSAY OF TROPONIN QUANT: CPT

## 2023-10-17 PROCEDURE — 70450 CT HEAD/BRAIN W/O DYE: CPT

## 2023-10-17 PROCEDURE — 700105 HCHG RX REV CODE 258: Performed by: STUDENT IN AN ORGANIZED HEALTH CARE EDUCATION/TRAINING PROGRAM

## 2023-10-17 RX ORDER — SODIUM CHLORIDE, SODIUM LACTATE, POTASSIUM CHLORIDE, CALCIUM CHLORIDE 600; 310; 30; 20 MG/100ML; MG/100ML; MG/100ML; MG/100ML
500 INJECTION, SOLUTION INTRAVENOUS ONCE
Status: COMPLETED | OUTPATIENT
Start: 2023-10-17 | End: 2023-10-17

## 2023-10-17 RX ADMIN — SODIUM CHLORIDE, POTASSIUM CHLORIDE, SODIUM LACTATE AND CALCIUM CHLORIDE 500 ML: 600; 310; 30; 20 INJECTION, SOLUTION INTRAVENOUS at 22:18

## 2023-10-17 ASSESSMENT — FIBROSIS 4 INDEX: FIB4 SCORE: 2.34

## 2023-10-18 VITALS
SYSTOLIC BLOOD PRESSURE: 153 MMHG | HEART RATE: 47 BPM | OXYGEN SATURATION: 97 % | BODY MASS INDEX: 27.15 KG/M2 | TEMPERATURE: 97.8 F | RESPIRATION RATE: 12 BRPM | WEIGHT: 173 LBS | DIASTOLIC BLOOD PRESSURE: 70 MMHG | HEIGHT: 67 IN

## 2023-10-18 NOTE — ED TRIAGE NOTES
"Chief Complaint   Patient presents with    ALOC     Patient found to be altered by [facility] staff. Staff reports patient received Norco at 0900 today and they were concerned for overdose. Patient given 4mg Narcan with minimal response.     77 yo male bib EMS for above.    Patient given 0.5 mg narcan en route by EMS with minimal response. Patients pupils are pinpoint and nonreactive.    BP (!) 158/80   Pulse (!) 55   Temp 36.5 °C (97.7 °F) (Temporal)   Resp 12   Ht 1.702 m (5' 7\")   Wt 78.5 kg (173 lb)   SpO2 95%   BMI 27.10 kg/m²     "

## 2023-10-18 NOTE — ED NOTES
Bedside report received from off going RN: NAHEED Kowalski assumed care of patient.  POC discussed with patient. Call light within reach, all needs addressed at this time.       Fall risk interventions in place: Move the patient closer to the nurse's station, Patient's personal possessions are with in their safe reach, Place socks on patient, and Keep floor surfaces clean and dry (all applicable per [place] Fall risk assessment)   Continuous monitoring: Cardiac Leads, Pulse Ox, or Blood Pressure  IVF/IV medications: Not Applicable   Oxygen: How many liters 2L, Traced the line to wall oxygen, and No oxygen tank in room  Bedside sitter: Not Applicable   Isolation: Not Applicable

## 2023-10-18 NOTE — DISCHARGE INSTRUCTIONS
Wale was seen in the emergency department for altered mental status.  This is likely related to his use of Norco earlier today.  We discussed his care with his daughter who [facility]  His work-up in the emergency department was reassuring.

## 2023-10-18 NOTE — DISCHARGE PLANNING
MARIAH asked to assist with transportation of Pt back to [facility].  SW reviewed Pt chart and noted Hx of Dementia and O2 needs.  [facility] PCS completed and faxed to [facility].  MARIAH called [facility] and was informed by Tamy that they are able to accommodate the Pt trip at 0045. COBRA and transfer packet prepared and will be given to ER RN.

## 2023-10-18 NOTE — ED NOTES
Bedside report given to NAHEED Moncada. POC discussed with patient. Call light within reach, all needs addressed at this time.       Fall risk interventions in place: Move the patient closer to the nurse's station, Patient's personal possessions are with in their safe reach, Place fall risk sign on patient's door, Give patient urinal if applicable, Keep floor surfaces clean and dry, and Accompanied to restroom (all applicable per [place] Fall risk assessment)   Continuous monitoring: Cardiac Leads, Pulse Ox, or Blood Pressure  IVF/IV medications: Not Applicable   Oxygen: How many liters 2L, Traced the line to wall oxygen, and No oxygen tank in room  Bedside sitter: Not Applicable   Isolation: Not Applicable

## 2023-10-18 NOTE — ED NOTES
"Pt discharged home to [facility] home via [facility] as transportation. Pt A&Ox1-0 (baseline), on 2L 02 NC (Baseline), bedbound. IV discontinued and gauze placed, pt in possession of belongings. Pt provided discharge education and information pertaining to medications and follow up appointments. Pt received copy of discharge instructions and verbalized understanding. BP (!) 153/70   Pulse (!) 47   Temp 36.6 °C (97.8 °F) (Temporal)   Resp 12   Ht 1.702 m (5' 7\")   Wt 78.5 kg (173 lb)   SpO2 97%   BMI 27.10 kg/m²     "

## 2023-10-18 NOTE — ED PROVIDER NOTES
ED Provider Note    CHIEF COMPLAINT  Chief Complaint   Patient presents with    ALOC     Patient found to be altered by [facility] staff. Staff reports patient received Norco at 0900 today and they were concerned for overdose. Patient given 4mg Narcan with minimal response.       EXTERNAL RECORDS REVIEWED  Inpatient Notes inpatient admission note for presentation for altered level of consciousness    HPI/ROS  LIMITATION TO HISTORY   Select: Altered mental status / Confusion history of dementia  OUTSIDE HISTORIAN(S):  Family had a long conversation with patient's daughter Jose as described below    Wale Olivas is a 78 y.o. male with past medical history of dementia. anxiety, BPH, CKD, COPD, major depression, type 2 diabetes, presenting to the emergency department for reported altered mental status.  Patient lives at the [facility] home.  Was sent to the emergency department due to a change in his mentation.  Staff was concerned that he was given too much Norco.  Was given Norco at 0900 today.  Found to be lethargic and more confused than baseline on EMS arrival.  Was given Narcan x2 with reported moderate change in his mentation.    On my evaluation, patient is not answering questions.  Is making eye contact.  Intermittently responding to commands.  I am unable to obtain any further meaningful history from the patient.    I spoke at length with patient's daughter Jose, she says that he has been in the hospital 4 times over the last month for similar presentation.  She thinks that his change in mentation is not a typical  and is likely related to administration of Norco which she did not think was indicated.  She does not want patient to stay in the hospital and would prefer him to return to his care facility.  I had a discussion with her regarding CODE STATUS, at this time she would like to keep him full code but she does say that she wants patient to stay out of the hospital as much as possible and for  the focus of treatment to involve reducing his suffering and preventing hospitalizations      PAST MEDICAL HISTORY   has a past medical history of Anxiety, BPH (benign prostatic hyperplasia), CKD (chronic kidney disease), COPD (chronic obstructive pulmonary disease) ([facility]), COVID-19, Dementia ([facility]), GERD (gastroesophageal reflux disease), Hyperlipidemia, Insomnia, Major depressive disorder, and Type II diabetes mellitus ([facility]).    SURGICAL HISTORY  patient denies any surgical history    FAMILY HISTORY  No family history on file.    SOCIAL HISTORY  Social History     Tobacco Use    Smoking status: Never    Smokeless tobacco: Never   Vaping Use    Vaping Use: Never used   Substance and Sexual Activity    Alcohol use: Not Currently    Drug use: Not Currently    Sexual activity: Not on file       CURRENT MEDICATIONS  Home Medications       Reviewed by Meghana Alvarez R.N. (Registered Nurse) on 10/17/23 at 1720  Med List Status: <None>     Medication Last Dose Status   acetaminophen (TYLENOL) 325 MG Tab  Active   albuterol 108 (90 Base) MCG/ACT Aero Soln inhalation aerosol  Active   ALPRAZolam (XANAX) 0.25 MG Tab  Active   amLODIPine (NORVASC) 5 MG Tab  Active   amoxicillin-clavulanate (AUGMENTIN) 875-125 MG Tab  Active   apixaban (ELIQUIS) 2.5mg Tab  Active   baclofen (LIORESAL) 5 MG Tab  Active   bisacodyl (DULCOLAX) 10 MG Suppos  Active   divalproex (DEPAKOTE) 125 MG EC tablet  Active   DULoxetine HCl 30 MG Capsule Delayed Release Sprinkle  Active   finasteride (PROSCAR) 5 MG Tab  Active   fluticasone furoate-vilanterol (BREO) 100-25 MCG/ACT AEROSOL POWDER, BREATH ACTIVATED  Active   influenza vaccine quad (FLUZONE QUADRIVALENT) 0.5 ML Suspension Prefilled Syringe injection  Active   insulin glargine (LANTUS) 100 UNIT/ML SC SOLN  Active   insulin regular (NOVOLIN R) 100 Unit/mL Solution  Active   magnesium hydroxide (MILK OF MAGNESIA) 400 MG/5ML Suspension  Active   nystatin (MYCOSTATIN) powder  Active   ondansetron  "(ZOFRAN ODT) 4 MG TABLET DISPERSIBLE  Active   pantoprazole (PROTONIX) 40 MG Tablet Delayed Response  Active   polyethylene glycol/lytes (MIRALAX) 17 g Pack  Active   pravastatin (PRAVACHOL) 20 MG Tab  Active   propranolol (INDERAL) 20 MG Tab  Active   risperiDONE (RISPERDAL) 1 MG Tab  Active   senna-docusate (PERICOLACE OR SENOKOT S) 8.6-50 MG Tab  Active   sodium phosphate (FLEET) 7-19 GM/118ML Enema  Active   tamsulosin (FLOMAX) 0.4 MG capsule  Active   Umeclidinium Bromide (INCRUSE ELLIPTA) 62.5 MCG/ACT AEROSOL POWDER, BREATH ACTIVATED  Active                    ALLERGIES  Allergies   Allergen Reactions    Metformin Unspecified     Unknown reaction, listed on MAR     Morphine Unspecified     Unknown reaction, listed on MAR     Simvastatin Unspecified     Unknown reaction, listed on MAR     Spironolactone Unspecified     Unknown reaction, listed on MAR        PHYSICAL EXAM  VITAL SIGNS: BP (!) 153/70   Pulse (!) 47   Temp 36.6 °C (97.8 °F) (Temporal)   Resp 12   Ht 1.702 m (5' 7\")   Wt 78.5 kg (173 lb)   SpO2 97%   BMI 27.10 kg/m²    General: non-toxic, no acute distress, nonverbal, following me with his eyes, intermittently responding to commands  Neuro: Moving his upper extremities symmetrically bilaterally.  Bilateral lower extremities are atrophic, in inflatable pads to protect his heels bilaterally  HEENT:   - Head: Normocephalic, atraumatic  - Eyes: PERRL  - Ears/Nose: normal external nose and ears  - Mouth: moist mucosal membranes  Resp: clear to auscultation, no increased work of breathing  CV: Regular rate and rhythm  Abd: Soft, non-tender, non-distended  Extremities: Moving his upper extremities symmetrically bilaterally.  Bilateral lower extremities are atrophic, in inflatable pads to protect his heels bilaterally        DIAGNOSTIC STUDIES / PROCEDURES    EKG  My independent EKG interpretation:  Results for orders placed or performed during the hospital encounter of 10/17/23   EKG (NOW)   Result " Value Ref Range    Report       [facility] Emergency Dept.    Test Date:  2023-10-17  Pt Name:    FRENCH TUBBS                Department: ER  MRN:        [id number]                      Room:       RD 03  Gender:     Male                         Technician: [id number]  :        1945                   Requested By:MANUEL HEART  Order #:    [id number]                    Reading MD: Manuel Heart    Measurements  Intervals                                Axis  Rate:       56                           P:          -63  ID:         [id number]                          QRS:        9  QRSD:       88                           T:          99  QT:         480  QTc:        464    Interpretive Statements  sinus bradycardia,, nonspecific interventricular conduction block, normal  axis, no acute ST or T changes.  Electronically Signed On 10- 23:32:25 PDT by Manuel Heart         LABS  Results for orders placed or performed during the hospital encounter of 10/17/23   CBC WITH DIFFERENTIAL   Result Value Ref Range    WBC 7.4 4.8 - 10.8 K/uL    RBC 3.48 (L) 4.70 - 6.10 M/uL    Hemoglobin 11.0 (L) 14.0 - 18.0 g/dL    Hematocrit 32.4 (L) 42.0 - 52.0 %    MCV 93.1 81.4 - 97.8 fL    MCH 31.6 27.0 - 33.0 pg    MCHC 34.0 32.3 - 36.5 g/dL    RDW 47.6 35.9 - 50.0 fL    Platelet Count 162 (L) 164 - 446 K/uL    MPV 9.4 9.0 - 12.9 fL    Neutrophils-Polys 30.10 (L) 44.00 - 72.00 %    Lymphocytes 41.30 (H) 22.00 - 41.00 %    Monocytes 9.80 0.00 - 13.40 %    Eosinophils 17.90 (H) 0.00 - 6.90 %    Basophils 0.80 0.00 - 1.80 %    Immature Granulocytes 0.10 0.00 - 0.90 %    Nucleated RBC 0.00 0.00 - 0.20 /100 WBC    Neutrophils (Absolute) 2.22 1.82 - 7.42 K/uL    Lymphs (Absolute) 3.05 1.00 - 4.80 K/uL    Monos (Absolute) 0.72 0.00 - 0.85 K/uL    Eos (Absolute) 1.32 (H) 0.00 - 0.51 K/uL    Baso (Absolute) 0.06 0.00 - 0.12 K/uL    Immature Granulocytes (abs) 0.01 0.00 - 0.11 K/uL    NRBC (Absolute) 0.00 K/uL   COMP METABOLIC  PANEL   Result Value Ref Range    Sodium 139 135 - 145 mmol/L    Potassium 3.7 3.6 - 5.5 mmol/L    Chloride 105 96 - 112 mmol/L    Co2 25 20 - 33 mmol/L    Anion Gap 9.0 7.0 - 16.0    Glucose 263 (H) 65 - 99 mg/dL    Bun 30 (H) 8 - 22 mg/dL    Creatinine 1.63 (H) 0.50 - 1.40 mg/dL    Calcium 9.0 8.5 - 10.5 mg/dL    Correct Calcium 9.7 8.5 - 10.5 mg/dL    AST(SGOT) 16 12 - 45 U/L    ALT(SGPT) 5 2 - 50 U/L    Alkaline Phosphatase 70 30 - 99 U/L    Total Bilirubin 0.3 0.1 - 1.5 mg/dL    Albumin 3.1 (L) 3.2 - 4.9 g/dL    Total Protein 7.6 6.0 - 8.2 g/dL    Globulin 4.5 (H) 1.9 - 3.5 g/dL    A-G Ratio 0.7 g/dL   TROPONIN   Result Value Ref Range    Troponin T 26 (H) 6 - 19 ng/L   proBrain Natriuretic Peptide, NT   Result Value Ref Range    NT-proBNP 439 (H) 0 - 125 pg/mL   AMMONIA   Result Value Ref Range    Ammonia 10 (L) 11 - 45 umol/L   ESTIMATED GFR   Result Value Ref Range    GFR (CKD-EPI) 43 (A) >60 mL/min/1.73 m 2   VALPROIC ACID   Result Value Ref Range    Valproic Acid 39.0 (L) 50.0 - 100.0 ug/mL   EKG (NOW)   Result Value Ref Range    Report       [facility] Emergency Dept.    Test Date:  2023-10-17  Pt Name:    FRENCH TUBBS                Department: ER  MRN:        [id number]                      Room:       RD 03  Gender:     Male                         Technician: [id number]  :        1945                   Requested By:MANUEL HEART  Order #:    [id number]                    Reading MD: Manuel Heart    Measurements  Intervals                                Axis  Rate:       56                           P:          -63  OH:         [id number]                          QRS:        9  QRSD:       88                           T:          99  QT:         480  QTc:        464    Interpretive Statements  sinus bradycardia,, nonspecific interventricular conduction block, normal  axis, no acute ST or T changes.  Electronically Signed On 10- 23:32:25 PDT by Manuel Heart         RADIOLOGY  I  have independently interpreted the diagnostic imaging associated with this visit and am waiting the final reading from the radiologist.   My preliminary interpretation is as follows:   -CT of the head is unremarkable  Radiologist interpretation:   CT-HEAD W/O   Final Result         1.  No acute intracranial abnormality is identified, there are nonspecific white matter changes, commonly associated with small vessel ischemic disease.  Associated mild cerebral atrophy is noted.   2.  Atherosclerosis.                 MEDICAL DECISION MAKING    ED Observation Status? Yes; I am placing the patient in to an observation status due to a diagnostic uncertainty as well as therapeutic intensity. Patient placed in observation status at 6:30 PM, 10/17/2023.     Observation plan is as follows: Obtain baseline labs, EKG, valproic acid level, CT scan of the head, urinalysis, discussed case with patient's emergency contact    Upon Reevaluation, the patient's condition has: Improved; and will be discharged.    Patient discharged from ED Observation status at 12:00 AM (Time) 10/18/2023   (Date).     ED COURSE AND PLAN    Wale Olivas is a 78 y.o. male with above medical history presenting to the emergency department for new onset confusion.  Multiple presentations to this hospital, recent admission for similar presentation.  On exam, patient is minimally verbal, following commands intermittently.  In general he looks well, his vital signs are stable, he is afebrile.  I had a long discussion with his daughter as described above in HPI.  She thinks that his symptoms are related to his Norco consumption at his facility.  Patient has not been dyspneic, hypoxic, was given Narcan by EMS with reported minimal change in his mental status.  I do not think that there is an indication at this time for repeating a dose of Narcan in the emergency department.    Obtained basic work-up for altered mental status including baseline labs, EKG,  urinalysis, CT scan of the head.  CBC shows no leukocytosis, stable anemia.  Chemistry shows normal electrolytes, borderline elevation in creatinine.  I discussed this with patient's daughter, she feels that he is inadequately hydrated at the facility.  Treated with a gentle fluid bolus in the emergency department.  EKG is nonischemic.  Troponin is borderline elevated at patient's baseline.  Ammonia is undetectable.  I obtained a valproic acid level which is subtherapeutic.   CT scan of the head was obtained shows no evidence of an acute process.    His change in mentation may be related to polypharmacy, there does not seem to be an acute emergent process.  According to daughter, he is at approximately his baseline mental status.  She would like to keep him out of the hospital.  I do not see an indication to admit him at this time.  On reevaluation, patient is clinically stable and is appropriate for discharge home.    ---Pertinent ED Course---:    8:27 PM I reviewed the patient's old records in Epic, medication list, allergies, past medical history and performed a physical examination.     12:00 AM reevaluated patient, stable and appropriate for discharge home      HYDRATION: Based on the patient's presentation of Acute Kidney Injury the patient was given IV fluids. IV Hydration was used because oral hydration was not adequate alone. Upon recheck following hydration, the patient was improved.  HTN/IDDM FOLLOW UP:  The patient has known hypertension and is being followed by their primary care doctor      Procedures:      ----------------------------------------------------------------------------------  DISCUSSIONS    I have discussed management of the patient with the following physicians and DEANNA's:      Discussion of management with other [facility] [place] or appropriate source(s):     Escalation of care considered, and ultimately not performed: Discussion with patient's daughter regarding admission, treatment for JOSE ENRIQUE, CODE  STATUS as described above    Barriers to care at this time, including but not limited to: Limited physical capacity, advanced age, history of dementia    Decision tools and prescription drugs considered including, but not limited to: No indication for antibiotics      FINAL IMPRESSION    1. Altered mental status, unspecified altered mental status type          DISPOSITION    Home, Stable      This chart was dictated using an electronic voice recognition software. The chart has been reviewed and edited but there is still possibility for dictation errors due to limitation of software.    Manuel Heart,  10/17/2023

## 2023-10-23 ENCOUNTER — APPOINTMENT (OUTPATIENT)
Dept: RADIOLOGY | Facility: MEDICAL CENTER | Age: 78
End: 2023-10-23
Attending: EMERGENCY MEDICINE
Payer: COMMERCIAL

## 2023-10-23 ENCOUNTER — HOSPITAL ENCOUNTER (EMERGENCY)
Facility: MEDICAL CENTER | Age: 78
End: 2023-10-23
Attending: EMERGENCY MEDICINE
Payer: COMMERCIAL

## 2023-10-23 VITALS
DIASTOLIC BLOOD PRESSURE: 103 MMHG | RESPIRATION RATE: 20 BRPM | HEART RATE: 57 BPM | OXYGEN SATURATION: 93 % | HEIGHT: 67 IN | TEMPERATURE: 98.6 F | BODY MASS INDEX: 27.15 KG/M2 | SYSTOLIC BLOOD PRESSURE: 189 MMHG | WEIGHT: 173 LBS

## 2023-10-23 DIAGNOSIS — R41.82 ALTERED MENTAL STATUS, UNSPECIFIED ALTERED MENTAL STATUS TYPE: ICD-10-CM

## 2023-10-23 LAB
ALBUMIN SERPL BCP-MCNC: 3.2 G/DL (ref 3.2–4.9)
ALBUMIN/GLOB SERPL: 0.8 G/DL
ALP SERPL-CCNC: 70 U/L (ref 30–99)
ALT SERPL-CCNC: 7 U/L (ref 2–50)
AMMONIA PLAS-SCNC: 16 UMOL/L (ref 11–45)
AMPHET UR QL SCN: NEGATIVE
ANION GAP SERPL CALC-SCNC: 7 MMOL/L (ref 7–16)
APPEARANCE UR: CLEAR
AST SERPL-CCNC: 16 U/L (ref 12–45)
BARBITURATES UR QL SCN: NEGATIVE
BASOPHILS # BLD AUTO: 0.9 % (ref 0–1.8)
BASOPHILS # BLD: 0.07 K/UL (ref 0–0.12)
BENZODIAZ UR QL SCN: NEGATIVE
BILIRUB SERPL-MCNC: 0.3 MG/DL (ref 0.1–1.5)
BILIRUB UR QL STRIP.AUTO: NEGATIVE
BUN SERPL-MCNC: 22 MG/DL (ref 8–22)
BZE UR QL SCN: NEGATIVE
CALCIUM ALBUM COR SERPL-MCNC: 9.7 MG/DL (ref 8.5–10.5)
CALCIUM SERPL-MCNC: 9.1 MG/DL (ref 8.5–10.5)
CANNABINOIDS UR QL SCN: NEGATIVE
CHLORIDE SERPL-SCNC: 106 MMOL/L (ref 96–112)
CO2 SERPL-SCNC: 28 MMOL/L (ref 20–33)
COLOR UR: YELLOW
CREAT SERPL-MCNC: 1.31 MG/DL (ref 0.5–1.4)
EKG IMPRESSION: NORMAL
EKG IMPRESSION: NORMAL
EOSINOPHIL # BLD AUTO: 1.22 K/UL (ref 0–0.51)
EOSINOPHIL NFR BLD: 15.6 % (ref 0–6.9)
ERYTHROCYTE [DISTWIDTH] IN BLOOD BY AUTOMATED COUNT: 47.1 FL (ref 35.9–50)
FENTANYL UR QL: NEGATIVE
FLUAV RNA SPEC QL NAA+PROBE: NEGATIVE
FLUBV RNA SPEC QL NAA+PROBE: NEGATIVE
GFR SERPLBLD CREATININE-BSD FMLA CKD-EPI: 56 ML/MIN/1.73 M 2
GLOBULIN SER CALC-MCNC: 4.2 G/DL (ref 1.9–3.5)
GLUCOSE SERPL-MCNC: 118 MG/DL (ref 65–99)
GLUCOSE UR STRIP.AUTO-MCNC: NEGATIVE MG/DL
HCT VFR BLD AUTO: 34.3 % (ref 42–52)
HGB BLD-MCNC: 11.5 G/DL (ref 14–18)
IMM GRANULOCYTES # BLD AUTO: 0.03 K/UL (ref 0–0.11)
IMM GRANULOCYTES NFR BLD AUTO: 0.4 % (ref 0–0.9)
KETONES UR STRIP.AUTO-MCNC: NEGATIVE MG/DL
LACTATE SERPL-SCNC: 1.4 MMOL/L (ref 0.5–2)
LACTATE SERPL-SCNC: 1.9 MMOL/L (ref 0.5–2)
LEUKOCYTE ESTERASE UR QL STRIP.AUTO: NEGATIVE
LYMPHOCYTES # BLD AUTO: 3.34 K/UL (ref 1–4.8)
LYMPHOCYTES NFR BLD: 42.7 % (ref 22–41)
MCH RBC QN AUTO: 30.4 PG (ref 27–33)
MCHC RBC AUTO-ENTMCNC: 33.5 G/DL (ref 32.3–36.5)
MCV RBC AUTO: 90.7 FL (ref 81.4–97.8)
METHADONE UR QL SCN: NEGATIVE
MICRO URNS: NORMAL
MONOCYTES # BLD AUTO: 0.67 K/UL (ref 0–0.85)
MONOCYTES NFR BLD AUTO: 8.6 % (ref 0–13.4)
NEUTROPHILS # BLD AUTO: 2.5 K/UL (ref 1.82–7.42)
NEUTROPHILS NFR BLD: 31.8 % (ref 44–72)
NITRITE UR QL STRIP.AUTO: NEGATIVE
NRBC # BLD AUTO: 0 K/UL
NRBC BLD-RTO: 0 /100 WBC (ref 0–0.2)
OPIATES UR QL SCN: NEGATIVE
OXYCODONE UR QL SCN: NEGATIVE
PCP UR QL SCN: NEGATIVE
PH UR STRIP.AUTO: 8 [PH] (ref 5–8)
PLATELET # BLD AUTO: 185 K/UL (ref 164–446)
PMV BLD AUTO: 8.9 FL (ref 9–12.9)
POTASSIUM SERPL-SCNC: 3.7 MMOL/L (ref 3.6–5.5)
PROPOXYPH UR QL SCN: NEGATIVE
PROT SERPL-MCNC: 7.4 G/DL (ref 6–8.2)
PROT UR QL STRIP: NEGATIVE MG/DL
RBC # BLD AUTO: 3.78 M/UL (ref 4.7–6.1)
RBC UR QL AUTO: NEGATIVE
RSV RNA SPEC QL NAA+PROBE: NEGATIVE
SARS-COV-2 RNA RESP QL NAA+PROBE: NOTDETECTED
SODIUM SERPL-SCNC: 141 MMOL/L (ref 135–145)
SP GR UR STRIP.AUTO: 1.01
SPECIMEN SOURCE: NORMAL
TROPONIN T SERPL-MCNC: 30 NG/L (ref 6–19)
TROPONIN T SERPL-MCNC: 30 NG/L (ref 6–19)
UROBILINOGEN UR STRIP.AUTO-MCNC: 0.2 MG/DL
WBC # BLD AUTO: 7.8 K/UL (ref 4.8–10.8)

## 2023-10-23 PROCEDURE — 80053 COMPREHEN METABOLIC PANEL: CPT

## 2023-10-23 PROCEDURE — 70450 CT HEAD/BRAIN W/O DYE: CPT

## 2023-10-23 PROCEDURE — 700111 HCHG RX REV CODE 636 W/ 250 OVERRIDE (IP): Mod: JZ | Performed by: EMERGENCY MEDICINE

## 2023-10-23 PROCEDURE — 0241U HCHG SARS-COV-2 COVID-19 NFCT DS RESP RNA 4 TRGT MIC: CPT

## 2023-10-23 PROCEDURE — 36415 COLL VENOUS BLD VENIPUNCTURE: CPT

## 2023-10-23 PROCEDURE — 71045 X-RAY EXAM CHEST 1 VIEW: CPT

## 2023-10-23 PROCEDURE — 80307 DRUG TEST PRSMV CHEM ANLYZR: CPT

## 2023-10-23 PROCEDURE — 93005 ELECTROCARDIOGRAM TRACING: CPT | Performed by: EMERGENCY MEDICINE

## 2023-10-23 PROCEDURE — C9803 HOPD COVID-19 SPEC COLLECT: HCPCS | Performed by: EMERGENCY MEDICINE

## 2023-10-23 PROCEDURE — 87040 BLOOD CULTURE FOR BACTERIA: CPT

## 2023-10-23 PROCEDURE — 82140 ASSAY OF AMMONIA: CPT

## 2023-10-23 PROCEDURE — 81003 URINALYSIS AUTO W/O SCOPE: CPT

## 2023-10-23 PROCEDURE — 83605 ASSAY OF LACTIC ACID: CPT

## 2023-10-23 PROCEDURE — 96374 THER/PROPH/DIAG INJ IV PUSH: CPT

## 2023-10-23 PROCEDURE — 99285 EMERGENCY DEPT VISIT HI MDM: CPT

## 2023-10-23 PROCEDURE — 700105 HCHG RX REV CODE 258: Performed by: EMERGENCY MEDICINE

## 2023-10-23 PROCEDURE — 87086 URINE CULTURE/COLONY COUNT: CPT

## 2023-10-23 PROCEDURE — 85025 COMPLETE CBC W/AUTO DIFF WBC: CPT

## 2023-10-23 PROCEDURE — 84484 ASSAY OF TROPONIN QUANT: CPT

## 2023-10-23 RX ORDER — NALOXONE HYDROCHLORIDE 0.4 MG/ML
2 INJECTION, SOLUTION INTRAMUSCULAR; INTRAVENOUS; SUBCUTANEOUS ONCE
Status: DISCONTINUED | OUTPATIENT
Start: 2023-10-23 | End: 2023-10-23

## 2023-10-23 RX ORDER — NALOXONE HYDROCHLORIDE 1 MG/ML
2 INJECTION INTRAMUSCULAR; INTRAVENOUS; SUBCUTANEOUS ONCE
Status: COMPLETED | OUTPATIENT
Start: 2023-10-23 | End: 2023-10-23

## 2023-10-23 RX ORDER — SODIUM CHLORIDE 9 MG/ML
1000 INJECTION, SOLUTION INTRAVENOUS ONCE
Status: COMPLETED | OUTPATIENT
Start: 2023-10-23 | End: 2023-10-23

## 2023-10-23 RX ADMIN — NALOXONE HYDROCHLORIDE 2 MG: 1 INJECTION PARENTERAL at 14:44

## 2023-10-23 RX ADMIN — SODIUM CHLORIDE 1000 ML: 9 INJECTION, SOLUTION INTRAVENOUS at 16:19

## 2023-10-23 ASSESSMENT — FIBROSIS 4 INDEX: FIB4 SCORE: 3.36

## 2023-10-23 NOTE — ED TRIAGE NOTES
Chief Complaint   Patient presents with    [facility] for [facility] for past 2 hours. Patient has dementia and is normally AAOx2. He is now GCS 9, responding to pain. Hx of urosepsis

## 2023-10-23 NOTE — ED PROVIDER NOTES
ED Provider Note    CHIEF COMPLAINT  Chief Complaint   Patient presents [facility]    [facility] for past 2 hours. Patient has dementia and is normally AAOx2. He is now GCS 9, responding to pain. Hx of urosepsis       EXTERNAL RECORDS REVIEWED  Recent emergency department note 10/17/2023 for altered mental status likely secondary to polypharmacy  Hospitalization for altered mental status 10/12/2023 which resolved without any major intervention    HPI/ROS  LIMITATION TO HISTORY   Select: Altered mental status / Confusion  OUTSIDE HISTORIAN(S):  Pt's daughter, see MDM for this discussion    Wale Olivas is a 78 y.o. male who presents with altered mental status.  Patient has a history of CKD, BPH, COPD depression diabetes and history of urosepsis.  Patient recently seen here for altered mental status and at that point was thought to have changes in his mentation secondary to polypharmacy as he received a extra dose of Norco at that point.  Patient has had multiple visits to the emergency department for altered mental status which typically resolves on its own, he has had a subdural in the past as well.  Patient apparently is ANO x2 at baseline, more confused over the last few hours.  No extra medications per nursing facility.  Patient unable to provide further history given his longstanding history of dementia and his current delirium    PAST MEDICAL HISTORY   has a past medical history of Anxiety, BPH (benign prostatic hyperplasia), CKD (chronic kidney disease), COPD (chronic obstructive pulmonary disease) ([facility]), COVID-19, Dementia ([facility]), GERD (gastroesophageal reflux disease), Hyperlipidemia, Insomnia, Major depressive disorder, and Type II diabetes mellitus ([facility]).    SURGICAL HISTORY  patient denies any surgical history    FAMILY HISTORY  History reviewed. No pertinent family history.    SOCIAL HISTORY  Social History     Tobacco Use    Smoking status: Never    Smokeless  tobacco: Never   Vaping Use    Vaping Use: Never used   Substance and Sexual Activity    Alcohol use: Not Currently    Drug use: Not Currently    Sexual activity: Not on file       CURRENT MEDICATIONS  Home Medications       Reviewed by Sera Bianchi R.N. (Registered Nurse) on 10/23/23 at 1421  Med List Status: Partial     Medication Last Dose Status   acetaminophen (TYLENOL) 325 MG Tab  Active   albuterol 108 (90 Base) MCG/ACT Aero Soln inhalation aerosol  Active   ALPRAZolam (XANAX) 0.25 MG Tab  Active   amLODIPine (NORVASC) 5 MG Tab  Active   apixaban (ELIQUIS) 2.5mg Tab  Active   baclofen (LIORESAL) 5 MG Tab  Active   bisacodyl (DULCOLAX) 10 MG Suppos  Active   divalproex (DEPAKOTE) 125 MG EC tablet  Active   DULoxetine HCl 30 MG Capsule Delayed Release Sprinkle  Active   finasteride (PROSCAR) 5 MG Tab  Active   fluticasone furoate-vilanterol (BREO) 100-25 MCG/ACT AEROSOL POWDER, BREATH ACTIVATED  Active   influenza vaccine quad (FLUZONE QUADRIVALENT) 0.5 ML Suspension Prefilled Syringe injection  Active   insulin glargine (LANTUS) 100 UNIT/ML SC SOLN  Active   insulin regular (NOVOLIN R) 100 Unit/mL Solution  Active   magnesium hydroxide (MILK OF MAGNESIA) 400 MG/5ML Suspension  Active   nystatin (MYCOSTATIN) powder  Active   ondansetron (ZOFRAN ODT) 4 MG TABLET DISPERSIBLE  Active   pantoprazole (PROTONIX) 40 MG Tablet Delayed Response  Active   polyethylene glycol/lytes (MIRALAX) 17 g Pack  Active   pravastatin (PRAVACHOL) 20 MG Tab  Active   propranolol (INDERAL) 20 MG Tab  Active   risperiDONE (RISPERDAL) 1 MG Tab  Active   senna-docusate (PERICOLACE OR SENOKOT S) 8.6-50 MG Tab  Active   sodium phosphate (FLEET) 7-19 GM/118ML Enema  Active   tamsulosin (FLOMAX) 0.4 MG capsule  Active   Umeclidinium Bromide (INCRUSE ELLIPTA) 62.5 MCG/ACT AEROSOL POWDER, BREATH ACTIVATED  Active                    ALLERGIES  Allergies   Allergen Reactions    Metformin Unspecified     Unknown reaction, listed on MAR      "Morphine Unspecified     Unknown reaction, listed on MAR     Simvastatin Unspecified     Unknown reaction, listed on MAR     Spironolactone Unspecified     Unknown reaction, listed on MAR        PHYSICAL EXAM  VITAL SIGNS: BP (!) 172/87   Pulse (!) 56   Temp 36.8 °C (98.3 °F) (Temporal)   Resp 16   Ht 1.702 m (5' 7\")   Wt 78.5 kg (173 lb)   SpO2 94%   BMI 27.10 kg/m²    Physical Exam  Constitutional:       Appearance: Normal appearance.   HENT:      Head: Normocephalic and atraumatic.      Right Ear: Tympanic membrane normal.      Left Ear: Tympanic membrane normal.      Nose: Nose normal.      Mouth/Throat:      Mouth: Mucous membranes are moist.   Eyes:      Comments: Pupils are 4 mm, minimally reactive but equal   Cardiovascular:      Rate and Rhythm: Normal rate and regular rhythm.   Pulmonary:      Effort: Pulmonary effort is normal. No respiratory distress.      Breath sounds: Normal breath sounds. No stridor.   Abdominal:      General: Abdomen is flat.      Palpations: Abdomen is soft.   Musculoskeletal:      Cervical back: Normal range of motion and neck supple.   Skin:     General: Skin is warm.      Capillary Refill: Capillary refill takes less than 2 seconds.   Neurological:      General: No focal deficit present.      Mental Status: He is oriented to person, place, and time.         DIAGNOSTIC STUDIES / PROCEDURES  EKG  I have independently interpreted this EKG  See below    LABS  Results for orders placed or performed during the hospital encounter of 10/23/23   CBC With Differential   Result Value Ref Range    WBC 7.8 4.8 - 10.8 K/uL    RBC 3.78 (L) 4.70 - 6.10 M/uL    Hemoglobin 11.5 (L) 14.0 - 18.0 g/dL    Hematocrit 34.3 (L) 42.0 - 52.0 %    MCV 90.7 81.4 - 97.8 fL    MCH 30.4 27.0 - 33.0 pg    MCHC 33.5 32.3 - 36.5 g/dL    RDW 47.1 35.9 - 50.0 fL    Platelet Count 185 164 - 446 K/uL    MPV 8.9 (L) 9.0 - 12.9 fL    Neutrophils-Polys 31.80 (L) 44.00 - 72.00 %    Lymphocytes 42.70 (H) 22.00 - " 41.00 %    Monocytes 8.60 0.00 - 13.40 %    Eosinophils 15.60 (H) 0.00 - 6.90 %    Basophils 0.90 0.00 - 1.80 %    Immature Granulocytes 0.40 0.00 - 0.90 %    Nucleated RBC 0.00 0.00 - 0.20 /100 WBC    Neutrophils (Absolute) 2.50 1.82 - 7.42 K/uL    Lymphs (Absolute) 3.34 1.00 - 4.80 K/uL    Monos (Absolute) 0.67 0.00 - 0.85 K/uL    Eos (Absolute) 1.22 (H) 0.00 - 0.51 K/uL    Baso (Absolute) 0.07 0.00 - 0.12 K/uL    Immature Granulocytes (abs) 0.03 0.00 - 0.11 K/uL    NRBC (Absolute) 0.00 K/uL   Comp Metabolic Panel   Result Value Ref Range    Sodium 141 135 - 145 mmol/L    Potassium 3.7 3.6 - 5.5 mmol/L    Chloride 106 96 - 112 mmol/L    Co2 28 20 - 33 mmol/L    Anion Gap 7.0 7.0 - 16.0    Glucose 118 (H) 65 - 99 mg/dL    Bun 22 8 - 22 mg/dL    Creatinine 1.31 0.50 - 1.40 mg/dL    Calcium 9.1 8.5 - 10.5 mg/dL    Correct Calcium 9.7 8.5 - 10.5 mg/dL    AST(SGOT) 16 12 - 45 U/L    ALT(SGPT) 7 2 - 50 U/L    Alkaline Phosphatase 70 30 - 99 U/L    Total Bilirubin 0.3 0.1 - 1.5 mg/dL    Albumin 3.2 3.2 - 4.9 g/dL    Total Protein 7.4 6.0 - 8.2 g/dL    Globulin 4.2 (H) 1.9 - 3.5 g/dL    A-G Ratio 0.8 g/dL   Lactic Acid   Result Value Ref Range    Lactic Acid 1.9 0.5 - 2.0 mmol/L   Lactic Acid   Result Value Ref Range    Lactic Acid 1.4 0.5 - 2.0 mmol/L   Urinalysis    Specimen: Urine   Result Value Ref Range    Color Yellow     Character Clear     Specific Gravity 1.012 <1.035    Ph 8.0 5.0 - 8.0    Glucose Negative Negative mg/dL    Ketones Negative Negative mg/dL    Protein Negative Negative mg/dL    Bilirubin Negative Negative    Urobilinogen, Urine 0.2 Negative    Nitrite Negative Negative    Leukocyte Esterase Negative Negative    Occult Blood Negative Negative    Micro Urine Req see below    Troponin   Result Value Ref Range    Troponin T 30 (H) 6 - 19 ng/L   AMMONIA   Result Value Ref Range    Ammonia 16 11 - 45 umol/L   CoV-2, Flu A/B, And RSV by PCR ([facility])    Specimen: Nasal; Respirate   Result Value Ref Range     Influenza virus A RNA Negative Negative    Influenza virus B, PCR Negative Negative    RSV, PCR Negative Negative    SARS-CoV-2 by PCR NotDetected     SARS-CoV-2 Source NP Swab    ESTIMATED GFR   Result Value Ref Range    GFR (CKD-EPI) 56 (A) >60 mL/min/1.73 m 2   URINE DRUG SCREEN   Result Value Ref Range    Amphetamines Urine Negative Negative    Barbiturates Negative Negative    Benzodiazepines Negative Negative    Cocaine Metabolite Negative Negative    Fentanyl, Urine Negative Negative    Methadone Negative Negative    Opiates Negative Negative    Oxycodone Negative Negative    Phencyclidine -Pcp Negative Negative    Propoxyphene Negative Negative    Cannabinoid Metab Negative Negative   TROPONIN   Result Value Ref Range    Troponin T 30 (H) 6 - 19 ng/L   EKG   Result Value Ref Range    Report       [facility] Emergency Dept.    Test Date:  2023-10-23  Pt Name:    FRENCH TUBBS                Department: ER  MRN:        [id number]                      Room:       [facility]  Gender:     Male                         Technician: [id number]  :        [id number]                   Requested By:IFTIKHAR ROQUE  Order #:    [id number]                    Reading MD: Prashanth Serrano MD    Measurements  Intervals                                Axis  Rate:       57                           P:          -45  IA:         251                          QRS:        -24  QRSD:       81                           T:          -87  QT:         444  QTc:        433    Interpretive Statements  EKG is sinus rhythm, normal axis normal intervals no ST changes consistent  with acute regional ischemia, no significant change from prio  Electronically Signed On 10- 16:02:29 PDT by Prashanth Serrano MD           RADIOLOGY  I have independently interpreted the diagnostic imaging associated with this visit and am waiting the final reading from the radiologist.   My preliminary interpretation is as follows:  Patient's CT head  reveals significant encephalomalacia on right temporal lobe, no acute changes  Radiologist interpretation:   CT-HEAD W/O   Final Result      1.  Postoperative right frontal-parietal-temporal craniotomy.   2.  Extensive chronic macrocystic encephalomalacia in involving the right temporal lobe, occipital lobe, parietal lobe, right frontal operculum. Associated ex vacuo ventricular dilatation.   3.  Moderate cerebral atrophy.   4.  Mild supratentorial white matter disease most consistent with microvascular ischemic change. Stable finding.   5.  No acute hemorrhage or other acute/new lesion evident compared with 10/17/2023         DX-CHEST-PORTABLE (1 VIEW)   Final Result      No acute cardiopulmonary disease evident.            COURSE & MEDICAL DECISION MAKING      INITIAL ASSESSMENT, COURSE AND PLAN  Care Narrative: Patient here, he has had intermittent episodes of confusion, no overt causative etiology in the past, possible urinary tract infections in the past, possible polypharmacy.  He is very well-appearing.  He is ANO x2 and managing secretions.  He is continue his airway if any issue.  He was given a dose of Narcan to see if he had any response to this.  There is no change in patient's mentation following this.  I did a very broad work-up checking for possible severe electrolyte abnormalities such as uremia, also check CT head to evaluate for evidence of ICH, patient is moving all extremities, suspicion of focal CVA is very low especially given the recurrence of patient's issues, I do not believe the MRI was indicated.  I checked ammonia to see if patient was potentially with hepatic encephalopathy.  Patient's basic labs were all very reassuring.  Urinalysis not consistent with infection.  Blood cultures were sent but patient does not appear to be septic at this point.  CT head was normal.  No severe electro abnormality.  Had a long discussion with patient's daughter who reports is a recurrent issue and  typically occurs when patient is dehydrated.  We did give him some IV fluids, following this patient did have some improvement in his mentation.  Patient will be discharged home to his care facility with the consent of patient's daughter.        DISPOSITION AND DISCUSSIONS      Escalation of care considered, and ultimately not performed: MRI deferred, see above for reasoning; admission deferred with consent of patient daughter, as patient with multiple recurrent issues and has a very reassuring work-up here, patient is to return if symptoms worsen    Barriers to care at this time, including but not limited to: Patient in skilled nursing facility, cannot transport himself without assistance        FINAL DIAGNOSIS  1. Altered mental status, unspecified altered mental status type

## 2023-10-24 NOTE — DISCHARGE PLANNING
SW able to contact [facility] to notify Pt would be returning via [facility] at 2000  SW spoke to Uli

## 2023-10-24 NOTE — ED NOTES
Patient report to [facility], Patient Alert , respirations even and unlabored on room air. Patient transferred to the facility via [facility].   Pt discharged . IV discontinued and gauze placed, [facility] received discharge packet and AVS summary.

## 2023-10-24 NOTE — DISCHARGE PLANNING
SW notified Pt medically cleared and can return to the [facility].     Pt has Dementia, non ambulatory and no oxygen needs.     Pt attempted to call the [facility]  [phone] -no answer and VM left.    PCS completed and faxed to [facility]  Transportation time arranged for 2000  Transfer Packet completed with a copy of POLST placed inside      updated RN on transportation time  SW will continue to try to call the [facility] to notify of Pt transportation time .

## 2023-10-24 NOTE — ED NOTES
Report given to NAHEED Duke, pt sleeping. Resps even and unlabored. Nsr on cardiac monitor with no ectopy. Pt to be transported back to residence at 2000 per social work.

## 2023-10-24 NOTE — ED NOTES
Assumed care of patient, patient bedside report received from NAHEED Zamora RN. Pt resting in bed with respirations even and unlabored, on room air . Introduced self as pt RN, Fall risk interventions in place. Awaiting transport back home via [facility], ETA 8 pm

## 2023-10-24 NOTE — DISCHARGE INSTRUCTIONS
Patient's work-up today was reassuring, head CT was unchanged.  He had some chronically elevated troponins but these were not increasing and were only minimally elevated, his chemistry was also normal.  Please have him follow-up closely with his primary care physician.  Make sure you are ensuring he is hydrated throughout the day and is drinking at least 64 ounces of water a day

## 2023-10-24 NOTE — ED NOTES
Patient fall assessment performed, patient is at high risk for fall. Fall Precautions placed.  Familiarize the patient with the environment   Call light within patients reach and demonstrate use   Gurney placed in low position and brakes locked  Assess patient hourly (Pain, Personal Needs, Position, Ect.)  Bed Alarm in place   Fall band applied   Non-skit socks applied

## 2023-10-24 NOTE — ED NOTES
Pt sleeping, resps even and unlabored. Discharge orders received, social work contacted to arrange transport back to group home.

## 2023-10-25 LAB
BACTERIA UR CULT: NORMAL
SIGNIFICANT IND 70042: NORMAL
SITE SITE: NORMAL
SOURCE SOURCE: NORMAL

## 2023-11-02 ENCOUNTER — APPOINTMENT (OUTPATIENT)
Dept: RADIOLOGY | Facility: MEDICAL CENTER | Age: 78
DRG: 190 | End: 2023-11-02
Attending: EMERGENCY MEDICINE
Payer: MEDICARE

## 2023-11-02 ENCOUNTER — HOSPITAL ENCOUNTER (INPATIENT)
Facility: MEDICAL CENTER | Age: 78
LOS: 5 days | DRG: 190 | End: 2023-11-07
Attending: EMERGENCY MEDICINE | Admitting: STUDENT IN AN ORGANIZED HEALTH CARE EDUCATION/TRAINING PROGRAM
Payer: MEDICARE

## 2023-11-02 DIAGNOSIS — E11.65 TYPE 2 DIABETES MELLITUS WITH HYPERGLYCEMIA, WITHOUT LONG-TERM CURRENT USE OF INSULIN (HCC): ICD-10-CM

## 2023-11-02 DIAGNOSIS — J45.901 MODERATE ASTHMA WITH ACUTE EXACERBATION, UNSPECIFIED WHETHER PERSISTENT: ICD-10-CM

## 2023-11-02 DIAGNOSIS — R09.02 HYPOXIA: ICD-10-CM

## 2023-11-02 DIAGNOSIS — N30.00 ACUTE CYSTITIS WITHOUT HEMATURIA: ICD-10-CM

## 2023-11-02 DIAGNOSIS — J44.1 CHRONIC OBSTRUCTIVE PULMONARY DISEASE WITH ACUTE EXACERBATION (HCC): ICD-10-CM

## 2023-11-02 DIAGNOSIS — J96.21 ACUTE ON CHRONIC RESPIRATORY FAILURE WITH HYPOXIA (HCC): ICD-10-CM

## 2023-11-02 PROBLEM — J96.20 ACUTE ON CHRONIC RESPIRATORY FAILURE (HCC): Status: ACTIVE | Noted: 2023-11-02

## 2023-11-02 PROBLEM — J96.00 ACUTE RESPIRATORY FAILURE (HCC): Status: ACTIVE | Noted: 2023-11-02

## 2023-11-02 PROBLEM — J96.00 ACUTE RESPIRATORY FAILURE (HCC): Status: RESOLVED | Noted: 2023-11-02 | Resolved: 2023-11-02

## 2023-11-02 LAB
ALBUMIN SERPL BCP-MCNC: 3.4 G/DL (ref 3.2–4.9)
ALBUMIN/GLOB SERPL: 0.7 G/DL
ALP SERPL-CCNC: 81 U/L (ref 30–99)
ALT SERPL-CCNC: 8 U/L (ref 2–50)
ANION GAP SERPL CALC-SCNC: 7 MMOL/L (ref 7–16)
AST SERPL-CCNC: 13 U/L (ref 12–45)
BASOPHILS # BLD AUTO: 1 % (ref 0–1.8)
BASOPHILS # BLD: 0.09 K/UL (ref 0–0.12)
BILIRUB SERPL-MCNC: 0.4 MG/DL (ref 0.1–1.5)
BUN SERPL-MCNC: 20 MG/DL (ref 8–22)
CALCIUM ALBUM COR SERPL-MCNC: 10.1 MG/DL (ref 8.5–10.5)
CALCIUM SERPL-MCNC: 9.6 MG/DL (ref 8.5–10.5)
CHLORIDE SERPL-SCNC: 103 MMOL/L (ref 96–112)
CO2 SERPL-SCNC: 30 MMOL/L (ref 20–33)
CREAT SERPL-MCNC: 1.23 MG/DL (ref 0.5–1.4)
EKG IMPRESSION: NORMAL
EOSINOPHIL # BLD AUTO: 1.42 K/UL (ref 0–0.51)
EOSINOPHIL NFR BLD: 16 % (ref 0–6.9)
ERYTHROCYTE [DISTWIDTH] IN BLOOD BY AUTOMATED COUNT: 48.5 FL (ref 35.9–50)
FLUAV RNA SPEC QL NAA+PROBE: NEGATIVE
FLUBV RNA SPEC QL NAA+PROBE: NEGATIVE
GFR SERPLBLD CREATININE-BSD FMLA CKD-EPI: 61 ML/MIN/1.73 M 2
GLOBULIN SER CALC-MCNC: 4.6 G/DL (ref 1.9–3.5)
GLUCOSE BLD STRIP.AUTO-MCNC: 332 MG/DL (ref 65–99)
GLUCOSE BLD STRIP.AUTO-MCNC: 366 MG/DL (ref 65–99)
GLUCOSE SERPL-MCNC: 131 MG/DL (ref 65–99)
HCT VFR BLD AUTO: 37 % (ref 42–52)
HGB BLD-MCNC: 12.6 G/DL (ref 14–18)
IMM GRANULOCYTES # BLD AUTO: 0.14 K/UL (ref 0–0.11)
IMM GRANULOCYTES NFR BLD AUTO: 1.6 % (ref 0–0.9)
LYMPHOCYTES # BLD AUTO: 2.59 K/UL (ref 1–4.8)
LYMPHOCYTES NFR BLD: 29.2 % (ref 22–41)
MCH RBC QN AUTO: 30.9 PG (ref 27–33)
MCHC RBC AUTO-ENTMCNC: 34.1 G/DL (ref 32.3–36.5)
MCV RBC AUTO: 90.7 FL (ref 81.4–97.8)
MONOCYTES # BLD AUTO: 0.76 K/UL (ref 0–0.85)
MONOCYTES NFR BLD AUTO: 8.6 % (ref 0–13.4)
NEUTROPHILS # BLD AUTO: 3.88 K/UL (ref 1.82–7.42)
NEUTROPHILS NFR BLD: 43.6 % (ref 44–72)
NRBC # BLD AUTO: 0 K/UL
NRBC BLD-RTO: 0 /100 WBC (ref 0–0.2)
PLATELET # BLD AUTO: 163 K/UL (ref 164–446)
PMV BLD AUTO: 9 FL (ref 9–12.9)
POTASSIUM SERPL-SCNC: 4.2 MMOL/L (ref 3.6–5.5)
PROCALCITONIN SERPL-MCNC: 0.13 NG/ML
PROT SERPL-MCNC: 8 G/DL (ref 6–8.2)
RBC # BLD AUTO: 4.08 M/UL (ref 4.7–6.1)
RSV RNA SPEC QL NAA+PROBE: NEGATIVE
SARS-COV-2 RNA RESP QL NAA+PROBE: NOTDETECTED
SODIUM SERPL-SCNC: 140 MMOL/L (ref 135–145)
WBC # BLD AUTO: 8.9 K/UL (ref 4.8–10.8)

## 2023-11-02 PROCEDURE — 700111 HCHG RX REV CODE 636 W/ 250 OVERRIDE (IP): Mod: JZ | Performed by: EMERGENCY MEDICINE

## 2023-11-02 PROCEDURE — 99223 1ST HOSP IP/OBS HIGH 75: CPT | Mod: AI | Performed by: STUDENT IN AN ORGANIZED HEALTH CARE EDUCATION/TRAINING PROGRAM

## 2023-11-02 PROCEDURE — 770020 HCHG ROOM/CARE - TELE (206)

## 2023-11-02 PROCEDURE — 71045 X-RAY EXAM CHEST 1 VIEW: CPT

## 2023-11-02 PROCEDURE — 96375 TX/PRO/DX INJ NEW DRUG ADDON: CPT

## 2023-11-02 PROCEDURE — 94640 AIRWAY INHALATION TREATMENT: CPT

## 2023-11-02 PROCEDURE — 0241U HCHG SARS-COV-2 COVID-19 NFCT DS RESP RNA 4 TRGT ED POC: CPT

## 2023-11-02 PROCEDURE — 80053 COMPREHEN METABOLIC PANEL: CPT

## 2023-11-02 PROCEDURE — 700101 HCHG RX REV CODE 250: Performed by: STUDENT IN AN ORGANIZED HEALTH CARE EDUCATION/TRAINING PROGRAM

## 2023-11-02 PROCEDURE — 700101 HCHG RX REV CODE 250: Performed by: EMERGENCY MEDICINE

## 2023-11-02 PROCEDURE — 87040 BLOOD CULTURE FOR BACTERIA: CPT

## 2023-11-02 PROCEDURE — 99285 EMERGENCY DEPT VISIT HI MDM: CPT

## 2023-11-02 PROCEDURE — 94669 MECHANICAL CHEST WALL OSCILL: CPT

## 2023-11-02 PROCEDURE — 82962 GLUCOSE BLOOD TEST: CPT

## 2023-11-02 PROCEDURE — C9803 HOPD COVID-19 SPEC COLLECT: HCPCS

## 2023-11-02 PROCEDURE — 94644 CONT INHLJ TX 1ST HOUR: CPT

## 2023-11-02 PROCEDURE — 93005 ELECTROCARDIOGRAM TRACING: CPT | Performed by: EMERGENCY MEDICINE

## 2023-11-02 PROCEDURE — 84145 PROCALCITONIN (PCT): CPT

## 2023-11-02 PROCEDURE — 700102 HCHG RX REV CODE 250 W/ 637 OVERRIDE(OP): Performed by: STUDENT IN AN ORGANIZED HEALTH CARE EDUCATION/TRAINING PROGRAM

## 2023-11-02 PROCEDURE — 96365 THER/PROPH/DIAG IV INF INIT: CPT

## 2023-11-02 PROCEDURE — A9270 NON-COVERED ITEM OR SERVICE: HCPCS | Performed by: STUDENT IN AN ORGANIZED HEALTH CARE EDUCATION/TRAINING PROGRAM

## 2023-11-02 PROCEDURE — 85025 COMPLETE CBC W/AUTO DIFF WBC: CPT

## 2023-11-02 PROCEDURE — 36415 COLL VENOUS BLD VENIPUNCTURE: CPT

## 2023-11-02 RX ORDER — IPRATROPIUM BROMIDE AND ALBUTEROL SULFATE 2.5; .5 MG/3ML; MG/3ML
3 SOLUTION RESPIRATORY (INHALATION)
Status: DISCONTINUED | OUTPATIENT
Start: 2023-11-02 | End: 2023-11-02

## 2023-11-02 RX ORDER — RISPERIDONE 1 MG/1
1 TABLET ORAL 3 TIMES DAILY
Status: DISCONTINUED | OUTPATIENT
Start: 2023-11-02 | End: 2023-11-07 | Stop reason: HOSPADM

## 2023-11-02 RX ORDER — FINASTERIDE 5 MG/1
5 TABLET, FILM COATED ORAL DAILY
Status: DISCONTINUED | OUTPATIENT
Start: 2023-11-02 | End: 2023-11-07 | Stop reason: HOSPADM

## 2023-11-02 RX ORDER — PRAVASTATIN SODIUM 20 MG
80 TABLET ORAL NIGHTLY
Status: DISCONTINUED | OUTPATIENT
Start: 2023-11-02 | End: 2023-11-07 | Stop reason: HOSPADM

## 2023-11-02 RX ORDER — METHYLPREDNISOLONE SODIUM SUCCINATE 40 MG/ML
40 INJECTION, POWDER, LYOPHILIZED, FOR SOLUTION INTRAMUSCULAR; INTRAVENOUS ONCE
Status: COMPLETED | OUTPATIENT
Start: 2023-11-02 | End: 2023-11-02

## 2023-11-02 RX ORDER — TAMSULOSIN HYDROCHLORIDE 0.4 MG/1
0.4 CAPSULE ORAL DAILY
Status: DISCONTINUED | OUTPATIENT
Start: 2023-11-02 | End: 2023-11-07 | Stop reason: HOSPADM

## 2023-11-02 RX ORDER — IPRATROPIUM BROMIDE AND ALBUTEROL SULFATE 2.5; .5 MG/3ML; MG/3ML
3 SOLUTION RESPIRATORY (INHALATION)
Status: DISCONTINUED | OUTPATIENT
Start: 2023-11-02 | End: 2023-11-03

## 2023-11-02 RX ORDER — IPRATROPIUM BROMIDE AND ALBUTEROL SULFATE 2.5; .5 MG/3ML; MG/3ML
3 SOLUTION RESPIRATORY (INHALATION)
Status: DISCONTINUED | OUTPATIENT
Start: 2023-11-02 | End: 2023-11-06

## 2023-11-02 RX ORDER — BACLOFEN 10 MG/1
10 TABLET ORAL 2 TIMES DAILY
COMMUNITY

## 2023-11-02 RX ORDER — PREDNISONE 20 MG/1
40 TABLET ORAL DAILY
Status: COMPLETED | OUTPATIENT
Start: 2023-11-03 | End: 2023-11-07

## 2023-11-02 RX ORDER — PROPRANOLOL HYDROCHLORIDE 20 MG/1
20 TABLET ORAL EVERY 12 HOURS
Status: DISCONTINUED | OUTPATIENT
Start: 2023-11-02 | End: 2023-11-07 | Stop reason: HOSPADM

## 2023-11-02 RX ORDER — MAGNESIUM SULFATE HEPTAHYDRATE 40 MG/ML
2 INJECTION, SOLUTION INTRAVENOUS ONCE
Status: COMPLETED | OUTPATIENT
Start: 2023-11-02 | End: 2023-11-02

## 2023-11-02 RX ORDER — DULOXETIN HYDROCHLORIDE 30 MG/1
30 CAPSULE, DELAYED RELEASE ORAL EVERY EVENING
Status: DISCONTINUED | OUTPATIENT
Start: 2023-11-02 | End: 2023-11-07 | Stop reason: HOSPADM

## 2023-11-02 RX ORDER — FLUTICASONE FUROATE AND VILANTEROL 100; 25 UG/1; UG/1
1 POWDER RESPIRATORY (INHALATION) DAILY
Status: DISCONTINUED | OUTPATIENT
Start: 2023-11-02 | End: 2023-11-02

## 2023-11-02 RX ORDER — DIVALPROEX SODIUM 250 MG/1
500 TABLET, DELAYED RELEASE ORAL 2 TIMES DAILY
Status: DISCONTINUED | OUTPATIENT
Start: 2023-11-02 | End: 2023-11-07 | Stop reason: HOSPADM

## 2023-11-02 RX ADMIN — DULOXETINE HYDROCHLORIDE 30 MG: 30 CAPSULE, DELAYED RELEASE ORAL at 17:20

## 2023-11-02 RX ADMIN — PRAVASTATIN SODIUM 80 MG: 20 TABLET ORAL at 22:09

## 2023-11-02 RX ADMIN — IPRATROPIUM BROMIDE AND ALBUTEROL SULFATE 3 ML: 2.5; .5 SOLUTION RESPIRATORY (INHALATION) at 22:13

## 2023-11-02 RX ADMIN — IPRATROPIUM BROMIDE AND ALBUTEROL SULFATE 3 ML: 2.5; .5 SOLUTION RESPIRATORY (INHALATION) at 19:01

## 2023-11-02 RX ADMIN — IPRATROPIUM BROMIDE AND ALBUTEROL SULFATE 3 ML: 2.5; .5 SOLUTION RESPIRATORY (INHALATION) at 13:51

## 2023-11-02 RX ADMIN — RISPERIDONE 1 MG: 1 TABLET ORAL at 16:37

## 2023-11-02 RX ADMIN — INSULIN HUMAN 4 UNITS: 100 INJECTION, SOLUTION PARENTERAL at 19:39

## 2023-11-02 RX ADMIN — METHYLPREDNISOLONE SODIUM SUCCINATE 40 MG: 40 INJECTION, POWDER, FOR SOLUTION INTRAMUSCULAR; INTRAVENOUS at 08:11

## 2023-11-02 RX ADMIN — MAGNESIUM SULFATE HEPTAHYDRATE 2 G: 2 INJECTION, SOLUTION INTRAVENOUS at 10:19

## 2023-11-02 RX ADMIN — INSULIN HUMAN 5 UNITS: 100 INJECTION, SOLUTION PARENTERAL at 22:09

## 2023-11-02 RX ADMIN — IPRATROPIUM BROMIDE AND ALBUTEROL SULFATE 3 ML: 2.5; .5 SOLUTION RESPIRATORY (INHALATION) at 08:17

## 2023-11-02 RX ADMIN — ALBUTEROL SULFATE 10 MG/HR: 2.5 SOLUTION RESPIRATORY (INHALATION) at 09:56

## 2023-11-02 RX ADMIN — PROPRANOLOL HYDROCHLORIDE 20 MG: 20 TABLET ORAL at 16:37

## 2023-11-02 RX ADMIN — APIXABAN 2.5 MG: 2.5 TABLET, FILM COATED ORAL at 16:37

## 2023-11-02 RX ADMIN — TAMSULOSIN HYDROCHLORIDE 0.4 MG: 0.4 CAPSULE ORAL at 14:44

## 2023-11-02 RX ADMIN — FINASTERIDE 5 MG: 5 TABLET, FILM COATED ORAL at 14:44

## 2023-11-02 RX ADMIN — DIVALPROEX SODIUM 500 MG: 250 TABLET, DELAYED RELEASE ORAL at 16:37

## 2023-11-02 RX ADMIN — MOMETASONE FUROATE AND FORMOTEROL FUMARATE DIHYDRATE 2 PUFF: 200; 5 AEROSOL RESPIRATORY (INHALATION) at 19:02

## 2023-11-02 ASSESSMENT — FIBROSIS 4 INDEX
FIB4 SCORE: 2.14
FIB4 SCORE: 2.48

## 2023-11-02 ASSESSMENT — PAIN DESCRIPTION - PAIN TYPE: TYPE: ACUTE PAIN

## 2023-11-02 NOTE — ASSESSMENT & PLAN NOTE
Continue with sliding scale  Accu-Cheks and hypoglycemia protocol  Aspiration precautions    11/4: Sugars uncontrolled, increasing Lantus to 15 units nightly plus sliding scale    11/5: sugars improving but still in 200s, will increase lantus 15-->17 units    11/6: CBGs remain elevated plan increase Lantus 22 units.  Continue sliding scale insulin and monitor CBGs

## 2023-11-02 NOTE — PROGRESS NOTES
4 Eyes Skin Assessment Completed by NAHEED Cannon and NAHEED Kowalski.    Head Scar above left eyebrow  Ears Redness and Blanching  Nose WDL  Mouth WDL  Neck WDL  Breast/Chest WDL  Shoulder Blades WDL  Spine WDL  (R) Arm/Elbow/Hand WDL  (L) Arm/Elbow/Hand WDL  Abdomen WDL  Groin Redness  Scrotum/Coccyx/Buttocks Redness, Non-Blanching, and Excoriation  (R) Leg Bruising  (L) Leg Bruising  (R) Heel/Foot/Toe Redness, Blanching, and Boggy  (L) Heel/Foot/Toe Redness, Blanching, and Boggy     Left heel     sacrum     Right heel        Devices In Places Tele Box, Pulse Ox, and Nasal Cannula      Interventions In Place Gray Ear Foams, NC W/Ear Foams, Heel Mepilex, Heel Float Boots, Waffle Overlay, TAP System, Pillows, Q2 Turns, Barrier Cream, and Heels Loaded W/Pillows; unit out of heel mepilex, order placed    Possible Skin Injury Yes    Pictures Uploaded Into Epic Yes  Wound Consult Placed Yes  RN Wound Prevention Protocol Ordered Yes

## 2023-11-02 NOTE — RESPIRATORY CARE
COPD EDUCATION by COPD CLINICAL EDUCATOR  11/2/2023  at  1:53 PM by Crys Chen, RRT     Patient interviewed by COPD education team.  Patient unable to participate in full program.  Unable to evaluate due to pts mental status - RN to notify when family is available at bedside to assist with history and education.

## 2023-11-02 NOTE — H&P
Hospital Medicine History & Physical Note    Date of Service  11/2/2023    Primary Care Physician  Pcp Pt States None    Consultants  none    Code Status  Full Code    Chief Complaint  Chief Complaint   Patient presents with    Shortness of Breath           Cough     Recent dx with pneumonia        History of Presenting Illness  Wale Olivas is a 78 y.o. male past medical history of CKD, COPD, diabetes, dementia baseline only alert and oriented to self who presented 11/2/2023 with hypoxia  [facility]  History is unobtainable from patient as he has dementia.  I called daughter, Jose, who reports that her father supposed to be on oxygen but does not really use it.  She reports he was recently hospitalized and treated for pneumonia.     In the ER, he was given multiple breathing treatments however still wheezing on my exam.  He will require admission for acute COPD exacerbation.  Discussed with daughter in detail regarding plan of care.        I discussed the plan of care with bedside RN and ER physician .    Review of Systems  Review of Systems   Unable to perform ROS: Dementia       Past Medical History   has a past medical history of Anxiety, BPH (benign prostatic hyperplasia), CKD (chronic kidney disease), COPD (chronic obstructive pulmonary disease) ([facility]), COVID-19, Dementia ([facility]), GERD (gastroesophageal reflux disease), Hyperlipidemia, Insomnia, Major depressive disorder, and Type II diabetes mellitus ([facility]).    Surgical History   has no past surgical history on file.     Family History  family history is not on file.   Family history reviewed with patient. There is no family history that is pertinent to the chief complaint.     Social History   reports that he has never smoked. He has never used smokeless tobacco. He reports that he does not currently use alcohol. He reports that he does not currently use drugs.    Allergies  Allergies   Allergen Reactions    Metformin Unspecified     Unknown  reaction, listed on MAR     Morphine Unspecified     Unknown reaction, listed on MAR     Simvastatin Unspecified     Unknown reaction, listed on MAR     Spironolactone Unspecified     Unknown reaction, listed on MAR        Medications  Prior to Admission Medications   Prescriptions Last Dose Informant Patient Reported? Taking?   DULoxetine HCl 30 MG Capsule Delayed Release Sprinkle 11/1/2023 at 1010-5508 MAR from Other Facility Yes No   Sig: Take 30 mg by mouth every day.   Umeclidinium Bromide (INCRUSE ELLIPTA) 62.5 MCG/ACT AEROSOL POWDER, BREATH ACTIVATED 11/1/2023 at 3017-7532 MAR from Other Facility Yes No   Sig: Inhale 1 Puff every day.   albuterol 108 (90 Base) MCG/ACT Aero Soln inhalation aerosol 11/1/2023 at 1556 MAR from Other Facility Yes No   Sig: Inhale 2 Puffs every 6 hours as needed for Shortness of Breath.   amLODIPine (NORVASC) 5 MG Tab unk at unk MAR from Other Facility Yes No   Sig: Take 5 mg by mouth every day.   apixaban (ELIQUIS) 2.5mg Tab 11/1/2023 at 7872-9624 MAR from Other Facility Yes No   Sig: Take 2.5 mg by mouth 2 times a day.   baclofen (LIORESAL) 10 MG Tab 11/2/2023 at 7035-6324 MAR from Other Facility Yes Yes   Sig: Take 10 mg by mouth 2 times a day.   divalproex (DEPAKOTE) 125 MG EC tablet 11/1/2023 at 0800 MAR from Other Facility Yes No   Sig: Take 500 mg by mouth 2 times a day. 4 tablets = 500 mg   finasteride (PROSCAR) 5 MG Tab 11/1/2023 at 4319-7593 MAR from Other Facility Yes No   Sig: Take 5 mg by mouth every day.   fluticasone furoate-vilanterol (BREO) 100-25 MCG/ACT AEROSOL POWDER, BREATH ACTIVATED 11/1/2023 at unk MAR from Other Facility Yes No   Sig: Inhale 1 Puff every day.   insulin glargine (LANTUS) 100 UNIT/ML SC SOLN 11/1/2023 at 1010-9088 MAR from Other Facility Yes No   Sig: Inject 7 Units under the skin 2 times a day.   insulin regular (NOVOLIN R) 100 Unit/mL Solution 11/1/2023 at 1700 MAR from Other Facility Yes No   Sig: Inject 2-10 Units under the skin 4 Times a  Day,Before Meals and at Bedtime. Sliding Scale  201-250= 2 units  251-300= 4 units  301-350= 6 units  351-400= 8 units  Greater than 400 call MD   pantoprazole (PROTONIX) 40 MG Tablet Delayed Response 11/1/2023 at 0500 MAR from Other Facility Yes No   Sig: Take 40 mg by mouth every day.   polyethylene glycol/lytes (MIRALAX) 17 g Pack 11/1/2023 at 3248-8000 MAR from Other Facility Yes No   Sig: Take 17 g by mouth every day.   pravastatin (PRAVACHOL) 20 MG Tab unk at unk MAR from Other Facility Yes No   Sig: Take 80 mg by mouth every evening.   propranolol (INDERAL) 20 MG Tab 11/1/2023 at 0800 MAR from Other Facility Yes No   Sig: Take 20 mg by mouth every 12 hours.   risperiDONE (RISPERDAL) 1 MG Tab 11/1/2023 at 1800 MAR from Other Facility Yes No   Sig: Take 1 mg by mouth 3 times a day. Indications: psychosis   senna-docusate (PERICOLACE OR SENOKOT S) 8.6-50 MG Tab 11/1/2023 at 0761-2541 MAR from Other Facility Yes No   Sig: Take 1 Tablet by mouth 2 times a day.   tamsulosin (FLOMAX) 0.4 MG capsule 11/1/2023 at 4389-5134 MAR from Other Facility Yes No   Sig: Take 0.4 mg by mouth every day.      Facility-Administered Medications: None       Physical Exam  Temp:  [36.6 °C (97.9 °F)-37 °C (98.6 °F)] 36.6 °C (97.9 °F)  Pulse:  [65-98] 87  Resp:  [18-34] 18  BP: (134-183)/() 134/71  SpO2:  [86 %-97 %] 97 %  Blood Pressure : 134/71   Temperature: 36.6 °C (97.9 °F)   Pulse: 87   Respiration: 18   Pulse Oximetry: 97 %       Physical Exam  Vitals and nursing note reviewed.   HENT:      Head: Normocephalic and atraumatic.      Right Ear: Tympanic membrane normal.      Left Ear: Tympanic membrane normal.      Nose: Nose normal.      Mouth/Throat:      Mouth: Mucous membranes are moist.      Pharynx: Oropharynx is clear.   Eyes:      Extraocular Movements: Extraocular movements intact.      Pupils: Pupils are equal, round, and reactive to light.   Cardiovascular:      Rate and Rhythm: Normal rate and regular rhythm.       "Pulses: Normal pulses.      Heart sounds: Normal heart sounds.   Pulmonary:      Breath sounds: Wheezing present.   Abdominal:      General: Bowel sounds are normal. There is no distension.      Palpations: Abdomen is soft. There is no mass.   Musculoskeletal:         General: Normal range of motion.      Cervical back: Neck supple.   Skin:     General: Skin is warm.      Capillary Refill: Capillary refill takes less than 2 seconds.   Neurological:      Mental Status: He is alert. He is disoriented.      Comments: Oriented to self only   Psychiatric:         Mood and Affect: Mood normal.         Behavior: Behavior normal.         Laboratory:  Recent Labs     11/02/23  0752   WBC 8.9   RBC 4.08*   HEMOGLOBIN 12.6*   HEMATOCRIT 37.0*   MCV 90.7   MCH 30.9   MCHC 34.1   RDW 48.5   PLATELETCT 163*   MPV 9.0     Recent Labs     11/02/23  0752   SODIUM 140   POTASSIUM 4.2   CHLORIDE 103   CO2 30   GLUCOSE 131*   BUN 20   CREATININE 1.23   CALCIUM 9.6     Recent Labs     11/02/23  0752   ALTSGPT 8   ASTSGOT 13   ALKPHOSPHAT 81   TBILIRUBIN 0.4   GLUCOSE 131*         No results for input(s): \"NTPROBNP\" in the last 72 hours.      No results for input(s): \"TROPONINT\" in the last 72 hours.    Imaging:  DX-CHEST-PORTABLE (1 VIEW)   Final Result         1.  No focal infiltrates.   2.  Atherosclerosis   3.  Perihilar interstitial prominence and bronchial wall cuffing, appearance suggests changes of underlying bronchial inflammation, consider bronchitis.          X-Ray:  I have personally reviewed the images and compared with prior images.    Assessment/Plan:  Justification for Admission Status  I anticipate this patient will require at least two midnights for appropriate medical management, necessitating inpatient admission because of c acute on hronic respiratory failure secondary to COPD exacerbation.    Patient will need a Telemetry bed on MEDICAL service .  The need is secondary to see above.    * Chronic obstructive " pulmonary disease with acute exacerbation (HCC)  Assessment & Plan  Continue bronchodilators   -DuoNeb  -Dulera  Continue with Spiriva  Prednisone 40 mg p.o. daily for total of 5 days  RT consult        Acute on chronic respiratory failure (HCC)  Assessment & Plan  Daughter reports patient supposed to be on 3 L of oxygen however does not believe patient is compliant.    Likely secondary to COPD exacerbation.  Procalcitonin negative.  Will hold antibiotics.      Chronic anticoagulation- (present on admission)  Assessment & Plan  Continue Eliquis    Type 2 diabetes mellitus with hyperglycemia, without long-term current use of insulin (HCC)- (present on admission)  Assessment & Plan  Continue with sliding scale  Accu-Cheks and hypoglycemia protocol  Aspiration precautions          VTE prophylaxis: SCDs/TEDs and therapeutic anticoagulation with Eliquis

## 2023-11-02 NOTE — ASSESSMENT & PLAN NOTE
Daughter reports patient supposed to be on 3 L of oxygen however does not believe patient is compliant.    Likely secondary to COPD exacerbation.     Improved oxygen requirements

## 2023-11-02 NOTE — ED PROVIDER NOTES
ED Provider Note    CHIEF COMPLAINT  Chief Complaint   Patient presents with    Shortness of Breath           Cough     Recent dx with pneumonia        EXTERNAL RECORDS REVIEWED  Inpatient Notes patient's recent admission earlier this month, and patient's recent emergency room visit this multiple for altered mental status which improved with IV fluids    HPI/ROS  LIMITATION TO HISTORY   History of severe dementia, unable to provide history  OUTSIDE HISTORIAN(S):  Report taken from EMS    Wale Olivas is a 78 y.o. male who presents to the emergency department for wheezing and hypoxia.  Patient has a history of CKD, dementia and type 2 diabetes.  Patient had significant wheezing, cough and hypoxia with oxygen levels in the mid 80s per EMS report at his nursing facility.  Upon EMS arrival they gave him a DuoNeb, albuterol and transferred him here.  Patient's wheezing improved but some mild wheezing persisted.  They kept him on 3 L of oxygen.  Patient is not on oxygen at the nursing facility.  Patient unable to provide any further history given his longstanding history of dementia.    PAST MEDICAL HISTORY   has a past medical history of Anxiety, BPH (benign prostatic hyperplasia), CKD (chronic kidney disease), COPD (chronic obstructive pulmonary disease) ([facility]), COVID-19, Dementia ([facility]), GERD (gastroesophageal reflux disease), Hyperlipidemia, Insomnia, Major depressive disorder, and Type II diabetes mellitus ([facility]).    SURGICAL HISTORY  patient denies any surgical history    FAMILY HISTORY  No family history on file.    SOCIAL HISTORY  Social History     Tobacco Use    Smoking status: Never    Smokeless tobacco: Never   Vaping Use    Vaping Use: Never used   Substance and Sexual Activity    Alcohol use: Not Currently    Drug use: Not Currently    Sexual activity: Not on file       CURRENT MEDICATIONS  Home Medications       Reviewed by Maryam Sampson R.N. (Registered Nurse) on 11/02/23 at 0781  Med List  Status: <None>     Medication Last Dose Status   acetaminophen (TYLENOL) 325 MG Tab  Active   albuterol 108 (90 Base) MCG/ACT Aero Soln inhalation aerosol  Active   ALPRAZolam (XANAX) 0.25 MG Tab  Active   amLODIPine (NORVASC) 5 MG Tab  Active   apixaban (ELIQUIS) 2.5mg Tab  Active   baclofen (LIORESAL) 5 MG Tab  Active   bisacodyl (DULCOLAX) 10 MG Suppos  Active   divalproex (DEPAKOTE) 125 MG EC tablet  Active   DULoxetine HCl 30 MG Capsule Delayed Release Sprinkle  Active   finasteride (PROSCAR) 5 MG Tab  Active   fluticasone furoate-vilanterol (BREO) 100-25 MCG/ACT AEROSOL POWDER, BREATH ACTIVATED  Active   influenza vaccine quad (FLUZONE QUADRIVALENT) 0.5 ML Suspension Prefilled Syringe injection  Active   insulin glargine (LANTUS) 100 UNIT/ML SC SOLN  Active   insulin regular (NOVOLIN R) 100 Unit/mL Solution  Active   magnesium hydroxide (MILK OF MAGNESIA) 400 MG/5ML Suspension  Active   nystatin (MYCOSTATIN) powder  Active   ondansetron (ZOFRAN ODT) 4 MG TABLET DISPERSIBLE  Active   pantoprazole (PROTONIX) 40 MG Tablet Delayed Response  Active   polyethylene glycol/lytes (MIRALAX) 17 g Pack  Active   pravastatin (PRAVACHOL) 20 MG Tab  Active   propranolol (INDERAL) 20 MG Tab  Active   risperiDONE (RISPERDAL) 1 MG Tab  Active   senna-docusate (PERICOLACE OR SENOKOT S) 8.6-50 MG Tab  Active   sodium phosphate (FLEET) 7-19 GM/118ML Enema  Active   tamsulosin (FLOMAX) 0.4 MG capsule  Active   Umeclidinium Bromide (INCRUSE ELLIPTA) 62.5 MCG/ACT AEROSOL POWDER, BREATH ACTIVATED  Active                    ALLERGIES  Allergies   Allergen Reactions    Metformin Unspecified     Unknown reaction, listed on MAR     Morphine Unspecified     Unknown reaction, listed on MAR     Simvastatin Unspecified     Unknown reaction, listed on MAR     Spironolactone Unspecified     Unknown reaction, listed on MAR        PHYSICAL EXAM  VITAL SIGNS: BP (!) 166/70   Pulse 86   Temp 37 °C (98.6 °F) (Temporal)   Resp 20   Wt 79.4 kg  (175 lb)   SpO2 96%   BMI 27.41 kg/m²    Physical Exam  Constitutional:       Appearance: He is well-developed.   HENT:      Head: Normocephalic and atraumatic.      Mouth/Throat:      Mouth: Mucous membranes are moist.   Eyes:      Pupils: Pupils are equal, round, and reactive to light.   Cardiovascular:      Rate and Rhythm: Normal rate and regular rhythm.   Pulmonary:      Comments: Diffuse wheezing throughout, mild rhonchi, no focal crackles  Chest:      Chest wall: No mass or deformity.   Musculoskeletal:         General: Normal range of motion.   Skin:     General: Skin is warm.      Capillary Refill: Capillary refill takes less than 2 seconds.   Neurological:      Mental Status: He is alert.      Comments: ANO x1, will tell me his name.  Moving all extremities.  No gaze defect.           DIAGNOSTIC STUDIES / PROCEDURES  EKG  I have independently interpreted this EKG  See below    LABS  Results for orders placed or performed during the hospital encounter of 11/02/23   CBC with Differential   Result Value Ref Range    WBC 8.9 4.8 - 10.8 K/uL    RBC 4.08 (L) 4.70 - 6.10 M/uL    Hemoglobin 12.6 (L) 14.0 - 18.0 g/dL    Hematocrit 37.0 (L) 42.0 - 52.0 %    MCV 90.7 81.4 - 97.8 fL    MCH 30.9 27.0 - 33.0 pg    MCHC 34.1 32.3 - 36.5 g/dL    RDW 48.5 35.9 - 50.0 fL    Platelet Count 163 (L) 164 - 446 K/uL    MPV 9.0 9.0 - 12.9 fL    Neutrophils-Polys 43.60 (L) 44.00 - 72.00 %    Lymphocytes 29.20 22.00 - 41.00 %    Monocytes 8.60 0.00 - 13.40 %    Eosinophils 16.00 (H) 0.00 - 6.90 %    Basophils 1.00 0.00 - 1.80 %    Immature Granulocytes 1.60 (H) 0.00 - 0.90 %    Nucleated RBC 0.00 0.00 - 0.20 /100 WBC    Neutrophils (Absolute) 3.88 1.82 - 7.42 K/uL    Lymphs (Absolute) 2.59 1.00 - 4.80 K/uL    Monos (Absolute) 0.76 0.00 - 0.85 K/uL    Eos (Absolute) 1.42 (H) 0.00 - 0.51 K/uL    Baso (Absolute) 0.09 0.00 - 0.12 K/uL    Immature Granulocytes (abs) 0.14 (H) 0.00 - 0.11 K/uL    NRBC (Absolute) 0.00 K/uL   Comp  Metabolic Panel   Result Value Ref Range    Sodium 140 135 - 145 mmol/L    Potassium 4.2 3.6 - 5.5 mmol/L    Chloride 103 96 - 112 mmol/L    Co2 30 20 - 33 mmol/L    Anion Gap 7.0 7.0 - 16.0    Glucose 131 (H) 65 - 99 mg/dL    Bun 20 8 - 22 mg/dL    Creatinine 1.23 0.50 - 1.40 mg/dL    Calcium 9.6 8.5 - 10.5 mg/dL    Correct Calcium 10.1 8.5 - 10.5 mg/dL    AST(SGOT) 13 12 - 45 U/L    ALT(SGPT) 8 2 - 50 U/L    Alkaline Phosphatase 81 30 - 99 U/L    Total Bilirubin 0.4 0.1 - 1.5 mg/dL    Albumin 3.4 3.2 - 4.9 g/dL    Total Protein 8.0 6.0 - 8.2 g/dL    Globulin 4.6 (H) 1.9 - 3.5 g/dL    A-G Ratio 0.7 g/dL   PROCALCITONIN   Result Value Ref Range    Procalcitonin 0.13 <0.25 ng/mL   ESTIMATED GFR   Result Value Ref Range    GFR (CKD-EPI) 61 >60 mL/min/1.73 m 2   EKG   Result Value Ref Range    Report       [facility] Emergency Dept.    Test Date:  2023  Pt Name:    FRENCH TUBBS                Department: ER  MRN:        [id number]                      Room:        12  Gender:     Male                         Technician: [id number]  :        [id number]                   Requested By:ER TRIAGE PROTOCOL  Order #:    [id number]                    Reading MD: Prashanth Serrano MD    Measurements  Intervals                                Axis  Rate:       92                           P:          0  MA:         0                            QRS:        -6  QRSD:       92                           T:          67  QT:         391  QTc:        484    Interpretive Statements  EKG is normal sinus with sinus arrhythmia, intermittent PVCs.  No ST  elevation or depression consistent with acute regional ischemia  Electronically Signed On 2023 09:13:14 PDT by Prashanth Serrano MD     POC CoV-2, FLU A/B, RSV by PCR   Result Value Ref Range    POC Influenza A RNA, PCR Negative Negative    POC Influenza B RNA, PCR Negative Negative    POC RSV, by PCR Negative Negative    POC SARS-CoV-2, PCR NotDetected           RADIOLOGY  I have independently interpreted the diagnostic imaging associated with this visit and am waiting the final reading from the radiologist.   My preliminary interpretation is as follows: no evidence of lobar pneumonia  Radiologist interpretation:   DX-CHEST-PORTABLE (1 VIEW)   Final Result         1.  No focal infiltrates.   2.  Atherosclerosis   3.  Perihilar interstitial prominence and bronchial wall cuffing, appearance suggests changes of underlying bronchial inflammation, consider bronchitis.            COURSE & MEDICAL DECISION MAKING      INITIAL ASSESSMENT, COURSE AND PLAN  Care Narrative: Patient here with longstanding history of COPD, he appears to be in COPD exacerbation.  There is mention of a possible diagnosis of pneumonia.  Will check chest x-ray.  Sending procalcitonin.  We will also check for COVID.  Patient chest x-ray fails to reveal any evidence of pneumonia.  Procalcitonin is normal.  Patient's COVID is negative.  I do not see any evidence of lobar pneumonia at this point.  He does appear to have a COPD exacerbation.  Following DuoNeb patient mildly improved but still remains with some hypoxia and ongoing wheezing.  We will start hour-long.  Patient has been given methylprednisolone, I have given him magnesium as well.  Patient with diffuse wheezing throughout, no significant tachycardia, I believe his symptoms are secondary to COPD exacerbation, suspicion of cardiopulmonary etiology otherwise is significantly lower.  I do not believe at this point that patient's symptoms are secondary to a pulmonary embolus, will continue to treat.  If patient in hospital continues to remain hypoxic despite his wheezing resolving consider PE at that point.  Patient case discussed with hospitalist who has agreed to admit.        DISPOSITION AND DISCUSSIONS  I have discussed management of the patient with the following physicians and DEANNA's: Hospitalist        Escalation of care considered, and  ultimately not performed: CTA for pulmonary embolus deferred, see above for reasoning    FINAL DIAGNOSIS  1. Hypoxia    2. Moderate asthma with acute exacerbation, unspecified whether persistent

## 2023-11-02 NOTE — PROGRESS NOTES
"Report received from ER RN. Pt transferred on zoll and hooked up to tele box once on unit. Pt not answering orientation status questions at this time, however during skin check pt asked \"what are you doing looking at my armpits\". Pt denies pain at this time. Pt oriented to room, bed is locked in lowest position. Belongings and call light within reach. Updated on plan of care. No questions at this time. Bed alarm is on  "

## 2023-11-02 NOTE — ED NOTES
Med rec is complete per  MAR from [facility]    Allergies reviewed.    Has patient had any outside antibiotics in the last 30 days? N    Any Anticoagulants (rivaroxaban, apixaban, edoxaban, dabigatran, warfarin, enoxaparin) taken in the last 14 days? Y    Anticoagulant name: Eliquis, Last dose: 11/1/23 0152-3548.          Preferred pharmacy for this visit : Unknown

## 2023-11-02 NOTE — FLOWSHEET NOTE
11/02/23 0959   Inhalation Therapy Treatment   Continuous Nebulizer Yes   $ Initial Continuous Nebulizer Set Up Yes

## 2023-11-02 NOTE — ED TRIAGE NOTES
.  Chief Complaint   Patient presents with    Shortness of Breath           Cough     Recent dx with pneumonia      Biba from care home, found to be hypoxic this morning placed on 3lnc. Pt baseline A&O x 1. Moans and becomes agitated with touch or movement. Audible wheezing and cough.

## 2023-11-02 NOTE — ASSESSMENT & PLAN NOTE
Continue bronchodilators   -DuoNeb  -Dulera  Continue with Spiriva  Prednisone 40 mg p.o. daily for total of 5 days  RT consult    11/4: continue DuoNebs every 4 hours, continue steroids, still wheezing and having slight increased work of breathing    11/5: continues to wheeze despite receiving q4h duonebs, on steroids, patient unsure of sputum production, will order CXR, may consider adding azithromycin     11/6: Continue Dulera prednisone and Omnicef.  RT protocol.  Wean off oxygen as tolerated

## 2023-11-03 LAB
GLUCOSE BLD STRIP.AUTO-MCNC: 265 MG/DL (ref 65–99)
GLUCOSE BLD STRIP.AUTO-MCNC: 323 MG/DL (ref 65–99)
GLUCOSE BLD STRIP.AUTO-MCNC: 335 MG/DL (ref 65–99)
GLUCOSE BLD STRIP.AUTO-MCNC: 391 MG/DL (ref 65–99)

## 2023-11-03 PROCEDURE — 700102 HCHG RX REV CODE 250 W/ 637 OVERRIDE(OP): Performed by: STUDENT IN AN ORGANIZED HEALTH CARE EDUCATION/TRAINING PROGRAM

## 2023-11-03 PROCEDURE — 94640 AIRWAY INHALATION TREATMENT: CPT

## 2023-11-03 PROCEDURE — 94760 N-INVAS EAR/PLS OXIMETRY 1: CPT

## 2023-11-03 PROCEDURE — A9270 NON-COVERED ITEM OR SERVICE: HCPCS | Performed by: STUDENT IN AN ORGANIZED HEALTH CARE EDUCATION/TRAINING PROGRAM

## 2023-11-03 PROCEDURE — 770020 HCHG ROOM/CARE - TELE (206)

## 2023-11-03 PROCEDURE — 700102 HCHG RX REV CODE 250 W/ 637 OVERRIDE(OP): Performed by: INTERNAL MEDICINE

## 2023-11-03 PROCEDURE — 700101 HCHG RX REV CODE 250: Performed by: STUDENT IN AN ORGANIZED HEALTH CARE EDUCATION/TRAINING PROGRAM

## 2023-11-03 PROCEDURE — 94669 MECHANICAL CHEST WALL OSCILL: CPT

## 2023-11-03 PROCEDURE — 700101 HCHG RX REV CODE 250: Performed by: INTERNAL MEDICINE

## 2023-11-03 PROCEDURE — 82962 GLUCOSE BLOOD TEST: CPT | Mod: 91

## 2023-11-03 PROCEDURE — 700111 HCHG RX REV CODE 636 W/ 250 OVERRIDE (IP): Performed by: STUDENT IN AN ORGANIZED HEALTH CARE EDUCATION/TRAINING PROGRAM

## 2023-11-03 PROCEDURE — 99232 SBSQ HOSP IP/OBS MODERATE 35: CPT | Performed by: INTERNAL MEDICINE

## 2023-11-03 RX ORDER — ALBUTEROL SULFATE 90 UG/1
2 AEROSOL, METERED RESPIRATORY (INHALATION)
Status: DISCONTINUED | OUTPATIENT
Start: 2023-11-03 | End: 2023-11-07 | Stop reason: HOSPADM

## 2023-11-03 RX ORDER — IPRATROPIUM BROMIDE AND ALBUTEROL SULFATE 2.5; .5 MG/3ML; MG/3ML
3 SOLUTION RESPIRATORY (INHALATION)
Status: DISCONTINUED | OUTPATIENT
Start: 2023-11-03 | End: 2023-11-04

## 2023-11-03 RX ADMIN — IPRATROPIUM BROMIDE AND ALBUTEROL SULFATE 3 ML: 2.5; .5 SOLUTION RESPIRATORY (INHALATION) at 21:48

## 2023-11-03 RX ADMIN — RISPERIDONE 1 MG: 1 TABLET ORAL at 12:54

## 2023-11-03 RX ADMIN — DIVALPROEX SODIUM 500 MG: 250 TABLET, DELAYED RELEASE ORAL at 05:39

## 2023-11-03 RX ADMIN — RISPERIDONE 1 MG: 1 TABLET ORAL at 17:34

## 2023-11-03 RX ADMIN — IPRATROPIUM BROMIDE AND ALBUTEROL SULFATE 3 ML: 2.5; .5 SOLUTION RESPIRATORY (INHALATION) at 08:07

## 2023-11-03 RX ADMIN — PRAVASTATIN SODIUM 80 MG: 20 TABLET ORAL at 21:00

## 2023-11-03 RX ADMIN — IPRATROPIUM BROMIDE AND ALBUTEROL SULFATE 3 ML: 2.5; .5 SOLUTION RESPIRATORY (INHALATION) at 11:05

## 2023-11-03 RX ADMIN — TAMSULOSIN HYDROCHLORIDE 0.4 MG: 0.4 CAPSULE ORAL at 05:40

## 2023-11-03 RX ADMIN — INSULIN HUMAN 5 UNITS: 100 INJECTION, SOLUTION PARENTERAL at 17:39

## 2023-11-03 RX ADMIN — INSULIN HUMAN 4 UNITS: 100 INJECTION, SOLUTION PARENTERAL at 21:04

## 2023-11-03 RX ADMIN — INSULIN HUMAN 4 UNITS: 100 INJECTION, SOLUTION PARENTERAL at 12:54

## 2023-11-03 RX ADMIN — INSULIN GLARGINE-YFGN 10 UNITS: 100 INJECTION, SOLUTION SUBCUTANEOUS at 17:39

## 2023-11-03 RX ADMIN — RISPERIDONE 1 MG: 1 TABLET ORAL at 05:39

## 2023-11-03 RX ADMIN — ALBUTEROL SULFATE 2 PUFF: 90 AEROSOL, METERED RESPIRATORY (INHALATION) at 23:32

## 2023-11-03 RX ADMIN — IPRATROPIUM BROMIDE AND ALBUTEROL SULFATE 3 ML: 2.5; .5 SOLUTION RESPIRATORY (INHALATION) at 19:09

## 2023-11-03 RX ADMIN — INSULIN HUMAN 3 UNITS: 100 INJECTION, SOLUTION PARENTERAL at 08:26

## 2023-11-03 RX ADMIN — FINASTERIDE 5 MG: 5 TABLET, FILM COATED ORAL at 05:39

## 2023-11-03 RX ADMIN — PREDNISONE 40 MG: 20 TABLET ORAL at 05:39

## 2023-11-03 RX ADMIN — PROPRANOLOL HYDROCHLORIDE 20 MG: 20 TABLET ORAL at 17:34

## 2023-11-03 RX ADMIN — MOMETASONE FUROATE AND FORMOTEROL FUMARATE DIHYDRATE 2 PUFF: 200; 5 AEROSOL RESPIRATORY (INHALATION) at 19:09

## 2023-11-03 RX ADMIN — APIXABAN 2.5 MG: 2.5 TABLET, FILM COATED ORAL at 17:34

## 2023-11-03 RX ADMIN — APIXABAN 2.5 MG: 2.5 TABLET, FILM COATED ORAL at 05:39

## 2023-11-03 RX ADMIN — DIVALPROEX SODIUM 500 MG: 250 TABLET, DELAYED RELEASE ORAL at 17:34

## 2023-11-03 RX ADMIN — DULOXETINE HYDROCHLORIDE 30 MG: 30 CAPSULE, DELAYED RELEASE ORAL at 17:34

## 2023-11-03 RX ADMIN — PROPRANOLOL HYDROCHLORIDE 20 MG: 20 TABLET ORAL at 05:40

## 2023-11-03 RX ADMIN — MOMETASONE FUROATE AND FORMOTEROL FUMARATE DIHYDRATE 2 PUFF: 200; 5 AEROSOL RESPIRATORY (INHALATION) at 08:12

## 2023-11-03 ASSESSMENT — FIBROSIS 4 INDEX: FIB4 SCORE: 2.14

## 2023-11-03 ASSESSMENT — ENCOUNTER SYMPTOMS
SHORTNESS OF BREATH: 1
FEVER: 0
COUGH: 1

## 2023-11-03 NOTE — RESPIRATORY CARE
"COPD EDUCATION by COPD CLINICAL EDUCATOR  11/3/2023  at  12:56 PM by Crys Chen, RRT     Patient interviewed by COPD education team. Pt unable at this time to participate in education programs due to ALOC/ dementia. [facility]LNC ordered as well as MDI both for maintenance and rescue.     COPD Screen  COPD Risk Screening  Do you have a history of COPD?: Yes (done per chart pt is ao x 1)  Do you have a Pulmonologist?:  (unknown)    COPD Assessment  COPD Clinical Specialists ONLY  COPD Education Initiated:  (2nd attempt to see pt - unable to communcate/ pt started yelling)  Is this a COPD exacerbation patient?: Yes (Per H& P COPD exacerbation - will refer to inpt Pulmonary - Unable to provide educational services due to pts mental status)  DME Company: unknown  DME Equipment Type: Ordered for 2LNC  Physician [facility]: [facility]  Pulmonologist Name: [facility]  Referrals Initiated: Yes  Pulmonary Rehab: N/A  Smoking Cessation: N/A  Hospice: N/A  Home Health Care: N/A  Mobile Urgent Care Services: N/A  Geriatric Specialty Group: N/A  Private In-Home Care Agency: N/A  (OP) Pulmonary Function Testing:  (not on file)  Interdisciplinary Rounds: Attendance at Rounds (30 Min)    PFT Results    No results found for: \"PFT\"    Meds to Beds        MY COPD ACTION PLAN     It is recommended that patients and physicians /healthcare providers complete this action plan together. This plan should be discussed at each physician visit and updated as needed.    The green, yellow and red zones show groups of symptoms of COPD. This list of symptoms is not comprehensive, and you may experience other symptoms. In the \"Actions\" column, your healthcare provider has recommended actions for you to take based on your symptoms.    Patient Name: Wale Olivas   YOB: 1945   Last Updated on: 11/3/2023 12:53 PM   Green Zone:  I am doing well today Actions     Usual activitiy and exercise level   " "Take daily medications     Usual amounts of cough and phlegm/mucus   Use oxygen as prescribed     Sleep well at night   Continue regular exercise/diet plan     Appetite is good   At all times avoid cigarette smoke, inhaled irritants     Daily Medications (these medications are taken every day):   Fluticasone Furoate and Vilanterol trifenatate (Breo)  Umeclidinium (Incurse Ellipta) 1 Puff  1 Puff Once daily  Once daily     Additional Information:  Remember to Rinse your mouth after using Breo inhaler    Yellow Zone:  I am having a bad day or a COPD flare Actions     More breathless than usual   Continue daily medications     I have less energy for my daily activities   Use quick relief inhaler as ordered     Increased or thicker phlegm/mucus   Use oxygen as prescribed     Using quick relief inhaler/nebulizer more often   Get plenty of rest     Swelling of ankles more than usual   Use pursed lip breathing     More coughing than usual   At all times avoid cigarette smoke, inhaled irritants     I feel like I have a \"chest cold\"     Poor sleep and my symptoms woke me up     My appetite is not good     My medicine is not helping      Call provider immediately if symptoms don’t improve     Continue daily medications, add rescue medications:   Albuterol 2 Puffs Every 6 hours PRN       Medications to be used during a flare up, (as Discussed with Provider):              Red Zone:  I need urgent medical care Actions     Severe shortness of breath even at rest   Call 911 or seek medical care immediately     Not able to do any activity because of breathing      Fever or shaking chills      Feeling confused or very drowsy       Chest pains      Coughing up blood                  "

## 2023-11-03 NOTE — PROGRESS NOTES
Bedside report received from day RN Kassandra, pt care assumed, assessment completed. Pt is A&Ox0, pain 0/10, SR/ST on the monitor. Updated on POC, questions answered. Bed in lowest, locked position, treaded socks on, call light and belongings within reach. Fall precautions in place.

## 2023-11-03 NOTE — PROGRESS NOTES
Monitor Summary  Rhythm: SR  Rate: 69-89  Ectopy: rPVC, trigeminy, rPAC  .20 / .08 / .37

## 2023-11-03 NOTE — CARE PLAN
The patient is Stable - Low risk of patient condition declining or worsening    Shift Goals  Clinical Goals: q2 turns, monitor O2 saturation, safety  Patient Goals: chas  Family Goals: n/a    Progress made toward(s) clinical / shift goals:    Problem: Skin Integrity  Goal: Skin integrity is maintained or improved  Outcome: Progressing  Note: Pt skin has maintained intact throughout the shift. Q2 turns in place. Pt on a waffle and TAPs system.      Problem: Fall Risk  Goal: Patient will remain free from falls  Outcome: Progressing  Note: Pt has remained free from falls throughout the shift. Bed alarm on.        Patient is not progressing towards the following goals:      Problem: Knowledge Deficit - Standard  Goal: Patient and family/care givers will demonstrate understanding of plan of care, disease process/condition, diagnostic tests and medications  Outcome: Not Progressing  Note: Pt educated on POC, treatment, test, and medications. Pt A&Ox0, unable to verbalize understanding.

## 2023-11-03 NOTE — CARE PLAN
Problem: Bronchoconstriction  Goal: Improve in air movement and diminished wheezing  Description: Target End Date:  2 to 3 days    1.  Implement inhaled treatments  2.  Evaluate and manage medication effects  Outcome: Not Met    Respiratory Update    Treatment modality: flutter  Frequency: QID    Pt tolerating current treatments well with no adverse reactions.       Problem: Bronchopulmonary Hygiene  Goal: Increase mobilization of retained secretions  Description: Target End Date:  2 to 3 days    1.  Perform bronchopulmonary therapy as indicated by assessment  2.  Perform airway suctioning  3.  Perform actions to maintain patient airway  Outcome: Not Met    Respiratory Update    Treatment modality: duoneb  Frequency: Q4    Pt tolerating current treatments well with no adverse reactions.

## 2023-11-04 LAB
APPEARANCE UR: CLEAR
BACTERIA #/AREA URNS HPF: NEGATIVE /HPF
BILIRUB UR QL STRIP.AUTO: NEGATIVE
COLOR UR: YELLOW
EPI CELLS #/AREA URNS HPF: NEGATIVE /HPF
GLUCOSE BLD STRIP.AUTO-MCNC: 213 MG/DL (ref 65–99)
GLUCOSE BLD STRIP.AUTO-MCNC: 286 MG/DL (ref 65–99)
GLUCOSE BLD STRIP.AUTO-MCNC: 323 MG/DL (ref 65–99)
GLUCOSE BLD STRIP.AUTO-MCNC: 360 MG/DL (ref 65–99)
GLUCOSE UR STRIP.AUTO-MCNC: >=1000 MG/DL
HYALINE CASTS #/AREA URNS LPF: ABNORMAL /LPF
KETONES UR STRIP.AUTO-MCNC: NEGATIVE MG/DL
LEUKOCYTE ESTERASE UR QL STRIP.AUTO: ABNORMAL
MICRO URNS: ABNORMAL
NITRITE UR QL STRIP.AUTO: NEGATIVE
PH UR STRIP.AUTO: 6 [PH] (ref 5–8)
PROT UR QL STRIP: 30 MG/DL
RBC # URNS HPF: ABNORMAL /HPF
RBC UR QL AUTO: ABNORMAL
SP GR UR STRIP.AUTO: 1.02
UROBILINOGEN UR STRIP.AUTO-MCNC: 0.2 MG/DL
WBC #/AREA URNS HPF: ABNORMAL /HPF

## 2023-11-04 PROCEDURE — A9270 NON-COVERED ITEM OR SERVICE: HCPCS | Performed by: STUDENT IN AN ORGANIZED HEALTH CARE EDUCATION/TRAINING PROGRAM

## 2023-11-04 PROCEDURE — 87186 SC STD MICRODIL/AGAR DIL: CPT

## 2023-11-04 PROCEDURE — 94640 AIRWAY INHALATION TREATMENT: CPT

## 2023-11-04 PROCEDURE — 81001 URINALYSIS AUTO W/SCOPE: CPT

## 2023-11-04 PROCEDURE — 700101 HCHG RX REV CODE 250: Performed by: INTERNAL MEDICINE

## 2023-11-04 PROCEDURE — 82962 GLUCOSE BLOOD TEST: CPT | Mod: 91

## 2023-11-04 PROCEDURE — 700111 HCHG RX REV CODE 636 W/ 250 OVERRIDE (IP): Performed by: STUDENT IN AN ORGANIZED HEALTH CARE EDUCATION/TRAINING PROGRAM

## 2023-11-04 PROCEDURE — 94669 MECHANICAL CHEST WALL OSCILL: CPT

## 2023-11-04 PROCEDURE — 770020 HCHG ROOM/CARE - TELE (206)

## 2023-11-04 PROCEDURE — 99232 SBSQ HOSP IP/OBS MODERATE 35: CPT | Performed by: INTERNAL MEDICINE

## 2023-11-04 PROCEDURE — 87077 CULTURE AEROBIC IDENTIFY: CPT

## 2023-11-04 PROCEDURE — 700102 HCHG RX REV CODE 250 W/ 637 OVERRIDE(OP): Performed by: STUDENT IN AN ORGANIZED HEALTH CARE EDUCATION/TRAINING PROGRAM

## 2023-11-04 PROCEDURE — 87086 URINE CULTURE/COLONY COUNT: CPT

## 2023-11-04 PROCEDURE — 97602 WOUND(S) CARE NON-SELECTIVE: CPT

## 2023-11-04 RX ORDER — IPRATROPIUM BROMIDE AND ALBUTEROL SULFATE 2.5; .5 MG/3ML; MG/3ML
3 SOLUTION RESPIRATORY (INHALATION)
Status: DISCONTINUED | OUTPATIENT
Start: 2023-11-04 | End: 2023-11-06

## 2023-11-04 RX ADMIN — MOMETASONE FUROATE AND FORMOTEROL FUMARATE DIHYDRATE 2 PUFF: 200; 5 AEROSOL RESPIRATORY (INHALATION) at 07:20

## 2023-11-04 RX ADMIN — INSULIN HUMAN 3 UNITS: 100 INJECTION, SOLUTION PARENTERAL at 09:27

## 2023-11-04 RX ADMIN — DULOXETINE HYDROCHLORIDE 30 MG: 30 CAPSULE, DELAYED RELEASE ORAL at 17:57

## 2023-11-04 RX ADMIN — IPRATROPIUM BROMIDE AND ALBUTEROL SULFATE 3 ML: 2.5; .5 SOLUTION RESPIRATORY (INHALATION) at 07:16

## 2023-11-04 RX ADMIN — APIXABAN 2.5 MG: 2.5 TABLET, FILM COATED ORAL at 17:57

## 2023-11-04 RX ADMIN — PROPRANOLOL HYDROCHLORIDE 20 MG: 20 TABLET ORAL at 17:57

## 2023-11-04 RX ADMIN — INSULIN HUMAN 2 UNITS: 100 INJECTION, SOLUTION PARENTERAL at 22:15

## 2023-11-04 RX ADMIN — RISPERIDONE 1 MG: 1 TABLET ORAL at 12:41

## 2023-11-04 RX ADMIN — IPRATROPIUM BROMIDE AND ALBUTEROL SULFATE 3 ML: 2.5; .5 SOLUTION RESPIRATORY (INHALATION) at 20:33

## 2023-11-04 RX ADMIN — RISPERIDONE 1 MG: 1 TABLET ORAL at 17:57

## 2023-11-04 RX ADMIN — RISPERIDONE 1 MG: 1 TABLET ORAL at 05:43

## 2023-11-04 RX ADMIN — TAMSULOSIN HYDROCHLORIDE 0.4 MG: 0.4 CAPSULE ORAL at 05:43

## 2023-11-04 RX ADMIN — PREDNISONE 40 MG: 20 TABLET ORAL at 05:43

## 2023-11-04 RX ADMIN — APIXABAN 2.5 MG: 2.5 TABLET, FILM COATED ORAL at 05:43

## 2023-11-04 RX ADMIN — FINASTERIDE 5 MG: 5 TABLET, FILM COATED ORAL at 05:43

## 2023-11-04 RX ADMIN — PRAVASTATIN SODIUM 80 MG: 20 TABLET ORAL at 22:14

## 2023-11-04 RX ADMIN — INSULIN HUMAN 4 UNITS: 100 INJECTION, SOLUTION PARENTERAL at 17:52

## 2023-11-04 RX ADMIN — PROPRANOLOL HYDROCHLORIDE 20 MG: 20 TABLET ORAL at 05:43

## 2023-11-04 RX ADMIN — DIVALPROEX SODIUM 500 MG: 250 TABLET, DELAYED RELEASE ORAL at 17:57

## 2023-11-04 RX ADMIN — MOMETASONE FUROATE AND FORMOTEROL FUMARATE DIHYDRATE 2 PUFF: 200; 5 AEROSOL RESPIRATORY (INHALATION) at 20:38

## 2023-11-04 RX ADMIN — INSULIN HUMAN 5 UNITS: 100 INJECTION, SOLUTION PARENTERAL at 12:38

## 2023-11-04 RX ADMIN — DIVALPROEX SODIUM 500 MG: 250 TABLET, DELAYED RELEASE ORAL at 05:43

## 2023-11-04 RX ADMIN — IPRATROPIUM BROMIDE AND ALBUTEROL SULFATE 3 ML: 2.5; .5 SOLUTION RESPIRATORY (INHALATION) at 13:50

## 2023-11-04 ASSESSMENT — COGNITIVE AND FUNCTIONAL STATUS - GENERAL
MOBILITY SCORE: 7
SUGGESTED CMS G CODE MODIFIER MOBILITY: CM
STANDING UP FROM CHAIR USING ARMS: TOTAL
DAILY ACTIVITIY SCORE: 11
DRESSING REGULAR LOWER BODY CLOTHING: A LOT
MOVING TO AND FROM BED TO CHAIR: UNABLE
TURNING FROM BACK TO SIDE WHILE IN FLAT BAD: A LOT
SUGGESTED CMS G CODE MODIFIER DAILY ACTIVITY: CL
EATING MEALS: A LITTLE
DRESSING REGULAR UPPER BODY CLOTHING: A LOT
CLIMB 3 TO 5 STEPS WITH RAILING: TOTAL
WALKING IN HOSPITAL ROOM: TOTAL
HELP NEEDED FOR BATHING: TOTAL
MOVING FROM LYING ON BACK TO SITTING ON SIDE OF FLAT BED: UNABLE
PERSONAL GROOMING: A LOT
TOILETING: TOTAL

## 2023-11-04 ASSESSMENT — ENCOUNTER SYMPTOMS
FEVER: 0
SHORTNESS OF BREATH: 1
COUGH: 1

## 2023-11-04 ASSESSMENT — FIBROSIS 4 INDEX: FIB4 SCORE: 2.14

## 2023-11-04 NOTE — ASSESSMENT & PLAN NOTE
Interventions to minimize the risk of delirium.   -do not disturb patient (vitals or lab draws) between the hours of 10 PM and 6 AM.  -frequent reorientation  -avoid sedatives  -up in chair for meals  -watch for constipation  -remove all unnecessary lines (central lines, peripheral IVs, feeding tubes, resendiz catheters)      Continue home risperidone 1 mg 3 times daily as well as duloxetine and Depakote    11/4: Intermittently requiring restraints, a little more confused overnight and today, was pulling at condom cath and caused penile trauma, Resendiz placed to protect skin, initially concern for possible milky urine, Resendiz replaced and urine appears more clear, UA obtained and does have some WBC, will send culture, family thinks he gets more confused when he has UTI    11/6/2023: Continue home dose of Risperdal and Cymbalta and Depakote frequent orientation

## 2023-11-04 NOTE — HOSPITAL COURSE
History of Presenting Illness  Wale Olivas is a 78 y.o. male past medical history of CKD, COPD, diabetes, dementia baseline only alert and oriented to self who presented 11/2/2023 with hypoxia  [facility]  History is unobtainable from patient as he has dementia.  I called daughter, Jose, who reports that her father supposed to be on oxygen but does not really use it.  She reports he was recently hospitalized and treated for pneumonia.      In the ER, he was given multiple breathing treatments however still wheezing on my exam.  He will require admission for acute COPD exacerbation.  Discussed with daughter in detail regarding plan of care.    Treated with breathing treatments and steroids.  Hospital course c/b penile wound (pulled condom cath, resendiz placed), hypertension and hyperglycemia likely in setting of receiving steroids.

## 2023-11-04 NOTE — PROGRESS NOTES
[facility] Medicine Daily Progress Note    Date of Service  11/4/2023    Chief Complaint  Wale Olivas is a 78 y.o. male admitted 11/2/2023 with shortness of breath    Hospital Course  History of Presenting Illness  Wale Olivas is a 78 y.o. male past medical history of CKD, COPD, diabetes, dementia baseline only alert and oriented to self who presented 11/2/2023 with [facility]  History is unobtainable from patient as he has dementia.  I called daughter, Jose, who reports that her father supposed to be on oxygen but does not really use it.  She reports he was recently hospitalized and treated for pneumonia.      In the ER, he was given multiple breathing treatments however still wheezing on my exam.  He will require admission for acute COPD exacerbation.  Discussed with daughter in detail regarding plan of care.    Interval Problem Update  - wheezing today but wasn't getting breathing treatments, sounded better after treatment  - intermittently confused, intermittent restraints  - sugars uncontrolled, increasing lantus    I have discussed this patient's plan of care and discharge plan at IDT rounds today with Case Management, Nursing, Nursing leadership, and other members of the IDT team.    Consultants/Specialty  None    Code Status  Full Code    Disposition  The patient is not medically cleared for discharge to home or a post-acute facility.  Anticipate discharge to: skilled nursing facility    I have placed the appropriate orders for post-discharge needs.    Review of Systems  Review of Systems   Unable to perform ROS: Dementia   Constitutional:  Negative for fever.   Respiratory:  Positive for cough and shortness of breath.         Physical Exam  Temp:  [36.7 °C (98.1 °F)-37 °C (98.6 °F)] 37 °C (98.6 °F)  Pulse:  [47-80] 74  Resp:  [16-22] 20  BP: (119-176)/(65-85) 176/83  SpO2:  [88 %-98 %] 98 %    Physical Exam  Constitutional:       General: He is not in acute distress.      Appearance: He is not ill-appearing, toxic-appearing or diaphoretic.   HENT:      Head: Normocephalic and atraumatic.      Nose: Nose normal.      Mouth/Throat:      Mouth: Mucous membranes are moist.   Eyes:      General: No scleral icterus.     Conjunctiva/sclera: Conjunctivae normal.   Cardiovascular:      Rate and Rhythm: Normal rate and regular rhythm.      Heart sounds: No murmur heard.     No friction rub. No gallop.   Pulmonary:      Effort: Accessory muscle usage (Mild) present.      Breath sounds: Wheezing (improved after breathing treatment, still with some decreased breath sounds at bases) present.   Abdominal:      General: Bowel sounds are normal. There is no distension.      Palpations: Abdomen is soft.      Tenderness: There is no abdominal tenderness.   Musculoskeletal:      Right lower leg: No edema.      Left lower leg: No edema.   Skin:     Coloration: Skin is not jaundiced.      Findings: No rash.   Neurological:      Mental Status: He is alert. He is disoriented.   Psychiatric:         Mood and Affect: Mood normal.         Behavior: Behavior normal.         Fluids    Intake/Output Summary (Last 24 hours) at 11/4/2023 1558  Last data filed at 11/4/2023 1000  Gross per 24 hour   Intake 800 ml   Output 2650 ml   Net -1850 ml         Laboratory  Recent Labs     11/02/23  0752   WBC 8.9   RBC 4.08*   HEMOGLOBIN 12.6*   HEMATOCRIT 37.0*   MCV 90.7   MCH 30.9   MCHC 34.1   RDW 48.5   PLATELETCT 163*   MPV 9.0       Recent Labs     11/02/23  0752   SODIUM 140   POTASSIUM 4.2   CHLORIDE 103   CO2 30   GLUCOSE 131*   BUN 20   CREATININE 1.23   CALCIUM 9.6                     Imaging  DX-CHEST-PORTABLE (1 VIEW)   Final Result         1.  No focal infiltrates.   2.  Atherosclerosis   3.  Perihilar interstitial prominence and bronchial wall cuffing, appearance suggests changes of underlying bronchial inflammation, consider bronchitis.           Assessment/Plan  * Chronic obstructive pulmonary disease with  acute exacerbation (HCC)  Assessment & Plan  Continue bronchodilators   -DuoNeb  -Dulera  Continue with Spiriva  Prednisone 40 mg p.o. daily for total of 5 days  RT consult    11/4: continue DuoNebs every 4 hours, continue steroids, still wheezing and having slight increased work of breathing        Acute on chronic respiratory failure (HCC)  Assessment & Plan  Daughter reports patient supposed to be on 3 L of oxygen however does not believe patient is compliant.    Likely secondary to COPD exacerbation.  Procalcitonin negative.  Will hold antibiotics.    Weaning oxygen as tolerated  Requiring 2 L today      Severe early onset Alzheimer's dementia (HCC)- (present on admission)  Assessment & Plan  Interventions to minimize the risk of delirium.   -do not disturb patient (vitals or lab draws) between the hours of 10 PM and 6 AM.  -frequent reorientation  -avoid sedatives  -up in chair for meals  -watch for constipation  -remove all unnecessary lines (central lines, peripheral IVs, feeding tubes, resendiz catheters)      Continue home risperidone 1 mg 3 times daily as well as duloxetine and Depakote    11/4: Intermittently requiring restraints, a little more confused overnight and today, was pulling at condom cath and caused penile trauma, Resendiz placed to protect skin, initially concern for possible milky urine, Resendiz replaced and urine appears more clear, UA obtained and does have some WBC, will send culture, family thinks he gets more confused when he has UTI        Atrial arrhythmia- (present on admission)  Assessment & Plan  Continue patient's propranolol and apixaban    Chronic anticoagulation- (present on admission)  Assessment & Plan  Continue Eliquis    Type 2 diabetes mellitus with hyperglycemia, without long-term current use of insulin ([facility])- (present on admission)  Assessment & Plan  Continue with sliding scale  Accu-Cheks and hypoglycemia protocol  Aspiration precautions    11/4: Sugars uncontrolled,  increasing Lantus to 15 units nightly plus sliding scale           VTE prophylaxis:    therapeutic anticoagulation with eliquis 2.5 mg BID      I have performed a physical exam and reviewed and updated ROS and Plan today (11/4/2023). In review of yesterday's note (11/3/2023), there are no changes except as documented above.

## 2023-11-04 NOTE — PROGRESS NOTES
Pt continues to remove oxygen, condom cath, and clothing. Pt keeps trying to get out of bed as well. Educated and re-directed multiple times but pt continues to removed things. A&Ox0. RN attempted to order a telesitter but there are none available-charge RN notified.

## 2023-11-04 NOTE — WOUND TEAM
[facility]  Inpatient Services  Wound and Skin Care Brief Evaluation    Admission Date: 11/2/2023     Last order of IP CONSULT TO WOUND CARE was found on 11/4/2023 from Hospital Encounter on 11/2/2023     HPI, PMH, SH: Reviewed    Chief Complaint   Patient presents with    Shortness of Breath           Cough     Recent dx with pneumonia      Diagnosis: Acute respiratory failure (HCC) [J96.00]    Unit where seen by Wound Team: T818/00     Wound consult placed regarding sacrum, penis, and left heel. Chart and images reviewed. This discussed with bedside RN. This clinician in to assess patient. Patient pleasant and agreeable. Patient coccyx with POA DTI. Barrier paste applied for protective layer. Continue with offloading. Penis with moisture related partial thickness skin breakdown. Barrier paste also applied to this area. Left heel intact. Offloading foam and heel float boot reapplied.    No pressure injuries or advanced wound care needs identified. Wound consult completed. No further follow up unless indicated and consulted.     Wound 11/02/23 Coccyx POA DTI (Active)   Date First Assessed/Time First Assessed: 11/02/23 1152   Present on Original Admission: Yes  Location: Coccyx  Wound Description (Comments): POA DTI      Assessments 11/4/2023  1:00 PM   Wound Image     Site Assessment Red;Excoriated   Periwound Assessment Intact   Margins Attached edges   Closure Open to air   Drainage Amount None   Treatments Cleansed;Site care;Offloading   Wound Cleansing Foam Cleanser/Washcloth   Periwound Protectant Barrier Paste   Dressing Status Open to Air   Dressing Change/Treatment Frequency Every Shift, and As Needed   NEXT Dressing Change/Treatment Date 11/04/23   NEXT Weekly Photo (Inpatient Only) 11/11/23   Wound Team Following Not following       Wound 11/04/23 Partial Thickness Wound Penis Skin Tear (Active)   Date First Assessed/Time First Assessed: 11/04/23 0154   Present on Original Admission: No   Hand Hygiene Completed: Yes  Primary Wound Type: Partial Thickness Wound  Location: Penis  Wound Description (Comments): Skin Tear      Assessments 11/4/2023  1:00 PM   Wound Image     Site Assessment Pink;Red   Periwound Assessment Intact   Margins Attached edges   Closure Open to air   Drainage Amount Scant   Drainage Description Serosanguineous   Treatments Cleansed;Site care   Wound Cleansing Foam Cleanser/Washcloth   Periwound Protectant Barrier Paste   Dressing Status Open to Air   Dressing Change/Treatment Frequency Every Shift, and As Needed   NEXT Dressing Change/Treatment Date 11/04/23   NEXT Weekly Photo (Inpatient Only) 11/11/23   Wound Team Following Not following   Non-staged Wound Description Partial thickness     Left Heel        PREVENTATIVE INTERVENTIONS:    Q shift Hector - performed per nursing policy  Q shift pressure point assessments - performed per nursing policy    Surface/Positioning  Reposition q 2 hours - Currently in Place  Waffle overlay  - Currently in Place    Offloading/Redistribution  Heel offloading dressing (Silicone dressing) - Currently in Place  Heel float boots (Prevalon boot) - Currently in Place      Respiratory  Silicone O2 tubing - Currently in Place    Containment/Moisture Prevention    Barrier paste - Currently in Place

## 2023-11-04 NOTE — PROGRESS NOTES
[facility] Medicine Daily Progress Note    Date of Service  11/3/2023    Chief Complaint  Wale Olivas is a 78 y.o. male admitted 11/2/2023 with shortness of breath    Hospital Course  History of Presenting Illness  Wale Olivas is a 78 y.o. male past medical history of CKD, COPD, diabetes, dementia baseline only alert and oriented to self who presented 11[facility]2023 [facility]  History is unobtainable from patient as he has dementia.  I called daughter, Jose, who reports that her father supposed to be on oxygen but does not really use it.  She reports he was recently hospitalized and treated for pneumonia.      In the ER, he was given multiple breathing treatments however still wheezing on my exam.  He will require admission for acute COPD exacerbation.  Discussed with daughter in detail regarding plan of care.    Interval Problem Update  -Patient still wheezing and short of breath however weaning oxygen, continuing steroids and DuoNebs  -Sugars elevated in the setting of steroids, increasing insulin    I have discussed this patient's plan of care and discharge plan at IDT rounds today with Case Management, Nursing, Nursing leadership, and other members of the IDT team.    Consultants/Specialty  None    Code Status  Full Code    Disposition  The patient is not medically cleared for discharge to home or a post-acute facility.      I have placed the appropriate orders for post-discharge needs.    Review of Systems  Review of Systems   Constitutional:  Negative for fever.   Respiratory:  Positive for cough and shortness of breath.         Physical Exam  Temp:  [37 °C (98.6 °F)-37.2 °C (99 °F)] 37 °C (98.6 °F)  Pulse:  [66-91] 73  Resp:  [16-20] 18  BP: (126-175)/(67-84) 147/71  SpO2:  [89 %-98 %] 92 %    Physical Exam  Constitutional:       General: He is not in acute distress.     Appearance: He is not ill-appearing, toxic-appearing or diaphoretic.   HENT:      Head: Normocephalic  and atraumatic.      Nose: Nose normal.      Mouth/Throat:      Mouth: Mucous membranes are moist.   Eyes:      General: No scleral icterus.     Conjunctiva/sclera: Conjunctivae normal.   Cardiovascular:      Rate and Rhythm: Normal rate and regular rhythm.      Heart sounds: No murmur heard.     No friction rub. No gallop.   Pulmonary:      Effort: Pulmonary effort is normal.      Breath sounds: Wheezing present.   Abdominal:      General: Bowel sounds are normal. There is no distension.      Palpations: Abdomen is soft.      Tenderness: There is no abdominal tenderness.   Musculoskeletal:      Right lower leg: No edema.      Left lower leg: No edema.   Skin:     Coloration: Skin is not jaundiced.      Findings: No rash.   Neurological:      Mental Status: He is alert. He is disoriented.   Psychiatric:         Mood and Affect: Mood normal.         Behavior: Behavior normal.         Fluids    Intake/Output Summary (Last 24 hours) at 11/3/2023 1713  Last data filed at 11/3/2023 1500  Gross per 24 hour   Intake 915 ml   Output 775 ml   Net 140 ml       Laboratory  Recent Labs     11/02/23  0752   WBC 8.9   RBC 4.08*   HEMOGLOBIN 12.6*   HEMATOCRIT 37.0*   MCV 90.7   MCH 30.9   MCHC 34.1   RDW 48.5   PLATELETCT 163*   MPV 9.0     Recent Labs     11/02/23  0752   SODIUM 140   POTASSIUM 4.2   CHLORIDE 103   CO2 30   GLUCOSE 131*   BUN 20   CREATININE 1.23   CALCIUM 9.6                   Imaging  DX-CHEST-PORTABLE (1 VIEW)   Final Result         1.  No focal infiltrates.   2.  Atherosclerosis   3.  Perihilar interstitial prominence and bronchial wall cuffing, appearance suggests changes of underlying bronchial inflammation, consider bronchitis.           Assessment/Plan  * Chronic obstructive pulmonary disease with acute exacerbation (HCC)  Assessment & Plan  Continue bronchodilators   -DuoNeb  -Dulera  Continue with Spiriva  Prednisone 40 mg p.o. daily for total of 5 days  RT consult    11/3: Continues to have wheezing,  weaning oxygen however will continue steroids and breathing treatments and patient        Acute on chronic respiratory failure (HCC)  Assessment & Plan  Daughter reports patient supposed to be on 3 L of oxygen however does not believe patient is compliant.    Likely secondary to COPD exacerbation.  Procalcitonin negative.  Will hold antibiotics.    Weaning oxygen as tolerated      Severe early onset Alzheimer's dementia (HCC)- (present on admission)  Assessment & Plan  Interventions to minimize the risk of delirium.   -do not disturb patient (vitals or lab draws) between the hours of 10 PM and 6 AM.  -frequent reorientation  -avoid sedatives  -up in chair for meals  -watch for constipation  -remove all unnecessary lines (central lines, peripheral IVs, feeding tubes, resendiz catheters)      Continue home risperidone 1 mg 3 times daily as well as duloxetine and Depakote        Atrial arrhythmia- (present on admission)  Assessment & Plan  Continue patient's propranolol and apixaban    Chronic anticoagulation- (present on admission)  Assessment & Plan  Continue Eliquis    Type 2 diabetes mellitus with hyperglycemia, without long-term current use of insulin ([facility])- (present on admission)  Assessment & Plan  Continue with sliding scale  Accu-Cheks and hypoglycemia protocol  Aspiration precautions    Sugars elevated today in the setting of steroids  Adding Lantus 10 units nightly           VTE prophylaxis:    therapeutic anticoagulation with eliquis 2.5 mg BID      I have performed a physical exam and reviewed and updated ROS and Plan today (11/3/2023). In review of yesterday's note (11/2/2023), there are no changes except as documented above.

## 2023-11-04 NOTE — PROGRESS NOTES
Monitor Summary  Rhythm: SR  Rate: 62-68  Ectopy: COUP,PVC,TRIGEM, BIGEM  0.20 / 0.05 / 0.33

## 2023-11-04 NOTE — CARE PLAN
The patient is Unstable - High likelihood or risk of patient condition declining or worsening    Shift Goals  Clinical Goals: hemodynamic stability  Patient Goals: CJ  Family Goals: N/A    Progress made toward(s) clinical / shift goals:  Patient stable through shift    Patient is not progressing towards the following goals: Patient still with heavy work of breathing

## 2023-11-04 NOTE — CARE PLAN
The patient is Watcher - Medium risk of patient condition declining or worsening    Shift Goals  Clinical Goals: monitor oxygen demand, q2h, re-orient, educate, promote self care  Patient Goals: get dressed  Family Goals: N/A    Progress made toward(s) clinical / shift goals:    Problem: Nutrition - Advanced  Goal: Patient will display progressive weight gain toward goal have adequate food and fluid intake  Outcome: Progressing  Note: Pt eating % of their food      Problem: Impaired Gas Exchange  Goal: Patient will demonstrate improved ventilation and adequate oxygenation and participate in treatment regimen within the level of ability/situation.  Outcome: Progressing  Flowsheets (Taken 11/3/2023 2152)  Impaired Gas Exchange:   Assesed rate/rhythm/depth of effort of respirations   Assessed oxygenation   Administered/titrated oxygen   Positioned patient for maximum ventilatory efficiency   Turn, cough, and deep breathe   Assessed vital signs, pulse oximetry   Assessed breath sounds   Encouraged deep-slow or pursed-lip breathing exercises   Monitored changes in the level of consciousness and mental status   Elevated head of bed   Assisted patient assume a position to ease work of breathing   Provided a calm, quiet environment   Collaborated with RT to administer medications/treatments as needed  Note: Pt maintaining RA and RT notified of need of increased wheezing and need for additional respiratory medication        Patient is not progressing towards the following goals:      Problem: Knowledge Deficit - COPD  Goal: Patient/significant other demonstrates understanding of disease process, utilization of the Action Plan, medications and discharge instruction  Outcome: Not Progressing  Note: Notified on call RT because pt still wheezing significantly-new inhaler ordered via RT

## 2023-11-04 NOTE — PROGRESS NOTES
Assumed care of patient after receiving report from Colleen night shift RN. Patient is currently Alert and Oriented x1 on 1L NC. Bed locked and in lowest position. Call light within reach.

## 2023-11-04 NOTE — PROGRESS NOTES
Pt continues to pull off condom cath, remove oxygen, remove clothing, try to get out of bed. Pt is not re-directable and is A&Ox0 requiring additional monitoring via telesitter which is not available. RN reached out to on call hospitalist. New order for nonviolent bilat wrist restraints. RN also reached out about wound on penis from continuous removal of condom cath-stage 2 skin tear open and bleeding. LDA started. Pt has stage 2 on sacrum and stage on scrotum from continuous excoriation-new order for resendiz to aid in excoriation injuries and prevent further damage to penis from condom cath removal. LDA added for resendiz.    RN called and LVM for Jose to notify about restraints and resendiz insertion.

## 2023-11-04 NOTE — PROGRESS NOTES
Assumed care of pt. Bedside report received from RN. Pt was updated on plan of care. Aox1, pt oriented to name only. Pt pain level 0/10, nonverbal pain scale will be utilized at well. Pt is telemetry, SR 66. Call light, phone and personal belongings in reach. Bed strip alarm on and working properly, bed in lowest position, and locked. Pt sitting in fowlers position eating dinner.

## 2023-11-05 ENCOUNTER — APPOINTMENT (OUTPATIENT)
Dept: RADIOLOGY | Facility: MEDICAL CENTER | Age: 78
DRG: 190 | End: 2023-11-05
Attending: INTERNAL MEDICINE
Payer: MEDICARE

## 2023-11-05 LAB
GLUCOSE BLD STRIP.AUTO-MCNC: 221 MG/DL (ref 65–99)
GLUCOSE BLD STRIP.AUTO-MCNC: 254 MG/DL (ref 65–99)
GLUCOSE BLD STRIP.AUTO-MCNC: 261 MG/DL (ref 65–99)
GLUCOSE BLD STRIP.AUTO-MCNC: 266 MG/DL (ref 65–99)

## 2023-11-05 PROCEDURE — 99232 SBSQ HOSP IP/OBS MODERATE 35: CPT | Performed by: INTERNAL MEDICINE

## 2023-11-05 PROCEDURE — A9270 NON-COVERED ITEM OR SERVICE: HCPCS | Performed by: STUDENT IN AN ORGANIZED HEALTH CARE EDUCATION/TRAINING PROGRAM

## 2023-11-05 PROCEDURE — 700102 HCHG RX REV CODE 250 W/ 637 OVERRIDE(OP): Performed by: INTERNAL MEDICINE

## 2023-11-05 PROCEDURE — 770020 HCHG ROOM/CARE - TELE (206)

## 2023-11-05 PROCEDURE — 94640 AIRWAY INHALATION TREATMENT: CPT

## 2023-11-05 PROCEDURE — 82962 GLUCOSE BLOOD TEST: CPT | Mod: 91

## 2023-11-05 PROCEDURE — 700111 HCHG RX REV CODE 636 W/ 250 OVERRIDE (IP): Performed by: STUDENT IN AN ORGANIZED HEALTH CARE EDUCATION/TRAINING PROGRAM

## 2023-11-05 PROCEDURE — 700102 HCHG RX REV CODE 250 W/ 637 OVERRIDE(OP): Performed by: STUDENT IN AN ORGANIZED HEALTH CARE EDUCATION/TRAINING PROGRAM

## 2023-11-05 PROCEDURE — 94760 N-INVAS EAR/PLS OXIMETRY 1: CPT

## 2023-11-05 PROCEDURE — 700101 HCHG RX REV CODE 250: Performed by: INTERNAL MEDICINE

## 2023-11-05 PROCEDURE — A9270 NON-COVERED ITEM OR SERVICE: HCPCS | Performed by: INTERNAL MEDICINE

## 2023-11-05 PROCEDURE — 71045 X-RAY EXAM CHEST 1 VIEW: CPT

## 2023-11-05 PROCEDURE — 94669 MECHANICAL CHEST WALL OSCILL: CPT

## 2023-11-05 RX ORDER — CEFDINIR 300 MG/1
300 CAPSULE ORAL EVERY 12 HOURS
Status: DISCONTINUED | OUTPATIENT
Start: 2023-11-05 | End: 2023-11-07 | Stop reason: HOSPADM

## 2023-11-05 RX ORDER — HYDRALAZINE HYDROCHLORIDE 20 MG/ML
20 INJECTION INTRAMUSCULAR; INTRAVENOUS EVERY 6 HOURS PRN
Status: DISCONTINUED | OUTPATIENT
Start: 2023-11-05 | End: 2023-11-07 | Stop reason: HOSPADM

## 2023-11-05 RX ADMIN — PRAVASTATIN SODIUM 80 MG: 20 TABLET ORAL at 20:06

## 2023-11-05 RX ADMIN — RISPERIDONE 1 MG: 1 TABLET ORAL at 18:34

## 2023-11-05 RX ADMIN — PREDNISONE 40 MG: 20 TABLET ORAL at 04:32

## 2023-11-05 RX ADMIN — TAMSULOSIN HYDROCHLORIDE 0.4 MG: 0.4 CAPSULE ORAL at 04:31

## 2023-11-05 RX ADMIN — RISPERIDONE 1 MG: 1 TABLET ORAL at 13:12

## 2023-11-05 RX ADMIN — INSULIN HUMAN 3 UNITS: 100 INJECTION, SOLUTION PARENTERAL at 18:40

## 2023-11-05 RX ADMIN — IPRATROPIUM BROMIDE AND ALBUTEROL SULFATE 3 ML: 2.5; .5 SOLUTION RESPIRATORY (INHALATION) at 14:25

## 2023-11-05 RX ADMIN — RISPERIDONE 1 MG: 1 TABLET ORAL at 04:32

## 2023-11-05 RX ADMIN — IPRATROPIUM BROMIDE AND ALBUTEROL SULFATE 3 ML: 2.5; .5 SOLUTION RESPIRATORY (INHALATION) at 23:17

## 2023-11-05 RX ADMIN — IPRATROPIUM BROMIDE AND ALBUTEROL SULFATE 3 ML: 2.5; .5 SOLUTION RESPIRATORY (INHALATION) at 10:50

## 2023-11-05 RX ADMIN — DIVALPROEX SODIUM 500 MG: 250 TABLET, DELAYED RELEASE ORAL at 04:32

## 2023-11-05 RX ADMIN — IPRATROPIUM BROMIDE AND ALBUTEROL SULFATE 3 ML: 2.5; .5 SOLUTION RESPIRATORY (INHALATION) at 19:39

## 2023-11-05 RX ADMIN — MOMETASONE FUROATE AND FORMOTEROL FUMARATE DIHYDRATE 2 PUFF: 200; 5 AEROSOL RESPIRATORY (INHALATION) at 19:39

## 2023-11-05 RX ADMIN — APIXABAN 2.5 MG: 2.5 TABLET, FILM COATED ORAL at 04:32

## 2023-11-05 RX ADMIN — DIVALPROEX SODIUM 500 MG: 250 TABLET, DELAYED RELEASE ORAL at 18:33

## 2023-11-05 RX ADMIN — PROPRANOLOL HYDROCHLORIDE 20 MG: 20 TABLET ORAL at 04:31

## 2023-11-05 RX ADMIN — IPRATROPIUM BROMIDE AND ALBUTEROL SULFATE 3 ML: 2.5; .5 SOLUTION RESPIRATORY (INHALATION) at 03:00

## 2023-11-05 RX ADMIN — MOMETASONE FUROATE AND FORMOTEROL FUMARATE DIHYDRATE 2 PUFF: 200; 5 AEROSOL RESPIRATORY (INHALATION) at 08:18

## 2023-11-05 RX ADMIN — FINASTERIDE 5 MG: 5 TABLET, FILM COATED ORAL at 04:32

## 2023-11-05 RX ADMIN — IPRATROPIUM BROMIDE AND ALBUTEROL SULFATE 3 ML: 2.5; .5 SOLUTION RESPIRATORY (INHALATION) at 00:03

## 2023-11-05 RX ADMIN — PROPRANOLOL HYDROCHLORIDE 20 MG: 20 TABLET ORAL at 18:34

## 2023-11-05 RX ADMIN — CEFDINIR 300 MG: 300 CAPSULE ORAL at 18:00

## 2023-11-05 RX ADMIN — IPRATROPIUM BROMIDE AND ALBUTEROL SULFATE 3 ML: 2.5; .5 SOLUTION RESPIRATORY (INHALATION) at 08:18

## 2023-11-05 RX ADMIN — INSULIN HUMAN 3 UNITS: 100 INJECTION, SOLUTION PARENTERAL at 20:14

## 2023-11-05 RX ADMIN — INSULIN HUMAN 3 UNITS: 100 INJECTION, SOLUTION PARENTERAL at 13:14

## 2023-11-05 RX ADMIN — INSULIN HUMAN 2 UNITS: 100 INJECTION, SOLUTION PARENTERAL at 09:19

## 2023-11-05 RX ADMIN — DULOXETINE HYDROCHLORIDE 30 MG: 30 CAPSULE, DELAYED RELEASE ORAL at 18:34

## 2023-11-05 RX ADMIN — APIXABAN 2.5 MG: 2.5 TABLET, FILM COATED ORAL at 18:00

## 2023-11-05 ASSESSMENT — ENCOUNTER SYMPTOMS
COUGH: 1
FEVER: 0
SHORTNESS OF BREATH: 1

## 2023-11-05 ASSESSMENT — FIBROSIS 4 INDEX: FIB4 SCORE: 2.14

## 2023-11-05 ASSESSMENT — PAIN DESCRIPTION - PAIN TYPE: TYPE: ACUTE PAIN

## 2023-11-05 NOTE — CARE PLAN
The patient is Stable - Low risk of patient condition declining or worsening    Shift Goals  Clinical Goals: monitor oxygen demand, q2h turns, re-orient, educate  Patient Goals: get ready  Family Goals: N/A    Progress made toward(s) clinical / shift goals:  decreased O2 needs to 1 liter.    Patient is not progressing towards the following goals:        Problem: Knowledge Deficit - Standard  Goal: Patient and family/care givers will demonstrate understanding of plan of care, disease process/condition, diagnostic tests and medications  Outcome: Progressing     Problem: Knowledge Deficit - COPD  Goal: Patient/significant other demonstrates understanding of disease process, utilization of the Action Plan, medications and discharge instruction  Outcome: Progressing     Problem: Risk for Infection - COPD  Goal: Patient will remain free from signs and symptoms of infection  Outcome: Progressing

## 2023-11-05 NOTE — PROGRESS NOTES
Assumed care of pt. Bedside report received from RN. Pt was updated on plan of care. Aox1, oriented to self only. Pt pain level 0/10. PT is on telemetry, SR 70. Call light, phone and personal belongings in reach. Bed strip alarm on and working properly, bed in lowest position, and locked. PT is sitting upright in bed eating dinner. Telesitter in room for pt safety.

## 2023-11-05 NOTE — PROGRESS NOTES
[facility] Medicine Daily Progress Note    Date of Service  11/5/2023    Chief Complaint  Wale Olivas is a 78 y.o. male admitted 11/2/2023 with shortness of breath    Hospital Course  History of Presenting Illness  Wale Olivas is a 78 y.o. male past medical history of CKD, COPD, diabetes, dementia baseline only alert and oriented to self who presented [facility]2023 with hypoxia  [facility]  History is unobtainable from patient as he has dementia.  I called daughter, Jose, who reports that her father supposed to be on oxygen but does not really use it.  She reports he was recently hospitalized and treated for pneumonia.      In the ER, he was given multiple breathing treatments however still wheezing on my exam.  He will require admission for acute COPD exacerbation.  Discussed with daughter in detail regarding plan of care.    Treated with breathing treatments and steroids.  Hospital course c/b penile wound (pulled condom cath, resendiz placed), hypertension and hyperglycemia likely in setting of receiving steroids.     Interval Problem Update  - still wheezing today despite getting q4h treatments, will order CXR, labs tomorrow, will assess for sputum production, patient doesn't know, may add azithromycin     I have discussed this patient's plan of care and discharge plan at IDT rounds today with Case Management, Nursing, Nursing leadership, and other members of the IDT team.    Consultants/Specialty  None    Code Status  Full Code    Disposition  The patient is not medically cleared for discharge to home or a post-acute facility.  Anticipate discharge to: skilled nursing facility    I have placed the appropriate orders for post-discharge needs.    Review of Systems  Review of Systems   Unable to perform ROS: Dementia   Constitutional:  Negative for fever.   Respiratory:  Positive for cough and shortness of breath.         Physical Exam  Temp:  [36.7 °C (98.1 °F)-37 °C (98.6 °F)] 37 °C (98.6  °F)  Pulse:  [60-76] 65  Resp:  [18-20] 18  BP: (146-178)/(68-88) 178/88  SpO2:  [91 %-98 %] 91 %    Physical Exam  Constitutional:       General: He is not in acute distress.     Appearance: He is not ill-appearing, toxic-appearing or diaphoretic.   HENT:      Head: Normocephalic and atraumatic.      Nose: Nose normal.      Mouth/Throat:      Mouth: Mucous membranes are moist.   Eyes:      General: No scleral icterus.     Conjunctiva/sclera: Conjunctivae normal.   Cardiovascular:      Rate and Rhythm: Normal rate and regular rhythm.      Heart sounds: No murmur heard.     No friction rub. No gallop.   Pulmonary:      Effort: No tachypnea or accessory muscle usage.      Breath sounds: Wheezing present.   Abdominal:      General: Bowel sounds are normal. There is no distension.      Palpations: Abdomen is soft.      Tenderness: There is no abdominal tenderness.   Genitourinary:     Comments: Ferro in place   Musculoskeletal:      Right lower leg: No edema.      Left lower leg: No edema.   Skin:     Coloration: Skin is not jaundiced.      Findings: No rash.   Neurological:      Mental Status: He is alert. He is disoriented.   Psychiatric:         Mood and Affect: Mood normal.         Behavior: Behavior normal.         Fluids    Intake/Output Summary (Last 24 hours) at 11/5/2023 0958  Last data filed at 11/5/2023 0600  Gross per 24 hour   Intake 400 ml   Output 2300 ml   Net -1900 ml         Laboratory                            Imaging  DX-CHEST-PORTABLE (1 VIEW)   Final Result         1.  No focal infiltrates.   2.  Atherosclerosis   3.  Perihilar interstitial prominence and bronchial wall cuffing, appearance suggests changes of underlying bronchial inflammation, consider bronchitis.      DX-CHEST-PORTABLE (1 VIEW)    (Results Pending)        Assessment/Plan  * Chronic obstructive pulmonary disease with acute exacerbation (HCC)  Assessment & Plan  Continue bronchodilators   -DuoNeb  -Dulera  Continue with  Spiriva  Prednisone 40 mg p.o. daily for total of 5 days  RT consult    11/4: continue DuoNebs every 4 hours, continue steroids, still wheezing and having slight increased work of breathing    11/5: continues to wheeze despite receiving q4h duonebs, on steroids, patient unsure of sputum production, will order CXR, may consider adding azithromycin       Acute on chronic respiratory failure (HCC)  Assessment & Plan  Daughter reports patient supposed to be on 3 L of oxygen however does not believe patient is compliant.    Likely secondary to COPD exacerbation.  Procalcitonin negative.  Will hold antibiotics.    Weaning oxygen as tolerated  Requiring 2 L today      Severe early onset Alzheimer's dementia (HCC)- (present on admission)  Assessment & Plan  Interventions to minimize the risk of delirium.   -do not disturb patient (vitals or lab draws) between the hours of 10 PM and 6 AM.  -frequent reorientation  -avoid sedatives  -up in chair for meals  -watch for constipation  -remove all unnecessary lines (central lines, peripheral IVs, feeding tubes, resendiz catheters)      Continue home risperidone 1 mg 3 times daily as well as duloxetine and Depakote    11/4: Intermittently requiring restraints, a little more confused overnight and today, was pulling at condom cath and caused penile trauma, Resendiz placed to protect skin, initially concern for possible milky urine, Resendiz replaced and urine appears more clear, UA obtained and does have some WBC, will send culture, family thinks he gets more confused when he has UTI        Atrial arrhythmia- (present on admission)  Assessment & Plan  Continue patient's propranolol and apixaban    Chronic anticoagulation- (present on admission)  Assessment & Plan  Continue Eliquis    Type 2 diabetes mellitus with hyperglycemia, without long-term current use of insulin ([facility])- (present on admission)  Assessment & Plan  Continue with sliding scale  Accu-Cheks and hypoglycemia  protocol  Aspiration precautions    11/4: Sugars uncontrolled, increasing Lantus to 15 units nightly plus sliding scale    11/5: sugars improving but still in 200s, will increase lantus 15-->17 units           VTE prophylaxis:    therapeutic anticoagulation with eliquis 2.5 mg BID      I have performed a physical exam and reviewed and updated ROS and Plan today (11/5/2023). In review of yesterday's note (11/4/2023), there are no changes except as documented above.

## 2023-11-05 NOTE — PROGRESS NOTES
Monitor Summary  Rhythm: Normal Sinus Rhythm  Rate: 60-90s  Ectopy: PVCs  .19 / .08 / .38

## 2023-11-05 NOTE — CARE PLAN
Problem: Bronchoconstriction  Goal: Improve in air movement and diminished wheezing  Description: Target End Date:  2 to 3 days    1.  Implement inhaled treatments  2.  Evaluate and manage medication effects  Outcome: Not Met     Problem: Bronchopulmonary Hygiene  Goal: Increase mobilization of retained secretions  Description: Target End Date:  2 to 3 days    1.  Perform bronchopulmonary therapy as indicated by assessment  2.  Perform airway suctioning  3.  Perform actions to maintain patient airway  Outcome: Not Met       Respiratory Update    Treatment modality: Duoneb, Flutter  Frequency: Q4    Pt tolerating current treatments well with no adverse reactions.

## 2023-11-06 PROBLEM — N39.0 UTI (URINARY TRACT INFECTION): Status: ACTIVE | Noted: 2023-11-06

## 2023-11-06 LAB
ANION GAP SERPL CALC-SCNC: 11 MMOL/L (ref 7–16)
BACTERIA UR CULT: ABNORMAL
BACTERIA UR CULT: ABNORMAL
BASOPHILS # BLD AUTO: 0.4 % (ref 0–1.8)
BASOPHILS # BLD: 0.04 K/UL (ref 0–0.12)
BUN SERPL-MCNC: 41 MG/DL (ref 8–22)
CALCIUM SERPL-MCNC: 9.1 MG/DL (ref 8.5–10.5)
CHLORIDE SERPL-SCNC: 102 MMOL/L (ref 96–112)
CO2 SERPL-SCNC: 26 MMOL/L (ref 20–33)
CREAT SERPL-MCNC: 1.49 MG/DL (ref 0.5–1.4)
EOSINOPHIL # BLD AUTO: 0.03 K/UL (ref 0–0.51)
EOSINOPHIL NFR BLD: 0.3 % (ref 0–6.9)
ERYTHROCYTE [DISTWIDTH] IN BLOOD BY AUTOMATED COUNT: 47.2 FL (ref 35.9–50)
GFR SERPLBLD CREATININE-BSD FMLA CKD-EPI: 48 ML/MIN/1.73 M 2
GLUCOSE BLD STRIP.AUTO-MCNC: 244 MG/DL (ref 65–99)
GLUCOSE BLD STRIP.AUTO-MCNC: 264 MG/DL (ref 65–99)
GLUCOSE BLD STRIP.AUTO-MCNC: 269 MG/DL (ref 65–99)
GLUCOSE BLD STRIP.AUTO-MCNC: 286 MG/DL (ref 65–99)
GLUCOSE SERPL-MCNC: 195 MG/DL (ref 65–99)
HCT VFR BLD AUTO: 39.2 % (ref 42–52)
HGB BLD-MCNC: 13.1 G/DL (ref 14–18)
IMM GRANULOCYTES # BLD AUTO: 0.09 K/UL (ref 0–0.11)
IMM GRANULOCYTES NFR BLD AUTO: 1 % (ref 0–0.9)
LYMPHOCYTES # BLD AUTO: 2.51 K/UL (ref 1–4.8)
LYMPHOCYTES NFR BLD: 27.9 % (ref 22–41)
MCH RBC QN AUTO: 29.9 PG (ref 27–33)
MCHC RBC AUTO-ENTMCNC: 33.4 G/DL (ref 32.3–36.5)
MCV RBC AUTO: 89.5 FL (ref 81.4–97.8)
MONOCYTES # BLD AUTO: 1.25 K/UL (ref 0–0.85)
MONOCYTES NFR BLD AUTO: 13.9 % (ref 0–13.4)
NEUTROPHILS # BLD AUTO: 5.07 K/UL (ref 1.82–7.42)
NEUTROPHILS NFR BLD: 56.5 % (ref 44–72)
NRBC # BLD AUTO: 0 K/UL
NRBC BLD-RTO: 0 /100 WBC (ref 0–0.2)
PLATELET # BLD AUTO: 178 K/UL (ref 164–446)
PMV BLD AUTO: 9 FL (ref 9–12.9)
POTASSIUM SERPL-SCNC: 4 MMOL/L (ref 3.6–5.5)
RBC # BLD AUTO: 4.38 M/UL (ref 4.7–6.1)
SIGNIFICANT IND 70042: ABNORMAL
SITE SITE: ABNORMAL
SODIUM SERPL-SCNC: 139 MMOL/L (ref 135–145)
SOURCE SOURCE: ABNORMAL
WBC # BLD AUTO: 9 K/UL (ref 4.8–10.8)

## 2023-11-06 PROCEDURE — 82962 GLUCOSE BLOOD TEST: CPT | Mod: 91

## 2023-11-06 PROCEDURE — 80048 BASIC METABOLIC PNL TOTAL CA: CPT

## 2023-11-06 PROCEDURE — 700102 HCHG RX REV CODE 250 W/ 637 OVERRIDE(OP): Performed by: INTERNAL MEDICINE

## 2023-11-06 PROCEDURE — 770001 HCHG ROOM/CARE - MED/SURG/GYN PRIV*

## 2023-11-06 PROCEDURE — 85025 COMPLETE CBC W/AUTO DIFF WBC: CPT

## 2023-11-06 PROCEDURE — 36415 COLL VENOUS BLD VENIPUNCTURE: CPT

## 2023-11-06 PROCEDURE — 700102 HCHG RX REV CODE 250 W/ 637 OVERRIDE(OP): Performed by: HOSPITALIST

## 2023-11-06 PROCEDURE — 99232 SBSQ HOSP IP/OBS MODERATE 35: CPT | Performed by: HOSPITALIST

## 2023-11-06 PROCEDURE — A9270 NON-COVERED ITEM OR SERVICE: HCPCS | Performed by: STUDENT IN AN ORGANIZED HEALTH CARE EDUCATION/TRAINING PROGRAM

## 2023-11-06 PROCEDURE — 700111 HCHG RX REV CODE 636 W/ 250 OVERRIDE (IP): Performed by: STUDENT IN AN ORGANIZED HEALTH CARE EDUCATION/TRAINING PROGRAM

## 2023-11-06 PROCEDURE — A9270 NON-COVERED ITEM OR SERVICE: HCPCS | Performed by: INTERNAL MEDICINE

## 2023-11-06 PROCEDURE — 700105 HCHG RX REV CODE 258: Performed by: HOSPITALIST

## 2023-11-06 PROCEDURE — 700102 HCHG RX REV CODE 250 W/ 637 OVERRIDE(OP): Performed by: STUDENT IN AN ORGANIZED HEALTH CARE EDUCATION/TRAINING PROGRAM

## 2023-11-06 RX ORDER — SODIUM CHLORIDE, SODIUM LACTATE, POTASSIUM CHLORIDE, CALCIUM CHLORIDE 600; 310; 30; 20 MG/100ML; MG/100ML; MG/100ML; MG/100ML
INJECTION, SOLUTION INTRAVENOUS CONTINUOUS
Status: DISCONTINUED | OUTPATIENT
Start: 2023-11-06 | End: 2023-11-07 | Stop reason: HOSPADM

## 2023-11-06 RX ORDER — IPRATROPIUM BROMIDE AND ALBUTEROL SULFATE 2.5; .5 MG/3ML; MG/3ML
3 SOLUTION RESPIRATORY (INHALATION)
Status: DISCONTINUED | OUTPATIENT
Start: 2023-11-06 | End: 2023-11-07 | Stop reason: HOSPADM

## 2023-11-06 RX ADMIN — RISPERIDONE 1 MG: 1 TABLET ORAL at 11:59

## 2023-11-06 RX ADMIN — APIXABAN 2.5 MG: 2.5 TABLET, FILM COATED ORAL at 05:30

## 2023-11-06 RX ADMIN — CEFDINIR 300 MG: 300 CAPSULE ORAL at 05:30

## 2023-11-06 RX ADMIN — PROPRANOLOL HYDROCHLORIDE 20 MG: 20 TABLET ORAL at 16:27

## 2023-11-06 RX ADMIN — INSULIN HUMAN 2 UNITS: 100 INJECTION, SOLUTION PARENTERAL at 16:41

## 2023-11-06 RX ADMIN — FINASTERIDE 5 MG: 5 TABLET, FILM COATED ORAL at 05:30

## 2023-11-06 RX ADMIN — APIXABAN 2.5 MG: 2.5 TABLET, FILM COATED ORAL at 16:26

## 2023-11-06 RX ADMIN — RISPERIDONE 1 MG: 1 TABLET ORAL at 17:16

## 2023-11-06 RX ADMIN — DULOXETINE HYDROCHLORIDE 30 MG: 30 CAPSULE, DELAYED RELEASE ORAL at 16:35

## 2023-11-06 RX ADMIN — TAMSULOSIN HYDROCHLORIDE 0.4 MG: 0.4 CAPSULE ORAL at 05:30

## 2023-11-06 RX ADMIN — INSULIN HUMAN 3 UNITS: 100 INJECTION, SOLUTION PARENTERAL at 21:10

## 2023-11-06 RX ADMIN — ALBUTEROL SULFATE 2 PUFF: 90 AEROSOL, METERED RESPIRATORY (INHALATION) at 16:37

## 2023-11-06 RX ADMIN — DIVALPROEX SODIUM 500 MG: 250 TABLET, DELAYED RELEASE ORAL at 16:33

## 2023-11-06 RX ADMIN — PREDNISONE 40 MG: 20 TABLET ORAL at 05:30

## 2023-11-06 RX ADMIN — PROPRANOLOL HYDROCHLORIDE 20 MG: 20 TABLET ORAL at 05:30

## 2023-11-06 RX ADMIN — SODIUM CHLORIDE, POTASSIUM CHLORIDE, SODIUM LACTATE AND CALCIUM CHLORIDE: 600; 310; 30; 20 INJECTION, SOLUTION INTRAVENOUS at 16:40

## 2023-11-06 RX ADMIN — RISPERIDONE 1 MG: 1 TABLET ORAL at 05:30

## 2023-11-06 RX ADMIN — CEFDINIR 300 MG: 300 CAPSULE ORAL at 17:15

## 2023-11-06 RX ADMIN — INSULIN GLARGINE-YFGN 22 UNITS: 100 INJECTION, SOLUTION SUBCUTANEOUS at 17:15

## 2023-11-06 RX ADMIN — MOMETASONE FUROATE AND FORMOTEROL FUMARATE DIHYDRATE 2 PUFF: 200; 5 AEROSOL RESPIRATORY (INHALATION) at 05:30

## 2023-11-06 RX ADMIN — DIVALPROEX SODIUM 500 MG: 250 TABLET, DELAYED RELEASE ORAL at 05:30

## 2023-11-06 RX ADMIN — INSULIN HUMAN 3 UNITS: 100 INJECTION, SOLUTION PARENTERAL at 12:01

## 2023-11-06 RX ADMIN — PRAVASTATIN SODIUM 80 MG: 20 TABLET ORAL at 21:05

## 2023-11-06 RX ADMIN — INSULIN HUMAN 3 UNITS: 100 INJECTION, SOLUTION PARENTERAL at 09:40

## 2023-11-06 ASSESSMENT — FIBROSIS 4 INDEX: FIB4 SCORE: 1.96

## 2023-11-06 ASSESSMENT — PAIN DESCRIPTION - PAIN TYPE: TYPE: ACUTE PAIN

## 2023-11-06 NOTE — PROGRESS NOTES
Hospital Medicine Daily Progress Note    Date of Service  11/6/2023    Chief Complaint  Wale Olivas is a 78 y.o. male admitted 11/2/2023 with shortness of breath    Hospital Course  History of Presenting Illness  Wale Olivas is a 78 y.o. male past medical history of CKD, COPD, diabetes, dementia baseline only alert and oriented to self who presented [facility]2023 [facility]  History is unobtainable from patient as he has dementia.  I called daughter, Jose, who reports that her father supposed to be on oxygen but does not really use it.  She reports he was recently hospitalized and treated for pneumonia.      In the ER, he was given multiple breathing treatments however still wheezing on my exam.  He will require admission for acute COPD exacerbation.  Discussed with daughter in detail regarding plan of care.    Treated with breathing treatments and steroids.  Hospital course c/b penile wound (pulled condom cath, resendiz placed), hypertension and hyperglycemia likely in setting of receiving steroids.     Interval Problem Update    Patient is afebrile on 0.5 L of oxygen  He is confused oriented to self only  Urine culture growing Proteus  I reviewed BMP BUN 41 creatinine 1.49  I reviewed CBC WBC 9.0 hemoglobin 13.1  I reviewed chest x-ray with improved interstitial infiltrates      I have discussed this patient's plan of care and discharge plan at IDT rounds today with Case Management, Nursing, Nursing leadership, and other members of the IDT team.    Consultants/Specialty  None    Code Status  Full Code    Disposition  The patient is not medically cleared for discharge to home or a post-acute facility.  Anticipate discharge to: skilled nursing facility    I have placed the appropriate orders for post-discharge needs.    Review of Systems  Review of Systems   Unable to perform ROS: Dementia        Physical Exam  Temp:  [36.4 °C (97.5 °F)-37 °C (98.6 °F)] 36.4 °C (97.5 °F)  Pulse:   [] 80  Resp:  [17-22] 18  BP: (121-171)/(74-92) 121/85  SpO2:  [91 %-96 %] 92 %    Physical Exam  Vitals and nursing note reviewed.   Constitutional:       Appearance: He is well-developed. He is not diaphoretic.   HENT:      Head: Normocephalic and atraumatic.      Mouth/Throat:      Pharynx: No oropharyngeal exudate.   Eyes:      General: No scleral icterus.        Right eye: No discharge.         Left eye: No discharge.      Conjunctiva/sclera: Conjunctivae normal.   Neck:      Vascular: No JVD.      Trachea: No tracheal deviation.   Cardiovascular:      Rate and Rhythm: Normal rate and regular rhythm.      Heart sounds: No murmur heard.     No friction rub. No gallop.   Pulmonary:      Effort: Pulmonary effort is normal. No respiratory distress.      Breath sounds: No stridor. Wheezing and rhonchi present.   Chest:      Chest wall: No tenderness.   Abdominal:      General: Bowel sounds are normal. There is no distension.      Palpations: Abdomen is soft.      Tenderness: There is no abdominal tenderness. There is no rebound.   Musculoskeletal:         General: No tenderness.      Cervical back: Neck supple.   Skin:     General: Skin is warm and dry.      Nails: There is no clubbing.   Neurological:      General: No focal deficit present.      Mental Status: He is alert. He is disoriented.      Cranial Nerves: No cranial nerve deficit.      Motor: Weakness present. No abnormal muscle tone.   Psychiatric:         Behavior: Behavior normal.         Fluids    Intake/Output Summary (Last 24 hours) at 11/6/2023 1440  Last data filed at 11/6/2023 0541  Gross per 24 hour   Intake 690 ml   Output 1080 ml   Net -390 ml         Laboratory  Recent Labs     11/06/23  0036   WBC 9.0   RBC 4.38*   HEMOGLOBIN 13.1*   HEMATOCRIT 39.2*   MCV 89.5   MCH 29.9   MCHC 33.4   RDW 47.2   PLATELETCT 178   MPV 9.0       Recent Labs     11/06/23  0036   SODIUM 139   POTASSIUM 4.0   CHLORIDE 102   CO2 26   GLUCOSE 195*   BUN 41*    CREATININE 1.49*   CALCIUM 9.1                     Imaging  DX-CHEST-PORTABLE (1 VIEW)   Final Result      1.  Interval improvement in the changes of edema/bronchitis.      DX-CHEST-PORTABLE (1 VIEW)   Final Result         1.  No focal infiltrates.   2.  Atherosclerosis   3.  Perihilar interstitial prominence and bronchial wall cuffing, appearance suggests changes of underlying bronchial inflammation, consider bronchitis.           Assessment/Plan  * Chronic obstructive pulmonary disease with acute exacerbation (HCC)  Assessment & Plan  Continue bronchodilators   -DuoNeb  -Dulera  Continue with Spiriva  Prednisone 40 mg p.o. daily for total of 5 days  RT consult    11/4: continue DuoNebs every 4 hours, continue steroids, still wheezing and having slight increased work of breathing    11/5: continues to wheeze despite receiving q4h duonebs, on steroids, patient unsure of sputum production, will order CXR, may consider adding azithromycin     11/6: Continue Dulera prednisone and Omnicef.  RT protocol.  Wean off oxygen as tolerated      UTI (urinary tract infection)  Assessment & Plan  Urine culture Proteus    Continue Omnicef for COPD exacerbation UTI    Acute on chronic respiratory failure (HCC)  Assessment & Plan  Daughter reports patient supposed to be on 3 L of oxygen however does not believe patient is compliant.    Likely secondary to COPD exacerbation.     Improved oxygen requirements      Severe early onset Alzheimer's dementia (HCC)- (present on admission)  Assessment & Plan  Interventions to minimize the risk of delirium.   -do not disturb patient (vitals or lab draws) between the hours of 10 PM and 6 AM.  -frequent reorientation  -avoid sedatives  -up in chair for meals  -watch for constipation  -remove all unnecessary lines (central lines, peripheral IVs, feeding tubes, resendiz catheters)      Continue home risperidone 1 mg 3 times daily as well as duloxetine and Depakote    11/4: Intermittently requiring  restraints, a little more confused overnight and today, was pulling at condom cath and caused penile trauma, Ferro placed to protect skin, initially concern for possible milky urine, Ferro replaced and urine appears more clear, UA obtained and does have some WBC, will send culture, family thinks he gets more confused when he has UTI    11/6/2023: Continue home dose of Risperdal and Cymbalta and Depakote frequent orientation        Atrial arrhythmia- (present on admission)  Assessment & Plan  Continue patient's propranolol and apixaban    JOSE ENRIQUE (acute kidney injury) ([facility])  Assessment & Plan  Gentle hydration and monitor    Chronic anticoagulation- (present on admission)  Assessment & Plan  Continue Eliquis    Type 2 diabetes mellitus with hyperglycemia, without long-term current use of insulin ([facility])- (present on admission)  Assessment & Plan  Continue with sliding scale  Accu-Cheks and hypoglycemia protocol  Aspiration precautions    11/4: Sugars uncontrolled, increasing Lantus to 15 units nightly plus sliding scale    11/5: sugars improving but still in 200s, will increase lantus 15-->17 units    11/6: CBGs remain elevated plan increase Lantus 22 units.  Continue sliding scale insulin and monitor CBGs           VTE prophylaxis:    therapeutic anticoagulation with eliquis 2.5 mg BID      I have performed a physical exam and reviewed and updated ROS and Plan today (11/6/2023). In review of yesterday's note (11/5/2023), there are no changes except as documented above.

## 2023-11-06 NOTE — CARE PLAN
The patient is Stable - Low risk of patient condition declining or worsening    Shift Goals  Clinical Goals: monitor resp. status, prevent skin breakdown  Patient Goals: Rest  Family Goals: CJ    Progress made toward(s) clinical / shift goals:    Problem: Skin Integrity  Goal: Skin integrity is maintained or improved  Description: Target End Date:  Prior to discharge or change in level of care    Document interventions on Skin Risk/Hector flowsheet groups and corresponding LDA    1.  Assess and monitor skin integrity, appearance and/or temperature  2.  Assess risk factors for impaired skin integrity and/or pressures ulcers  3.  Implement precautions to protect skin integrity in collaboration with interdisciplinary team  4.  Implement pressure ulcer prevention protocol if at risk for skin breakdown  5.  Confirm wound care consult if at risk for skin breakdown  6.  Ensure patient use of pressure relieving devices  (Low air loss bed, waffle overlay, heel protectors, ROHO cushion, etc)  Outcome: Progressing  Note: Q 2hr turns in place with TAPS. Waffle overlay placed on bed. Pt coccyx cleansed with washcloth and foam cleanser and barrier paste applied. No mepilex implemented as pt is incontinent. Penile wound was cleansed with foam cleanser and washcloth.      Problem: Fall Risk  Goal: Patient will remain free from falls  Description: Target End Date:  Prior to discharge or change in level of care    Document interventions on the [facility] Fall Risk Assessment    1.  Assess for fall risk factors  2.  Implement fall precautions  Outcome: Progressing  Note: Pt is a high fall risk. Bed is in lowest, locked position, bed rails x3 in place. Pt call light within reach. Tele sitter in the room.

## 2023-11-06 NOTE — PROGRESS NOTES
Monitor Summary  Rhythm: Sinus Rhythm with 1st degree HB  Rate: 60-90  Ectopy: PVC bigem  .21 / .08 / .38

## 2023-11-06 NOTE — PROGRESS NOTES
Monitor Summary     Rhythm: SR/ST  Heart Rate:   Ectopy: PVC [place]  Measurement: .19/.06/.32

## 2023-11-07 VITALS
RESPIRATION RATE: 16 BRPM | HEART RATE: 68 BPM | DIASTOLIC BLOOD PRESSURE: 72 MMHG | TEMPERATURE: 97.8 F | BODY MASS INDEX: 27.66 KG/M2 | OXYGEN SATURATION: 92 % | SYSTOLIC BLOOD PRESSURE: 143 MMHG | WEIGHT: 176.59 LBS

## 2023-11-07 LAB
ANION GAP SERPL CALC-SCNC: 10 MMOL/L (ref 7–16)
BACTERIA BLD CULT: NORMAL
BACTERIA BLD CULT: NORMAL
BUN SERPL-MCNC: 43 MG/DL (ref 8–22)
CALCIUM SERPL-MCNC: 8.9 MG/DL (ref 8.5–10.5)
CHLORIDE SERPL-SCNC: 104 MMOL/L (ref 96–112)
CO2 SERPL-SCNC: 24 MMOL/L (ref 20–33)
CREAT SERPL-MCNC: 1.49 MG/DL (ref 0.5–1.4)
ERYTHROCYTE [DISTWIDTH] IN BLOOD BY AUTOMATED COUNT: 48.1 FL (ref 35.9–50)
GFR SERPLBLD CREATININE-BSD FMLA CKD-EPI: 48 ML/MIN/1.73 M 2
GLUCOSE BLD STRIP.AUTO-MCNC: 160 MG/DL (ref 65–99)
GLUCOSE BLD STRIP.AUTO-MCNC: 297 MG/DL (ref 65–99)
GLUCOSE SERPL-MCNC: 173 MG/DL (ref 65–99)
HCT VFR BLD AUTO: 38.7 % (ref 42–52)
HGB BLD-MCNC: 13 G/DL (ref 14–18)
MCH RBC QN AUTO: 30.4 PG (ref 27–33)
MCHC RBC AUTO-ENTMCNC: 33.6 G/DL (ref 32.3–36.5)
MCV RBC AUTO: 90.6 FL (ref 81.4–97.8)
PLATELET # BLD AUTO: 162 K/UL (ref 164–446)
PMV BLD AUTO: 9 FL (ref 9–12.9)
POTASSIUM SERPL-SCNC: 4.1 MMOL/L (ref 3.6–5.5)
RBC # BLD AUTO: 4.27 M/UL (ref 4.7–6.1)
SIGNIFICANT IND 70042: NORMAL
SIGNIFICANT IND 70042: NORMAL
SITE SITE: NORMAL
SITE SITE: NORMAL
SODIUM SERPL-SCNC: 138 MMOL/L (ref 135–145)
SOURCE SOURCE: NORMAL
SOURCE SOURCE: NORMAL
WBC # BLD AUTO: 8.2 K/UL (ref 4.8–10.8)

## 2023-11-07 PROCEDURE — A9270 NON-COVERED ITEM OR SERVICE: HCPCS | Performed by: INTERNAL MEDICINE

## 2023-11-07 PROCEDURE — 700102 HCHG RX REV CODE 250 W/ 637 OVERRIDE(OP): Performed by: INTERNAL MEDICINE

## 2023-11-07 PROCEDURE — 80048 BASIC METABOLIC PNL TOTAL CA: CPT

## 2023-11-07 PROCEDURE — 99239 HOSP IP/OBS DSCHRG MGMT >30: CPT | Performed by: INTERNAL MEDICINE

## 2023-11-07 PROCEDURE — 700111 HCHG RX REV CODE 636 W/ 250 OVERRIDE (IP): Mod: JZ | Performed by: INTERNAL MEDICINE

## 2023-11-07 PROCEDURE — 700111 HCHG RX REV CODE 636 W/ 250 OVERRIDE (IP): Performed by: STUDENT IN AN ORGANIZED HEALTH CARE EDUCATION/TRAINING PROGRAM

## 2023-11-07 PROCEDURE — 82962 GLUCOSE BLOOD TEST: CPT

## 2023-11-07 PROCEDURE — 94640 AIRWAY INHALATION TREATMENT: CPT

## 2023-11-07 PROCEDURE — 700102 HCHG RX REV CODE 250 W/ 637 OVERRIDE(OP): Performed by: STUDENT IN AN ORGANIZED HEALTH CARE EDUCATION/TRAINING PROGRAM

## 2023-11-07 PROCEDURE — 36415 COLL VENOUS BLD VENIPUNCTURE: CPT

## 2023-11-07 PROCEDURE — A9270 NON-COVERED ITEM OR SERVICE: HCPCS | Performed by: STUDENT IN AN ORGANIZED HEALTH CARE EDUCATION/TRAINING PROGRAM

## 2023-11-07 PROCEDURE — 85027 COMPLETE CBC AUTOMATED: CPT

## 2023-11-07 RX ORDER — CEFDINIR 300 MG/1
300 CAPSULE ORAL EVERY 12 HOURS
Qty: 10 CAPSULE | Refills: 0 | Status: SHIPPED | OUTPATIENT
Start: 2023-11-07 | End: 2023-11-07 | Stop reason: SDUPTHER

## 2023-11-07 RX ORDER — BISACODYL 10 MG
10 SUPPOSITORY, RECTAL RECTAL
Status: DISCONTINUED | OUTPATIENT
Start: 2023-11-07 | End: 2023-11-07 | Stop reason: HOSPADM

## 2023-11-07 RX ORDER — POLYETHYLENE GLYCOL 3350 17 G/17G
1 POWDER, FOR SOLUTION ORAL
Status: DISCONTINUED | OUTPATIENT
Start: 2023-11-07 | End: 2023-11-07 | Stop reason: HOSPADM

## 2023-11-07 RX ORDER — CEFDINIR 300 MG/1
300 CAPSULE ORAL EVERY 12 HOURS
Qty: 10 CAPSULE | Refills: 0 | Status: ACTIVE | DISCHARGE
Start: 2023-11-07 | End: 2023-11-12

## 2023-11-07 RX ORDER — AMOXICILLIN 250 MG
2 CAPSULE ORAL 2 TIMES DAILY
Status: DISCONTINUED | OUTPATIENT
Start: 2023-11-07 | End: 2023-11-07 | Stop reason: HOSPADM

## 2023-11-07 RX ORDER — IPRATROPIUM BROMIDE AND ALBUTEROL SULFATE 2.5; .5 MG/3ML; MG/3ML
3 SOLUTION RESPIRATORY (INHALATION) EVERY 4 HOURS PRN
Status: SHIPPED
Start: 2023-11-07 | End: 2023-12-07

## 2023-11-07 RX ORDER — PREDNISONE 20 MG/1
TABLET ORAL
Qty: 21 TABLET | Refills: 0 | Status: SHIPPED
Start: 2023-11-07 | End: 2023-11-28

## 2023-11-07 RX ORDER — INSULIN GLARGINE 100 [IU]/ML
10 INJECTION, SOLUTION SUBCUTANEOUS 2 TIMES DAILY
Qty: 10 ML | Status: ACTIVE | DISCHARGE
Start: 2023-11-07

## 2023-11-07 RX ORDER — METHYLPREDNISOLONE SODIUM SUCCINATE 40 MG/ML
40 INJECTION, POWDER, LYOPHILIZED, FOR SOLUTION INTRAMUSCULAR; INTRAVENOUS ONCE
Status: COMPLETED | OUTPATIENT
Start: 2023-11-07 | End: 2023-11-07

## 2023-11-07 RX ADMIN — RISPERIDONE 1 MG: 1 TABLET ORAL at 12:58

## 2023-11-07 RX ADMIN — TAMSULOSIN HYDROCHLORIDE 0.4 MG: 0.4 CAPSULE ORAL at 04:56

## 2023-11-07 RX ADMIN — POLYETHYLENE GLYCOL 3350 1 PACKET: 17 POWDER, FOR SOLUTION ORAL at 12:57

## 2023-11-07 RX ADMIN — ALBUTEROL SULFATE 2 PUFF: 90 AEROSOL, METERED RESPIRATORY (INHALATION) at 11:20

## 2023-11-07 RX ADMIN — APIXABAN 2.5 MG: 2.5 TABLET, FILM COATED ORAL at 04:56

## 2023-11-07 RX ADMIN — PROPRANOLOL HYDROCHLORIDE 20 MG: 20 TABLET ORAL at 04:56

## 2023-11-07 RX ADMIN — INSULIN HUMAN 3 UNITS: 100 INJECTION, SOLUTION PARENTERAL at 13:11

## 2023-11-07 RX ADMIN — SENNOSIDES AND DOCUSATE SODIUM 2 TABLET: 50; 8.6 TABLET ORAL at 10:15

## 2023-11-07 RX ADMIN — BISACODYL 10 MG: 10 SUPPOSITORY RECTAL at 12:57

## 2023-11-07 RX ADMIN — MAGNESIUM HYDROXIDE 30 ML: 1200 LIQUID ORAL at 10:46

## 2023-11-07 RX ADMIN — CEFDINIR 300 MG: 300 CAPSULE ORAL at 04:56

## 2023-11-07 RX ADMIN — METHYLPREDNISOLONE SODIUM SUCCINATE 40 MG: 40 INJECTION, POWDER, FOR SOLUTION INTRAMUSCULAR; INTRAVENOUS at 10:46

## 2023-11-07 RX ADMIN — RISPERIDONE 1 MG: 1 TABLET ORAL at 04:57

## 2023-11-07 RX ADMIN — PREDNISONE 40 MG: 20 TABLET ORAL at 04:56

## 2023-11-07 RX ADMIN — DIVALPROEX SODIUM 500 MG: 250 TABLET, DELAYED RELEASE ORAL at 04:56

## 2023-11-07 RX ADMIN — FINASTERIDE 5 MG: 5 TABLET, FILM COATED ORAL at 04:56

## 2023-11-07 RX ADMIN — INSULIN HUMAN 1 UNITS: 100 INJECTION, SOLUTION PARENTERAL at 10:00

## 2023-11-07 RX ADMIN — MOMETASONE FUROATE AND FORMOTEROL FUMARATE DIHYDRATE 2 PUFF: 200; 5 AEROSOL RESPIRATORY (INHALATION) at 06:00

## 2023-11-07 ASSESSMENT — FIBROSIS 4 INDEX: FIB4 SCORE: 2.16

## 2023-11-07 NOTE — PROGRESS NOTES
Monitor Summary  Rhythm: Sinus Rhythm  Rate: 60-90  Ectopy: PVC [place], trigem  .20 / .16 / .31             Dr. Bartolo Gibbs (PGY4), Dr. Mindy Light (PGY2)

## 2023-11-07 NOTE — DISCHARGE PLANNING
"Per rounds- Patient not medically cleared. RNCM has cancelled [facility] transport for 11/5. Asked Therese to place in \"will call\" at this time.  "
Care Transition Team Assessment    Chart review completed. Pt is a long term resident at [facility] and is anticipated to return there upon DC.    Information Source  Orientation Level: Unable to assess (pt not answering questions at this time)  Information Given By: Other (Comments) (chart review)  Who is responsible for making decisions for patient? : Adult child  Name(s) of Primary Decision Maker: Jose Olivas    Readmission Evaluation  Is this a readmission?: No    Elopement Risk  Legal Hold: No  Ambulatory or Self Mobile in Wheelchair: No-Not an Elopement Risk  Elopement Risk: Not at Risk for Elopement    Discharge Preparedness  What is your plan after discharge?: Skilled nursing facility  What are your discharge supports?: Child, Other (comment) (SNF staff)  Prior Functional Level: Needs Assist with Activities of Daily Living, Needs Assist with Medication Management  Difficulity with ADLs: Bathing, Brushing teeth, Dressing, Eating, Toileting, Walking  Difficulity with IADLs: Cooking, Driving, Keeping track of finances, Laundry, Managing medication, Shopping, Using the telephone or computer    Functional Assesment  Prior Functional Level: Needs Assist with Activities of Daily Living, Needs Assist with Medication Management    Finances  Financial Barriers to Discharge: No  Prescription Coverage: Yes    Advance Directive  Advance Directive?: POLST    Domestic Abuse  Have you ever been the victim of abuse or violence?: No    Psychological Assessment  History of Substance Abuse: None  History of Psychiatric Problems: No  Non-compliant with Treatment: No  Newly Diagnosed Illness: No    Discharge Risks or Barriers  Discharge risks or barriers?: No    Anticipated Discharge Information  Discharge Disposition: D/T to SNF with Medicare cert in anticipation of skilled care (03)  Discharge Address: [address] [place] [address]  
Case Management Discharge Planning    Admission Date: 11/2/2023  GMLOS: 3.5  ALOS: 1    6-Clicks ADL Score:    6-Clicks Mobility Score:    PT and/or OT Eval ordered: No  Post-acute Referrals Ordered: NA  Post-acute Choice Obtained: NA  Has referral(s) been sent to post-acute provider:  NA      Anticipated Discharge Dispo: Discharge Disposition: D/T to SNF with Medicare cert in anticipation of skilled care (03)  Discharge Address: [address] [place] [address]  Per chart review pt resides in [place]    Family support:  Name Relation Home Work Mobile   Jose Olivas Daughter   [phone]   Current Insurance on file: Medicare and [facility]     DME Needed: No    Action(s) Taken:  chart reviewed.Pt discussed during IDT rounds. LMSW spoke with Albert at the [facility] to set up transport. TRANSPORT set up back to the [facility] on Sunday at 1000 per IDT rounds.     Escalations Completed: None    Medically Clear: No    Next Steps: Follow up with medical team if there is a change in care. Cancel transport if pt is no longer MC for transport.     Barriers to Discharge: Medical Clearance.    Is the patient up for discharge tomorrow: No        
Case Management Discharge Planning    Admission Date: 11/2/2023  GMLOS: 3.5  ALOS: 4    6-Clicks ADL Score: 11  6-Clicks Mobility Score: 7  PT and/or OT Eval ordered: No  Post-acute Referrals Ordered: No  Post-acute Choice Obtained: No  Has referral(s) been sent to post-acute provider:  No      Anticipated Discharge Dispo: Discharge Disposition: D/T to SNF with Medicare cert in anticipation of skilled care (03)  Discharge Address: [address] [place] [address]  Per chart review pt resides in [place]     Family support:  Name Relation Home Work Mobile   Jose Olivas Daughter     [phone]   Current Insurance on file: Medicare and [facility]     DME Needed: No    Action(s) Taken: chart reviewed.Pt discussed during IDT rounds. Will call canceled for transfer back.     Escalations Completed: None    Medically Clear: No    Next Steps: Follow up with medical team if there is a change in care. Set up transport when pt is MC.    Barriers to Discharge: Medical clearance    Is the patient up for discharge tomorrow: No        
Case Management Discharge Planning    Admission Date: 11/2/2023  GMLOS: 3.5  ALOS: 5    6-Clicks ADL Score: 11  6-Clicks Mobility Score: 7  PT and/or OT Eval ordered: Yes  PT/OT: Recommend Post acute placement   Post-acute Referrals Ordered: Yes  Post-acute Choice Obtained: Yes  Has referral(s) been sent to post-acute provider:  Yes  [facility]     Anticipated Discharge Dispo: Discharge Disposition: D/T to SNF with Medicare cert in anticipation of skilled care (03)  Discharge Address: [address] [place] [address]    DME Needed: No    Action(s) Taken: Choice obtained, Referral(s) sent, and Transport Arranged     Escalations Completed: None    Medically Clear: Yes    Next Steps: Transportation arranged for 1630 to [facility]    Barriers to Discharge: Transportation    Is the patient up for discharge tomorrow: Pt is DC today         
DC Transport Scheduled    Received request at: 11/3/2023 at 1510    Transport Company Scheduled:  [employer]  Spoke with Therese at [facility] to schedule transport.    Scheduled Date: 11/5/2023  Scheduled Time: 1000    Destination: [facility] [address] [facility] [place]     Notified care team of scheduled transport via Voalte.     If there are any changes needed to the DC transportation scheduled, please contact [facility] at ext. [phone] between the hours of 7728-0362 Mon-Fri. If outside those hours, contact the ED Case Manager at ext. [phone].     
DC Transport Scheduled    Received request at: 11/7/2023 at 1142    Transport Company Scheduled:  [facility]  Spoke with Nury at [facility] to schedule transport.    Scheduled Date: 11/7/2023   Scheduled Time: 1630    [facility]     Notified care team of scheduled transport via Voalte.     If there are any changes needed to the DC transportation scheduled, please contact [facility] at ext. [phone] between the hours of 4295-1256 Mon-Fri. If outside those hours, contact the ED Case Manager at ext. [phone].     
[facility] #[id number]  
normal...

## 2023-11-07 NOTE — CARE PLAN
The patient is Watcher - Medium risk of patient condition declining or worsening    Shift Goals encourage coughing  Clinical Goals: monitor resp. status, prevent skin breakdown  Patient Goals: Rest  Family Goals: CJ    Progress made toward(s) clinical / shift goals:    Problem: Ineffective Airway Clearance  Goal: Patient will maintain patent airway with clear/clearing breath sounds  Outcome: Progressing   Patient makes effort to cough but effort is weak.    Patient is not progressing towards the following goals:

## 2023-11-07 NOTE — DISCHARGE SUMMARY
Discharge Summary    CHIEF COMPLAINT ON ADMISSION  Chief Complaint   Patient presents with    Shortness of Breath           Cough     Recent dx with pneumonia        Reason for Admission  ems    Admission Date  11/2/2023     CODE STATUS  Full Code    HPI & HOSPITAL COURSE  This is a 78 y.o. male here with shortness of breath.    Wale Olivas is a 78 y.o. male past medical history of CKD, COPD, diabetes, dementia baseline only alert and oriented to self who presented 11[facility]2/2023 with [facility]  History is unobtainable from patient as he has dementia. I called daughter, Jose, who reports that her father supposed to be on oxygen but does not really use it.  She reports he was recently hospitalized and treated for pneumonia. In the ER, he was given multiple breathing treatments however still wheezing on my exam.  He will require admission for acute COPD exacerbation. Treated with breathing treatments and steroids.  Hospital course c/b penile wound (pulled condom cath, resendiz placed), hypertension and hyperglycemia likely in setting of receiving steroids.  Patient's urine culture grew Proteus and he was started on Omnicef, he will need to finish off the oral course.  Due to patient's extended stay for continued wheezing I have ordered a prednisone taper for discharge as well as DuoNebs as needed as it was noted patient improved with breathing treatments. his glargine has been increased to 10 units twice daily and will likely need to be titrated with the changing of the prednisone doses.  He should continue his Ellipta and Breo.  Patient is currently doing well on room air, however patient may be needing oxygen especially at night as daughter reports that he is supposed to be on this chronically.  Patient is stable and ready for discharge back to SNF, he is to return to the ER if his condition worsens.    Therefore, he is discharged in good and stable condition to skilled nursing  facility.    The patient met 2-midnight criteria for an inpatient stay at the time of discharge.      FOLLOW UP ITEMS POST DISCHARGE  Follow up with primary care as needed   I do recommend follow-up with pulmonary outpatient, I have placed a referral      DISCHARGE DIAGNOSES  Principal Problem:    Chronic obstructive pulmonary disease with acute exacerbation (HCC) (POA: Unknown)  Active Problems:    Type 2 diabetes mellitus with hyperglycemia, without long-term current use of insulin (HCC) (POA: Yes)    Chronic anticoagulation (POA: Yes)    JOSE ENRIQUE (acute kidney injury) (HCC) (POA: Unknown)    Atrial arrhythmia (POA: Yes)    Severe early onset Alzheimer's dementia (HCC) (POA: Yes)    Acute on chronic respiratory failure (HCC) (POA: Unknown)    UTI (urinary tract infection) (POA: Unknown)  Resolved Problems:    * No resolved hospital problems. *      FOLLOW UP  No future appointments.  [facility]  [address] [facility] [address]  [phone]          MEDICATIONS ON DISCHARGE     Medication List        START taking these medications        Instructions   cefdinir 300 MG Caps  Commonly known as: Omnicef   Take 1 Capsule by mouth every 12 hours for 5 days.  Dose: 300 mg     ipratropium-albuterol 0.5-2.5 (3) MG/3ML nebulizer solution  Commonly known as: Duoneb   Take 3 mL by nebulization every four hours as needed for Shortness of Breath for up to 30 days.  Dose: 3 mL     predniSONE 20 MG Tabs  Start taking on: November 7, 2023  Commonly known as: Deltasone   Take 1.5 Tablets by mouth every day for 7 days, THEN 1 Tablet every day for 7 days, THEN 0.5 Tablets every day for 7 days.            CHANGE how you take these medications        Instructions   insulin glargine 100 UNIT/ML Joseph  What changed: how much to take  Commonly known as: Lantus   Inject 10 Units under the skin 2 times a day.  Dose: 10 Units            CONTINUE taking these medications        Instructions   albuterol 108 (90  Base) MCG/ACT Aers inhalation aerosol   Inhale 2 Puffs every 6 hours as needed for Shortness of Breath.  Dose: 2 Puff     amLODIPine 5 MG Tabs  Commonly known as: Norvasc   Take 5 mg by mouth every day.  Dose: 5 mg     baclofen 10 MG Tabs  Commonly known as: Lioresal   Take 10 mg by mouth 2 times a day.  Dose: 10 mg     divalproex 125 MG EC tablet  Commonly known as: Depakote   Take 500 mg by mouth 2 times a day. 4 tablets = 500 mg  Dose: 500 mg     DULoxetine HCl 30 MG Csdr   Take 30 mg by mouth every day.  Dose: 30 mg     Eliquis 2.5mg Tabs  Generic drug: apixaban   Take 2.5 mg by mouth 2 times a day.  Dose: 2.5 mg     finasteride 5 MG Tabs  Commonly known as: Proscar   Take 5 mg by mouth every day.  Dose: 5 mg     fluticasone furoate-vilanterol 100-25 MCG/ACT Aepb  Commonly known as: Breo   Inhale 1 Puff every day.  Dose: 1 Puff     Incruse Ellipta 62.5 MCG/ACT Aepb inhaler  Generic drug: umeclidinium bromide   Inhale 1 Puff every day.  Dose: 1 Puff     NovoLIN R 100 Unit/mL Soln  Generic drug: insulin regular   Inject 2-10 Units under the skin 4 Times a Day,Before Meals and at Bedtime. Sliding Scale  201-250= 2 units  251-300= 4 units  301-350= 6 units  351-400= 8 units  Greater than 400 call MD  Dose: 2-10 Units     pantoprazole 40 MG Tbec  Commonly known as: Protonix   Take 40 mg by mouth every day.  Dose: 40 mg     polyethylene glycol/lytes 17 g Pack  Commonly known as: Miralax   Take 17 g by mouth every day.  Dose: 17 g     pravastatin 20 MG Tabs  Commonly known as: Pravachol   Take 80 mg by mouth every evening.  Dose: 80 mg     propranolol 20 MG Tabs  Commonly known as: Inderal   Take 20 mg by mouth every 12 hours.  Dose: 20 mg     risperiDONE 1 MG Tabs  Commonly known as: RisperDAL   Take 1 mg by mouth 3 times a day. Indications: psychosis  Dose: 1 mg     senna-docusate 8.6-50 MG Tabs  Commonly known as: Pericolace Or Senokot S   Take 1 Tablet by mouth 2 times a day.  Dose: 1 Tablet     tamsulosin 0.4 MG  capsule  Commonly known as: Flomax   Take 0.4 mg by mouth every day.  Dose: 0.4 mg              Allergies  Allergies   Allergen Reactions    Metformin Unspecified     Unknown reaction, listed on MAR     Morphine Unspecified     Unknown reaction, listed on MAR     Simvastatin Unspecified     Unknown reaction, listed on MAR     Spironolactone Unspecified     Unknown reaction, listed on MAR        DIET  Orders Placed This Encounter   Procedures    Diet Order Diet: Level 7 - Easy to Chew; Liquid level: Level 0 - Thin; Second Modifier: (optional): Consistent CHO (Diabetic)     Standing Status:   Standing     Number of Occurrences:   1     Order Specific Question:   Diet:     Answer:   Level 7 - Easy to Chew [22]     Order Specific Question:   Liquid level     Answer:   Level 0 - Thin     Order Specific Question:   Second Modifier: (optional)     Answer:   Consistent CHO (Diabetic) [4]       ACTIVITY  As tolerated.  Weight bearing as tolerated    LINES, DRAINS, AND WOUNDS  This is an automated list. Peripheral IVs will be removed prior to discharge.  Peripheral IV 11/02/23 18 G Anterior;Distal;Right Forearm (Active)   Site Assessment Clean;Dry;Intact 11/07/23 1000   Dressing Type Transparent;Occlusive 11/07/23 1000   Line Status Scrubbed the hub prior to access;Flushed;Saline locked 11/07/23 1000   Dressing Status Clean;Dry;Intact 11/07/23 1000   Dressing Intervention N/A 11/07/23 1000   Infiltration Grading ([facility]) 0 11/07/23 1000   Phlebitis Scale ([facility] Only) 0 11/07/23 1000     Urethral Catheter (Active)   Site Assessment Clean 11/06/23 2200   Collection Container Standard drainage bag 11/06/23 2200   Urinary Catheter Care Tamper Evident Seal in Place;Drainage Tube Extended;Drainage Bag Not Overfilled;Drainage Tubing Properly Secured;Cath Care Done with Soap & Water;Drainage Bag Below Bladder Level and Not on Floor 11/06/23 2200   Securement Method Securing device (Describe) 11/06/23 2200   Output (mL) 380 mL  11/06/23 0541      Wound 09/22/23 Pressure Injury Heel Left POA Stage 3 (Active)       Wound 11/02/23 Coccyx POA DTI (Active)   Wound Image   11/04/23 1300   Site Assessment Red;Pink;Excoriated 11/07/23 0730   Periwound Assessment Intact 11/07/23 0730   Margins Attached edges 11/06/23 2200   Closure Open to air 11/07/23 0730   Drainage Amount None 11/07/23 0730   Treatments Offloading 11/06/23 2200   Offloading/DME Offloading Boot 11/05/23 0945   Wound Cleansing Foam Cleanser/Washcloth 11/06/23 2200   Periwound Protectant Barrier Paste 11/06/23 2200   Dressing Status Clean;Dry;Intact 11/06/23 2200   Dressing Change/Treatment Frequency Every Shift, and As Needed 11/05/23 2000   NEXT Dressing Change/Treatment Date 11/08/23 11/05/23 2000   NEXT Weekly Photo (Inpatient Only) 11/11/23 11/04/23 1300   Wound Team Following Not following 11/04/23 1300       Wound 11/04/23 Partial Thickness Wound Penis Skin Tear (Active)   Wound Image   11/04/23 1300   Site Assessment Red;Pink;Excoriated 11/07/23 0730   Periwound Assessment Clean;Dry 11/07/23 0730   Margins Attached edges 11/06/23 2200   Closure Open to air 11/05/23 2000   Drainage Amount Scant 11/06/23 2200   Drainage Description Serous 11/06/23 2200   Treatments Cleansed;Site care 11/06/23 2200   Wound Cleansing Foam Cleanser/Washcloth 11/06/23 2200   Periwound Protectant Barrier Paste 11/05/23 2000   Dressing Status Open to Air 11/06/23 2200   Dressing Change/Treatment Frequency Every Shift, and As Needed 11/05/23 2000   NEXT Dressing Change/Treatment Date 11/04/23 11/04/23 1300   NEXT Weekly Photo (Inpatient Only) 11/11/23 11/04/23 1300   Wound Team Following Not following 11/04/23 1300   Non-staged Wound Description Partial thickness 11/04/23 1300       Peripheral IV 11/02/23 18 G Anterior;Distal;Right Forearm (Active)   Site Assessment Clean;Dry;Intact 11/07/23 1000   Dressing Type Transparent;Occlusive 11/07/23 1000   Line Status Scrubbed the hub prior to  access;Flushed;Saline locked 11/07/23 1000   Dressing Status Clean;Dry;Intact 11/07/23 1000   Dressing Intervention N/A 11/07/23 1000   Infiltration Grading ([facility]) 0 11/07/23 1000   Phlebitis Scale ([facility] Only) 0 11/07/23 1000           Urethral Catheter (Active)   Site Assessment Clean 11/06/23 2200   Collection Container Standard drainage bag 11/06/23 2200   Urinary Catheter Care Tamper Evident Seal in Place;Drainage Tube Extended;Drainage Bag Not Overfilled;Drainage Tubing Properly Secured;Cath Care Done with Soap & Water;Drainage Bag Below Bladder Level and Not on Floor 11/06/23 2200   Securement Method Securing device (Describe) 11/06/23 2200   Output (mL) 380 mL 11/06/23 0541        MENTAL STATUS ON TRANSFER             CONSULTATIONS  N/A    PROCEDURES  N/A    LABORATORY  Lab Results   Component Value Date    SODIUM 138 11/07/2023    POTASSIUM 4.1 11/07/2023    CHLORIDE 104 11/07/2023    CO2 24 11/07/2023    GLUCOSE 173 (H) 11/07/2023    BUN 43 (H) 11/07/2023    CREATININE 1.49 (H) 11/07/2023        Lab Results   Component Value Date    WBC 8.2 11/07/2023    HEMOGLOBIN 13.0 (L) 11/07/2023    HEMATOCRIT 38.7 (L) 11/07/2023    PLATELETCT 162 (L) 11/07/2023        Total time of the discharge process exceeds 34 minutes.

## 2023-11-07 NOTE — DOCUMENTATION QUERY
[facility]                                                                       Query Response Note      PATIENT:               FRENCH TUBBS  ACCT #:                  [id number]  MRN:                     [id number]  :                      1945  ADMIT DATE:       2023 7:25 AM  DISCH DATE:          RESPONDING  PROVIDER #:        [id number]           QUERY TEXT:    Patient with dementia admitted for COPD exacerbation.   PN documents more confused overnight and today...family thinks gets more confused when has UTI.  Urine  culture positive for Proteus mirabilis.  Treated with PO Cefdinir.  Can the diagnosis of dementia be further clarified based on the clinical indicators documented?      The patient's Clinical Indicators include:   Urine culture = + Proteus mirabilis     PN - more confused overnight and today...family thinks gets more confused when has UTI    Treatment - Urine culture; PO Cefdinir; PO Depakote, Risperidone    Risk factors - Dementia, confused, UTI    Thank you,  Roberta Barraza BSN  Clinical   Connect via [employer]  Options provided:   -- Altered Mental Status is a progression of Dementia and not an acute condition   -- Altered Mental Status is related to the UTI and is metabolic encephalopathy superimposed on dementia   -- Dementia with behavior disturbances of agitation   -- Dementia only, no further specification]   [[Other explanation, (please specify the other explanation)   -- Unable to determine      Query created by: Roberta Barraza on 2023 5:25 PM    RESPONSE TEXT:    Altered Mental Status is related to the UTI and is metabolic encephalopathy superimposed on dementia          Electronically signed by:  MARA RENTERIA MD 2023 8:36 AM

## 2023-11-07 NOTE — CARE PLAN
The patient is Watcher - Medium risk of patient condition declining or worsening    Shift Goals  Clinical Goals: monitor resp. status, prevent skin breakdown  Patient Goals: Rest  Family Goals: CJ    Progress made toward(s) clinical / shift goals:  changing position.      Patient is not progressing towards the following goals:    Problem: Knowledge Deficit - Standard  Goal: Patient and family/care givers will demonstrate understanding of plan of care, disease process/condition, diagnostic tests and medications  Outcome: Progressing     Problem: Knowledge Deficit - COPD  Goal: Patient/significant other demonstrates understanding of disease process, utilization of the Action Plan, medications and discharge instruction  Outcome: Progressing     Problem: Risk for Infection - COPD  Goal: Patient will remain free from signs and symptoms of infection  Outcome: Progressing     Problem: Nutrition - Advanced  Goal: Patient will display progressive weight gain toward goal have adequate food and fluid intake  Outcome: Progressing     Problem: Ineffective Airway Clearance  Goal: Patient will maintain patent airway with clear/clearing breath sounds  Outcome: Progressing     Problem: Impaired Gas Exchange  Goal: Patient will demonstrate improved ventilation and adequate oxygenation and participate in treatment regimen within the level of ability/situation.  Outcome: Progressing     Problem: Risk for Aspiration  Goal: Patient's risk for aspiration will be absent or decrease  Outcome: Progressing     Problem: Self Care  Goal: Patient will have the ability to perform ADLs independently or with assistance (bathe, groom, dress, toilet and feed)  Outcome: Progressing     Problem: Skin Integrity  Goal: Skin integrity is maintained or improved  Outcome: Progressing     Problem: Fall Risk  Goal: Patient will remain free from falls  Outcome: Progressing

## 2023-11-07 NOTE — PROGRESS NOTES
Tried calling report to [facility]. Called twice and sent to voicemail each time. Voicemail left with call back number.

## 2023-11-07 NOTE — PROGRESS NOTES
Assumed care of patient at bedside report from day RN. Updated on POC. Patient currently A & O x self; on 2 L O2 nasal cannula; up bed bound; without complaints of acute pain. Assessment completed Call light within reach. Whiteboard updated. Fall precautions in place. Bed locked and in lowest position. All questions answered. No other needs indicated at this time.

## 2023-11-08 NOTE — PROGRESS NOTES
Third attempt at calling [facility]. Got into contact with RN. Report given, all questions answered. IV removed, [facility] here for transport. All belongings with patient.

## 2023-11-15 ENCOUNTER — HOSPITAL ENCOUNTER (EMERGENCY)
Facility: MEDICAL CENTER | Age: 78
End: 2023-11-15
Attending: EMERGENCY MEDICINE
Payer: MEDICARE

## 2023-11-15 ENCOUNTER — APPOINTMENT (OUTPATIENT)
Dept: RADIOLOGY | Facility: MEDICAL CENTER | Age: 78
End: 2023-11-15
Attending: EMERGENCY MEDICINE
Payer: MEDICARE

## 2023-11-15 VITALS
HEART RATE: 83 BPM | TEMPERATURE: 97.8 F | WEIGHT: 176 LBS | OXYGEN SATURATION: 95 % | HEIGHT: 67 IN | BODY MASS INDEX: 27.62 KG/M2 | DIASTOLIC BLOOD PRESSURE: 98 MMHG | RESPIRATION RATE: 18 BRPM | SYSTOLIC BLOOD PRESSURE: 198 MMHG

## 2023-11-15 DIAGNOSIS — S01.81XA FOREHEAD LACERATION, INITIAL ENCOUNTER: ICD-10-CM

## 2023-11-15 DIAGNOSIS — S09.90XA CLOSED HEAD INJURY, INITIAL ENCOUNTER: ICD-10-CM

## 2023-11-15 DIAGNOSIS — T14.8XXA HEMATOMA: ICD-10-CM

## 2023-11-15 PROCEDURE — 99284 EMERGENCY DEPT VISIT MOD MDM: CPT

## 2023-11-15 PROCEDURE — 304999 HCHG REPAIR-SIMPLE/INTERMED LEVEL 1

## 2023-11-15 PROCEDURE — 70450 CT HEAD/BRAIN W/O DYE: CPT

## 2023-11-15 PROCEDURE — 72125 CT NECK SPINE W/O DYE: CPT

## 2023-11-15 PROCEDURE — 70486 CT MAXILLOFACIAL W/O DYE: CPT

## 2023-11-15 PROCEDURE — 303747 HCHG EXTRA SUTURE

## 2023-11-15 PROCEDURE — 304217 HCHG IRRIGATION SYSTEM

## 2023-11-15 PROCEDURE — 700101 HCHG RX REV CODE 250

## 2023-11-15 RX ORDER — LIDOCAINE HYDROCHLORIDE AND EPINEPHRINE BITARTRATE 20; .01 MG/ML; MG/ML
10 INJECTION, SOLUTION SUBCUTANEOUS ONCE
Status: COMPLETED | OUTPATIENT
Start: 2023-11-15 | End: 2023-11-15

## 2023-11-15 RX ADMIN — LIDOCAINE HYDROCHLORIDE,EPINEPHRINE BITARTRATE 10 ML: 20; .01 INJECTION, SOLUTION INFILTRATION; PERINEURAL at 08:46

## 2023-11-15 ASSESSMENT — FIBROSIS 4 INDEX: FIB4 SCORE: 2.16

## 2023-11-15 NOTE — ED PROVIDER NOTES
ER Provider Note    Scribed for Davon Quinonez M.d. by Maritza Helm. 11/15/2023  7:54 AM    Primary Care Provider: None noted    CHIEF COMPLAINT  Chief Complaint   Patient presents with    T-5000 Head Injury     Patient BIB EMS from [facility], patient was in wheel chair and found on ground. Patient has a Laceration to right side of face, L hip pain, and neck tenderness. Patient A/O x 1-2 at baseline. - LOC + thinners eliquis.      EXTERNAL RECORDS REVIEWED  Outpatient Notes Patient has a history of dementia. He was last seen at ED on 11/2/23 for COPD exacerbation. Patient takes Eliquis.     HPI/ROS  LIMITATION TO HISTORY   None  OUTSIDE HISTORIAN(S):  EMS who brought patient into ED and contributed to medical history.     Wale Olivas is a 78 y.o. male who presents to the ED as a code TBI. [facility]. Patient was found on the ground from his wheelchair. Patient normally is on a wheelchair and is able to transfer well at baseline. Patient reports head trauma at this time. Patient has associated right eye swelling, right face laceration, neck pain and left hip pain. Patient denies loss of consciousness. Patient takes Eliquis. He had a history of craniotomy, dementia, COPD and diabetes.    PAST MEDICAL HISTORY  Past Medical History:   Diagnosis Date    Anxiety     BPH (benign prostatic hyperplasia)     CKD (chronic kidney disease)     COPD (chronic obstructive pulmonary disease) (HCC)     COVID-19     Dementia (HCC)     GERD (gastroesophageal reflux disease)     Hyperlipidemia     Insomnia     Major depressive disorder     Type II diabetes mellitus (HCC)      SURGICAL HISTORY  No pertinent past surgical history.    FAMILY HISTORY  No pertinent family history.    SOCIAL HISTORY   reports that he has never smoked. He has never used smokeless tobacco. He reports that he does not currently use alcohol. He reports that he does not currently use drugs.    CURRENT  MEDICATIONS  Previous Medications    ALBUTEROL 108 (90 BASE) MCG/ACT AERO SOLN INHALATION AEROSOL    Inhale 2 Puffs every 6 hours as needed for Shortness of Breath.    AMLODIPINE (NORVASC) 5 MG TAB    Take 5 mg by mouth every day.    APIXABAN (ELIQUIS) 2.5MG TAB    Take 2.5 mg by mouth 2 times a day.    BACLOFEN (LIORESAL) 10 MG TAB    Take 10 mg by mouth 2 times a day.    DIVALPROEX (DEPAKOTE) 125 MG EC TABLET    Take 500 mg by mouth 2 times a day. 4 tablets = 500 mg    DULOXETINE HCL 30 MG CAPSULE DELAYED RELEASE SPRINKLE    Take 30 mg by mouth every day.    FINASTERIDE (PROSCAR) 5 MG TAB    Take 5 mg by mouth every day.    FLUTICASONE FUROATE-VILANTEROL (BREO) 100-25 MCG/ACT AEROSOL POWDER, BREATH ACTIVATED    Inhale 1 Puff every day.    INSULIN GLARGINE (LANTUS) 100 UNIT/ML SC SOLN    Inject 10 Units under the skin 2 times a day.    INSULIN REGULAR (NOVOLIN R) 100 UNIT/ML SOLUTION    Inject 2-10 Units under the skin 4 Times a Day,Before Meals and at Bedtime. Sliding Scale  201-250= 2 units  251-300= 4 units  301-350= 6 units  351-400= 8 units  Greater than 400 call MD    IPRATROPIUM-ALBUTEROL (DUONEB) 0.5-2.5 (3) MG/3ML NEBULIZER SOLUTION    Take 3 mL by nebulization every four hours as needed for Shortness of Breath for up to 30 days.    PANTOPRAZOLE (PROTONIX) 40 MG TABLET DELAYED RESPONSE    Take 40 mg by mouth every day.    POLYETHYLENE GLYCOL/LYTES (MIRALAX) 17 G PACK    Take 17 g by mouth every day.    PRAVASTATIN (PRAVACHOL) 20 MG TAB    Take 80 mg by mouth every evening.    PREDNISONE (DELTASONE) 20 MG TAB    Take 1.5 Tablets by mouth every day for 7 days, THEN 1 Tablet every day for 7 days, THEN 0.5 Tablets every day for 7 days.    PROPRANOLOL (INDERAL) 20 MG TAB    Take 20 mg by mouth every 12 hours.    RISPERIDONE (RISPERDAL) 1 MG TAB    Take 1 mg by mouth 3 times a day. Indications: psychosis    SENNA-DOCUSATE (PERICOLACE OR SENOKOT S) 8.6-50 MG TAB    Take 1 Tablet by mouth 2 times a day.     "TAMSULOSIN (FLOMAX) 0.4 MG CAPSULE    Take 0.4 mg by mouth every day.    UMECLIDINIUM BROMIDE (INCRUSE ELLIPTA) 62.5 MCG/ACT AEROSOL POWDER, BREATH ACTIVATED    Inhale 1 Puff every day.     ALLERGIES  Metformin, Morphine, Simvastatin, and Spironolactone    PHYSICAL EXAM  BP (!) 193/92   Pulse 73   Resp 18   Ht 1.702 m (5' 7\")   Wt 79.8 kg (176 lb)   SpO2 96%   BMI 27.57 kg/m²   Constitutional: Well developed, Well nourished, No acute distress, Non-toxic appearance.   HENT: Normocephalic, Bilateral external ears normal, Oropharynx is clear mucous membranes are moist. No oral exudates or nasal discharge.   Eyes: Pupils are equal round and reactive, EOMI, Conjunctiva normal, No discharge. Right eye swelling. Patient had minimal swelling when he came in but now he has a significant hematoma that has developed. Complex laceration at right eyebrow. Macerated wound margins approximately 3.5 cm in total length.   Neck: Normal range of motion, No tenderness, Supple, No stridor. No meningismus.  Lymphatic: No lymphadenopathy noted.   Cardiovascular: Regular rate and rhythm without murmur rub or gallop.  Thorax & Lungs: Clear breath sounds bilaterally without wheezes, rhonchi or rales. There is no chest wall tenderness.   Abdomen: Soft non-tender non-distended. There is no rebound or guarding. No organomegaly is appreciated. Bowel sounds are normal.  Skin: Normal without rash.   Back: No CVA or spinal tenderness.   Extremities: Intact distal pulses, No edema, No tenderness, No cyanosis, No clubbing. Capillary refill is less than 2 seconds.  Musculoskeletal: Good range of motion in all major joints. No tenderness to palpation or major deformities noted.   Neurologic: Alert & oriented x 3, Normal motor function, Normal sensory function, No focal deficits noted. Reflexes are normal.  Psychiatric: Affect normal, Judgment normal, Mood normal. There is no suicidal ideation or patient reported hallucinations.     DIAGNOSTIC " STUDIES  Radiology:   The attending emergency physician has independently interpreted the diagnostic imaging associated with this visit and am waiting the final reading from the radiologist.   Preliminary interpretation is a follows: No evidence of fracture  Radiologist interpretation:   CT-MAXILLOFACIAL W/O PLUS RECONS   Final Result      1.  Acute right periorbital hematoma. The shape of the right globe is well maintained indicating that there is no globe rupture. There is no retro-orbital hematoma.   2.  There is no evidence of fracture.      CT-CSPINE WITHOUT PLUS RECONS   Final Result      1.  No acute fracture or dislocation.   2.  Degenerative changes in the cervical spine.   3.  Bone island in the spinous process of C7.      CT-HEAD W/O   Final Result      1.  No evidence of acute intracranial posttraumatic sequelae.   2.  Periorbital soft tissue hematoma. The shape of the globe is maintained. There is no retro-orbital hematoma.   3.  Right frontotemporal encephalomalacia with previous craniotomy changes.   4.  Age-appropriate cerebral volume loss.           PROCEDURES:  Laceration Repair Procedure Note    Indication: Laceration    Procedure: The patient was placed in the appropriate position and anesthesia around the laceration was obtained by infiltration using 2% Lidocaine with epinephrine. The wound was minimally contaminated .The area was then irrigated with normal saline. The laceration was closed with 5-0 Ethilon using running sutures. There were no additional lacerations requiring repair. The wound area was then dressed with a sterile dressing.      Total repaired wound length: 3.5 cm.     Other Items: Suture count: 6    The patient tolerated the procedure well.    Complications: None    COURSE & MEDICAL DECISION MAKING     ED Observation Status? No; the patient does not meet criteria for ED observation at this time.    INITIAL ASSESSMENT, COURSE AND PLAN  Care Narrative:   7:57 AM - Patient was seen  and evaluated by charge desk. Patient presents to the ED as a code TBI. On exam patient had right eye swelling and right facial laceration. He has a complex laceration at right eyebrow. Macerated wound margins approximately 3.5 cm in total length. After my exam, I discussed with the patient the plan of care, which includes obtaining imaging for further evaluation. Patient was sent to CT stat. Patient understands and verbalizes agreement to plan of care.     8:43 AM - Patient was numbed with 2% lidocaine with epinephrine at this time. Patient had minimal swelling when he came in but now he has a significant hematoma that has developed.    CT shows right periorbital hematoma but no evidence of globe injury and no retro-orbital hematoma or intracranial bleed or skull fracture/maxillofacial fracture/neck fracture    9:03 AM - Laceration repair procedure performed by me at this time. See note above for details. Discussed with patient would care instructions. Use soap and water, and cleanse twice daily. Instructed patient to apply antibiotic ointment thereafter for wound care. Informed patient his hematoma should slowly go down within the next few weeks and fully resolve within a month. I then informed the patient of my plan for discharge, which includes strict return precautions for any new or worsening symptoms. Patient understands and verbalizes agreement to plan of care. Patient is comfortable going home at this time.      DISPOSITION AND DISCUSSIONS  I have discussed management of the patient with the following physicians and DEANNA's:  None    Discussion of management with other QHP or appropriate source(s): None     Escalation of care considered, and ultimately not performed: blood analysis.which is not indicated at this time    Barriers to care at this time, including but not limited to: Patient does not have established PCP.     Decision tools and prescription drugs considered including, but not limited to: Treat  pain with over the counter medications and ointment.     Patient will be discharged home in stable condition.    FINAL DIAGNOSIS  1. Closed head injury, initial encounter    2. Forehead laceration, initial encounter    3. Hematoma    4.  Forehead laceration with suture repair by ERP, 3.5 cm    The note accurately reflects work and decisions made by me.  Davon Quinonez M.D.  11/15/2023  9:59 AM

## 2023-11-15 NOTE — ED NOTES
[facility] at bedside to transport patient back to facility. Transfer packet and discharge paperwork sent with patient. Patient care transferred.

## 2023-11-15 NOTE — ED TRIAGE NOTES
Chief Complaint   Patient presents with    T-5000 Head Injury     Patient BIB EMS from [facility], patient was in wheel chair and found on ground. Patient has a Laceration to right side of face, L hip pain, and neck tenderness. Patient A/O x 1-2 at baseline. - LOC + thinners eliquis.

## 2023-11-15 NOTE — DISCHARGE PLANNING
ER  Note:  Received information that pt is medically cleared to be discharged back to [facility] and needs assistance with gurney transport. RN CM called facility, spoke to NAHEED Geller. RN confirmed pt is from their facility. Call transferred to ED RN for report. Per chart review, pt is confused. RN BRIAN spoke to pt's daughter. Received consent for discharge back to facility and transportation. PCS faxed to [facility]. Per Zelalem from [facility], ETA 1100. Pt's daughter, ED RN and ERP updated. Cobra, transfer packet in chart.

## 2023-11-15 NOTE — DISCHARGE INSTRUCTIONS
Soap and water cleanse twice daily and use antibiotic ointment thereafter  Suture removal in 7 days time  Hematoma should slowly go down over the next couple of weeks and fully resolve within a month

## 2023-11-15 NOTE — ED NOTES
Bandage applied over suture wound. Patient changed into dry hospital gown. Fresh warm blankets provided. Meal provided. Call light within reach. Bed alarm in use.

## 2023-12-29 ENCOUNTER — APPOINTMENT (OUTPATIENT)
Dept: RADIOLOGY | Facility: MEDICAL CENTER | Age: 78
End: 2023-12-29
Attending: EMERGENCY MEDICINE
Payer: MEDICARE

## 2023-12-29 ENCOUNTER — HOSPITAL ENCOUNTER (EMERGENCY)
Facility: MEDICAL CENTER | Age: 78
End: 2023-12-29
Attending: EMERGENCY MEDICINE
Payer: MEDICARE

## 2023-12-29 VITALS
WEIGHT: 180 LBS | RESPIRATION RATE: 13 BRPM | HEART RATE: 54 BPM | BODY MASS INDEX: 28.25 KG/M2 | OXYGEN SATURATION: 96 % | HEIGHT: 67 IN | TEMPERATURE: 97.4 F | SYSTOLIC BLOOD PRESSURE: 173 MMHG | DIASTOLIC BLOOD PRESSURE: 72 MMHG

## 2023-12-29 DIAGNOSIS — F02.80 DEMENTIA DUE TO ALZHEIMER'S DISEASE (HCC): ICD-10-CM

## 2023-12-29 DIAGNOSIS — G30.9 DEMENTIA DUE TO ALZHEIMER'S DISEASE (HCC): ICD-10-CM

## 2023-12-29 DIAGNOSIS — R41.0 CONFUSION: ICD-10-CM

## 2023-12-29 LAB
ALBUMIN SERPL BCP-MCNC: 3.2 G/DL (ref 3.2–4.9)
ALBUMIN/GLOB SERPL: 0.8 G/DL
ALP SERPL-CCNC: 74 U/L (ref 30–99)
ALT SERPL-CCNC: 9 U/L (ref 2–50)
ANION GAP SERPL CALC-SCNC: 11 MMOL/L (ref 7–16)
APPEARANCE UR: CLEAR
AST SERPL-CCNC: 14 U/L (ref 12–45)
BACTERIA #/AREA URNS HPF: NEGATIVE /HPF
BASOPHILS # BLD AUTO: 0.9 % (ref 0–1.8)
BASOPHILS # BLD: 0.07 K/UL (ref 0–0.12)
BILIRUB SERPL-MCNC: 0.5 MG/DL (ref 0.1–1.5)
BILIRUB UR QL STRIP.AUTO: NEGATIVE
BUN SERPL-MCNC: 18 MG/DL (ref 8–22)
CALCIUM ALBUM COR SERPL-MCNC: 9.2 MG/DL (ref 8.5–10.5)
CALCIUM SERPL-MCNC: 8.6 MG/DL (ref 8.5–10.5)
CHLORIDE SERPL-SCNC: 99 MMOL/L (ref 96–112)
CO2 SERPL-SCNC: 24 MMOL/L (ref 20–33)
COLOR UR: YELLOW
CREAT SERPL-MCNC: 1.45 MG/DL (ref 0.5–1.4)
EOSINOPHIL # BLD AUTO: 0.52 K/UL (ref 0–0.51)
EOSINOPHIL NFR BLD: 6.3 % (ref 0–6.9)
EPI CELLS #/AREA URNS HPF: ABNORMAL /HPF
ERYTHROCYTE [DISTWIDTH] IN BLOOD BY AUTOMATED COUNT: 43.3 FL (ref 35.9–50)
GFR SERPLBLD CREATININE-BSD FMLA CKD-EPI: 50 ML/MIN/1.73 M 2
GLOBULIN SER CALC-MCNC: 3.8 G/DL (ref 1.9–3.5)
GLUCOSE SERPL-MCNC: 333 MG/DL (ref 65–99)
GLUCOSE UR STRIP.AUTO-MCNC: >=1000 MG/DL
HCT VFR BLD AUTO: 36.7 % (ref 42–52)
HGB BLD-MCNC: 12.5 G/DL (ref 14–18)
HYALINE CASTS #/AREA URNS LPF: ABNORMAL /LPF
IMM GRANULOCYTES # BLD AUTO: 0.07 K/UL (ref 0–0.11)
IMM GRANULOCYTES NFR BLD AUTO: 0.9 % (ref 0–0.9)
KETONES UR STRIP.AUTO-MCNC: ABNORMAL MG/DL
LACTATE SERPL-SCNC: 2 MMOL/L (ref 0.5–2)
LACTATE SERPL-SCNC: 2.9 MMOL/L (ref 0.5–2)
LEUKOCYTE ESTERASE UR QL STRIP.AUTO: NEGATIVE
LYMPHOCYTES # BLD AUTO: 1.58 K/UL (ref 1–4.8)
LYMPHOCYTES NFR BLD: 19.2 % (ref 22–41)
MCH RBC QN AUTO: 30.4 PG (ref 27–33)
MCHC RBC AUTO-ENTMCNC: 34.1 G/DL (ref 32.3–36.5)
MCV RBC AUTO: 89.3 FL (ref 81.4–97.8)
MICRO URNS: ABNORMAL
MONOCYTES # BLD AUTO: 0.84 K/UL (ref 0–0.85)
MONOCYTES NFR BLD AUTO: 10.2 % (ref 0–13.4)
NEUTROPHILS # BLD AUTO: 5.15 K/UL (ref 1.82–7.42)
NEUTROPHILS NFR BLD: 62.5 % (ref 44–72)
NITRITE UR QL STRIP.AUTO: NEGATIVE
NRBC # BLD AUTO: 0 K/UL
NRBC BLD-RTO: 0 /100 WBC (ref 0–0.2)
PH UR STRIP.AUTO: 5.5 [PH] (ref 5–8)
PLATELET # BLD AUTO: 153 K/UL (ref 164–446)
PMV BLD AUTO: 8.2 FL (ref 9–12.9)
POTASSIUM SERPL-SCNC: 4.4 MMOL/L (ref 3.6–5.5)
PROT SERPL-MCNC: 7 G/DL (ref 6–8.2)
PROT UR QL STRIP: 30 MG/DL
RBC # BLD AUTO: 4.11 M/UL (ref 4.7–6.1)
RBC # URNS HPF: ABNORMAL /HPF
RBC UR QL AUTO: NEGATIVE
SODIUM SERPL-SCNC: 134 MMOL/L (ref 135–145)
SP GR UR STRIP.AUTO: 1.02
UROBILINOGEN UR STRIP.AUTO-MCNC: 1 MG/DL
WBC # BLD AUTO: 8.2 K/UL (ref 4.8–10.8)
WBC #/AREA URNS HPF: ABNORMAL /HPF

## 2023-12-29 PROCEDURE — 83605 ASSAY OF LACTIC ACID: CPT

## 2023-12-29 PROCEDURE — 85025 COMPLETE CBC W/AUTO DIFF WBC: CPT

## 2023-12-29 PROCEDURE — 71045 X-RAY EXAM CHEST 1 VIEW: CPT

## 2023-12-29 PROCEDURE — 80053 COMPREHEN METABOLIC PANEL: CPT

## 2023-12-29 PROCEDURE — 70450 CT HEAD/BRAIN W/O DYE: CPT

## 2023-12-29 PROCEDURE — 36415 COLL VENOUS BLD VENIPUNCTURE: CPT

## 2023-12-29 PROCEDURE — 81001 URINALYSIS AUTO W/SCOPE: CPT

## 2023-12-29 PROCEDURE — 87040 BLOOD CULTURE FOR BACTERIA: CPT | Mod: 91

## 2023-12-29 PROCEDURE — 700111 HCHG RX REV CODE 636 W/ 250 OVERRIDE (IP): Mod: JZ | Performed by: EMERGENCY MEDICINE

## 2023-12-29 PROCEDURE — 87086 URINE CULTURE/COLONY COUNT: CPT

## 2023-12-29 PROCEDURE — 96374 THER/PROPH/DIAG INJ IV PUSH: CPT

## 2023-12-29 PROCEDURE — 700105 HCHG RX REV CODE 258: Performed by: EMERGENCY MEDICINE

## 2023-12-29 PROCEDURE — 99285 EMERGENCY DEPT VISIT HI MDM: CPT

## 2023-12-29 RX ORDER — SODIUM CHLORIDE, SODIUM LACTATE, POTASSIUM CHLORIDE, CALCIUM CHLORIDE 600; 310; 30; 20 MG/100ML; MG/100ML; MG/100ML; MG/100ML
1000 INJECTION, SOLUTION INTRAVENOUS ONCE
Status: COMPLETED | OUTPATIENT
Start: 2023-12-29 | End: 2023-12-29

## 2023-12-29 RX ORDER — CEFTRIAXONE 2 G/1
2000 INJECTION, POWDER, FOR SOLUTION INTRAMUSCULAR; INTRAVENOUS ONCE
Status: COMPLETED | OUTPATIENT
Start: 2023-12-29 | End: 2023-12-29

## 2023-12-29 RX ADMIN — CEFTRIAXONE SODIUM 2000 MG: 2 INJECTION, POWDER, FOR SOLUTION INTRAMUSCULAR; INTRAVENOUS at 14:09

## 2023-12-29 RX ADMIN — SODIUM CHLORIDE, POTASSIUM CHLORIDE, SODIUM LACTATE AND CALCIUM CHLORIDE 1000 ML: 600; 310; 30; 20 INJECTION, SOLUTION INTRAVENOUS at 12:00

## 2023-12-29 ASSESSMENT — FIBROSIS 4 INDEX: FIB4 SCORE: 2.16

## 2023-12-29 NOTE — ED NOTES
Discharge instructions given to pt. Prescriptions unchanged. Pt educated, verbalizes understanding. All belongings accounted for. [facility] All belongings accounted for at transfer.

## 2023-12-29 NOTE — DISCHARGE PLANNING
MSW received notification from bedside RN. Pt needs to return to the [facility]. MSW arranged [facility] transport for 1700. Cobra and packet on chart. Bedside RN called report.

## 2023-12-29 NOTE — ED PROVIDER NOTES
ED Provider Note    CHIEF COMPLAINT  Chief Complaint   Patient presents with    UTI     Pt sent from [facility] for above. Diagnosed with UTI on [place] Noemi per EMS and has been started on cefdinir. Staff reports increase in confusion and agitation today.        EXTERNAL RECORDS REVIEWED  Reviewed records from recent ER visit dated November 2023    HPI/ROS  LIMITATION TO HISTORY   Patient with underlying dementia  OUTSIDE HISTORIAN(S):  Transferring facility and EMS providers provided care regarding course of events    Wale Olivas is a 78 y.o. male who presents evaluation of increasing agitation confusion.  The patient has a complex medical history as listed below.  He has underlying chronic kidney disease as well as dementia.  He was apparently diagnosed with a urinary tract infection around 7 days ago started on Omnicef.  There is no report of any trauma or fall.  The patient was reportedly more confused than his baseline.  He arrives here in no acute distress.  He is oriented to his person only.    PAST MEDICAL HISTORY   has a past medical history of Anxiety, BPH (benign prostatic hyperplasia), CKD (chronic kidney disease), COPD (chronic obstructive pulmonary disease) ([facility]), COVID-19, Dementia ([facility]), GERD (gastroesophageal reflux disease), Hyperlipidemia, Insomnia, Major depressive disorder, and Type II diabetes mellitus ([facility]).    SURGICAL HISTORY  patient denies any surgical history    FAMILY HISTORY  History reviewed. No pertinent family history.    SOCIAL HISTORY  Social History     Tobacco Use    Smoking status: Never    Smokeless tobacco: Never   Vaping Use    Vaping Use: Never used   Substance and Sexual Activity    Alcohol use: Not Currently    Drug use: Not Currently    Sexual activity: Not on file       CURRENT MEDICATIONS  Home Medications       Reviewed by Mayelin Alfaro R.N. (Registered Nurse) on 12/29/23 at 1023  Med List Status: Not Addressed     Medication Last Dose  "Status   albuterol 108 (90 Base) MCG/ACT Aero Soln inhalation aerosol  Active   amLODIPine (NORVASC) 5 MG Tab  Active   apixaban (ELIQUIS) 2.5mg Tab  Active   baclofen (LIORESAL) 10 MG Tab  Active   divalproex (DEPAKOTE) 125 MG EC tablet  Active   DULoxetine HCl 30 MG Capsule Delayed Release Sprinkle  Active   finasteride (PROSCAR) 5 MG Tab  Active   fluticasone furoate-vilanterol (BREO) 100-25 MCG/ACT AEROSOL POWDER, BREATH ACTIVATED  Active   insulin glargine (LANTUS) 100 UNIT/ML SC SOLN  Active   insulin regular (NOVOLIN R) 100 Unit/mL Solution  Active   pantoprazole (PROTONIX) 40 MG Tablet Delayed Response  Active   polyethylene glycol/lytes (MIRALAX) 17 g Pack  Active   pravastatin (PRAVACHOL) 20 MG Tab  Active   propranolol (INDERAL) 20 MG Tab  Active   risperiDONE (RISPERDAL) 1 MG Tab  Active   senna-docusate (PERICOLACE OR SENOKOT S) 8.6-50 MG Tab  Active   tamsulosin (FLOMAX) 0.4 MG capsule  Active   Umeclidinium Bromide (INCRUSE ELLIPTA) 62.5 MCG/ACT AEROSOL POWDER, BREATH ACTIVATED  Active                    ALLERGIES  Allergies   Allergen Reactions    Metformin Unspecified     Unknown reaction, listed on MAR     Morphine Unspecified     Unknown reaction, listed on MAR     Simvastatin Unspecified     Unknown reaction, listed on MAR     Spironolactone Unspecified     Unknown reaction, listed on MAR        PHYSICAL EXAM  VITAL SIGNS: /63   Pulse 70   Temp 36.2 °C (97.1 °F) (Temporal)   Ht 1.702 m (5' 7\")   Wt 81.6 kg (180 lb)   SpO2 95%   BMI 28.19 kg/m²    Pulse ox interpretation: I interpret this pulse ox as normal.  Constitutional: Oriented to person only, no evidence of lethargy or toxicity   HEENT: Atraumatic normocephalic, pupils are equal round reactive to light extraocular movements are intact. The nares is clear, external ears are normal, mouth shows moist mucous membranes normal dentition for age  Neck: Supple, no JVD no tracheal deviation  Cardiovascular: Regular rate and rhythm no " murmur rub or gallop 2+ pulses peripherally x4  Thorax & Lungs: No respiratory distress, no wheezes rales or rhonchi, No chest tenderness.   GI: Soft nontender nondistended positive bowel sounds, no peritoneal signs  Skin: Warm dry no acute rash or lesion  Musculoskeletal: Moving all extremities with full range and 5 of 5 strength no acute  deformity  Neurologic: Cranial nerves III through XII are grossly intact no sensory deficit no cerebellar dysfunction no seizure activity moving all extremities  Psychiatric: Mildly agitated        DIAGNOSTIC STUDIES / PROCEDURES      LABS  Results for orders placed or performed during the hospital encounter of 12/29/23   CBC With Differential   Result Value Ref Range    WBC 8.2 4.8 - 10.8 K/uL    RBC 4.11 (L) 4.70 - 6.10 M/uL    Hemoglobin 12.5 (L) 14.0 - 18.0 g/dL    Hematocrit 36.7 (L) 42.0 - 52.0 %    MCV 89.3 81.4 - 97.8 fL    MCH 30.4 27.0 - 33.0 pg    MCHC 34.1 32.3 - 36.5 g/dL    RDW 43.3 35.9 - 50.0 fL    Platelet Count 153 (L) 164 - 446 K/uL    MPV 8.2 (L) 9.0 - 12.9 fL    Neutrophils-Polys 62.50 44.00 - 72.00 %    Lymphocytes 19.20 (L) 22.00 - 41.00 %    Monocytes 10.20 0.00 - 13.40 %    Eosinophils 6.30 0.00 - 6.90 %    Basophils 0.90 0.00 - 1.80 %    Immature Granulocytes 0.90 0.00 - 0.90 %    Nucleated RBC 0.00 0.00 - 0.20 /100 WBC    Neutrophils (Absolute) 5.15 1.82 - 7.42 K/uL    Lymphs (Absolute) 1.58 1.00 - 4.80 K/uL    Monos (Absolute) 0.84 0.00 - 0.85 K/uL    Eos (Absolute) 0.52 (H) 0.00 - 0.51 K/uL    Baso (Absolute) 0.07 0.00 - 0.12 K/uL    Immature Granulocytes (abs) 0.07 0.00 - 0.11 K/uL    NRBC (Absolute) 0.00 K/uL   Comp Metabolic Panel   Result Value Ref Range    Sodium 134 (L) 135 - 145 mmol/L    Potassium 4.4 3.6 - 5.5 mmol/L    Chloride 99 96 - 112 mmol/L    Co2 24 20 - 33 mmol/L    Anion Gap 11.0 7.0 - 16.0    Glucose 333 (H) 65 - 99 mg/dL    Bun 18 8 - 22 mg/dL    Creatinine 1.45 (H) 0.50 - 1.40 mg/dL    Calcium 8.6 8.5 - 10.5 mg/dL    Correct  Calcium 9.2 8.5 - 10.5 mg/dL    AST(SGOT) 14 12 - 45 U/L    ALT(SGPT) 9 2 - 50 U/L    Alkaline Phosphatase 74 30 - 99 U/L    Total Bilirubin 0.5 0.1 - 1.5 mg/dL    Albumin 3.2 3.2 - 4.9 g/dL    Total Protein 7.0 6.0 - 8.2 g/dL    Globulin 3.8 (H) 1.9 - 3.5 g/dL    A-G Ratio 0.8 g/dL   Lactic Acid   Result Value Ref Range    Lactic Acid 2.9 (H) 0.5 - 2.0 mmol/L   Urinalysis    Specimen: Urine   Result Value Ref Range    Color Yellow     Character Clear     Specific Gravity 1.021 <1.035    Ph 5.5 5.0 - 8.0    Glucose >=1000 (A) Negative mg/dL    Ketones Trace (A) Negative mg/dL    Protein 30 (A) Negative mg/dL    Bilirubin Negative Negative    Urobilinogen, Urine 1.0 Negative    Nitrite Negative Negative    Leukocyte Esterase Negative Negative    Occult Blood Negative Negative    Micro Urine Req Microscopic    URINE MICROSCOPIC (W/UA)   Result Value Ref Range    WBC 5-10 (A) /hpf    RBC 2-5 (A) /hpf    Bacteria Negative None /hpf    Epithelial Cells Few /hpf    Hyaline Cast 0-2 /lpf   ESTIMATED GFR   Result Value Ref Range    GFR (CKD-EPI) 50 (A) >60 mL/min/1.73 m 2         RADIOLOGY  I have independently interpreted the diagnostic imaging associated with this visit and am waiting the final reading from the radiologist.   My preliminary interpretation is as follows: No acute intracranial hemorrhage  Radiologist interpretation:     CT-HEAD W/O   Final Result      1.  No acute intracranial findings.      2.  Diffuse atrophy and periventricular white matter changes consistent with chronic small vessel disease.      3.  Encephalomalacia in the distribution of the right MCA.      4.  Status post right craniotomy with associated dural thickening      5.  Paranasal sinus disease and small right mastoid effusion               DX-CHEST-PORTABLE (1 VIEW)   Final Result      1.  No acute cardiac or pulmonary abnormalities are identified.          COURSE & MEDICAL DECISION MAKING    ED Observation Status? Yes; I am placing the  patient in to an observation status due to a diagnostic uncertainty as well as therapeutic intensity. Patient placed in observation status a at 10:30 AM, 12/29/2023.     Observation plan is as follows:     Upon Reevaluation, the patient's condition has: Improved; and will be discharged.    Patient discharged from ED Observation status at 3:51 PM (Time) 12/29 (Date).     INITIAL ASSESSMENT, COURSE AND PLAN  Care Narrative:     This is a very challenging 78-year-old gentleman with known dementia sent over from nursing home for decline in mental status.  I reviewed his records and he has been referred to our emergency department on several occasions for similar presentations.  Septic protocol was initiated as I thought the patient could have underlying sepsis.  He has no leukocytosis high fever tachycardia or signs of endorgan ischemia.  Lactic acid is reassuring.  Catheterized urine sample did not clearly suggest overt infection and patient had a repeat CT scan of the head and did not demonstrate any new process.  I considered but did not feel admission is clinically indicated.  I did discuss the plan of care with the patient's family.  I feel comfortable sending the patient back to his nursing home.  I suspect this could be a stepwise profound decline in his overall mental status and functioning due to underlying dementia.  He was given a single dose of Rocephin but patient should be able to continue Omnicef and oral rehydration at his care facility.  HYDRATION: Based on the patient's presentation of Dehydration the patient was given IV fluids. IV Hydration was used because oral hydration was not adequate alone. Upon recheck following hydration, the patient was improved.      ADDITIONAL PROBLEM LIST    DISPOSITION AND DISCUSSIONS  I have discussed management of the patient with the following physicians and DEANNA's: None    Discussion of management with other QHP or appropriate source(s): None    Escalation of care  considered, and ultimately not performed:acute inpatient care management, however at this time, the patient is most appropriate for outpatient management    Barriers to care at this time, including but not limited to: None.     Decision tools and prescription drugs considered including, but not limited to: None.    FINAL DIAGNOSIS  1. Confusion        2. 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

## 2023-12-29 NOTE — ED TRIAGE NOTES
Chief Complaint   Patient presents with    UTI     Pt sent from [facility] for above. Diagnosed with UTI on [place] Noemi per EMS and has been started on cefdinir. Staff reports increase in confusion and agitation today.      Pt BIB EMS for above. Pt has baseline dementia though EMS reports staff said pt was more confused and agitated around 0200. Pt received about 200 fluids prior to arrival. FSBS 312 for EMS, pt received his insulin this morning at residence.

## 2023-12-31 LAB
BACTERIA UR CULT: NORMAL
SIGNIFICANT IND 70042: NORMAL
SITE SITE: NORMAL
SOURCE SOURCE: NORMAL

## 2024-01-04 ENCOUNTER — APPOINTMENT (OUTPATIENT)
Dept: RADIOLOGY | Facility: MEDICAL CENTER | Age: 79
End: 2024-01-04
Attending: EMERGENCY MEDICINE
Payer: MEDICARE

## 2024-01-04 ENCOUNTER — HOSPITAL ENCOUNTER (EMERGENCY)
Facility: MEDICAL CENTER | Age: 79
End: 2024-01-05
Attending: EMERGENCY MEDICINE
Payer: MEDICARE

## 2024-01-04 DIAGNOSIS — R41.82 ALTERED MENTAL STATUS, UNSPECIFIED ALTERED MENTAL STATUS TYPE: ICD-10-CM

## 2024-01-04 LAB
ALBUMIN SERPL BCP-MCNC: 3 G/DL (ref 3.2–4.9)
ALBUMIN/GLOB SERPL: 0.8 G/DL
ALP SERPL-CCNC: 68 U/L (ref 30–99)
ALT SERPL-CCNC: 6 U/L (ref 2–50)
ANION GAP SERPL CALC-SCNC: 9 MMOL/L (ref 7–16)
APTT PPP: 37.9 SEC (ref 24.7–36)
AST SERPL-CCNC: 10 U/L (ref 12–45)
BASOPHILS # BLD AUTO: 0.9 % (ref 0–1.8)
BASOPHILS # BLD: 0.06 K/UL (ref 0–0.12)
BILIRUB SERPL-MCNC: 0.3 MG/DL (ref 0.1–1.5)
BUN SERPL-MCNC: 20 MG/DL (ref 8–22)
CALCIUM ALBUM COR SERPL-MCNC: 9.3 MG/DL (ref 8.5–10.5)
CALCIUM SERPL-MCNC: 8.5 MG/DL (ref 8.5–10.5)
CHLORIDE SERPL-SCNC: 96 MMOL/L (ref 96–112)
CO2 SERPL-SCNC: 28 MMOL/L (ref 20–33)
CREAT SERPL-MCNC: 1.53 MG/DL (ref 0.5–1.4)
EOSINOPHIL # BLD AUTO: 0.77 K/UL (ref 0–0.51)
EOSINOPHIL NFR BLD: 11.6 % (ref 0–6.9)
ERYTHROCYTE [DISTWIDTH] IN BLOOD BY AUTOMATED COUNT: 42.4 FL (ref 35.9–50)
GFR SERPLBLD CREATININE-BSD FMLA CKD-EPI: 47 ML/MIN/1.73 M 2
GLOBULIN SER CALC-MCNC: 3.9 G/DL (ref 1.9–3.5)
GLUCOSE BLD STRIP.AUTO-MCNC: 263 MG/DL (ref 65–99)
GLUCOSE SERPL-MCNC: 286 MG/DL (ref 65–99)
HCT VFR BLD AUTO: 33.2 % (ref 42–52)
HGB BLD-MCNC: 11.3 G/DL (ref 14–18)
IMM GRANULOCYTES # BLD AUTO: 0.06 K/UL (ref 0–0.11)
IMM GRANULOCYTES NFR BLD AUTO: 0.9 % (ref 0–0.9)
INR PPP: 1.19 (ref 0.87–1.13)
LYMPHOCYTES # BLD AUTO: 2.24 K/UL (ref 1–4.8)
LYMPHOCYTES NFR BLD: 33.6 % (ref 22–41)
MCH RBC QN AUTO: 30.1 PG (ref 27–33)
MCHC RBC AUTO-ENTMCNC: 34 G/DL (ref 32.3–36.5)
MCV RBC AUTO: 88.3 FL (ref 81.4–97.8)
MONOCYTES # BLD AUTO: 0.81 K/UL (ref 0–0.85)
MONOCYTES NFR BLD AUTO: 12.2 % (ref 0–13.4)
NEUTROPHILS # BLD AUTO: 2.72 K/UL (ref 1.82–7.42)
NEUTROPHILS NFR BLD: 40.8 % (ref 44–72)
NRBC # BLD AUTO: 0 K/UL
NRBC BLD-RTO: 0 /100 WBC (ref 0–0.2)
PLATELET # BLD AUTO: 155 K/UL (ref 164–446)
PMV BLD AUTO: 8.3 FL (ref 9–12.9)
POTASSIUM SERPL-SCNC: 4.2 MMOL/L (ref 3.6–5.5)
PROT SERPL-MCNC: 6.9 G/DL (ref 6–8.2)
PROTHROMBIN TIME: 15.3 SEC (ref 12–14.6)
RBC # BLD AUTO: 3.76 M/UL (ref 4.7–6.1)
SODIUM SERPL-SCNC: 133 MMOL/L (ref 135–145)
TROPONIN T SERPL-MCNC: 35 NG/L (ref 6–19)
WBC # BLD AUTO: 6.7 K/UL (ref 4.8–10.8)

## 2024-01-04 PROCEDURE — 80053 COMPREHEN METABOLIC PANEL: CPT

## 2024-01-04 PROCEDURE — 94760 N-INVAS EAR/PLS OXIMETRY 1: CPT

## 2024-01-04 PROCEDURE — 93005 ELECTROCARDIOGRAM TRACING: CPT | Performed by: EMERGENCY MEDICINE

## 2024-01-04 PROCEDURE — 70450 CT HEAD/BRAIN W/O DYE: CPT

## 2024-01-04 PROCEDURE — 85610 PROTHROMBIN TIME: CPT

## 2024-01-04 PROCEDURE — 70496 CT ANGIOGRAPHY HEAD: CPT

## 2024-01-04 PROCEDURE — 82962 GLUCOSE BLOOD TEST: CPT

## 2024-01-04 PROCEDURE — 700117 HCHG RX CONTRAST REV CODE 255: Performed by: EMERGENCY MEDICINE

## 2024-01-04 PROCEDURE — 85730 THROMBOPLASTIN TIME PARTIAL: CPT

## 2024-01-04 PROCEDURE — 84484 ASSAY OF TROPONIN QUANT: CPT

## 2024-01-04 PROCEDURE — 36415 COLL VENOUS BLD VENIPUNCTURE: CPT

## 2024-01-04 PROCEDURE — 99285 EMERGENCY DEPT VISIT HI MDM: CPT

## 2024-01-04 PROCEDURE — 71045 X-RAY EXAM CHEST 1 VIEW: CPT

## 2024-01-04 PROCEDURE — 85025 COMPLETE CBC W/AUTO DIFF WBC: CPT

## 2024-01-04 PROCEDURE — 70498 CT ANGIOGRAPHY NECK: CPT

## 2024-01-04 RX ADMIN — IOHEXOL 80 ML: 350 INJECTION, SOLUTION INTRAVENOUS at 22:33

## 2024-01-04 ASSESSMENT — FIBROSIS 4 INDEX: FIB4 SCORE: 2.32

## 2024-01-05 VITALS
SYSTOLIC BLOOD PRESSURE: 156 MMHG | DIASTOLIC BLOOD PRESSURE: 70 MMHG | BODY MASS INDEX: 28.25 KG/M2 | HEIGHT: 67 IN | RESPIRATION RATE: 19 BRPM | OXYGEN SATURATION: 96 % | WEIGHT: 180 LBS | HEART RATE: 58 BPM | TEMPERATURE: 98.6 F

## 2024-01-05 LAB — EKG IMPRESSION: NORMAL

## 2024-01-05 NOTE — ED NOTES
"Pt now answering in simple one word answers. Pt answers that he is feeling \"okay.\" A&Ox1-2, disoriented to situation and time. MD notified   "

## 2024-01-05 NOTE — DISCHARGE PLANNING
Medical Social Work    MSW received a call from bedside RN that pt is ready to return to the [facility].  [facility].  MSW faxed PCS and facesheet to [facility] and made follow up phone call to Kamila to arrange transport for 0145.  [facility] and transfer packet complete and in pt's chart for transfer.  RN spoke with pt's daughter, Jose who is agreeable to pt transferring back.  RN made aware of transport time.

## 2024-01-05 NOTE — ED NOTES
RN spoke with pt's daughter, Jose, who confirmed pt has days where he cannot communicate well and is mostly non-verbal and others when he is more alert and communicative. Discussed [facility].

## 2024-01-05 NOTE — ED TRIAGE NOTES
"Wale Olivas  78 y.o. male    Chief Complaint   Patient presents with    ALOC       [facility] after being found altered in bed. LWK 1700. Per EMS, staff went in to give meds at 1840 and pt was non-verbal and wound not follow commands. Staff denies falls/trauma    On arrival to ED, pt remains non-verbal. Is able to squeeze hands and wiggle toes. Possible R sided gaze deviation and R sided facial droop. Pt will not follow other commands. Per EMS, pt yelled \"fuck you!\" When PIV was started but has been otherwise non verbal.     . Code stroke called on arrival. MD at bedside.     "

## 2024-01-05 NOTE — ED NOTES
"Brief changed due to urinary incontinence. Pt dressed back in his clothing. Pt continues to speak one word sentences/answers. Informed of plan of care. Pt pleasantly states \"oh ok.\"    Small area of non-blanchable redness present on L buttocks near sacrum. No open wounds  "

## 2024-01-05 NOTE — ED PROVIDER NOTES
ED Provider Note    CHIEF COMPLAINT  Chief Complaint   Patient presents with    ALOC       EXTERNAL RECORDS REVIEWED:  Inpatient Notes   and Outpatient Notes records reviewed after stroke activation demonstrate the patient's been here for similar presentation several times most recently was 5 days prior.  He does have a significant past medical history of large right-sided ischemic stroke with left-sided residual deficits also has chronic dementia and agitation    HPI/ROS:    LIMITATION TO HISTORY   Select: Altered mental status / Confusion    OUTSIDE HISTORIAN(S):  EMS      Wale Olivas is a 78 y.o. male who presents to the emergency department with acute onset of change in mental status.  Reportedly at 1500 hrs. today this is 5 hours prior patient suddenly became aphasic and was not moving left side of his body.  Concern for left-sided facial droop.  Was sent here for further evaluation and treatment.  Patient had finger stick blood glucose in the mid 300s upon her EMS check reportedly has had a few words when stimulated with noxious stimuli and round and otherwise has been noncommunicative and seems to have left-sided hemineglect/left-sided weakness.    PAST MEDICAL HISTORY   has a past medical history of Anxiety, BPH (benign prostatic hyperplasia), CKD (chronic kidney disease), COPD (chronic obstructive pulmonary disease) ([facility]), COVID-19, Dementia ([facility]), GERD (gastroesophageal reflux disease), Hyperlipidemia, Insomnia, Major depressive disorder, and Type II diabetes mellitus ([facility]).    SURGICAL HISTORY  patient denies any surgical history    FAMILY HISTORY  No family history on file.    SOCIAL HISTORY  Social History     Tobacco Use    Smoking status: Never    Smokeless tobacco: Never   Vaping Use    Vaping Use: Never used   Substance and Sexual Activity    Alcohol use: Not Currently    Drug use: Not Currently    Sexual activity: Not on file       CURRENT MEDICATIONS  Home Medications    **Home medications  "have not yet been reviewed for this encounter**         ALLERGIES  Allergies   Allergen Reactions    Metformin Unspecified     Unknown reaction, listed on MAR     Morphine Unspecified     Unknown reaction, listed on MAR     Simvastatin Unspecified     Unknown reaction, listed on MAR     Spironolactone Unspecified     Unknown reaction, listed on MAR        PHYSICAL EXAM  VITAL SIGNS: /64   Pulse 61   Resp 20   Ht 1.702 m (5' 7\")   Wt 81.6 kg (180 lb)   SpO2 95%   BMI 28.19 kg/m²    Pulse ox interpretation: I interpret this pulse ox as normal.  Constitutional: Altered nonverbal  HEENT: Left-sided facial droop, pupils are equal round reactive to light extraocular movements are intact, right gaze preference. The nares is clear, external ears are normal, mouth shows moist mucous membranes normal dentition for age  Neck: Supple, no JVD no tracheal deviation  Cardiovascular: Regular rate and rhythm no murmur rub or gallop 2+ pulses peripherally x4  Thorax & Lungs: No respiratory distress, no wheezes rales or rhonchi, No chest tenderness.   GI: Soft nontender nondistended positive bowel sounds, no peritoneal signs  Skin: Warm dry no acute rash or lesion  Musculoskeletal:  no acute  deformity  Neurologic: Aphasic left-sided facial droop left upper and lower weakness in comparison to the right side.  Patient only verbal with painful stimuli and then is very aggressive with medical staff  Psychiatric: Appropriate affect for situation at this time            DIAGNOSTIC STUDIES / PROCEDURES  Results for orders placed or performed during the hospital encounter of 01/04/24   CBC WITH DIFFERENTIAL   Result Value Ref Range    WBC 6.7 4.8 - 10.8 K/uL    RBC 3.76 (L) 4.70 - 6.10 M/uL    Hemoglobin 11.3 (L) 14.0 - 18.0 g/dL    Hematocrit 33.2 (L) 42.0 - 52.0 %    MCV 88.3 81.4 - 97.8 fL    MCH 30.1 27.0 - 33.0 pg    MCHC 34.0 32.3 - 36.5 g/dL    RDW 42.4 35.9 - 50.0 fL    Platelet Count 155 (L) 164 - 446 K/uL    MPV 8.3 (L) " 9.0 - 12.9 fL    Neutrophils-Polys 40.80 (L) 44.00 - 72.00 %    Lymphocytes 33.60 22.00 - 41.00 %    Monocytes 12.20 0.00 - 13.40 %    Eosinophils 11.60 (H) 0.00 - 6.90 %    Basophils 0.90 0.00 - 1.80 %    Immature Granulocytes 0.90 0.00 - 0.90 %    Nucleated RBC 0.00 0.00 - 0.20 /100 WBC    Neutrophils (Absolute) 2.72 1.82 - 7.42 K/uL    Lymphs (Absolute) 2.24 1.00 - 4.80 K/uL    Monos (Absolute) 0.81 0.00 - 0.85 K/uL    Eos (Absolute) 0.77 (H) 0.00 - 0.51 K/uL    Baso (Absolute) 0.06 0.00 - 0.12 K/uL    Immature Granulocytes (abs) 0.06 0.00 - 0.11 K/uL    NRBC (Absolute) 0.00 K/uL   COMP METABOLIC PANEL   Result Value Ref Range    Sodium 133 (L) 135 - 145 mmol/L    Potassium 4.2 3.6 - 5.5 mmol/L    Chloride 96 96 - 112 mmol/L    Co2 28 20 - 33 mmol/L    Anion Gap 9.0 7.0 - 16.0    Glucose 286 (H) 65 - 99 mg/dL    Bun 20 8 - 22 mg/dL    Creatinine 1.53 (H) 0.50 - 1.40 mg/dL    Calcium 8.5 8.5 - 10.5 mg/dL    Correct Calcium 9.3 8.5 - 10.5 mg/dL    AST(SGOT) 10 (L) 12 - 45 U/L    ALT(SGPT) 6 2 - 50 U/L    Alkaline Phosphatase 68 30 - 99 U/L    Total Bilirubin 0.3 0.1 - 1.5 mg/dL    Albumin 3.0 (L) 3.2 - 4.9 g/dL    Total Protein 6.9 6.0 - 8.2 g/dL    Globulin 3.9 (H) 1.9 - 3.5 g/dL    A-G Ratio 0.8 g/dL   PROTHROMBIN TIME   Result Value Ref Range    PT 15.3 (H) 12.0 - 14.6 sec    INR 1.19 (H) 0.87 - 1.13   APTT   Result Value Ref Range    APTT 37.9 (H) 24.7 - 36.0 sec   TROPONIN   Result Value Ref Range    Troponin T 35 (H) 6 - 19 ng/L   ESTIMATED GFR   Result Value Ref Range    GFR (CKD-EPI) 47 (A) >60 mL/min/1.73 m 2   EKG (NOW)   Result Value Ref Range    Report       [facility]    Test Date:  2024  Pt Name:    FRENCH TUBBS                Department: ER  MRN:        [id number]                      Room:        05  Gender:     Male                         Technician: [id number]  :        1945                   Requested By:KELLY CORADO  Order #:    [id number]                     Reading MD:    Measurements  Intervals                                Axis  Rate:       65                           P:          152  MD:         64                           QRS:        -26  QRSD:       114                          T:          62  QT:         416  QTc:        433    Interpretive Statements  Sinus or ectopic atrial rhythm  Atrial premature complex  Short MD interval  Borderline intraventricular conduction delay  Low voltage, extremity and precordial leads  Consider inferior infarct  Compared to ECG 11/02/2023 07:43:45  Ectopic atrial rhythm now present  Atrial premature complex(es) now present  Short MD i nterval now present  Low QRS voltage now present  Myocardial infarct finding now present  Sinus arrhythmia no longer present  Ventricular premature complex(es) no longer present  ST (T wave) deviation no longer present  Possible ischemia no longer present     POCT glucose device results   Result Value Ref Range    POC Glucose, Blood 263 (H) 65 - 99 mg/dL         RADIOLOGY  I have independently interpreted the diagnostic imaging associated with this visit and am waiting the final reading from the radiologist.   My preliminary interpretation is as follows:   Neck encephalomalacia without new infarct or intracranial hemorrhage    Radiologist interpretation:   DX-CHEST-PORTABLE (1 VIEW)   Final Result         1. No acute cardiopulmonary abnormalities are identified.      CT-CTA NECK WITH & W/O-POST PROCESSING   Final Result      1. No evidence of flow-limiting stenosis in the cervical carotid or cervical vertebral arteries.      CT-CTA HEAD WITH & W/O-POST PROCESS   Final Result         1. No hemodynamically significant narrowing of the major intracranial vessels.      CT-HEAD W/O   Final Result         1. No acute intracranial abnormality. No evidence of acute intracranial hemorrhage or mass lesion.      2. Large encephalomalacia in the right MCA distribution.              "        COURSE & MEDICAL DECISION MAKING    ED Observation Status? No; Patient does not meet criteria for ED Observation.     ASSESSMENT, COURSE AND PLAN  Care Narrative: I was initially called to the patient's room for evaluation of possible acute stroke.  Report from EMS was that patient had an abrupt change in mental status and development of aphasia within the last 12 hours.  Stroke protocol was activated however upon activation we were able to do chart review and patient's had multiple similar presentations within the last year.  Patient is also showing improvement here.  He is actually communicated with nurses quite a bit.  Nursing staff is discussed with patient is next of kin and they report that this is his baseline CT scan shows no flow-limiting lesion on CTA's and no evidence of new infarct or bleed.  He does have a large amount of encephalomalacia in the right MCA distribution correlates with his chronic physical exam.  At this time I see no benefit from admitting the patient to the hospital as there will be no change in management or other acute interventions.  Patient be discharged back to his nursing home in stable condition.  Return for any worsening symptom changes or concerns.        ADDITIONAL PROBLEM LIST    DISPOSITION AND DISCUSSIONS  I have discussed management of the patient with the following physicians and DEANNA's:    Marietta JEAN [facility] Neu[facility]    Discussion of management with other [facility] or appropriate source(s): None     Escalation of care considered, and ultimately not performed:acute inpatient care management, however at this time, the patient is most appropriate for outpatient management    Barriers to care at this time, including but not limited to: .     Decision tools and prescription drugs considered including, but not limited to: .  BP (!) 156/70   Pulse (!) 58   Temp 37 °C (98.6 °F) (Temporal)   Resp 19   Ht 1.702 m (5' 7\")   Wt 81.6 kg (180 lb)   SpO2 96%   BMI 28.19 kg/m² "     [facility], Emergency Dept  [address] [id number] [phone]    in 12-24 hours if symptoms persist, immediately If symptoms worsen, or if you develop any other symptoms or concerns      FINAL DIAGNOSIS  1. Altered mental status, unspecified altered mental status type           Electronically signed by: Jessee Rascon M.D.

## 2024-01-10 ENCOUNTER — APPOINTMENT (OUTPATIENT)
Dept: RADIOLOGY | Facility: MEDICAL CENTER | Age: 79
DRG: 100 | End: 2024-01-10
Attending: EMERGENCY MEDICINE
Payer: COMMERCIAL

## 2024-01-10 ENCOUNTER — HOSPITAL ENCOUNTER (INPATIENT)
Facility: MEDICAL CENTER | Age: 79
LOS: 1 days | DRG: 100 | End: 2024-01-11
Attending: EMERGENCY MEDICINE | Admitting: HOSPITALIST
Payer: COMMERCIAL

## 2024-01-10 DIAGNOSIS — G30.0 SEVERE EARLY ONSET ALZHEIMER'S DEMENTIA, UNSPECIFIED WHETHER BEHAVIORAL, PSYCHOTIC, OR MOOD DISTURBANCE OR ANXIETY (HCC): ICD-10-CM

## 2024-01-10 DIAGNOSIS — F02.C3 SEVERE EARLY ONSET ALZHEIMER'S DEMENTIA WITH MOOD DISTURBANCE (HCC): ICD-10-CM

## 2024-01-10 DIAGNOSIS — G93.40 ENCEPHALOPATHY ACUTE: ICD-10-CM

## 2024-01-10 DIAGNOSIS — G30.0 SEVERE EARLY ONSET ALZHEIMER'S DEMENTIA WITH MOOD DISTURBANCE (HCC): ICD-10-CM

## 2024-01-10 DIAGNOSIS — F02.C0 SEVERE EARLY ONSET ALZHEIMER'S DEMENTIA, UNSPECIFIED WHETHER BEHAVIORAL, PSYCHOTIC, OR MOOD DISTURBANCE OR ANXIETY (HCC): ICD-10-CM

## 2024-01-10 DIAGNOSIS — R41.82 ALTERED MENTAL STATUS, UNSPECIFIED ALTERED MENTAL STATUS TYPE: ICD-10-CM

## 2024-01-10 DIAGNOSIS — R79.89 ELEVATED TROPONIN: ICD-10-CM

## 2024-01-10 PROBLEM — E11.65 TYPE 2 DIABETES MELLITUS WITH HYPERGLYCEMIA, WITH LONG-TERM CURRENT USE OF INSULIN (HCC): Status: ACTIVE | Noted: 2024-01-10

## 2024-01-10 PROBLEM — Z79.4 TYPE 2 DIABETES MELLITUS WITH HYPERGLYCEMIA, WITH LONG-TERM CURRENT USE OF INSULIN (HCC): Status: ACTIVE | Noted: 2024-01-10

## 2024-01-10 LAB
ABO GROUP BLD: NORMAL
ALBUMIN SERPL BCP-MCNC: 3.3 G/DL (ref 3.2–4.9)
ALBUMIN/GLOB SERPL: 0.8 G/DL
ALP SERPL-CCNC: 71 U/L (ref 30–99)
ALT SERPL-CCNC: 9 U/L (ref 2–50)
ANION GAP SERPL CALC-SCNC: 11 MMOL/L (ref 7–16)
APTT PPP: 39.6 SEC (ref 24.7–36)
AST SERPL-CCNC: 14 U/L (ref 12–45)
BASE EXCESS BLDA CALC-SCNC: 1 MMOL/L (ref -4–3)
BASOPHILS # BLD AUTO: 0.6 % (ref 0–1.8)
BASOPHILS # BLD: 0.06 K/UL (ref 0–0.12)
BILIRUB SERPL-MCNC: 0.4 MG/DL (ref 0.1–1.5)
BLD GP AB SCN SERPL QL: NORMAL
BODY TEMPERATURE: 35.9 CENTIGRADE
BUN SERPL-MCNC: 20 MG/DL (ref 8–22)
CALCIUM ALBUM COR SERPL-MCNC: 9.6 MG/DL (ref 8.5–10.5)
CALCIUM SERPL-MCNC: 9 MG/DL (ref 8.5–10.5)
CHLORIDE SERPL-SCNC: 107 MMOL/L (ref 96–112)
CO2 SERPL-SCNC: 26 MMOL/L (ref 20–33)
CREAT SERPL-MCNC: 1.53 MG/DL (ref 0.5–1.4)
EKG IMPRESSION: NORMAL
EOSINOPHIL # BLD AUTO: 1.18 K/UL (ref 0–0.51)
EOSINOPHIL NFR BLD: 12.8 % (ref 0–6.9)
ERYTHROCYTE [DISTWIDTH] IN BLOOD BY AUTOMATED COUNT: 44.6 FL (ref 35.9–50)
GFR SERPLBLD CREATININE-BSD FMLA CKD-EPI: 47 ML/MIN/1.73 M 2
GLOBULIN SER CALC-MCNC: 4 G/DL (ref 1.9–3.5)
GLUCOSE BLD STRIP.AUTO-MCNC: 111 MG/DL (ref 65–99)
GLUCOSE BLD STRIP.AUTO-MCNC: 140 MG/DL (ref 65–99)
GLUCOSE BLD STRIP.AUTO-MCNC: 145 MG/DL (ref 65–99)
GLUCOSE BLD STRIP.AUTO-MCNC: 59 MG/DL (ref 65–99)
GLUCOSE SERPL-MCNC: 81 MG/DL (ref 65–99)
HCO3 BLDA-SCNC: 25 MMOL/L (ref 17–25)
HCT VFR BLD AUTO: 37 % (ref 42–52)
HGB BLD-MCNC: 12.6 G/DL (ref 14–18)
IMM GRANULOCYTES # BLD AUTO: 0.06 K/UL (ref 0–0.11)
IMM GRANULOCYTES NFR BLD AUTO: 0.6 % (ref 0–0.9)
INHALED O2 FLOW RATE: 0 L/MIN
INR PPP: 1.21 (ref 0.87–1.13)
LYMPHOCYTES # BLD AUTO: 2.14 K/UL (ref 1–4.8)
LYMPHOCYTES NFR BLD: 23.1 % (ref 22–41)
MCH RBC QN AUTO: 30.7 PG (ref 27–33)
MCHC RBC AUTO-ENTMCNC: 34.1 G/DL (ref 32.3–36.5)
MCV RBC AUTO: 90.2 FL (ref 81.4–97.8)
MONOCYTES # BLD AUTO: 0.84 K/UL (ref 0–0.85)
MONOCYTES NFR BLD AUTO: 9.1 % (ref 0–13.4)
NEUTROPHILS # BLD AUTO: 4.97 K/UL (ref 1.82–7.42)
NEUTROPHILS NFR BLD: 53.8 % (ref 44–72)
NRBC # BLD AUTO: 0 K/UL
NRBC BLD-RTO: 0 /100 WBC (ref 0–0.2)
PCO2 BLDA: 42.2 MMHG (ref 26–37)
PCO2 TEMP ADJ BLDA: 40.2 MMHG (ref 26–37)
PH BLDA: 7.4 [PH] (ref 7.4–7.5)
PH TEMP ADJ BLDA: 7.41 [PH] (ref 7.4–7.5)
PLATELET # BLD AUTO: 149 K/UL (ref 164–446)
PMV BLD AUTO: 8 FL (ref 9–12.9)
PO2 BLDA: 65.6 MMHG (ref 64–87)
PO2 TEMP ADJ BLDA: 60.8 MMHG (ref 64–87)
POTASSIUM SERPL-SCNC: 4 MMOL/L (ref 3.6–5.5)
PROT SERPL-MCNC: 7.3 G/DL (ref 6–8.2)
PROTHROMBIN TIME: 15.4 SEC (ref 12–14.6)
RBC # BLD AUTO: 4.1 M/UL (ref 4.7–6.1)
RH BLD: NORMAL
SAO2 % BLDA: 92.2 % (ref 93–99)
SODIUM SERPL-SCNC: 144 MMOL/L (ref 135–145)
TROPONIN T SERPL-MCNC: 40 NG/L (ref 6–19)
WBC # BLD AUTO: 9.3 K/UL (ref 4.8–10.8)

## 2024-01-10 PROCEDURE — 82803 BLOOD GASES ANY COMBINATION: CPT

## 2024-01-10 PROCEDURE — 80053 COMPREHEN METABOLIC PANEL: CPT

## 2024-01-10 PROCEDURE — 700105 HCHG RX REV CODE 258: Performed by: HOSPITALIST

## 2024-01-10 PROCEDURE — 700111 HCHG RX REV CODE 636 W/ 250 OVERRIDE (IP): Performed by: EMERGENCY MEDICINE

## 2024-01-10 PROCEDURE — 99291 CRITICAL CARE FIRST HOUR: CPT

## 2024-01-10 PROCEDURE — 86850 RBC ANTIBODY SCREEN: CPT

## 2024-01-10 PROCEDURE — 770001 HCHG ROOM/CARE - MED/SURG/GYN PRIV*

## 2024-01-10 PROCEDURE — 99223 1ST HOSP IP/OBS HIGH 75: CPT | Mod: AI,25 | Performed by: HOSPITALIST

## 2024-01-10 PROCEDURE — 86900 BLOOD TYPING SEROLOGIC ABO: CPT

## 2024-01-10 PROCEDURE — 97602 WOUND(S) CARE NON-SELECTIVE: CPT

## 2024-01-10 PROCEDURE — 96374 THER/PROPH/DIAG INJ IV PUSH: CPT

## 2024-01-10 PROCEDURE — 71045 X-RAY EXAM CHEST 1 VIEW: CPT

## 2024-01-10 PROCEDURE — 85730 THROMBOPLASTIN TIME PARTIAL: CPT

## 2024-01-10 PROCEDURE — 700101 HCHG RX REV CODE 250: Performed by: HOSPITALIST

## 2024-01-10 PROCEDURE — A9270 NON-COVERED ITEM OR SERVICE: HCPCS | Performed by: HOSPITALIST

## 2024-01-10 PROCEDURE — 700102 HCHG RX REV CODE 250 W/ 637 OVERRIDE(OP): Performed by: HOSPITALIST

## 2024-01-10 PROCEDURE — 36415 COLL VENOUS BLD VENIPUNCTURE: CPT

## 2024-01-10 PROCEDURE — 82962 GLUCOSE BLOOD TEST: CPT | Mod: 91

## 2024-01-10 PROCEDURE — 93005 ELECTROCARDIOGRAM TRACING: CPT | Performed by: EMERGENCY MEDICINE

## 2024-01-10 PROCEDURE — 86901 BLOOD TYPING SEROLOGIC RH(D): CPT

## 2024-01-10 PROCEDURE — 73521 X-RAY EXAM HIPS BI 2 VIEWS: CPT

## 2024-01-10 PROCEDURE — 700111 HCHG RX REV CODE 636 W/ 250 OVERRIDE (IP): Mod: JZ | Performed by: HOSPITALIST

## 2024-01-10 PROCEDURE — 84484 ASSAY OF TROPONIN QUANT: CPT

## 2024-01-10 PROCEDURE — 700105 HCHG RX REV CODE 258: Performed by: EMERGENCY MEDICINE

## 2024-01-10 PROCEDURE — 94760 N-INVAS EAR/PLS OXIMETRY 1: CPT

## 2024-01-10 PROCEDURE — 70450 CT HEAD/BRAIN W/O DYE: CPT

## 2024-01-10 PROCEDURE — 99497 ADVNCD CARE PLAN 30 MIN: CPT | Performed by: HOSPITALIST

## 2024-01-10 PROCEDURE — 85025 COMPLETE CBC W/AUTO DIFF WBC: CPT

## 2024-01-10 PROCEDURE — 85610 PROTHROMBIN TIME: CPT

## 2024-01-10 RX ORDER — CEFDINIR 300 MG/1
300 CAPSULE ORAL 2 TIMES DAILY
COMMUNITY
End: 2024-01-28

## 2024-01-10 RX ORDER — TAMSULOSIN HYDROCHLORIDE 0.4 MG/1
0.4 CAPSULE ORAL DAILY
Status: DISCONTINUED | OUTPATIENT
Start: 2024-01-11 | End: 2024-01-11 | Stop reason: HOSPADM

## 2024-01-10 RX ORDER — NALOXONE HYDROCHLORIDE 0.4 MG/ML
0.4 INJECTION, SOLUTION INTRAMUSCULAR; INTRAVENOUS; SUBCUTANEOUS ONCE
Status: COMPLETED | OUTPATIENT
Start: 2024-01-10 | End: 2024-01-10

## 2024-01-10 RX ORDER — FINASTERIDE 5 MG/1
5 TABLET, FILM COATED ORAL DAILY
Status: DISCONTINUED | OUTPATIENT
Start: 2024-01-11 | End: 2024-01-11 | Stop reason: HOSPADM

## 2024-01-10 RX ORDER — NALOXONE HYDROCHLORIDE 0.4 MG/ML
INJECTION, SOLUTION INTRAMUSCULAR; INTRAVENOUS; SUBCUTANEOUS
Status: DISPENSED
Start: 2024-01-10 | End: 2024-01-10

## 2024-01-10 RX ORDER — IPRATROPIUM BROMIDE AND ALBUTEROL SULFATE 2.5; .5 MG/3ML; MG/3ML
3 SOLUTION RESPIRATORY (INHALATION) 2 TIMES DAILY
COMMUNITY

## 2024-01-10 RX ORDER — BISACODYL 10 MG
10 SUPPOSITORY, RECTAL RECTAL
Status: DISCONTINUED | OUTPATIENT
Start: 2024-01-10 | End: 2024-01-11 | Stop reason: HOSPADM

## 2024-01-10 RX ORDER — POLYETHYLENE GLYCOL 3350 17 G/17G
1 POWDER, FOR SOLUTION ORAL
Status: DISCONTINUED | OUTPATIENT
Start: 2024-01-10 | End: 2024-01-11 | Stop reason: HOSPADM

## 2024-01-10 RX ORDER — DEXTROSE MONOHYDRATE 25 G/50ML
25 INJECTION, SOLUTION INTRAVENOUS
Status: DISCONTINUED | OUTPATIENT
Start: 2024-01-10 | End: 2024-01-11

## 2024-01-10 RX ORDER — LIDOCAINE 4 G/G
2 PATCH TOPICAL EVERY 12 HOURS
COMMUNITY

## 2024-01-10 RX ORDER — ACETAMINOPHEN 325 MG/1
650 TABLET ORAL 2 TIMES DAILY PRN
COMMUNITY

## 2024-01-10 RX ORDER — ALPRAZOLAM 0.25 MG/1
0.5 TABLET ORAL 3 TIMES DAILY PRN
COMMUNITY
End: 2024-01-28

## 2024-01-10 RX ORDER — ENOXAPARIN SODIUM 100 MG/ML
40 INJECTION SUBCUTANEOUS DAILY
Status: DISCONTINUED | OUTPATIENT
Start: 2024-01-10 | End: 2024-01-10

## 2024-01-10 RX ORDER — AMOXICILLIN 250 MG
2 CAPSULE ORAL 2 TIMES DAILY
Status: DISCONTINUED | OUTPATIENT
Start: 2024-01-10 | End: 2024-01-11 | Stop reason: HOSPADM

## 2024-01-10 RX ORDER — PROPRANOLOL HYDROCHLORIDE 20 MG/1
20 TABLET ORAL EVERY 12 HOURS
Status: DISCONTINUED | OUTPATIENT
Start: 2024-01-10 | End: 2024-01-11 | Stop reason: HOSPADM

## 2024-01-10 RX ORDER — SODIUM CHLORIDE, SODIUM LACTATE, POTASSIUM CHLORIDE, CALCIUM CHLORIDE 600; 310; 30; 20 MG/100ML; MG/100ML; MG/100ML; MG/100ML
INJECTION, SOLUTION INTRAVENOUS CONTINUOUS
Status: DISCONTINUED | OUTPATIENT
Start: 2024-01-10 | End: 2024-01-11 | Stop reason: HOSPADM

## 2024-01-10 RX ORDER — HYDRALAZINE HYDROCHLORIDE 20 MG/ML
10-20 INJECTION INTRAMUSCULAR; INTRAVENOUS EVERY 4 HOURS PRN
Status: DISCONTINUED | OUTPATIENT
Start: 2024-01-10 | End: 2024-01-11 | Stop reason: HOSPADM

## 2024-01-10 RX ORDER — SODIUM CHLORIDE, SODIUM LACTATE, POTASSIUM CHLORIDE, CALCIUM CHLORIDE 600; 310; 30; 20 MG/100ML; MG/100ML; MG/100ML; MG/100ML
1000 INJECTION, SOLUTION INTRAVENOUS ONCE
Status: COMPLETED | OUTPATIENT
Start: 2024-01-10 | End: 2024-01-10

## 2024-01-10 RX ADMIN — SODIUM CHLORIDE, POTASSIUM CHLORIDE, SODIUM LACTATE AND CALCIUM CHLORIDE: 600; 310; 30; 20 INJECTION, SOLUTION INTRAVENOUS at 19:40

## 2024-01-10 RX ADMIN — SODIUM CHLORIDE, POTASSIUM CHLORIDE, SODIUM LACTATE AND CALCIUM CHLORIDE 1000 ML: 600; 310; 30; 20 INJECTION, SOLUTION INTRAVENOUS at 08:35

## 2024-01-10 RX ADMIN — DEXTROSE MONOHYDRATE 25 G: 25 INJECTION, SOLUTION INTRAVENOUS at 12:45

## 2024-01-10 RX ADMIN — SODIUM CHLORIDE, POTASSIUM CHLORIDE, SODIUM LACTATE AND CALCIUM CHLORIDE: 600; 310; 30; 20 INJECTION, SOLUTION INTRAVENOUS at 09:42

## 2024-01-10 RX ADMIN — ENOXAPARIN SODIUM 40 MG: 100 INJECTION SUBCUTANEOUS at 17:30

## 2024-01-10 RX ADMIN — APIXABAN 2.5 MG: 5 TABLET, FILM COATED ORAL at 19:56

## 2024-01-10 RX ADMIN — PROPRANOLOL HYDROCHLORIDE 20 MG: 20 TABLET ORAL at 19:55

## 2024-01-10 RX ADMIN — HYDRALAZINE HYDROCHLORIDE 20 MG: 20 INJECTION, SOLUTION INTRAMUSCULAR; INTRAVENOUS at 13:07

## 2024-01-10 RX ADMIN — NALOXONE HYDROCHLORIDE 0.4 MG: 0.4 INJECTION, SOLUTION INTRAMUSCULAR; INTRAVENOUS; SUBCUTANEOUS at 07:07

## 2024-01-10 ASSESSMENT — PAIN DESCRIPTION - PAIN TYPE
TYPE: ACUTE PAIN

## 2024-01-10 ASSESSMENT — COGNITIVE AND FUNCTIONAL STATUS - GENERAL
SUGGESTED CMS G CODE MODIFIER MOBILITY: CN
DRESSING REGULAR UPPER BODY CLOTHING: TOTAL
MOVING TO AND FROM BED TO CHAIR: UNABLE
DRESSING REGULAR LOWER BODY CLOTHING: TOTAL
CLIMB 3 TO 5 STEPS WITH RAILING: TOTAL
STANDING UP FROM CHAIR USING ARMS: TOTAL
TOILETING: TOTAL
HELP NEEDED FOR BATHING: TOTAL
EATING MEALS: TOTAL
MOVING FROM LYING ON BACK TO SITTING ON SIDE OF FLAT BED: UNABLE
MOBILITY SCORE: 6
SUGGESTED CMS G CODE MODIFIER DAILY ACTIVITY: CN
DAILY ACTIVITIY SCORE: 6
PERSONAL GROOMING: TOTAL
TURNING FROM BACK TO SIDE WHILE IN FLAT BAD: UNABLE
WALKING IN HOSPITAL ROOM: TOTAL

## 2024-01-10 ASSESSMENT — FIBROSIS 4 INDEX
FIB4 SCORE: 2
FIB4 SCORE: 2.38

## 2024-01-10 ASSESSMENT — CHA2DS2 SCORE
DIABETES: NO
HYPERTENSION: YES
CHA2DS2 VASC SCORE: 5
CHF OR LEFT VENTRICULAR DYSFUNCTION: NO
AGE 75 OR GREATER: YES
PRIOR STROKE OR TIA OR THROMBOEMBOLISM: YES
SEX: MALE
VASCULAR DISEASE: NO
AGE 65 TO 74: NO

## 2024-01-10 NOTE — PROGRESS NOTES
[facility] Acceptance Note    Called by Dr. Bird Martines for admission to IMCU for acute encephalopathy in a patient with a history of dementia and large right MCA stroke with residual left-sided deficits.  I did order an ABG; however, patient wakes to stimuli.  Will admit to IMCU under the care of the hospitalist.      Carleen Horan MD  Pulmonary and Critical Care Medicine

## 2024-01-10 NOTE — ASSESSMENT & PLAN NOTE
Acute metabolic encephalopathy.  No evidence of a toxic process and less for some reason he got a medicine at the [facility] accidentally.  Labs and imaging are unrevealing.  Supportive care and IV fluids.  Daughter does not want a lumbar puncture or any further workup.  At this point there does not appear to be a source of bacterial infection therefore refrain from IV antibiotics.  If his mentation does not improve, consider EEG.

## 2024-01-10 NOTE — ED NOTES
Spoke to pts daughter. Pts daughter states that he has not been aox4, and that he has had a decline in the last month. She states pt knows his name at times. Pt states baseline L sided weakness from his stroke in early 2000's. Pts daughter is Linden, willing to be called at anytime for updates. Number is correct in [employer]

## 2024-01-10 NOTE — PROGRESS NOTES
Hypoglycemia Intervention    Hypoglycemia protocol intervention:  Blood glucose 59 at 1245.  Intervention: 25 g IV dextrose per MAR   Repeat blood glucose 110 at 1300.  Intervention: Patient NPO, recheck blood glucose in 1 hour   Additional interventions needed: N/A    Additional blood glucose 145 at 1320.

## 2024-01-10 NOTE — ED NOTES
Pt is responsive to painful stimuli.  Pt responds to pain with loud moan, does not follow any commands. Pt remains hypertensive. ERP aware.

## 2024-01-10 NOTE — ED NOTES
[facility] with ACLS RN on monitor. All belongings and paperwork being sent with pt.

## 2024-01-10 NOTE — ED NOTES
Received report from Caio FARFAN. Assumed pt care. Pt not oriented. Pt able to squeeze hand, but could not open eyes to follow commands.   Pt in lowest and locked position on gurney, will place bed alarm

## 2024-01-10 NOTE — ED NOTES
Medication history reviewed with MAR from [facility] ([phone])    Med rec is complete per MAR    Allergies reviewed per MAR     PT was on Cefdinir 300mg 13 dose course started on 12/28/2023. Last dose was on 01/03/2024    Patient is taking Eliquis 2.5mg. Last dose was 01/09/2024 in the PM    Preferred pharmacy for this visit - [facility] ([phone])

## 2024-01-10 NOTE — PROGRESS NOTES
4 Eyes Skin Assessment Completed by NAHEED Rea and NAHEED Mccloud.    Head Dryness, back of head redness/blanching  Ears WDL  Nose WDL  Mouth WDL  Neck WDL  Breast/Chest WDL  Shoulder Blades WDL  Spine WDL  (R) Arm/Elbow/Hand Bruising  (L) Arm/Elbow/Hand Bruising  Abdomen Redness, Rash, and Abrasion  Groin Redness, Excoriation, and Rash  Scrotum/Coccyx/Buttocks Redness, Excoriation, and Moisture Fissure  (R) Leg Bruising  (L) Leg Scab and Bruising  (R) Heel/Foot/Toe Redness and Blanching  (L) Heel/Foot/Toe Redness and Blanching          Devices In Places Tele Box, Blood Pressure Cuff, Pulse Ox, and SCD's      Interventions In Place InterDry, Heel Mepilex, Pillows, Q2 Turns, Low Air Loss Mattress, Barrier Cream, Dri-German Pads, and Heels Loaded W/Pillows    Possible Skin Injury Yes    Pictures Uploaded Into Epic Yes  Wound Consult Placed Yes  RN Wound Prevention Protocol Ordered Yes

## 2024-01-10 NOTE — H&P
Hospital Medicine History & Physical Note    Date of Service  1/10/2024    Primary Care Physician  unknown    Consultants  none      Code Status  Full Code    Chief Complaint  Chief Complaint   Patient presents with    Possible Stroke     BIBA from [facility] for aphasia, increased left arm weakness and ALOC, LKW 0130. Per ems patient usually aox4 and conversational at baseline. History dementia, previous CVA with left sided deficits.        History of Presenting Illness  Wale Olivas is a 78 y.o. male who presented 1/10/2024 with decreased level of consciousness.  Mr. Manzanares has a past medical tree of advanced dementia oriented to self only at baseline as well as insulin-dependent diabetes mellitus atrial fibrillation on Eliquis that was most recently admitted to our [facility] hospital from 11/2/2023 through 1/7/2023 with shortness of breath, COPD exacerbation, and pneumonia.  He lives at the [facility].  He was brought in this morning due to decreased level of consciousness.  In the emergency room extensive workup has been done including labs, chest x-ray, imaging of the head and none are revealing as to a source.  His daughter, Jose Olivas, discussed his condition at length with her.  She states he tends to get dehydrated and has these bouts of decreased level of consciousness tend to rebound with some IV fluids.  We discussed his CODE STATUS and she is quite adamant he is to remain full code though does not authorize a lumbar puncture if indicated.  Will be admitted in guarded condition to the [facility].     I discussed the plan of care with Dr. Martines.    Review of Systems  Review of Systems   Unable to perform ROS: Mental acuity       Past Medical History   has a past medical history of Anxiety, BPH (benign prostatic hyperplasia), CKD (chronic kidney disease), COPD (chronic obstructive pulmonary disease) ([facility]), COVID-19, Dementia ([facility]), GERD (gastroesophageal reflux disease),  Hyperlipidemia, Insomnia, Major depressive disorder, and Type II diabetes mellitus (HCC).    Surgical History   has no past surgical history on file.     Family History  family history is not on file.   Family history reviewed with patient. There is no family history that is pertinent to the chief complaint.     Social History   reports that he has never smoked. He has never used smokeless tobacco. He reports that he does not currently use alcohol. He reports that he does not currently use drugs.    Allergies  Allergies   Allergen Reactions    Metformin Unspecified     Unknown reaction, listed on MAR     Morphine Unspecified     Unknown reaction, listed on MAR     Simvastatin Unspecified     Unknown reaction, listed on MAR     Spironolactone Unspecified     Unknown reaction, listed on MAR        Medications  Prior to Admission Medications   Prescriptions Last Dose Informant Patient Reported? Taking?   ALPRAZolam (XANAX) 0.25 MG Tab 1/9/2024 at 1201 MAR from Other Facility Yes Yes   Sig: Take 0.5 mg by mouth 3 times a day as needed for Anxiety. 2 tablets = 0.5mg   DULoxetine HCl 30 MG Capsule Delayed Release Sprinkle 1/9/2024 at PM MAR from Other Facility Yes No   Sig: Take 30 mg by mouth every day.   Umeclidinium Bromide (INCRUSE ELLIPTA) 62.5 MCG/ACT AEROSOL POWDER, BREATH ACTIVATED 1/9/2024 at PM MAR from Other Facility Yes No   Sig: Inhale 1 Puff every evening.   acetaminophen (TYLENOL) 325 MG Tab 1/9/2024 at PM MAR from Other Facility Yes Yes   Sig: Take 650 mg by mouth 2 times a day as needed for Moderate Pain.   amLODIPine (NORVASC) 5 MG Tab 1/9/2024 at PM MAR from Other Facility Yes No   Sig: Take 5 mg by mouth every day.   apixaban (ELIQUIS) 2.5mg Tab 1/9/2024 at PM MAR from Other Facility Yes No   Sig: Take 2.5 mg by mouth 2 times a day.   baclofen (LIORESAL) 10 MG Tab 1/9/2024 at PM MAR from Other Facility Yes No   Sig: Take 10 mg by mouth 2 times a day.   cefdinir (OMNICEF) 300 MG Cap 1/3/2024 at FINISHED  MAR from Other Facility Yes Yes   Sig: Take 300 mg by mouth 2 times a day. 13 doses started on 12/28/2023   divalproex (DEPAKOTE) 125 MG EC tablet 1/9/2024 at PM MAR from Other Facility Yes No   Sig: Take 250-500 mg by mouth see administration instructions. 2 capsules = 250mg every afternoon  4 capsules = 500mg twice daily   finasteride (PROSCAR) 5 MG Tab 1/9/2024 at AM MAR from Other Facility Yes No   Sig: Take 5 mg by mouth every day.   fluticasone furoate-vilanterol (BREO) 100-25 MCG/ACT AEROSOL POWDER, BREATH ACTIVATED 1/9/2024 at AM MAR from Other Facility Yes No   Sig: Inhale 1 Puff every morning.   insulin glargine (LANTUS) 100 UNIT/ML SC SOLN 1/9/2024 at PM MAR from Other Facility No No   Sig: Inject 10 Units under the skin 2 times a day.   Patient taking differently: Inject 10 Units under the skin 2 times a day.   insulin regular (NOVOLIN R) 100 Unit/mL Solution 1/9/2024 at 1700 MAR from Other Facility Yes No   Sig: Inject 2-10 Units under the skin 4 Times a Day,Before Meals and at Bedtime. Sliding Scale  201-250= 2 units  251-300= 4 units  301-350= 6 units  351-400= 8 units  Greater than 400 call MD   ipratropium-albuterol (DUONEB) 0.5-2.5 (3) MG/3ML nebulizer solution 1/9/2024 at PM MAR from Other Facility Yes Yes   Sig: Take 3 mL by nebulization 2 times a day.   lidocaine (ASPERFLEX) 4 % Patch REMOVED 01/09/2024 at PM MAR from Other Facility Yes Yes   Sig: Place 2 Patches on the skin every 12 hours. Lower back side by side  12 hours on and 12 hours off   pantoprazole (PROTONIX) 40 MG Tablet Delayed Response 1/9/2024 at 0500 MAR from Other Facility Yes No   Sig: Take 40 mg by mouth every day.   polyethylene glycol/lytes (MIRALAX) 17 g Pack 1/9/2024 at AM MAR from Other Facility Yes No   Sig: Take 17 g by mouth every day. Hold for loose stools   pravastatin (PRAVACHOL) 80 MG tablet 1/9/2024 at PM MAR from Other Facility Yes No   Sig: Take 80 mg by mouth every evening.   propranolol (INDERAL) 20 MG Tab  1/9/2024 at PM MAR from Other Facility Yes No   Sig: Take 20 mg by mouth every 12 hours.   risperiDONE (RISPERDAL) 1 MG Tab 1/9/2024 at 1800 MAR from Other Facility Yes No   Sig: Take 1 mg by mouth 3 times a day. Indications: psychosis   senna-docusate (PERICOLACE OR SENOKOT S) 8.6-50 MG Tab 1/9/2024 at PM MAR from Other Facility Yes No   Sig: Take 1 Tablet by mouth 2 times a day. Hold for loose stool   tamsulosin (FLOMAX) 0.4 MG capsule 1/9/2024 at PM MAR from Other Facility Yes No   Sig: Take 0.4 mg by mouth every day.      Facility-Administered Medications: None       Physical Exam  Temp:  [36.1 °C (97 °F)] 36.1 °C (97 °F)  Pulse:  [55-67] 67  Resp:  [14-17] 16  BP: (183-196)/(75-88) 183/81  SpO2:  [95 %-96 %] 96 %  Blood Pressure : (!) 183/81   Temperature: 36.1 °C (97 °F)   Pulse: 67   Respiration: 16   Pulse Oximetry: 96 %       Physical Exam  Vitals and nursing note reviewed.   Constitutional:       Appearance: He is ill-appearing.   HENT:      Mouth/Throat:      Mouth: Mucous membranes are dry.   Neck:      Comments: He does not allow me to turn his neck  Cardiovascular:      Rate and Rhythm: Regular rhythm. Bradycardia present.   Pulmonary:      Effort: Pulmonary effort is normal.      Breath sounds: Normal breath sounds.   Abdominal:      General: There is no distension.      Tenderness: There is no abdominal tenderness.   Musculoskeletal:      Right lower leg: No edema.      Left lower leg: No edema.   Skin:     General: Skin is dry.   Neurological:      Mental Status: He is alert.      Comments: Non verbal  He squeezes his hands and moves his feet to command  Gaze to the right         Laboratory:  Recent Labs     01/10/24  0619   WBC 9.3   RBC 4.10*   HEMOGLOBIN 12.6*   HEMATOCRIT 37.0*   MCV 90.2   MCH 30.7   MCHC 34.1   RDW 44.6   PLATELETCT 149*   MPV 8.0*     Recent Labs     01/10/24  0619   SODIUM 144   POTASSIUM 4.0   CHLORIDE 107   CO2 26   GLUCOSE 81   BUN 20   CREATININE 1.53*   CALCIUM 9.0  "    Recent Labs     01/10/24  0619   ALTSGPT 9   ASTSGOT 14   ALKPHOSPHAT 71   TBILIRUBIN 0.4   GLUCOSE 81     Recent Labs     01/10/24  0619   APTT 39.6*   INR 1.21*     No results for input(s): \"NTPROBNP\" in the last 72 hours.      Recent Labs     01/10/24  0619   TROPONINT 40*       Imaging:  DX-HIP-BILATERAL-WITH PELVIS-2 VIEWS   Final Result         1.  No radiographic evidence of acute traumatic injury.   2.  Atherosclerosis      DX-CHEST-PORTABLE (1 VIEW)   Final Result         1.  No focal infiltrates.   2.  Perihilar interstitial prominence and bronchial wall cuffing, appearance suggests changes of underlying bronchial inflammation, consider bronchitis.      CT-HEAD W/O   Final Result         1.  No acute intracranial abnormality is identified, there are nonspecific white matter changes, commonly associated with small vessel ischemic disease.  Associated mild cerebral atrophy is noted.   2.  Bilateral maxillary and ethmoid sinusitis changes   3.  Atherosclerosis.                 Assessment/Plan:  Justification for Admission Status  I anticipate this patient will require at least two midnights for appropriate medical management, necessitating inpatient admission because work up of encephalopathy     Patient will need a Intermediate Care (Adult and Pediatrics) bed on MEDICAL service .  The need is secondary to as above.    * Encephalopathy acute- (present on admission)  Assessment & Plan  Acute metabolic encephalopathy.  No evidence of a toxic process and less for some reason he got a medicine at the [facility] accidentally.  Labs and imaging are unrevealing.  Supportive care and IV fluids.  Daughter does not want a lumbar puncture or any further workup.  At this point there does not appear to be a source of bacterial infection therefore refrain from IV antibiotics.  If his mentation does not improve, consider EEG.    Severe early onset Alzheimer's dementia (HCC)- (present on admission)  Assessment & " "Plan  Baseline is oriented only to name.    Type 2 diabetes mellitus with hyperglycemia, with long-term current use of insulin (formerly [facility])- (present on admission)  Assessment & Plan  He is on insulin long-acting which will be held for now  Sliding scale insulin      Counseling regarding advanced care planning and goals of care- (present on admission)  Assessment & Plan  On discussion with his daughter, Jose Olivas [phone]  She understands his advanced dementia and multiple admissions.  She is quite adamant that she and her sister want him full code and she states she understands this is a \"selfish\" status as they do not want to lose their father.  We discussed what a CODE BLUE could entail and the nonbeneficial care that would be provided.  Despite this she is quite adamant that we are to do everything.  She does note though that she does not want a lumbar puncture or anything invasive short of the obvious methods that would be entailed and a CODE BLUE.  20 minutes were spent in these discussions.    Atrial arrhythmia- (present on admission)  Assessment & Plan  He is on Eliquis as an outpatient  This will be held in case a lumbar puncture is indicated.     Primary hypertension- (present on admission)  Assessment & Plan  Is a high aspiration risk therefore cannot be given oral medications.  IV hydralazine has been ordered for systolic blood pressure over 170        VTE prophylaxis: enoxaparin ppx  "

## 2024-01-10 NOTE — ED PROVIDER NOTES
ER Provider Note    Scribed for Dr. Bird Martines M.D. by Shahnaz Kaufman. 1/10/2024  6:21 AM    Primary Care Provider: Pcp Pt States None    CHIEF COMPLAINT  Chief Complaint   Patient presents with    Possible Stroke     BIBA from [facility] for aphasia, increased left arm weakness and ALOC, LKW 0130. Per ems patient usually aox4 and conversational at baseline. History dementia, previous CVA with left sided deficits.        EXTERNAL RECORDS REVIEWED  Inpatient Notes The patient has a history of ALOC, left sided weakness, and dementia.    HPI/ROS  LIMITATION TO HISTORY   Select: Altered mental status / Confusion    OUTSIDE HISTORIAN(S):  EMS Provides history    Wale Olivas is a 78 y.o. male with a history of dementia and left sided weakness who presents to the ED via EMS as a code stroke onset 40 minutes ago. The patient currently resides in a care facility and his last known well was 1:30 AM when he was woken up for medications and then 30 minutes ago there was increased left facial droop and left sided weakness. EMS reports that the patient has a history of ALOCs. En route to the ED EMS states that the patient was hypertensive and rotating between sinus rhythm and atrial fibrillation with a blood sugar of 110. He has no known history of afib. The patient was unable to follow commands. The patient is reported to be a GCS of 9. Currently in the ED he in grunting with some comprehensible speech and not following commands. He is currently taking Eliquis.     PAST MEDICAL HISTORY  Past Medical History:   Diagnosis Date    Anxiety     BPH (benign prostatic hyperplasia)     CKD (chronic kidney disease)     COPD (chronic obstructive pulmonary disease) (HCC)     COVID-19     Dementia (HCC)     GERD (gastroesophageal reflux disease)     Hyperlipidemia     Insomnia     Major depressive disorder     Type II diabetes mellitus (HCC)        SURGICAL HISTORY  No past surgical history on  file.    FAMILY HISTORY  No family history on file.    SOCIAL HISTORY   reports that he has never smoked. He has never used smokeless tobacco. He reports that he does not currently use alcohol. He reports that he does not currently use drugs.    CURRENT MEDICATIONS  Current Discharge Medication List        CONTINUE these medications which have NOT CHANGED    Details   lidocaine (ASPERFLEX) 4 % Patch Place 2 Patches on the skin every 12 hours. Lower back side by side  12 hours on and 12 hours off      acetaminophen (TYLENOL) 325 MG Tab Take 650 mg by mouth 2 times a day as needed for Moderate Pain.      cefdinir (OMNICEF) 300 MG Cap Take 300 mg by mouth 2 times a day. 13 doses started on 12/28/2023      ipratropium-albuterol (DUONEB) 0.5-2.5 (3) MG/3ML nebulizer solution Take 3 mL by nebulization 2 times a day.      ALPRAZolam (XANAX) 0.25 MG Tab Take 0.5 mg by mouth 3 times a day as needed for Anxiety. 2 tablets = 0.5mg      insulin glargine (LANTUS) 100 UNIT/ML SC SOLN Inject 10 Units under the skin 2 times a day.  Qty: 10 mL    Associated Diagnoses: Type 2 diabetes mellitus with hyperglycemia, without long-term current use of insulin (HCC)      baclofen (LIORESAL) 10 MG Tab Take 10 mg by mouth 2 times a day.      insulin regular (NOVOLIN R) 100 Unit/mL Solution Inject 2-10 Units under the skin 4 Times a Day,Before Meals and at Bedtime. Sliding Scale  201-250= 2 units  251-300= 4 units  301-350= 6 units  351-400= 8 units  Greater than 400 call MD      amLODIPine (NORVASC) 5 MG Tab Take 5 mg by mouth every day.      senna-docusate (PERICOLACE OR SENOKOT S) 8.6-50 MG Tab Take 1 Tablet by mouth 2 times a day. Hold for loose stool      apixaban (ELIQUIS) 2.5mg Tab Take 2.5 mg by mouth 2 times a day.      risperiDONE (RISPERDAL) 1 MG Tab Take 1 mg by mouth 3 times a day. Indications: psychosis      fluticasone furoate-vilanterol (BREO) 100-25 MCG/ACT AEROSOL POWDER, BREATH ACTIVATED Inhale 1 Puff every morning.     "  DULoxetine HCl 30 MG Capsule Delayed Release Sprinkle Take 30 mg by mouth every day.      divalproex (DEPAKOTE) 125 MG EC tablet Take 250-500 mg by mouth see administration instructions. 2 capsules = 250mg every afternoon  4 capsules = 500mg twice daily      finasteride (PROSCAR) 5 MG Tab Take 5 mg by mouth every day.      Umeclidinium Bromide (INCRUSE ELLIPTA) 62.5 MCG/ACT AEROSOL POWDER, BREATH ACTIVATED Inhale 1 Puff every evening.      pantoprazole (PROTONIX) 40 MG Tablet Delayed Response Take 40 mg by mouth every day.      polyethylene glycol/lytes (MIRALAX) 17 g Pack Take 17 g by mouth every day. Hold for loose stools      pravastatin (PRAVACHOL) 80 MG tablet Take 80 mg by mouth every evening.      tamsulosin (FLOMAX) 0.4 MG capsule Take 0.4 mg by mouth every day.      propranolol (INDERAL) 20 MG Tab Take 20 mg by mouth every 12 hours.             ALLERGIES  Metformin, Morphine, Simvastatin, and Spironolactone    PHYSICAL EXAM  BP (!) 193/88   Pulse 65   Temp 36.1 °C (97 °F) (Temporal)   Resp 17   Ht 1.778 m (5' 10\")   Wt 86.2 kg (190 lb)   SpO2 96%   BMI 27.26 kg/m²   Constitutional: Mild distress.  HENT: No signs of trauma, Bilateral external ears normal, Nose normal.   Eyes: Pupils are equal and reactive, Conjunctiva normal, Non-icteric.   Neck: Normal range of motion, No tenderness, Supple,   Cardiovascular: Regular rate and rhythm, no murmurs.   Thorax & Lungs: Normal breath sounds, No respiratory distress, No wheezing, No chest tenderness.   Abdomen: Bowel sounds normal, Soft, No tenderness, No masses, No pulsatile masses. No peritoneal signs.  Skin: Warm, Dry, No erythema, No rash.   Back: No bony tenderness, No CVA tenderness.   Extremities: Intact distal pulses, No edema, No tenderness, No cyanosis, no tenderness  Neurologic: Slurred speech with occasional comprehensible words, not able to follow commands, strength in all extremities, facial droop to left side, appears weaker on left side but " is able to hold left leg up off bed.  Psychiatric: Affect normal     DIAGNOSTIC STUDIES & PROCEDURES    Labs:   Labs Reviewed   CBC WITH DIFFERENTIAL - Abnormal; Notable for the following components:       Result Value    RBC 4.10 (*)     Hemoglobin 12.6 (*)     Hematocrit 37.0 (*)     Platelet Count 149 (*)     MPV 8.0 (*)     Eosinophils 12.80 (*)     Eos (Absolute) 1.18 (*)     All other components within normal limits    Narrative:     Indicate which anticoagulants the patient is on:->UNKNOWN  Biotin intake of greater than 5 mg per day may interfere with  troponin levels, causing false low values.   COMP METABOLIC PANEL - Abnormal; Notable for the following components:    Creatinine 1.53 (*)     Globulin 4.0 (*)     All other components within normal limits    Narrative:     Indicate which anticoagulants the patient is on:->UNKNOWN  Biotin intake of greater than 5 mg per day may interfere with  troponin levels, causing false low values.   PROTHROMBIN TIME - Abnormal; Notable for the following components:    PT 15.4 (*)     INR 1.21 (*)     All other components within normal limits    Narrative:     Indicate which anticoagulants the patient is on:->UNKNOWN  Biotin intake of greater than 5 mg per day may interfere with  troponin levels, causing false low values.   APTT - Abnormal; Notable for the following components:    APTT 39.6 (*)     All other components within normal limits    Narrative:     Indicate which anticoagulants the patient is on:->UNKNOWN  Biotin intake of greater than 5 mg per day may interfere with  troponin levels, causing false low values.   TROPONIN - Abnormal; Notable for the following components:    Troponin T 40 (*)     All other components within normal limits    Narrative:     Indicate which anticoagulants the patient is on:->UNKNOWN  Biotin intake of greater than 5 mg per day may interfere with  troponin levels, causing false low values.   ESTIMATED GFR - Abnormal; Notable for the  following components:    GFR (CKD-EPI) 47 (*)     All other components within normal limits    Narrative:     Indicate which anticoagulants the patient is on:->UNKNOWN  Biotin intake of greater than 5 mg per day may interfere with  troponin levels, causing false low values.   ARTERIAL BLOOD GAS - Abnormal; Notable for the following components:    Pco2 42.2 (*)     O2 Saturation 92.2 (*)     Pco2 -TC 40.2 (*)     Po2 -TC 60.8 (*)     All other components within normal limits   POCT GLUCOSE DEVICE RESULTS - Abnormal; Notable for the following components:    POC Glucose, Blood 59 (*)     All other components within normal limits   POCT GLUCOSE DEVICE RESULTS - Abnormal; Notable for the following components:    POC Glucose, Blood 111 (*)     All other components within normal limits   COD (ADULT)   URINALYSIS,CULTURE IF INDICATED      All labs reviewed by me.    EKG Interpretation:  Interpreted by me    12 Lead EKG interpreted by me to show:  Normal sinus rhythm  Rate 58  Axis: Normal  Intervals: Normal  Normal T waves  Normal ST segments  My impression of this EKG: Does not indicate ischemia or arrhythmia at this time.     Radiology:   The attending Emergency Physician has independently interpreted the diagnostic imaging associated with this visit and is awaiting the final reading from the radiologist, which will be displayed below.    Preliminary interpretation is a follows: No pneumonia noted.  Radiologist interpretation:      DX-HIP-BILATERAL-WITH PELVIS-2 VIEWS   Final Result         1.  No radiographic evidence of acute traumatic injury.   2.  Atherosclerosis      DX-CHEST-PORTABLE (1 VIEW)   Final Result         1.  No focal infiltrates.   2.  Perihilar interstitial prominence and bronchial wall cuffing, appearance suggests changes of underlying bronchial inflammation, consider bronchitis.      CT-HEAD W/O   Final Result         1.  No acute intracranial abnormality is identified, there are nonspecific white  matter changes, commonly associated with small vessel ischemic disease.  Associated mild cerebral atrophy is noted.   2.  Bilateral maxillary and ethmoid sinusitis changes   3.  Atherosclerosis.              COURSE & MEDICAL DECISION MAKING    ED Observation Status? No; Patient does not meet criteria for ED Observation.     INITIAL ASSESSMENT AND PLAN  Care Narrative:       6:21 AM - Patient emergently seen and evaluated at the charge desk as a code stroke. Discussed plan of care, including labs and imaging. Patient agrees to plan of care. Ordered UA, CBC with diff, CMP, Prothrombin, APTT, COD, Troponin, and EKG to evaluate.    6:32 AM - Spoke with Dr. Gonzalez (Neurology) about the patient's condition. We discussed the patient's history of left sided weakness.     7:01 AM - The nurse reports that the patient is painfully responsive with sternal rub and has a systolic of 195.     7:03 AM - Patient was reevaluated at bedside. The patient will be medicated with 0.5 mg Narcan.     7:15 AM - Patient was reevaluated at bedside. He has snoring respirations. The patient yells in pain when left leg is moved. Ordered for DX- Hip with pelvis, CT head w/o, and DX- Chest.     8:12 AM - I discussed the patient's case and the above findings with Dr. Dang (Hospitalist) who agrees to evaluate the patient for hospitalization.     CRITICAL CARE  The very real possibility of a deterioration of this patient's condition required the highest level of my preparedness for sudden, emergent intervention.  I provided critical care services, which included medication orders, frequent reevaluations of the patient's condition and response to treatment, ordering and reviewing test results, and discussing the case with various consultants.  The critical care time associated with the care of the patient was 40 minutes. Review chart for interventions. This time is exclusive of any other billable procedures.      HTN/IDDM FOLLOW UP:  The patient is  referred to a primary physician for blood pressure management, diabetic screening, and for all other preventive health concerns    ADDITIONAL PROBLEM LIST AND DISPOSITION  Patient with altered mental status.  At this time his NIH would be in the 17-18 range.  It is unclear if this is truly stroke is very unlikely given history.  This is likely toxic metabolic and recrudescence.  There is no obvious infection.  The patient has no elevated blood glucose.  Troponin is mildly elevated and need to be trended.  Mild anemia.  He will be admitted to the hospitalist in guarded condition.  He is easily arousable and protecting his airway at this time.    CRITICAL CARE  The very real possibilty of a deterioration of this patient's condition required the highest level of my preparedness for sudden, emergent intervention.  I provided critical care services, which included medication orders, frequent reevaluations of the patient's condition and response to treatment, ordering and reviewing test results, and discussing the case with various consultants.  The critical care time associated with the care of the patient was 35 minutes. Review chart for interventions. This time is exclusive of any other billable procedures.                  DISPOSITION AND DISCUSSIONS  I have discussed management of the patient with the following physicians and DEANNA's: Dr. Gonzalez (Neurology), Dr. Dang (Hospitalist)    Discussion of management with other [facility] or appropriate source(s): None     Barriers to care at this time, including but not limited to: Patient does not have established PCP.     Decision tools and prescription drugs considered including, but not limited to: No tPA given given the situation of the patient and his clinical scenario..    DISPOSITION:  Patient will be hospitalized by Dr. Dang in critical condition.     FINAL IMPRESSION   1. Altered mental status, unspecified altered mental status type    2. Elevated troponin    3.       CCT: 40 minutes     Shahnaz VALENZUELA (Scribe), am scribing for, and in the presence of, Bird Martines M.D..    Electronically signed by: Shahnaz Kaufman (Elisabeth), 1/10/2024    Bird VALENZUELA M.D. personally performed the services described in this documentation, as scribed by Shahnaz Kaufman in my presence, and it is both accurate and complete.    The note accurately reflects work and decisions made by me.  Bird Martines M.D.  1/10/2024  1:38 PM

## 2024-01-10 NOTE — ED TRIAGE NOTES
"Chief Complaint   Patient presents with    Possible Stroke     BIBA from [facility] for aphasia, increased left arm weakness and ALOC, LKW 0130. Per ems patient usually aox4 and conversational at baseline. History dementia, previous CVA with left sided deficits.      BP (!) 196/75   Pulse 67   Ht 1.778 m (5' 10\")   Wt 86.2 kg (190 lb)   SpO2 95%     Chart review shows patient was recently seen for similar event.     FSBG 110  "

## 2024-01-10 NOTE — ASSESSMENT & PLAN NOTE
Is a high aspiration risk therefore cannot be given oral medications.  IV hydralazine has been ordered for systolic blood pressure over 170

## 2024-01-10 NOTE — ASSESSMENT & PLAN NOTE
"On discussion with his daughter, Jose Olivas [phone]  She understands his advanced dementia and multiple admissions.  She is quite adamant that she and her sister want him full code and she states she understands this is a \"selfish\" status as they do not want to lose their father.  We discussed what a CODE BLUE could entail and the nonbeneficial care that would be provided.  Despite this she is quite adamant that we are to do everything.  She does note though that she does not want a lumbar puncture or anything invasive short of the obvious methods that would be entailed and a CODE BLUE.  20 minutes were spent in these discussions.  "

## 2024-01-10 NOTE — CONSULTS
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  Patient has had several similar episodes in the past and was recently seen twice in the emergency room within the past 11 days and underwent CTA head and neck which did not reveal flow-limiting stenosis.  Brain CT today with no evidence of acute finding, there is encephalomalacia in distribution of right MCA and evidence of craniotomy on the right.  This appears to be encephalopathy.  Rule out toxic, metabolic or infective encephalopathy.  Please reconsult neurology for concern for primary neurological causes.  Discussed with Dr. Corona

## 2024-01-11 VITALS
RESPIRATION RATE: 12 BRPM | SYSTOLIC BLOOD PRESSURE: 124 MMHG | OXYGEN SATURATION: 97 % | BODY MASS INDEX: 26.89 KG/M2 | HEART RATE: 67 BPM | TEMPERATURE: 97.5 F | DIASTOLIC BLOOD PRESSURE: 75 MMHG | WEIGHT: 187.83 LBS | HEIGHT: 70 IN

## 2024-01-11 LAB
ANION GAP SERPL CALC-SCNC: 8 MMOL/L (ref 7–16)
BASOPHILS # BLD AUTO: 0.7 % (ref 0–1.8)
BASOPHILS # BLD: 0.05 K/UL (ref 0–0.12)
BUN SERPL-MCNC: 16 MG/DL (ref 8–22)
CALCIUM SERPL-MCNC: 9 MG/DL (ref 8.5–10.5)
CHLORIDE SERPL-SCNC: 104 MMOL/L (ref 96–112)
CO2 SERPL-SCNC: 26 MMOL/L (ref 20–33)
CREAT SERPL-MCNC: 1.31 MG/DL (ref 0.5–1.4)
EOSINOPHIL # BLD AUTO: 0.84 K/UL (ref 0–0.51)
EOSINOPHIL NFR BLD: 12.4 % (ref 0–6.9)
ERYTHROCYTE [DISTWIDTH] IN BLOOD BY AUTOMATED COUNT: 43.8 FL (ref 35.9–50)
GFR SERPLBLD CREATININE-BSD FMLA CKD-EPI: 56 ML/MIN/1.73 M 2
GLUCOSE BLD STRIP.AUTO-MCNC: 149 MG/DL (ref 65–99)
GLUCOSE BLD STRIP.AUTO-MCNC: 164 MG/DL (ref 65–99)
GLUCOSE BLD STRIP.AUTO-MCNC: 225 MG/DL (ref 65–99)
GLUCOSE SERPL-MCNC: 149 MG/DL (ref 65–99)
HCT VFR BLD AUTO: 34 % (ref 42–52)
HGB BLD-MCNC: 11.8 G/DL (ref 14–18)
IMM GRANULOCYTES # BLD AUTO: 0.05 K/UL (ref 0–0.11)
IMM GRANULOCYTES NFR BLD AUTO: 0.7 % (ref 0–0.9)
LYMPHOCYTES # BLD AUTO: 1.82 K/UL (ref 1–4.8)
LYMPHOCYTES NFR BLD: 26.9 % (ref 22–41)
MCH RBC QN AUTO: 30.7 PG (ref 27–33)
MCHC RBC AUTO-ENTMCNC: 34.7 G/DL (ref 32.3–36.5)
MCV RBC AUTO: 88.5 FL (ref 81.4–97.8)
MONOCYTES # BLD AUTO: 0.85 K/UL (ref 0–0.85)
MONOCYTES NFR BLD AUTO: 12.6 % (ref 0–13.4)
NEUTROPHILS # BLD AUTO: 3.16 K/UL (ref 1.82–7.42)
NEUTROPHILS NFR BLD: 46.7 % (ref 44–72)
NRBC # BLD AUTO: 0 K/UL
NRBC BLD-RTO: 0 /100 WBC (ref 0–0.2)
PLATELET # BLD AUTO: 131 K/UL (ref 164–446)
PMV BLD AUTO: 8.4 FL (ref 9–12.9)
POTASSIUM SERPL-SCNC: 4 MMOL/L (ref 3.6–5.5)
RBC # BLD AUTO: 3.84 M/UL (ref 4.7–6.1)
SODIUM SERPL-SCNC: 138 MMOL/L (ref 135–145)
WBC # BLD AUTO: 6.8 K/UL (ref 4.8–10.8)

## 2024-01-11 PROCEDURE — 80048 BASIC METABOLIC PNL TOTAL CA: CPT

## 2024-01-11 PROCEDURE — A9270 NON-COVERED ITEM OR SERVICE: HCPCS | Performed by: HOSPITALIST

## 2024-01-11 PROCEDURE — 700102 HCHG RX REV CODE 250 W/ 637 OVERRIDE(OP): Performed by: HOSPITALIST

## 2024-01-11 PROCEDURE — 4A10X4Z MONITORING OF CENTRAL NERVOUS ELECTRICAL ACTIVITY, EXTERNAL APPROACH: ICD-10-PCS | Performed by: PSYCHIATRY & NEUROLOGY

## 2024-01-11 PROCEDURE — 85025 COMPLETE CBC W/AUTO DIFF WBC: CPT

## 2024-01-11 PROCEDURE — 95816 EEG AWAKE AND DROWSY: CPT | Performed by: PSYCHIATRY & NEUROLOGY

## 2024-01-11 PROCEDURE — 82962 GLUCOSE BLOOD TEST: CPT

## 2024-01-11 PROCEDURE — 99239 HOSP IP/OBS DSCHRG MGMT >30: CPT | Performed by: HOSPITALIST

## 2024-01-11 PROCEDURE — 95816 EEG AWAKE AND DROWSY: CPT | Mod: 26 | Performed by: PSYCHIATRY & NEUROLOGY

## 2024-01-11 PROCEDURE — 700105 HCHG RX REV CODE 258: Performed by: HOSPITALIST

## 2024-01-11 RX ORDER — DEXTROSE MONOHYDRATE 25 G/50ML
25 INJECTION, SOLUTION INTRAVENOUS
Status: DISCONTINUED | OUTPATIENT
Start: 2024-01-11 | End: 2024-01-11 | Stop reason: HOSPADM

## 2024-01-11 RX ORDER — VALPROIC ACID 250 MG/1
500 CAPSULE, LIQUID FILLED ORAL ONCE
Status: COMPLETED | OUTPATIENT
Start: 2024-01-11 | End: 2024-01-11

## 2024-01-11 RX ORDER — DEXTROSE MONOHYDRATE 25 G/50ML
25 INJECTION, SOLUTION INTRAVENOUS
Status: DISCONTINUED | OUTPATIENT
Start: 2024-01-11 | End: 2024-01-11

## 2024-01-11 RX ORDER — DIVALPROEX SODIUM 125 MG/1
500 TABLET, DELAYED RELEASE ORAL 3 TIMES DAILY
Qty: 30 TABLET | Refills: 0 | Status: SHIPPED
Start: 2024-01-11 | End: 2024-01-28

## 2024-01-11 RX ADMIN — FINASTERIDE 5 MG: 5 TABLET, FILM COATED ORAL at 05:36

## 2024-01-11 RX ADMIN — SODIUM CHLORIDE, POTASSIUM CHLORIDE, SODIUM LACTATE AND CALCIUM CHLORIDE: 600; 310; 30; 20 INJECTION, SOLUTION INTRAVENOUS at 03:35

## 2024-01-11 RX ADMIN — VALPROIC ACID 500 MG: 250 CAPSULE ORAL at 12:23

## 2024-01-11 RX ADMIN — INSULIN HUMAN 2 UNITS: 100 INJECTION, SOLUTION PARENTERAL at 07:38

## 2024-01-11 RX ADMIN — TAMSULOSIN HYDROCHLORIDE 0.4 MG: 0.4 CAPSULE ORAL at 05:35

## 2024-01-11 RX ADMIN — INSULIN HUMAN 3 UNITS: 100 INJECTION, SOLUTION PARENTERAL at 11:04

## 2024-01-11 RX ADMIN — PROPRANOLOL HYDROCHLORIDE 20 MG: 20 TABLET ORAL at 05:35

## 2024-01-11 RX ADMIN — DOCUSATE SODIUM 50 MG AND SENNOSIDES 8.6 MG 2 TABLET: 8.6; 5 TABLET, FILM COATED ORAL at 05:35

## 2024-01-11 RX ADMIN — APIXABAN 2.5 MG: 5 TABLET, FILM COATED ORAL at 05:36

## 2024-01-11 ASSESSMENT — LIFESTYLE VARIABLES
EVER FELT BAD OR GUILTY ABOUT YOUR DRINKING: NO
TOTAL SCORE: 0
TOTAL SCORE: 0
HAVE YOU EVER FELT YOU SHOULD CUT DOWN ON YOUR DRINKING: NO
ALCOHOL_USE: NO
TOTAL SCORE: 0
AVERAGE NUMBER OF DAYS PER WEEK YOU HAVE A DRINK CONTAINING ALCOHOL: 0
EVER HAD A DRINK FIRST THING IN THE MORNING TO STEADY YOUR NERVES TO GET RID OF A HANGOVER: NO
CONSUMPTION TOTAL: NEGATIVE
HAVE PEOPLE ANNOYED YOU BY CRITICIZING YOUR DRINKING: NO
DOES PATIENT WANT TO STOP DRINKING: NO
ON A TYPICAL DAY WHEN YOU DRINK ALCOHOL HOW MANY DRINKS DO YOU HAVE: 0
HOW MANY TIMES IN THE PAST YEAR HAVE YOU HAD 5 OR MORE DRINKS IN A DAY: 0

## 2024-01-11 ASSESSMENT — PAIN DESCRIPTION - PAIN TYPE
TYPE: ACUTE PAIN

## 2024-01-11 ASSESSMENT — FIBROSIS 4 INDEX: FIB4 SCORE: 2.38

## 2024-01-11 ASSESSMENT — PATIENT HEALTH QUESTIONNAIRE - PHQ9
1. LITTLE INTEREST OR PLEASURE IN DOING THINGS: NOT AT ALL
2. FEELING DOWN, DEPRESSED, IRRITABLE, OR HOPELESS: NOT AT ALL
SUM OF ALL RESPONSES TO PHQ9 QUESTIONS 1 AND 2: 0

## 2024-01-11 NOTE — CARE PLAN
The patient is Watcher - Medium risk of patient condition declining or worsening    Shift Goals  Clinical Goals: Q2 neuro's, SBP<170  Patient Goals: Rest  Family Goals: CJ    Progress made toward(s) clinical / shift goals:       Problem: Knowledge Deficit - Standard  Goal: Patient and family/care givers will demonstrate understanding of plan of care, disease process/condition, diagnostic tests and medications  Outcome: Progressing     Problem: Pain - Standard  Goal: Alleviation of pain or a reduction in pain to the patient’s comfort goal  Outcome: Progressing     Problem: Skin Integrity  Goal: Skin integrity is maintained or improved  Outcome: Progressing     Problem: Fall Risk  Goal: Patient will remain free from falls  Outcome: Progressing

## 2024-01-11 NOTE — DISCHARGE INSTRUCTIONS
"Ischemic Stroke  Discharge Instructions    You experienced an Ischemic Stroke.  Ischemic stroke is the most common type of stroke and happens when an artery in the brain becomes blocked by a plaque fragment or blood clot. Typically, these blockages travel from the heart or larger arteries that supply the brain.  The brain needs a constant supply of blood, which carries oxygen and nutrients it needs to function.  A stroke occurs when one of these arteries to the brain is either blocked or bursts. As a result, part of the brain does not get the blood it needs, so it starts to die.         Stroke Risk Factors    You are at increased risk of having another stroke event.  See your Patient Stroke Guide to help reduce your stroke risk. These are your specific risk factors:  Age - Over 55  Atrial Fibrillation  Gender - Men are at a higher risk than women     Get help right away if you have any signs of a stroke.  \"BE FAST\" is an easy way to remember the main warning signs of a stroke:  B - Balance. Dizziness, sudden trouble walking, or loss of balance.  E - Eyes. Trouble seeing or a change in how you see.  F - Face. Sudden weakness or loss of feeling in the face. The face or eyelid may droop on one side.  A - Arms. Weakness or loss of feeling in an arm. This happens all of a sudden and most often on one side of the body.  S - Speech. Sudden trouble speaking, slurred speech, or trouble understanding what people say.  T - Time. Time to call emergency services. Write down what time symptoms started.    "

## 2024-01-11 NOTE — DISCHARGE PLANNING
DC Transport Scheduled    Received request at: 1/11/2024 at 1039    Transport Company Scheduled:  [employer]  Spoke with Radha at [facility] to schedule transport.    Scheduled Date: 1/11/2024  Scheduled Time: 1230    Destination: [facility] at [address] [place] [address] Room Assignment: [id number]    Notified care team of scheduled transport via [employer].     If there are any changes needed to the DC transportation scheduled, please contact [facility] at ext. [phone] between the hours of [id number] Mon-Fri. If outside those hours, contact the ED Case Manager at ext. [phone].

## 2024-01-11 NOTE — CARE PLAN
The patient is Stable - Low risk of patient condition declining or worsening    Shift Goals  Clinical Goals: sbp <170  Patient Goals: chas  Family Goals: chas    Progress made toward(s) clinical / shift goals:      Problem: Pain - Standard  Goal: Alleviation of pain or a reduction in pain to the patient’s comfort goal  Outcome: Progressing     Problem: Skin Integrity  Goal: Skin integrity is maintained or improved  Outcome: Progressing     Problem: Fall Risk  Goal: Patient will remain free from falls  Outcome: Progressing       Patient is not progressing towards the following goals: N/A

## 2024-01-11 NOTE — DISCHARGE SUMMARY
Discharge Summary    CHIEF COMPLAINT ON ADMISSION  Chief Complaint   Patient presents with    Possible Stroke     BIBA from [facility] for aphasia, increased left arm weakness and ALOC, LKW 0130. Per ems patient usually aox4 and conversational at baseline. History dementia, previous CVA with left sided deficits.        Reason for Admission  Encephalopathy acute     Admission Date  1/10/2024    CODE STATUS  Full Code    HPI & HOSPITAL COURSE  This is a 78 y.o. male here with decreased level of consciousness.  Mr. Olivas has a past medical tree of advanced dementia oriented to self only at baseline as well as insulin-dependent diabetes mellitus atrial fibrillation on Eliquis that was most recently admitted to our [facility] from 11/2/2023 through 1/7/2023 with shortness of breath, COPD exacerbation, and pneumonia.  He lives at the [facility].  He was brought in this morning due to decreased level of consciousness.  In the emergency room extensive workup has been done including labs, chest x-ray, imaging of the head and none are revealing as to a source.  His daughter, Jose Olivas, discussed his condition at length with her.  She states he tends to get dehydrated and has these bouts of decreased level of consciousness tend to rebound with some IV fluids.  We discussed his CODE STATUS and she is quite adamant he is to remain full code. He will be admitted in guarded condition to the [facility].   1/11/2024 Mr. Olivas appears to be back to his baseline.  There has been no source of bacterial infection.  EEG was performed as noted below and very suspicious for seizure activity.  I called his daughter Jose Olivas and she notes that he has been diagnosed with seizures in the past and his valproic acid had been 1500 mg daily and was recently dropped down.  Given the history of seizures, abnormal EEG, significant encephalopathy that improves quite quickly this is all consistent with postictal condition.   I have recommended going back up to the dose of 1500 mg daily which can either be 500 mg 3 times daily or once a day long-acting Depakote.  [facility]  Apparently he has not tolerated Keppra in the past.    Therefore, he is discharged in fair and stable condition to skilled nursing facility.    The patient recovered much more quickly than anticipated on admission.    Discharge Date  1/11    FOLLOW UP ITEMS POST DISCHARGE  [facility]    DISCHARGE DIAGNOSES  Principal Problem:    Encephalopathy acute (POA: Yes)  Active Problems:    Severe early onset Alzheimer's dementia (HCC) (POA: Yes)    Primary hypertension (POA: Yes)    Atrial arrhythmia (POA: Yes)    Counseling regarding advanced care planning and goals of care (POA: Yes)    Type 2 diabetes mellitus with hyperglycemia, with long-term current use of insulin (HCC) (POA: Yes)  Resolved Problems:    * No resolved hospital problems. *      FOLLOW UP  No future appointments.  [facility]  [address] [place] [id number]  [phone]  Schedule an appointment as soon as possible for a visit        MEDICATIONS ON DISCHARGE     Medication List        CHANGE how you take these medications        Instructions   divalproex 125 MG EC tablet  What changed:   how much to take  when to take this  Commonly known as: Depakote   Take 4 Tablets by mouth 3 times a day. 2 capsules = 250mg every afternoon  4 capsules = 500mg twice daily  Dose: 500 mg     insulin glargine 100 UNIT/ML Soln  What changed: when to take this  Commonly known as: Lantus   Inject 10 Units under the skin 2 times a day.  Dose: 10 Units            CONTINUE taking these medications        Instructions   acetaminophen 325 MG Tabs  Commonly known as: Tylenol   Take 650 mg by mouth 2 times a day as needed for Moderate Pain.  Dose: 650 mg     ALPRAZolam 0.25 MG Tabs  Commonly  known as: Xanax   Take 0.5 mg by mouth 3 times a day as needed for Anxiety. 2 tablets = 0.5mg  Dose: 0.5 mg     amLODIPine 5 MG Tabs  Commonly known as: Norvasc   Take 5 mg by mouth every day.  Dose: 5 mg     baclofen 10 MG Tabs  Commonly known as: Lioresal   Take 10 mg by mouth 2 times a day.  Dose: 10 mg     cefdinir 300 MG Caps  Commonly known as: Omnicef   Take 300 mg by mouth 2 times a day. 13 doses started on 12/28/2023  Dose: 300 mg     DULoxetine HCl 30 MG Csdr   Take 30 mg by mouth every day.  Dose: 30 mg     Eliquis 2.5mg Tabs  Generic drug: apixaban   Take 2.5 mg by mouth 2 times a day.  Dose: 2.5 mg     finasteride 5 MG Tabs  Commonly known as: Proscar   Take 5 mg by mouth every day.  Dose: 5 mg     fluticasone furoate-vilanterol 100-25 MCG/ACT Aepb  Commonly known as: Breo   Inhale 1 Puff every morning.  Dose: 1 Puff     Incruse Ellipta 62.5 MCG/ACT Aepb inhaler  Generic drug: umeclidinium bromide   Inhale 1 Puff every evening.  Dose: 1 Puff     ipratropium-albuterol 0.5-2.5 (3) MG/3ML nebulizer solution  Commonly known as: Duoneb   Take 3 mL by nebulization 2 times a day.  Dose: 3 mL     lidocaine 4 % Ptch  Commonly known as: Asperflex   Place 2 Patches on the skin every 12 hours. Lower back side by side  12 hours on and 12 hours off  Dose: 2 Patch     NovoLIN R 100 Unit/mL Soln  Generic drug: insulin regular   Inject 2-10 Units under the skin 4 Times a Day,Before Meals and at Bedtime. Sliding Scale  201-250= 2 units  251-300= 4 units  301-350= 6 units  351-400= 8 units  Greater than 400 call MD  Dose: 2-10 Units     pantoprazole 40 MG Tbec  Commonly known as: Protonix   Take 40 mg by mouth every day.  Dose: 40 mg     polyethylene glycol/lytes Pack  Commonly known as: Miralax   Take 17 g by mouth every day. Hold for loose stools  Dose: 17 g     pravastatin 80 MG tablet  Commonly known as: Pravachol   Take 80 mg by mouth every evening.  Dose: 80 mg     propranolol 20 MG Tabs  Commonly known as:  Inderal   Take 20 mg by mouth every 12 hours.  Dose: 20 mg     risperiDONE 1 MG Tabs  Commonly known as: RisperDAL   Take 1 mg by mouth 3 times a day. Indications: psychosis  Dose: 1 mg     senna-docusate 8.6-50 MG Tabs  Commonly known as: Pericolace Or Senokot S   Take 1 Tablet by mouth 2 times a day. Hold for loose stool  Dose: 1 Tablet     tamsulosin 0.4 MG capsule  Commonly known as: Flomax   Take 0.4 mg by mouth every day.  Dose: 0.4 mg              Allergies  Allergies   Allergen Reactions    Metformin Unspecified     Unknown reaction, listed on MAR     Morphine Unspecified     Unknown reaction, listed on MAR     Simvastatin Unspecified     Unknown reaction, listed on MAR     Spironolactone Unspecified     Unknown reaction, listed on MAR        DIET  Orders Placed This Encounter   Procedures    Diet Order Diet: Level 5 - Minced and Moist; Liquid level: Level 0 - Thin     Standing Status:   Standing     Number of Occurrences:   1     Order Specific Question:   Diet:     Answer:   Level 5 - Minced and Moist [24]     Order Specific Question:   Liquid level     Answer:   Level 0 - Thin       ACTIVITY  As tolerated.      CONSULTATIONS  none    PROCEDURES  EEG:  INTERPRETATION:  This was, within the above noted technical limitations, an abnormal video EEG recording in the awake and drowsy state(s):  Diffuse slowing of the background, suggestive of diffuse and/or multifocal cerebral dysfunction.  This finding might be seen in a myriad of encephalopathies and in itself is a nonspecific finding.  The presence of additional, continuous, right posterior quadrant focal slowing, is suggestive of additional cerebral dysfunction in that region.  This finding might be seen in context of structural lesion, and/or ictal onset zone, among other considerations.  Clinical and radiological correlation is recommended.  The fact that the above-noted right posterior quadrant focal slowing had some embedded sharply contoured waveforms,  might point toward increased propensity toward focal seizures arising from that region.  There were no electrographic seizures captured.    LABORATORY  Lab Results   Component Value Date    SODIUM 138 01/11/2024    POTASSIUM 4.0 01/11/2024    CHLORIDE 104 01/11/2024    CO2 26 01/11/2024    GLUCOSE 149 (H) 01/11/2024    BUN 16 01/11/2024    CREATININE 1.31 01/11/2024        Lab Results   Component Value Date    WBC 6.8 01/11/2024    HEMOGLOBIN 11.8 (L) 01/11/2024    HEMATOCRIT 34.0 (L) 01/11/2024    PLATELETCT 131 (L) 01/11/2024      DX-HIP-BILATERAL-WITH PELVIS-2 VIEWS   Final Result         1.  No radiographic evidence of acute traumatic injury.   2.  Atherosclerosis      DX-CHEST-PORTABLE (1 VIEW)   Final Result         1.  No focal infiltrates.   2.  Perihilar interstitial prominence and bronchial wall cuffing, appearance suggests changes of underlying bronchial inflammation, consider bronchitis.      CT-HEAD W/O   Final Result         1.  No acute intracranial abnormality is identified, there are nonspecific white matter changes, commonly associated with small vessel ischemic disease.  Associated mild cerebral atrophy is noted.   2.  Bilateral maxillary and ethmoid sinusitis changes   3.  Atherosclerosis.               Total time of the discharge process exceeds 35 minutes.

## 2024-01-11 NOTE — WOUND TEAM
[facility]  Inpatient Services  Initial Wound and Skin Care Evaluation    Admission Date: 1/10/2024     Last order of IP CONSULT TO WOUND CARE was found on 1/10/2024 from Hospital Encounter on 1/10/2024     HPI, PMH, SH: Reviewed    No past surgical history on file.  Social History     Tobacco Use    Smoking status: Never    Smokeless tobacco: Never   Substance Use Topics    Alcohol use: Not Currently     Chief Complaint   Patient presents with    Possible Stroke     BIBA from [facility] for aphasia, increased left arm weakness and ALOC, LKW 0130. Per ems patient usually aox4 and conversational at baseline. History dementia, previous CVA with left sided deficits.      Diagnosis: Encephalopathy acute [G93.40]    Unit where seen by Wound Team: R105/00     WOUND CONSULT RELATED TO:  right buttock and penis    WOUND TEAM PLAN OF CARE - Frequency of Follow-up:   Nursing to follow dressing orders written for wound care. Contact wound team if area fails to progress, deteriorates or with any questions/concerns if something comes up before next scheduled follow up (See below as to whether wound is following and frequency of wound follow up)   Not following, consult as needed  - sacrum, buttock, left lateral LE, right anterior LE, bilateral heels     WOUND HISTORY:   Consulted for sacrum and penis.  Sacrum had previous DTI that has resolved at this time with scar tissue at the sacrum.  Penis has moisture related skin breakdown.        WOUND ASSESSMENT/LDA  Wound 11/02/23 Coccyx POA DTI (Active)   Date First Assessed/Time First Assessed: 11/02/23 1152   Present on Original Admission: Yes  Location: Coccyx  Wound Description (Comments): POA DTI      Assessments 1/10/2024  4:00 PM   Wound Image         Wound 01/10/24 Pressure Injury Leg;Knee Lateral Left POA DTI (Active)   Date First Assessed/Time First Assessed: 01/10/24 1614   Present on Original Admission: Yes  Hand Hygiene Completed: Yes   Primary Wound Type: Pressure Injury  Location: Leg;Knee  Wound Orientation: Lateral  Laterality: Left  Wound Description (Comment...      Assessments 1/10/2024  4:00 PM   Wound Image      Site Assessment Purple;Red   Periwound Assessment Clean;Dry;Intact   Margins Defined edges   Closure Secondary intention;Open to air   Drainage Amount None   Treatments Cleansed;Nonselective debridement;Site care;Offloading   Wound Cleansing Not Applicable   Periwound Protectant Not Applicable   Dressing Status Clean;Dry;Intact   Dressing Changed New   Dressing Cleansing/Solutions Not Applicable   Dressing Options Offloading Dressing - Sacral   Dressing Change/Treatment Frequency Every 72 hrs, and As Needed   NEXT Dressing Change/Treatment Date 01/13/24   NEXT Weekly Photo (Inpatient Only) 01/17/24   Wound Team Following Not following   Pressure Injury Stage Deep Tissue   Wound Length (cm) 1 cm   Wound Width (cm) 1 cm   Wound Surface Area (cm^2) 1 cm^2   Shape circular   Wound Odor None       Wound 01/10/24 Pressure Injury Pretibial Anterior Right POA stage 1 (Active)   Date First Assessed/Time First Assessed: 01/10/24 1628   Present on Original Admission: Yes  Hand Hygiene Completed: Yes  Primary Wound Type: Pressure Injury  Location: Pretibial  Wound Orientation: Anterior  Laterality: Right  Wound Description (Comm...      Assessments 1/10/2024  4:00 PM   Wound Image      Site Assessment Red   Periwound Assessment Non-blanchable erythema   Margins Defined edges   Closure Open to air   Drainage Amount None   Treatments Offloading   Wound Cleansing Not Applicable   Dressing Status Clean;Dry;Intact   Dressing Changed Observed   Dressing Cleansing/Solutions Not Applicable   Dressing Options Open to Air   NEXT Weekly Photo (Inpatient Only) 01/17/24   Wound Team Following Not following   Pressure Injury Stage Stage 1   Wound Length (cm) 0.5 cm   Wound Width (cm) 0.5 cm   Wound Surface Area (cm^2) 0.25 cm^2   Shape circular   Wound Odor  None        Vascular:    MARSHALL:   No results found.    Lab Values:    Lab Results   Component Value Date/Time    WBC 9.3 01/10/2024 06:19 AM    RBC 4.10 (L) 01/10/2024 06:19 AM    HEMOGLOBIN 12.6 (L) 01/10/2024 06:19 AM    HEMATOCRIT 37.0 (L) 01/10/2024 06:19 AM    HBA1C 6.7 (H) 09/22/2023 04:39 AM         Culture Results show:  No results found for this or any previous visit (from the past 720 hour(s)).    Pain Level/Medicated:  None, Tolerated without pain medication       INTERVENTIONS BY WOUND TEAM:  Chart and images reviewed. Discussed with bedside RN. All areas of concern (based on picture review, LDA review and discussion with bedside RN) have been thoroughly assessed. Documentation of areas based on significant findings. This RN in to assess patient. Performed standard wound care which includes appropriate positioning, dressing removal and non-selective debridement. Pictures and measurements obtained weekly if/when required.    Wound:  Sacrum   Preparation for Dressing removal: Removed without difficulty  Cleansed/Non-selectively Debrided with:  Moist warm washcloth  Fadia wound: Cleansed with Moist warm washcloth, Prepped with N/A  Primary Dressing:  sacral offloading foam, offloading measures     Wound:  left lateral LE  Preparation for Dressing removal: Open to air  Cleansed/Non-selectively Debrided with:  Moist warm washcloth  Fadia wound: Cleansed with Moist warm washcloth, Prepped with N/A  Primary Dressing:  sacral offloading foam, offloading measures     Wound:  right anterior LE  Preparation for Dressing removal: Open to air  Cleansed/Non-selectively Debrided with:  Moist warm washcloth  Fadia wound: Cleansed with Moist warm washcloth, Prepped with N/A  Primary Dressing:  sacral offloading foam     Wound:  bilateral heels  Preparation for Dressing removal: Removed without difficulty  Cleansed/Non-selectively Debrided with:  Moist warm washcloth  Fadia wound: Cleansed with Moist warm washcloth, Prepped  with N/A  Primary Dressing:  heel offloading foam     Advanced Wound Care Discharge Planning  Number of Clinicians necessary to complete wound care: 2  Is patient requiring IV pain medications for dressing changes:  No   Length of time for dressing change 15 min. (This does not include chart review, pre-medication time, set up, clean up or time spent charting.)    Interdisciplinary consultation: Patient, Bedside RN (Vielka), N/A.  Pressure injury and staging reviewed with N/A.    EVALUATION / RATIONALE FOR TREATMENT:     Date:  01/10/24  Wound Status:  Initial evaluation    Sacral deep tissue injury has resolved at this admittance.  Bilateral heels are blanching and intact at this time.  Offloading with offloading foams and pillows.  Patient has a purple non-blanching discoloration to the left lateral LE and non blanching red spot on right anterior LE.  Offloading measures are in place.   Groin and penis, moisture associated skin breakdown, interdry banana hammock applied.          Goals: Steady decrease in wound area and depth weekly.    NURSING PLAN OF CARE ORDERS:  Skin care: See Skin Care orders  RN Prevention Protocol    NUTRITION RECOMMENDATIONS   Wound Team Recommendations:  N/A    DIET ORDERS (From admission to next 24h)       Start     Ordered    01/10/24 0925  Diet NPO Restrict to: Strict  ALL MEALS        Question:  Diet NPO Restrict to:  Answer:  Strict    01/10/24 0924                    PREVENTATIVE INTERVENTIONS:    Q shift Hector - performed per nursing policy  Q shift pressure point assessments - performed per nursing policy    Surface/Positioning  ICU Low Airloss - Currently in Place  Reposition q 2 hours - Currently in Place  TAPs Turning system - Currently in Place    Offloading/Redistribution  Sacral offloading dressing (Silicone dressing) - Currently in Place  Heel offloading dressing (Silicone dressing) - Currently in Place  Float Heels off Bed with Pillows - Currently in Place            Respiratory  Silicone O2 tubing - Currently in Place  Gray Foam Ear protectors - Currently in Place    Containment/Moisture Prevention    Dri-daniela pad - Currently in Place  Ferro Catheter - Currently in Place    Mobilization      Unable to assess     Anticipated discharge plans:  TBD        Vac Discharge Needs:  Vac Discharge plan is purely a recommendation from wound team and not a requirement for discharge unless otherwise stated by physician.  Not Applicable Pt not on a wound vac

## 2024-01-11 NOTE — CARE PLAN
The patient is Stable - Low risk of patient condition declining or worsening    Shift Goals  Clinical Goals: Q4h neuro checks, SBP <170  Patient Goals: CJ  Family Goals: CJ    Progress made toward(s) clinical / shift goals:     Problem: Pain - Standard  Goal: Alleviation of pain or a reduction in pain to the patient’s comfort goal  Outcome: Progressing     Problem: Skin Integrity  Goal: Skin integrity is maintained or improved  Outcome: Progressing     Problem: Fall Risk  Goal: Patient will remain free from falls  Outcome: Progressing       Patient is not progressing towards the following goals:

## 2024-01-11 NOTE — DISCHARGE PLANNING
Case Management Discharge Planning    Admission Date: 1/10/2024  GMLOS: 3.4  ALOS: 1    6-Clicks ADL Score: 6  6-Clicks Mobility Score: 6  PT and/or OT Eval ordered: No  Post-acute Referrals Ordered: Yes  Post-acute Choice Obtained: Yes  Has referral(s) been sent to post-acute provider:  Yes      Anticipated Discharge Disposition: D/T to SNF with Medicare cert in anticipation of skilled care (03)  Discharge Address: [facility] - [address] [place] [address]  Discharge Contact Phone Number: [phone]    DME Needed: No    Action(s) Taken: Chart reviewed:  Patient with hx of severe early onset Alzheimer's dementia admitted on 1/10/2024 for acute encephalopathy.   Patient is a long term care resident at [facility].   Plan is for DC today post spot EEG.    Assessment completed based on chart review.    CM called patient's daughter, Jose #[phone], to discuss DCP; Jose agreeable to return to [facility] and provided direct phone# [phone] to assigned wing at facility.       T/C placed to facility:  1-Spoke to Vicky, Nurse (#[phone])  2-Albert in Admission (#[phone])    Ok for patient to return per both Vicky & Albert.    SNF referral submitted via Epic & choice form faxed to [facility].    [facility] transport tentatively scheduled for 12:30 PM via [facility].    Transfer packet initiated.       Escalations Completed: Charge nurse escalated EEG reading    Medically Clear: No, pending EEG reading    Next Steps: CM will f/u on medical clearance    Barriers to Discharge: Medical clearance/pending EEG reading    Is the patient up for discharge tomorrow:  Anticipating DC today    **1147 Hrs -  Patient medically cleared for DC.   Transfer packet handed to bedside nurse.    Care Transition Team Assessment    Information Source  Orientation Level: Unable to assess (Hx of severe dementia)  Information Given By:  (Chart review)  Who is  responsible for making decisions for patient? : Adult child  Name(s) of Primary Decision Maker: Jose Olivas (daughter)    Readmission Evaluation  Is this a readmission?: No    Elopement Risk  Legal Hold: No  Ambulatory or Self Mobile in Wheelchair: No-Not an Elopement Risk  Elopement Risk: Not at Risk for Elopement    Interdisciplinary Discharge Planning  Lives with - Patient's Self Care Capacity: Unable To Determine At This Time  Patient or legal guardian wants to designate a caregiver: No  Support Systems: Home Care Staff, Family Member(s)  Housing / Facility: Skilled Nursing Facility  Durable Medical Equipment: Not Applicable    Discharge Preparedness  What is your plan after discharge?: Skilled nursing facility  What are your discharge supports?: Other (comment), Child (Staff @ [facility] and daughter/Jose)  Prior Functional Level: Needs Assist with Activities of Daily Living    Functional Assesment  Prior Functional Level: Needs Assist with Activities of Daily Living    Finances  Financial Barriers to Discharge: No  Prescription Coverage: Yes    Vision / Hearing Impairment  Vision Impairment : No  Hearing Impairment : No     Advance Directive  Advance Directive?: None (Not on file)    Domestic Abuse  Have you ever been the victim of abuse or violence?: No  Physical Abuse or Sexual Abuse: No  Verbal Abuse or Emotional Abuse: No  Possible Abuse/Neglect Reported to:: Not Applicable    Psychological Assessment  History of Substance Abuse: None  History of Psychiatric Problems: Yes (MDD, anxiety)  Non-compliant with Treatment: No  Newly Diagnosed Illness: Yes    Discharge Risks or Barriers  Discharge risks or barriers?: Other (comment) (Cognitive impairment 2/2 severe dementia)  Patient risk factors: Cognitive / sensory / physical deficit    Anticipated Discharge Information  Discharge Disposition: D/T to SNF with Medicare cert in anticipation of skilled care (03)  Discharge Address: [facility] - [address] [place] [address]  Discharge Contact Phone Number: [phone]

## 2024-01-11 NOTE — PROGRESS NOTES
Report called to [facility] Nurse Vicky. All questions answered on patient. Patients resendiz and Ivs removed. Patient educated on plan of care, no evidence of understanding.

## 2024-01-11 NOTE — PROCEDURES
[facility]    Inpatient Standard Video Electroencephalogram Report      Patient Name: Wael Olivas  MRN: [id number]  #: [id number]  Date of Service: 01/11/24  Total Recording Time: 0 hours and 25 minutes.  Referring Provider: Csomo Dang M.D.    INDICATION:  Wale Olivas 78 y.o. male. This standard, inpatient, EEG was requested to evaluate for seizure(s).    CURRENT ANTI-SEIZURE AND OTHER PERTINENT MEDICATIONS:     Current Facility-Administered Medications:     insulin regular **AND** POC blood glucose manual result **AND** NOTIFY MD and PharmD **AND** Administer 20 grams of glucose (approximately 8 ounces of fruit juice) every 15 minutes PRN FSBG less than 70 mg/dL **AND** dextrose bolus    hydrALAZINE    senna-docusate **AND** polyethylene glycol/lytes **AND** magnesium hydroxide **AND** bisacodyl    LR    apixaban    finasteride    propranolol    tamsulosin    TECHNIQUE: Standard inpatient video EEG was set up by a Neurodiagnostic technologist who performed education to the patient and staff. A minimum of 23 electrodes and 23 channel recording was setup and performed by Neurodiagnostic technologist, in accordance with the international 10-20 system. Impedence, electrode integrity, and technical impressions were documented. The study was reviewed in bipolar and referential montages. The recording examined the patient in the awake and drowsy state(s).  This was a technically limited study due to significant muscle/electrical artifact over the right hemisphere contacts.    DESCRIPTION OF THE RECORD:  EEG background: During maximal wakefulness, the background was continuous, asymmetrical, and poorly defined and/or fragmented 7 Hz posterior dominant rhythm, with a mixture of theta and some delta activity.  Reactivity  was seen. State changes were seen.  During drowsiness, a loss of myogenic artifact  and theta/delta frequencies were seen.     N2 sleep architecture was not seen.    ACTIVATION  PROCEDURES:   Intermittent Photic stimulation was performed in a stepwise fashion from 1 to 30 Hz and did not elicited additional abnormalities on EEG.     ICTAL AND INTERICTAL FINDINGS:   There was additional, continuous, right posterior quadrant focal slowing, with some embedded sharply contoured waveforms in the same area.    No electroclinical or electrographic seizures were reported or recorded during the study.     EKG: Sampling of the EKG recording did not demonstrate any abnormalities    EVENTS:  No clinical events recorded or reported.    INTERPRETATION:  This was, within the above noted technical limitations, an abnormal video EEG recording in the awake and drowsy state(s):  Diffuse slowing of the background, suggestive of diffuse and/or multifocal cerebral dysfunction.  This finding might be seen in a myriad of encephalopathies and in itself is a nonspecific finding.  The presence of additional, continuous, right posterior quadrant focal slowing, is suggestive of additional cerebral dysfunction in that region.  This finding might be seen in context of structural lesion, and/or ictal onset zone, among other considerations.  Clinical and radiological correlation is recommended.  The fact that the above-noted right posterior quadrant focal slowing had some embedded sharply contoured waveforms, might point toward increased propensity toward focal seizures arising from that region.  There were no electrographic seizures captured.    Monty Ashton MD  Neurology Attending, Epilepsy Program  [facility]

## 2024-01-23 ENCOUNTER — HOSPITAL ENCOUNTER (EMERGENCY)
Facility: MEDICAL CENTER | Age: 79
End: 2024-01-23
Attending: EMERGENCY MEDICINE
Payer: MEDICARE

## 2024-01-23 VITALS
TEMPERATURE: 97 F | WEIGHT: 187 LBS | DIASTOLIC BLOOD PRESSURE: 71 MMHG | RESPIRATION RATE: 18 BRPM | HEART RATE: 50 BPM | OXYGEN SATURATION: 94 % | BODY MASS INDEX: 26.77 KG/M2 | HEIGHT: 70 IN | SYSTOLIC BLOOD PRESSURE: 155 MMHG

## 2024-01-23 DIAGNOSIS — R56.9 SEIZURE (HCC): ICD-10-CM

## 2024-01-23 DIAGNOSIS — R03.0 ELEVATED BLOOD PRESSURE READING: ICD-10-CM

## 2024-01-23 LAB
ALBUMIN SERPL BCP-MCNC: 3.3 G/DL (ref 3.2–4.9)
ALBUMIN/GLOB SERPL: 0.8 G/DL
ALP SERPL-CCNC: 69 U/L (ref 30–99)
ALT SERPL-CCNC: 8 U/L (ref 2–50)
ANION GAP SERPL CALC-SCNC: 12 MMOL/L (ref 7–16)
AST SERPL-CCNC: 17 U/L (ref 12–45)
BASOPHILS # BLD AUTO: 0.7 % (ref 0–1.8)
BASOPHILS # BLD: 0.05 K/UL (ref 0–0.12)
BILIRUB SERPL-MCNC: 0.2 MG/DL (ref 0.1–1.5)
BUN SERPL-MCNC: 24 MG/DL (ref 8–22)
CALCIUM ALBUM COR SERPL-MCNC: 9.5 MG/DL (ref 8.5–10.5)
CALCIUM SERPL-MCNC: 8.9 MG/DL (ref 8.5–10.5)
CHLORIDE SERPL-SCNC: 101 MMOL/L (ref 96–112)
CO2 SERPL-SCNC: 25 MMOL/L (ref 20–33)
CREAT SERPL-MCNC: 1.47 MG/DL (ref 0.5–1.4)
EOSINOPHIL # BLD AUTO: 1.14 K/UL (ref 0–0.51)
EOSINOPHIL NFR BLD: 15.1 % (ref 0–6.9)
ERYTHROCYTE [DISTWIDTH] IN BLOOD BY AUTOMATED COUNT: 47.3 FL (ref 35.9–50)
GFR SERPLBLD CREATININE-BSD FMLA CKD-EPI: 49 ML/MIN/1.73 M 2
GLOBULIN SER CALC-MCNC: 4.4 G/DL (ref 1.9–3.5)
GLUCOSE SERPL-MCNC: 272 MG/DL (ref 65–99)
HCT VFR BLD AUTO: 33.9 % (ref 42–52)
HGB BLD-MCNC: 11.6 G/DL (ref 14–18)
IMM GRANULOCYTES # BLD AUTO: 0.13 K/UL (ref 0–0.11)
IMM GRANULOCYTES NFR BLD AUTO: 1.7 % (ref 0–0.9)
LYMPHOCYTES # BLD AUTO: 1.87 K/UL (ref 1–4.8)
LYMPHOCYTES NFR BLD: 24.8 % (ref 22–41)
MCH RBC QN AUTO: 30.8 PG (ref 27–33)
MCHC RBC AUTO-ENTMCNC: 34.2 G/DL (ref 32.3–36.5)
MCV RBC AUTO: 89.9 FL (ref 81.4–97.8)
MONOCYTES # BLD AUTO: 0.69 K/UL (ref 0–0.85)
MONOCYTES NFR BLD AUTO: 9.2 % (ref 0–13.4)
NEUTROPHILS # BLD AUTO: 3.65 K/UL (ref 1.82–7.42)
NEUTROPHILS NFR BLD: 48.5 % (ref 44–72)
NRBC # BLD AUTO: 0 K/UL
NRBC BLD-RTO: 0 /100 WBC (ref 0–0.2)
PLATELET # BLD AUTO: 121 K/UL (ref 164–446)
PMV BLD AUTO: 8.4 FL (ref 9–12.9)
POTASSIUM SERPL-SCNC: 4.2 MMOL/L (ref 3.6–5.5)
PROT SERPL-MCNC: 7.7 G/DL (ref 6–8.2)
RBC # BLD AUTO: 3.77 M/UL (ref 4.7–6.1)
SODIUM SERPL-SCNC: 138 MMOL/L (ref 135–145)
VALPROATE SERPL-MCNC: 54.4 UG/ML (ref 50–100)
WBC # BLD AUTO: 7.5 K/UL (ref 4.8–10.8)

## 2024-01-23 PROCEDURE — 700105 HCHG RX REV CODE 258: Performed by: EMERGENCY MEDICINE

## 2024-01-23 PROCEDURE — 85025 COMPLETE CBC W/AUTO DIFF WBC: CPT

## 2024-01-23 PROCEDURE — 99285 EMERGENCY DEPT VISIT HI MDM: CPT

## 2024-01-23 PROCEDURE — 80164 ASSAY DIPROPYLACETIC ACD TOT: CPT

## 2024-01-23 PROCEDURE — 80053 COMPREHEN METABOLIC PANEL: CPT

## 2024-01-23 PROCEDURE — 36415 COLL VENOUS BLD VENIPUNCTURE: CPT

## 2024-01-23 RX ORDER — SODIUM CHLORIDE 9 MG/ML
1000 INJECTION, SOLUTION INTRAVENOUS ONCE
Status: COMPLETED | OUTPATIENT
Start: 2024-01-23 | End: 2024-01-23

## 2024-01-23 RX ADMIN — SODIUM CHLORIDE 1000 ML: 9 INJECTION, SOLUTION INTRAVENOUS at 18:39

## 2024-01-23 ASSESSMENT — FIBROSIS 4 INDEX: FIB4 SCORE: 2.71

## 2024-01-24 NOTE — ED NOTES
Bedside report received by NAHEED Contreras    Oxygen:RA  Fall Risk status: bed in lowest position, locked, call light within reach  Patient Mentation:A+O x 2 (baseline)  Continuous cardiac monitoring: Sinus bradycardia

## 2024-01-24 NOTE — ED PROVIDER NOTES
"ED Provider Note    Scribed for Dr. Ward by Maritza Helm. 1/23/2024,  5:43 PM.    CHIEF COMPLAINT  Chief Complaint   Patient presents with    Seizure     EXTERNAL RECORDS REVIEWED  Outpatient Notes Patient was last hospitalized on 1/10/24-1/11/24 for a possible stroke and aphasia. He has a history of seizures. He has not tolerated Keppra in the past.     HPI  LIMITATION TO HISTORY   Select: Dementia  OUTSIDE HISTORIAN(S):  EMS report    Wale Olivas is a 78 y.o. male with a history of dementia and seizures who presents to the Emergency Department via EMS for evaluation of a seizure onset prior to arrival. Per EMS report, patient was sitting at table today and he began \"staring off and twitching\". Patient's blood glucose was 366 by EMS. He was given 250 cc LR en route by EMS.  No additional pain or symptoms noted at this time.  Patient does report he either may have bit or burned his tongue.    REVIEW OF SYSTEMS  See HPI for further details. All other systems are negative.     PAST MEDICAL HISTORY     Past Medical History:   Diagnosis Date    Anxiety     BPH (benign prostatic hyperplasia)     CKD (chronic kidney disease)     COPD (chronic obstructive pulmonary disease) (HCC)     COVID-19     Dementia (HCC)     GERD (gastroesophageal reflux disease)     Hyperlipidemia     Insomnia     Major depressive disorder     Type II diabetes mellitus (HCC)      SURGICAL HISTORY  No pertinent past surgical history    FAMILY HISTORY  No pertinent family history    SOCIAL HISTORY    reports that he has never smoked. He has never used smokeless tobacco. He reports that he does not currently use alcohol. He reports that he does not currently use drugs.    CURRENT MEDICATIONS  Home Medications    **Home medications have not yet been reviewed for this encounter**       ALLERGIES  Allergies   Allergen Reactions    Metformin Unspecified     Unknown reaction, listed on MAR     Morphine Unspecified     Unknown reaction, listed on MAR " "    Simvastatin Unspecified     Unknown reaction, listed on MAR     Spironolactone Unspecified     Unknown reaction, listed on MAR      PHYSICAL EXAM  VITAL SIGNS: BP (!) 169/79   Pulse (!) 55   Temp 36.1 °C (96.9 °F) (Temporal)   Resp 14   Ht 1.778 m (5' 10\")   Wt 84.8 kg (187 lb)   SpO2 95%   BMI 26.83 kg/m²   Gen: Alert, no acute distress  HEENT: ATNC, normal oropharynx  Eyes: PERRL, EOMI, normal conjunctiva  Neck: trachea midline  Resp: no respiratory distress clear to auscultation bilaterally  CV: No JVD, regular rate and rhythm  Abd: non-distended, soft, nontender  Ext: No deformities  Neuro: Moves all extremities, cranial nerves II through XII intact, answers basic questions with slow speech    DIAGNOSTIC STUDIES / PROCEDURES  LABS  Labs Reviewed   CBC WITH DIFFERENTIAL - Abnormal; Notable for the following components:       Result Value    RBC 3.77 (*)     Hemoglobin 11.6 (*)     Hematocrit 33.9 (*)     Platelet Count 121 (*)     MPV 8.4 (*)     Eosinophils 15.10 (*)     Immature Granulocytes 1.70 (*)     Eos (Absolute) 1.14 (*)     Immature Granulocytes (abs) 0.13 (*)     All other components within normal limits   COMP METABOLIC PANEL - Abnormal; Notable for the following components:    Glucose 272 (*)     Bun 24 (*)     Creatinine 1.47 (*)     Globulin 4.4 (*)     All other components within normal limits   ESTIMATED GFR - Abnormal; Notable for the following components:    GFR (CKD-EPI) 49 (*)     All other components within normal limits   VALPROIC ACID     COURSE & MEDICAL DECISION MAKING  Pertinent Labs & Imaging studies were reviewed. (See chart for details)    ED Observation Status? No; The patient does not meet criteria for ED Observation.   -----------    5:43 PM Patient seen and examined at bedside. Patient is a 78 year old male who presents today for evaluation of seizures onset prior to arrival. See HPI for further details.    7:59 PM - Patient was reevaluated at bedside. Patient is " medically cleared for return to care at [facility].     INITIAL ASSESSMENT AND PLAN  Medical Decision Making: Patient presents with seizure-like activity.  The patient has a known seizure disorder and is on valproic acid for it.  This appears to be in the low therapeutic level, no evidence of toxicity of this.  No evidence of significant electrolyte derangements such as hyponatremia to cause his symptoms.  He appears to be back at his baseline.  He does report some pain to his tongue but I do not see any obvious tongue biting.  He does have hyperglycemia but no evidence of DKA/HHS.  There is no report of trauma and no lateralizing signs to suggest need for CT scan of the head.  The patient's facility is willing to take him back.  Will discharge back to his facility.    ADDITIONAL PROBLEM LIST AND DISPOSITION    I have discussed management of the patient with the following medical professionals:  None    Escalation of care considered, and ultimately not performed: acute inpatient care management, however at this time, the patient is most appropriate for outpatient management.     Barriers to care at this time, including but not limited to: Patient does not have established PCP.     Decision tools and prescription drugs considered including, but not limited to: Medication modification will defer to neurology .    Hydration: Patient received IV fluids for hyperglycemia. Oral hydration alone was not sufficient. After IV fluids patient is improved .    The patient will return for new or worsening symptoms and is stable at the time of discharge.    DISPOSITION:  Patient will be discharged home in stable condition.    FOLLOW UP:  [facility], Emergency Dept  [facility] [facility] [address] [phone]    If symptoms worsen    Your regular doctor.  To establish a primary care provider within our system, please call [phone]          Your neurologist    Schedule an appointment as soon as  possible for a visit       FINAL IMPRESSION  1. Seizure (HCC)    2. Elevated blood pressure reading       I, Maritza Helm (Scribe), am scribing for, and in the presence of, Damon Ward M.D..    Electronically signed by: Maritza Helm (Scribe), 1/23/2024    IDamon M.D. personally performed the services described in this documentation, as scribed by Maritza Helm in my presence, and it is both accurate and complete.    The note accurately reflects work and decisions made by me.  Damon Ward M.D.  1/23/2024  10:27 PM      This dictation was created using voice recognition software. The accuracy of the dictation is limited to the abilities of the software. I expect there may be some errors of grammar and possibly content. The nursing notes were reviewed and certain aspects of this information were incorporated into this note.

## 2024-01-24 NOTE — ED TRIAGE NOTES
"Wale Olivas  78 y.o.  male  [facility]     Chief Complaint   Patient presents with    Seizure     Per report, pt has known hx of seizure and while sitting at the table today began \"staring off and twitching\".  PIV placed by EMS pta w/ FSBS 366.  Pt was given 250 cc LR pta.  EMS reports pt has hx dementia and is a/ox2 baseline.  Pt alert and only responds with cuss words to stimuli.  Pt denies pain.  EMS reports patient did not fall or suffer any injuries.  BP (!) 169/79   Pulse (!) 55   Temp 36.1 °C (96.9 °F) (Temporal)   Resp 14   Ht 1.778 m (5' 10\")   Wt 84.8 kg (187 lb)   SpO2 95%   BMI 26.83 kg/m²     Pt is on a BED ALARM  "

## 2024-01-24 NOTE — ED NOTES
PASTOR arrived to transport pt to facility, pt DC papers and transport packet at bedside, IV removed, VS stable, NAD

## 2024-01-24 NOTE — DISCHARGE PLANNING
This nurse completed a PCS, cobra and transfer packet [facility].  CM called [facility] and spoke with Tamy, transport time arranged for 9:15 pm.  BRIAN provided packet to NAHEED Joseph.  All parties aware [facility].  PCS scanned to patients chart.

## 2024-01-24 NOTE — ED NOTES
IVF hung per MAR.  Pt repositioned, answering more questions than on arrival but still selective in answering.  Warm blankets provided.  All needs met.

## 2024-01-24 NOTE — ED NOTES
Primary RN called Derrek at [facility] regarding pt's current status and treatment performed, Derrek confirmed that pt's baseline is Alert and only orientated at times and that he has a hx of dementia, ERP aware of situation

## 2024-01-24 NOTE — DISCHARGE INSTRUCTIONS
You were seen in the emergency department for what appeared to be a seizure.  Your workup was reassuring.  Your valproic acid level appears to be within the acceptable range.    You were noted to have an elevated blood pressure in the emergency department.  We recommend following up with your primary care provider for this.    Please follow-up closely with your neurologist.    Return to the emergency department or seek medical attention if you develop:  Fever, vomiting, difficulty breathing, uncontrolled seizures, any other new or concerning findings

## 2024-01-28 ENCOUNTER — APPOINTMENT (OUTPATIENT)
Dept: RADIOLOGY | Facility: MEDICAL CENTER | Age: 79
DRG: 871 | End: 2024-01-28
Attending: EMERGENCY MEDICINE
Payer: COMMERCIAL

## 2024-01-28 ENCOUNTER — APPOINTMENT (OUTPATIENT)
Dept: RADIOLOGY | Facility: MEDICAL CENTER | Age: 79
DRG: 871 | End: 2024-01-28
Attending: INTERNAL MEDICINE
Payer: COMMERCIAL

## 2024-01-28 ENCOUNTER — HOSPITAL ENCOUNTER (INPATIENT)
Facility: MEDICAL CENTER | Age: 79
LOS: 8 days | DRG: 871 | End: 2024-02-05
Attending: EMERGENCY MEDICINE | Admitting: HOSPITALIST
Payer: COMMERCIAL

## 2024-01-28 DIAGNOSIS — N17.9 SEPSIS WITH ACUTE RENAL FAILURE, DUE TO UNSPECIFIED ORGANISM, UNSPECIFIED ACUTE RENAL FAILURE TYPE, UNSPECIFIED WHETHER SEPTIC SHOCK PRESENT (HCC): ICD-10-CM

## 2024-01-28 DIAGNOSIS — A41.9 SEPSIS WITH ACUTE RENAL FAILURE, DUE TO UNSPECIFIED ORGANISM, UNSPECIFIED ACUTE RENAL FAILURE TYPE, UNSPECIFIED WHETHER SEPTIC SHOCK PRESENT (HCC): ICD-10-CM

## 2024-01-28 DIAGNOSIS — R65.20 SEPSIS WITH ACUTE RENAL FAILURE, DUE TO UNSPECIFIED ORGANISM, UNSPECIFIED ACUTE RENAL FAILURE TYPE, UNSPECIFIED WHETHER SEPTIC SHOCK PRESENT (HCC): ICD-10-CM

## 2024-01-28 PROBLEM — R31.0 GROSS HEMATURIA: Status: ACTIVE | Noted: 2024-01-28

## 2024-01-28 PROBLEM — D69.6 THROMBOCYTOPENIA (HCC): Status: ACTIVE | Noted: 2024-01-28

## 2024-01-28 PROBLEM — N39.0 SEPSIS SECONDARY TO UTI (HCC): Status: RESOLVED | Noted: 2023-01-29 | Resolved: 2024-01-28

## 2024-01-28 PROBLEM — I87.1 IVC (INFERIOR VENA CAVA OBSTRUCTION): Status: ACTIVE | Noted: 2024-01-28

## 2024-01-28 PROBLEM — J18.9 PNEUMONIA OF LEFT LOWER LOBE DUE TO INFECTIOUS ORGANISM: Status: ACTIVE | Noted: 2024-01-28

## 2024-01-28 LAB
ALBUMIN SERPL BCP-MCNC: 3.1 G/DL (ref 3.2–4.9)
ALBUMIN/GLOB SERPL: 0.7 G/DL
ALP SERPL-CCNC: 70 U/L (ref 30–99)
ALT SERPL-CCNC: 9 U/L (ref 2–50)
ANION GAP SERPL CALC-SCNC: 12 MMOL/L (ref 7–16)
APPEARANCE UR: CLEAR
AST SERPL-CCNC: 24 U/L (ref 12–45)
BACTERIA #/AREA URNS HPF: NEGATIVE /HPF
BASOPHILS # BLD AUTO: 0.2 % (ref 0–1.8)
BASOPHILS # BLD: 0.06 K/UL (ref 0–0.12)
BILIRUB SERPL-MCNC: 0.7 MG/DL (ref 0.1–1.5)
BILIRUB UR QL STRIP.AUTO: NEGATIVE
BUN SERPL-MCNC: 29 MG/DL (ref 8–22)
CALCIUM ALBUM COR SERPL-MCNC: 9.7 MG/DL (ref 8.5–10.5)
CALCIUM SERPL-MCNC: 9 MG/DL (ref 8.5–10.5)
CHLORIDE SERPL-SCNC: 95 MMOL/L (ref 96–112)
CO2 SERPL-SCNC: 24 MMOL/L (ref 20–33)
COLOR UR: YELLOW
CREAT SERPL-MCNC: 1.86 MG/DL (ref 0.5–1.4)
EKG IMPRESSION: NORMAL
EOSINOPHIL # BLD AUTO: 0.03 K/UL (ref 0–0.51)
EOSINOPHIL NFR BLD: 0.1 % (ref 0–6.9)
EPI CELLS #/AREA URNS HPF: NEGATIVE /HPF
ERYTHROCYTE [DISTWIDTH] IN BLOOD BY AUTOMATED COUNT: 48.8 FL (ref 35.9–50)
FLUAV RNA SPEC QL NAA+PROBE: NEGATIVE
FLUBV RNA SPEC QL NAA+PROBE: NEGATIVE
GFR SERPLBLD CREATININE-BSD FMLA CKD-EPI: 37 ML/MIN/1.73 M 2
GLOBULIN SER CALC-MCNC: 4.4 G/DL (ref 1.9–3.5)
GLUCOSE BLD STRIP.AUTO-MCNC: 160 MG/DL (ref 65–99)
GLUCOSE SERPL-MCNC: 210 MG/DL (ref 65–99)
GLUCOSE UR STRIP.AUTO-MCNC: 250 MG/DL
HCT VFR BLD AUTO: 34.4 % (ref 42–52)
HGB BLD-MCNC: 12.3 G/DL (ref 14–18)
HYALINE CASTS #/AREA URNS LPF: ABNORMAL /LPF
IMM GRANULOCYTES # BLD AUTO: 0.24 K/UL (ref 0–0.11)
IMM GRANULOCYTES NFR BLD AUTO: 1 % (ref 0–0.9)
INR PPP: 1.27 (ref 0.87–1.13)
KETONES UR STRIP.AUTO-MCNC: NEGATIVE MG/DL
LACTATE SERPL-SCNC: 1.6 MMOL/L (ref 0.5–2)
LACTATE SERPL-SCNC: 2.8 MMOL/L (ref 0.5–2)
LACTATE SERPL-SCNC: 4 MMOL/L (ref 0.5–2)
LACTATE SERPL-SCNC: 4.4 MMOL/L (ref 0.5–2)
LEUKOCYTE ESTERASE UR QL STRIP.AUTO: ABNORMAL
LYMPHOCYTES # BLD AUTO: 1.26 K/UL (ref 1–4.8)
LYMPHOCYTES NFR BLD: 5.2 % (ref 22–41)
MCH RBC QN AUTO: 31.5 PG (ref 27–33)
MCHC RBC AUTO-ENTMCNC: 35.8 G/DL (ref 32.3–36.5)
MCV RBC AUTO: 88.2 FL (ref 81.4–97.8)
MICRO URNS: ABNORMAL
MONOCYTES # BLD AUTO: 2.05 K/UL (ref 0–0.85)
MONOCYTES NFR BLD AUTO: 8.5 % (ref 0–13.4)
NEUTROPHILS # BLD AUTO: 20.51 K/UL (ref 1.82–7.42)
NEUTROPHILS NFR BLD: 85 % (ref 44–72)
NITRITE UR QL STRIP.AUTO: NEGATIVE
NRBC # BLD AUTO: 0 K/UL
NRBC BLD-RTO: 0 /100 WBC (ref 0–0.2)
PH UR STRIP.AUTO: 6.5 [PH] (ref 5–8)
PLATELET # BLD AUTO: 80 K/UL (ref 164–446)
PLATELET BLD QL SMEAR: NORMAL
PLATELETS.RETICULATED NFR BLD AUTO: 1.7 % (ref 0.6–13.1)
PMV BLD AUTO: 9.4 FL (ref 9–12.9)
POTASSIUM SERPL-SCNC: 4.8 MMOL/L (ref 3.6–5.5)
PROT SERPL-MCNC: 7.5 G/DL (ref 6–8.2)
PROT UR QL STRIP: 30 MG/DL
PROTHROMBIN TIME: 16 SEC (ref 12–14.6)
RBC # BLD AUTO: 3.9 M/UL (ref 4.7–6.1)
RBC # URNS HPF: ABNORMAL /HPF
RBC UR QL AUTO: ABNORMAL
RSV RNA SPEC QL NAA+PROBE: NEGATIVE
SARS-COV-2 RNA RESP QL NAA+PROBE: NOTDETECTED
SODIUM SERPL-SCNC: 131 MMOL/L (ref 135–145)
SP GR UR STRIP.AUTO: 1.01
UROBILINOGEN UR STRIP.AUTO-MCNC: 0.2 MG/DL
WBC # BLD AUTO: 24.2 K/UL (ref 4.8–10.8)
WBC #/AREA URNS HPF: ABNORMAL /HPF

## 2024-01-28 PROCEDURE — 93005 ELECTROCARDIOGRAM TRACING: CPT | Performed by: EMERGENCY MEDICINE

## 2024-01-28 PROCEDURE — 700111 HCHG RX REV CODE 636 W/ 250 OVERRIDE (IP): Mod: JZ | Performed by: EMERGENCY MEDICINE

## 2024-01-28 PROCEDURE — 700105 HCHG RX REV CODE 258: Performed by: HOSPITALIST

## 2024-01-28 PROCEDURE — 700101 HCHG RX REV CODE 250: Performed by: HOSPITALIST

## 2024-01-28 PROCEDURE — 770000 HCHG ROOM/CARE - INTERMEDIATE ICU *

## 2024-01-28 PROCEDURE — 87040 BLOOD CULTURE FOR BACTERIA: CPT

## 2024-01-28 PROCEDURE — 99285 EMERGENCY DEPT VISIT HI MDM: CPT

## 2024-01-28 PROCEDURE — 82962 GLUCOSE BLOOD TEST: CPT

## 2024-01-28 PROCEDURE — 96365 THER/PROPH/DIAG IV INF INIT: CPT

## 2024-01-28 PROCEDURE — 700111 HCHG RX REV CODE 636 W/ 250 OVERRIDE (IP): Performed by: HOSPITALIST

## 2024-01-28 PROCEDURE — 80053 COMPREHEN METABOLIC PANEL: CPT

## 2024-01-28 PROCEDURE — 85055 RETICULATED PLATELET ASSAY: CPT

## 2024-01-28 PROCEDURE — 85610 PROTHROMBIN TIME: CPT

## 2024-01-28 PROCEDURE — 700102 HCHG RX REV CODE 250 W/ 637 OVERRIDE(OP): Performed by: HOSPITALIST

## 2024-01-28 PROCEDURE — 700105 HCHG RX REV CODE 258: Performed by: EMERGENCY MEDICINE

## 2024-01-28 PROCEDURE — 303105 HCHG CATHETER EXTRA

## 2024-01-28 PROCEDURE — 71250 CT THORAX DX C-: CPT

## 2024-01-28 PROCEDURE — 87015 SPECIMEN INFECT AGNT CONCNTJ: CPT

## 2024-01-28 PROCEDURE — 83605 ASSAY OF LACTIC ACID: CPT

## 2024-01-28 PROCEDURE — 51702 INSERT TEMP BLADDER CATH: CPT

## 2024-01-28 PROCEDURE — 96367 TX/PROPH/DG ADDL SEQ IV INF: CPT

## 2024-01-28 PROCEDURE — 700101 HCHG RX REV CODE 250: Performed by: EMERGENCY MEDICINE

## 2024-01-28 PROCEDURE — 85025 COMPLETE CBC W/AUTO DIFF WBC: CPT

## 2024-01-28 PROCEDURE — 96375 TX/PRO/DX INJ NEW DRUG ADDON: CPT

## 2024-01-28 PROCEDURE — 81001 URINALYSIS AUTO W/SCOPE: CPT

## 2024-01-28 PROCEDURE — 36415 COLL VENOUS BLD VENIPUNCTURE: CPT

## 2024-01-28 PROCEDURE — 70450 CT HEAD/BRAIN W/O DYE: CPT

## 2024-01-28 PROCEDURE — 99223 1ST HOSP IP/OBS HIGH 75: CPT | Performed by: HOSPITALIST

## 2024-01-28 PROCEDURE — 0241U HCHG SARS-COV-2 COVID-19 NFCT DS RESP RNA 4 TRGT ED POC: CPT

## 2024-01-28 PROCEDURE — A9270 NON-COVERED ITEM OR SERVICE: HCPCS | Performed by: HOSPITALIST

## 2024-01-28 PROCEDURE — 87077 CULTURE AEROBIC IDENTIFY: CPT | Mod: 91

## 2024-01-28 PROCEDURE — 87186 SC STD MICRODIL/AGAR DIL: CPT | Mod: 91

## 2024-01-28 PROCEDURE — 71045 X-RAY EXAM CHEST 1 VIEW: CPT

## 2024-01-28 PROCEDURE — 87086 URINE CULTURE/COLONY COUNT: CPT

## 2024-01-28 RX ORDER — DIVALPROEX SODIUM 125 MG/1
500 CAPSULE, COATED PELLETS ORAL 3 TIMES DAILY
Status: DISCONTINUED | OUTPATIENT
Start: 2024-01-28 | End: 2024-01-28

## 2024-01-28 RX ORDER — DEXTROSE MONOHYDRATE 25 G/50ML
25 INJECTION, SOLUTION INTRAVENOUS
Status: DISCONTINUED | OUTPATIENT
Start: 2024-01-28 | End: 2024-02-05 | Stop reason: HOSPADM

## 2024-01-28 RX ORDER — SODIUM CHLORIDE, SODIUM LACTATE, POTASSIUM CHLORIDE, AND CALCIUM CHLORIDE .6; .31; .03; .02 G/100ML; G/100ML; G/100ML; G/100ML
30 INJECTION, SOLUTION INTRAVENOUS ONCE
Status: COMPLETED | OUTPATIENT
Start: 2024-01-28 | End: 2024-01-28

## 2024-01-28 RX ORDER — BISACODYL 10 MG
10 SUPPOSITORY, RECTAL RECTAL
Status: DISCONTINUED | OUTPATIENT
Start: 2024-01-28 | End: 2024-02-05 | Stop reason: HOSPADM

## 2024-01-28 RX ORDER — NYSTATIN 100000 [USP'U]/G
1 POWDER TOPICAL 2 TIMES DAILY
COMMUNITY
Start: 2024-01-17 | End: 2024-02-08

## 2024-01-28 RX ORDER — AZITHROMYCIN 500 MG/5ML
500 INJECTION, POWDER, LYOPHILIZED, FOR SOLUTION INTRAVENOUS EVERY 24 HOURS
Status: DISCONTINUED | OUTPATIENT
Start: 2024-01-29 | End: 2024-01-29

## 2024-01-28 RX ORDER — TAMSULOSIN HYDROCHLORIDE 0.4 MG/1
0.4 CAPSULE ORAL DAILY
Status: DISCONTINUED | OUTPATIENT
Start: 2024-01-28 | End: 2024-02-05 | Stop reason: HOSPADM

## 2024-01-28 RX ORDER — AMOXICILLIN 250 MG
2 CAPSULE ORAL 2 TIMES DAILY
Status: DISCONTINUED | OUTPATIENT
Start: 2024-01-28 | End: 2024-02-05 | Stop reason: HOSPADM

## 2024-01-28 RX ORDER — OMEPRAZOLE 20 MG/1
40 CAPSULE, DELAYED RELEASE ORAL DAILY
Status: DISCONTINUED | OUTPATIENT
Start: 2024-01-29 | End: 2024-02-05 | Stop reason: HOSPADM

## 2024-01-28 RX ORDER — POLYETHYLENE GLYCOL 3350 17 G/17G
1 POWDER, FOR SOLUTION ORAL
Status: DISCONTINUED | OUTPATIENT
Start: 2024-01-28 | End: 2024-02-05 | Stop reason: HOSPADM

## 2024-01-28 RX ORDER — AZITHROMYCIN 250 MG/1
500 TABLET, FILM COATED ORAL ONCE
Status: DISCONTINUED | OUTPATIENT
Start: 2024-01-28 | End: 2024-01-28

## 2024-01-28 RX ORDER — VALPROIC ACID 250 MG/5ML
500 SOLUTION ORAL 3 TIMES DAILY
Status: DISCONTINUED | OUTPATIENT
Start: 2024-01-28 | End: 2024-02-05 | Stop reason: HOSPADM

## 2024-01-28 RX ORDER — AZITHROMYCIN 500 MG/5ML
500 INJECTION, POWDER, LYOPHILIZED, FOR SOLUTION INTRAVENOUS ONCE
Status: COMPLETED | OUTPATIENT
Start: 2024-01-28 | End: 2024-01-28

## 2024-01-28 RX ORDER — CLOTRIMAZOLE AND BETAMETHASONE DIPROPIONATE 10; .64 MG/G; MG/G
1 CREAM TOPICAL 2 TIMES DAILY
Status: DISCONTINUED | OUTPATIENT
Start: 2024-01-28 | End: 2024-02-05 | Stop reason: HOSPADM

## 2024-01-28 RX ORDER — DIVALPROEX SODIUM 125 MG/1
500 CAPSULE, COATED PELLETS ORAL 3 TIMES DAILY
COMMUNITY

## 2024-01-28 RX ORDER — FLUTICASONE FUROATE AND VILANTEROL 100; 25 UG/1; UG/1
1 POWDER RESPIRATORY (INHALATION) EVERY MORNING
Status: DISCONTINUED | OUTPATIENT
Start: 2024-01-28 | End: 2024-01-28

## 2024-01-28 RX ORDER — RISPERIDONE 1 MG/1
1 TABLET ORAL 3 TIMES DAILY
Status: DISCONTINUED | OUTPATIENT
Start: 2024-01-28 | End: 2024-02-05 | Stop reason: HOSPADM

## 2024-01-28 RX ORDER — HALOPERIDOL 5 MG/ML
5 INJECTION INTRAMUSCULAR ONCE
Status: COMPLETED | OUTPATIENT
Start: 2024-01-28 | End: 2024-01-28

## 2024-01-28 RX ORDER — SODIUM CHLORIDE, SODIUM LACTATE, POTASSIUM CHLORIDE, CALCIUM CHLORIDE 600; 310; 30; 20 MG/100ML; MG/100ML; MG/100ML; MG/100ML
INJECTION, SOLUTION INTRAVENOUS CONTINUOUS
Status: DISCONTINUED | OUTPATIENT
Start: 2024-01-28 | End: 2024-02-05 | Stop reason: HOSPADM

## 2024-01-28 RX ORDER — DULOXETIN HYDROCHLORIDE 30 MG/1
30 CAPSULE, DELAYED RELEASE ORAL EVERY EVENING
Status: DISCONTINUED | OUTPATIENT
Start: 2024-01-28 | End: 2024-02-05 | Stop reason: HOSPADM

## 2024-01-28 RX ORDER — SODIUM CHLORIDE, SODIUM LACTATE, POTASSIUM CHLORIDE, AND CALCIUM CHLORIDE .6; .31; .03; .02 G/100ML; G/100ML; G/100ML; G/100ML
500 INJECTION, SOLUTION INTRAVENOUS
Status: COMPLETED | OUTPATIENT
Start: 2024-01-28 | End: 2024-01-28

## 2024-01-28 RX ORDER — CLOTRIMAZOLE AND BETAMETHASONE DIPROPIONATE 10; .64 MG/G; MG/G
1 CREAM TOPICAL 2 TIMES DAILY
COMMUNITY
End: 2024-02-26

## 2024-01-28 RX ORDER — PROPRANOLOL HYDROCHLORIDE 10 MG/1
20 TABLET ORAL EVERY 12 HOURS
Status: DISCONTINUED | OUTPATIENT
Start: 2024-01-28 | End: 2024-02-05 | Stop reason: HOSPADM

## 2024-01-28 RX ORDER — SENNOSIDES A AND B 8.6 MG/1
8.6 TABLET, FILM COATED ORAL 2 TIMES DAILY
COMMUNITY

## 2024-01-28 RX ORDER — PRAVASTATIN SODIUM 20 MG
80 TABLET ORAL NIGHTLY
Status: DISCONTINUED | OUTPATIENT
Start: 2024-01-28 | End: 2024-02-05 | Stop reason: HOSPADM

## 2024-01-28 RX ORDER — DEXMEDETOMIDINE HYDROCHLORIDE 4 UG/ML
.1-1.5 INJECTION, SOLUTION INTRAVENOUS CONTINUOUS
Status: DISCONTINUED | OUTPATIENT
Start: 2024-01-28 | End: 2024-01-29

## 2024-01-28 RX ORDER — AMLODIPINE BESYLATE 10 MG/1
5 TABLET ORAL EVERY EVENING
Status: DISCONTINUED | OUTPATIENT
Start: 2024-01-28 | End: 2024-01-29

## 2024-01-28 RX ORDER — FINASTERIDE 5 MG/1
5 TABLET, FILM COATED ORAL DAILY
Status: DISCONTINUED | OUTPATIENT
Start: 2024-01-28 | End: 2024-02-05 | Stop reason: HOSPADM

## 2024-01-28 RX ADMIN — SODIUM CHLORIDE, POTASSIUM CHLORIDE, SODIUM LACTATE AND CALCIUM CHLORIDE 500 ML: 600; 310; 30; 20 INJECTION, SOLUTION INTRAVENOUS at 10:16

## 2024-01-28 RX ADMIN — SODIUM CHLORIDE, POTASSIUM CHLORIDE, SODIUM LACTATE AND CALCIUM CHLORIDE 2544 ML: 600; 310; 30; 20 INJECTION, SOLUTION INTRAVENOUS at 08:29

## 2024-01-28 RX ADMIN — CEFTRIAXONE SODIUM 2000 MG: 10 INJECTION, POWDER, FOR SOLUTION INTRAVENOUS at 14:31

## 2024-01-28 RX ADMIN — HALOPERIDOL LACTATE 5 MG: 5 INJECTION, SOLUTION INTRAMUSCULAR at 08:28

## 2024-01-28 RX ADMIN — SODIUM CHLORIDE, POTASSIUM CHLORIDE, SODIUM LACTATE AND CALCIUM CHLORIDE: 600; 310; 30; 20 INJECTION, SOLUTION INTRAVENOUS at 23:36

## 2024-01-28 RX ADMIN — INSULIN HUMAN 2 UNITS: 100 INJECTION, SOLUTION PARENTERAL at 17:10

## 2024-01-28 RX ADMIN — CLOTRIMAZOLE AND BETAMETHASONE DIPROPIONATE 1 APPLICATION: 10; .5 CREAM TOPICAL at 17:14

## 2024-01-28 RX ADMIN — AMPICILLIN AND SULBACTAM 3 G: 1; 2 INJECTION, POWDER, FOR SOLUTION INTRAMUSCULAR; INTRAVENOUS at 10:11

## 2024-01-28 RX ADMIN — FLUTICASONE FUROATE, UMECLIDINIUM BROMIDE AND VILANTEROL TRIFENATATE 1 PUFF: 200; 62.5; 25 POWDER RESPIRATORY (INHALATION) at 15:36

## 2024-01-28 RX ADMIN — AZITHROMYCIN 500 MG: 500 INJECTION, POWDER, LYOPHILIZED, FOR SOLUTION INTRAVENOUS at 10:38

## 2024-01-28 RX ADMIN — INSULIN GLARGINE-YFGN 20 UNITS: 100 INJECTION, SOLUTION SUBCUTANEOUS at 17:12

## 2024-01-28 RX ADMIN — SODIUM CHLORIDE, POTASSIUM CHLORIDE, SODIUM LACTATE AND CALCIUM CHLORIDE: 600; 310; 30; 20 INJECTION, SOLUTION INTRAVENOUS at 13:21

## 2024-01-28 RX ADMIN — DEXMEDETOMIDINE 0.2 MCG/KG/HR: 100 INJECTION, SOLUTION INTRAVENOUS at 14:30

## 2024-01-28 ASSESSMENT — COGNITIVE AND FUNCTIONAL STATUS - GENERAL
DRESSING REGULAR UPPER BODY CLOTHING: TOTAL
SUGGESTED CMS G CODE MODIFIER MOBILITY: CN
PERSONAL GROOMING: TOTAL
MOVING FROM LYING ON BACK TO SITTING ON SIDE OF FLAT BED: UNABLE
CLIMB 3 TO 5 STEPS WITH RAILING: TOTAL
TOILETING: TOTAL
HELP NEEDED FOR BATHING: TOTAL
STANDING UP FROM CHAIR USING ARMS: TOTAL
MOBILITY SCORE: 6
TURNING FROM BACK TO SIDE WHILE IN FLAT BAD: UNABLE
SUGGESTED CMS G CODE MODIFIER DAILY ACTIVITY: CN
WALKING IN HOSPITAL ROOM: TOTAL
EATING MEALS: TOTAL
DRESSING REGULAR LOWER BODY CLOTHING: TOTAL
DAILY ACTIVITIY SCORE: 6
MOVING TO AND FROM BED TO CHAIR: UNABLE

## 2024-01-28 ASSESSMENT — PAIN DESCRIPTION - PAIN TYPE
TYPE: ACUTE PAIN

## 2024-01-28 ASSESSMENT — FIBROSIS 4 INDEX
FIB4 SCORE: 7.6
FIB4 SCORE: 3.78

## 2024-01-28 ASSESSMENT — LIFESTYLE VARIABLES: DO YOU DRINK ALCOHOL: NO

## 2024-01-28 NOTE — PROGRESS NOTES
4 Eyes Skin Assessment Completed by NAHEED Pacheco and NAHEED Garvin.    Head Blanching and Redness  Ears flaky skin on right ear  Nose Redness and Blanching  Mouth dry  Neck Redness and Blanching  Breast/Chest petechiae, discoloration  Shoulder Blades WDL  Spine WDL  (R) Arm/Elbow/Hand Redness, Blanching, Bruising, and Discoloration  (L) Arm/Elbow/Hand Redness, Blanching, Bruising, and Discoloration  Abdomen petechiae, bruising  Groin Redness, Excoriation, and Rash  Scrotum/Coccyx/Buttocks Redness, Blanching, and Scab  (R) Leg Scab and Bruising  (L) Leg Scab and Bruising  (R) Heel/Foot/Toe Redness and Blanching  (L) Heel/Foot/Toe Redness and Blanching          Devices In Places ECG, Blood Pressure Cuff, Pulse Ox, Ferro, SCD's, and Nasal Cannula      Interventions In Place NC W/Ear Foams, Sacral Mepilex, Waffle Overlay, and Q2 Turns    Possible Skin Injury Yes    Pictures Uploaded Into Epic Yes  Wound Consult Placed Yes  RN Wound Prevention Protocol Ordered Yes

## 2024-01-28 NOTE — ED PROVIDER NOTES
ED Provider Note    CHIEF COMPLAINT  Chief Complaint   Patient presents with    ALOC       EXTERNAL RECORDS REVIEWED  Select: Reviewed a discharge summary from 11 January 2024.  The patient presented to [facility] for possible stroke from [facility] with aphasia and increased left arm weakness as well as altered level of consciousness.  It was noted in that note the patient presented with similar symptoms as he presents today.  He is typically only oriented to self at baseline.  It was noted the patient responded to IV fluids and did get back to his baseline as he has had these spells in the past and they have been from dehydration.  The patient is a full code.    HPI/ROS  LIMITATION TO HISTORY   Select: Altered mental status / Confusion  OUTSIDE HISTORIAN(S):  EMS provides history as the patient is noncommunicative    Walehaim Olivas is a 78 y.o. male who presents in an altered state.  According to the [facility] the last time the patient was normal was Friday.  The patient's been screaming and apparent discomfort and is more altered than his baseline.  The patient does have a history of advanced dementia as mentioned above.  He is also on Eliquis for atrial fibrillation and has insulin-dependent diabetes.  No other history could be obtained.    PAST MEDICAL HISTORY   has a past medical history of Anxiety, BPH (benign prostatic hyperplasia), CKD (chronic kidney disease), COPD (chronic obstructive pulmonary disease) ([facility]), COVID-19, Dementia ([facility]), GERD (gastroesophageal reflux disease), Hyperlipidemia, Insomnia, Major depressive disorder, and Type II diabetes mellitus ([facility]).    SURGICAL HISTORY  patient denies any surgical history    FAMILY HISTORY  No family history on file.    SOCIAL HISTORY  Social History     Tobacco Use    Smoking status: Never    Smokeless tobacco: Never   Vaping Use    Vaping Use: Never used   Substance and Sexual Activity    Alcohol use: Not Currently     "Drug use: Not Currently    Sexual activity: Not on file       CURRENT MEDICATIONS  Home Medications    **Home medications have not yet been reviewed for this encounter**         ALLERGIES  Allergies   Allergen Reactions    Metformin Unspecified     Unknown reaction, listed on MAR     Morphine Unspecified     Unknown reaction, listed on MAR     Simvastatin Unspecified     Unknown reaction, listed on MAR     Spironolactone Unspecified     Unknown reaction, listed on MAR        PHYSICAL EXAM  VITAL SIGNS: BP (!) 171/72   Pulse (!) 101   Temp 37.6 °C (99.6 °F) (Temporal)   Resp (!) 24   Ht 1.778 m (5' 10\")   Wt 84.8 kg (187 lb)   SpO2 (!) 87%   BMI 26.83 kg/m²    General the patient appears in distress    HEENT otherwise unremarkable except for dry mucous membranes    Pulmonary the patient's lungs are slightly diminished throughout with mild diffuse rhonchi    Cardiovascular S1-S2 with a tachycardic rate    GI abdomen soft    Skin no signs of trauma    Extremities atraumatic    Neurologic examination the patient opens eyes spontaneously.  He will not speak intelligibly.  He does move all 4 extremities to command    DIAGNOSTIC STUDIES   Results for orders placed or performed during the hospital encounter of 01/28/24   Lactic Acid   Result Value Ref Range    Lactic Acid 4.4 (HH) 0.5 - 2.0 mmol/L   CBC with Differential   Result Value Ref Range    WBC 24.2 (H) 4.8 - 10.8 K/uL    RBC 3.90 (L) 4.70 - 6.10 M/uL    Hemoglobin 12.3 (L) 14.0 - 18.0 g/dL    Hematocrit 34.4 (L) 42.0 - 52.0 %    MCV 88.2 81.4 - 97.8 fL    MCH 31.5 27.0 - 33.0 pg    MCHC 35.8 32.3 - 36.5 g/dL    RDW 48.8 35.9 - 50.0 fL    Platelet Count 80 (L) 164 - 446 K/uL    MPV 9.4 9.0 - 12.9 fL    Neutrophils-Polys 85.00 (H) 44.00 - 72.00 %    Lymphocytes 5.20 (L) 22.00 - 41.00 %    Monocytes 8.50 0.00 - 13.40 %    Eosinophils 0.10 0.00 - 6.90 %    Basophils 0.20 0.00 - 1.80 %    Immature Granulocytes 1.00 (H) 0.00 - 0.90 %    Nucleated RBC 0.00 0.00 - " 0.20 /100 WBC    Neutrophils (Absolute) 20.51 (H) 1.82 - 7.42 K/uL    Lymphs (Absolute) 1.26 1.00 - 4.80 K/uL    Monos (Absolute) 2.05 (H) 0.00 - 0.85 K/uL    Eos (Absolute) 0.03 0.00 - 0.51 K/uL    Baso (Absolute) 0.06 0.00 - 0.12 K/uL    Immature Granulocytes (abs) 0.24 (H) 0.00 - 0.11 K/uL    NRBC (Absolute) 0.00 K/uL   Complete Metabolic Panel   Result Value Ref Range    Sodium 131 (L) 135 - 145 mmol/L    Potassium 4.8 3.6 - 5.5 mmol/L    Chloride 95 (L) 96 - 112 mmol/L    Co2 24 20 - 33 mmol/L    Anion Gap 12.0 7.0 - 16.0    Glucose 210 (H) 65 - 99 mg/dL    Bun 29 (H) 8 - 22 mg/dL    Creatinine 1.86 (H) 0.50 - 1.40 mg/dL    Calcium 9.0 8.5 - 10.5 mg/dL    Correct Calcium 9.7 8.5 - 10.5 mg/dL    AST(SGOT) 24 12 - 45 U/L    ALT(SGPT) 9 2 - 50 U/L    Alkaline Phosphatase 70 30 - 99 U/L    Total Bilirubin 0.7 0.1 - 1.5 mg/dL    Albumin 3.1 (L) 3.2 - 4.9 g/dL    Total Protein 7.5 6.0 - 8.2 g/dL    Globulin 4.4 (H) 1.9 - 3.5 g/dL    A-G Ratio 0.7 g/dL   Urinalysis    Specimen: Urine   Result Value Ref Range    Color Yellow     Character Clear     Specific Gravity 1.010 <1.035    Ph 6.5 5.0 - 8.0    Glucose 250 (A) Negative mg/dL    Ketones Negative Negative mg/dL    Protein 30 (A) Negative mg/dL    Bilirubin Negative Negative    Urobilinogen, Urine 0.2 Negative    Nitrite Negative Negative    Leukocyte Esterase Large (A) Negative    Occult Blood Small (A) Negative    Micro Urine Req Microscopic    ESTIMATED GFR   Result Value Ref Range    GFR (CKD-EPI) 37 (A) >60 mL/min/1.73 m 2   PLATELET ESTIMATE   Result Value Ref Range    Plt Estimation Decreased    IMMATURE PLT FRACTION   Result Value Ref Range    Imm. Plt Fraction 1.7 0.6 - 13.1 %   URINE MICROSCOPIC (W/UA)   Result Value Ref Range    WBC 20-50 (A) /hpf    RBC 5-10 (A) /hpf    Bacteria Negative None /hpf    Epithelial Cells Negative /hpf    Hyaline Cast 0-2 /lpf   EKG   Result Value Ref Range    Report       [facility] Emergency  Dept.    Test Date:  2024  Pt Name:    FRENCH TUBBS                Department: ER  MRN:        [id number]                      Room:       BL 21  Gender:     Male                         Technician: [id number]  :        1945                   Requested By:ER TRIAGE PROTOCOL  Order #:    [id number]                    Reading MD: GABY DIAZ MD    Measurements  Intervals                                Axis  Rate:       96                           P:          0  IA:         0                            QRS:        -28  QRSD:       90                           T:          70  QT:         353  QTc:        447    Interpretive Statements  twelve-lead EKG does show atrial fibrillation with good rate control of 96,  no ST segment elevation or depression that meets criteria.  The patient does  have some depression in V3 only.  Electronically Signed On 2024 08:29:06 PST by GABY DIAZ MD     POC CoV-2, FLU A/B, RSV by PCR   Result Value Ref Range    POC Influenza A RNA, PCR Negative Negative    POC Influenza B RNA, PCR Negative Negative    POC RSV, by PCR Negative Negative    POC SARS-CoV-2, PCR NotDetected        EKG  I have independently interpreted this EKG  Please see my interpretation above      RADIOLOGY  DX-CHEST-PORTABLE (1 VIEW)   Final Result         Hazy opacity in the left lower lung, concerning for pneumonia.      CT-HEAD W/O   Final Result         1. No acute intracranial abnormality. No evidence of acute intracranial hemorrhage or mass lesion.      2. Unchanged large right parietal encephalomalacia.                     COURSE & MEDICAL DECISION MAKING    This is a 78-year-old gentleman who presents the emergency department altered.  I did review his history and it appears that he had states like this in the past from dehydration.  The patient was tachycardic and I did order a sepsis bolus as we also and laboratory analysis, chest x-ray, and urinalysis to evaluate for possible source  of infection.  The patient's lactic acid was noted at approximately 9:30 AM as well as leukocytosis and this was very concerning for potential sepsis.  Therefore I ordered a cath urinalysis and chest x-ray emergently to evaluate for possible source of infection and the chest x-ray does show evidence of pneumonia.  This was noted at 10 AM and antibiotics have been ordered.  Upon arrival was concern for possible intracranial source and thus performed a CT scan of the head.  The patient did receive Haldol for his agitation and this does seem to be effective as well as the IV fluids.  Viral testing has been obtained and is negative.  Patient's urinalysis does show some white cells as well as leukocyte Estrace positive but there is no bacteria.  This was from a cath specimen I suspect this is more inflammatory than from an infectious standpoint.  After reviewing the chest x-ray suspect his sepsis is from pneumonia.  Patient also has evidence of acute on chronic renal sufficiency all consistent with sepsis with organ failure.  The patient will be admitted to the intensivist in guarded condition.    FINAL DIAGNOSIS  1.  Sepsis  2.  Left lower lobe pneumonia  3.  Renal insufficiency  4.  Agitation requiring sedation  5.  Critical care time 45 minutes    Disposition  Patient will be admitted in guarded condition       Electronically signed by: Ancelmo Davey M.D., 1/28/2024 8:15 AM

## 2024-01-28 NOTE — ED NOTES
Med Rec complete per PT  Allergies Reviewed    Pt reports taking anticoagulant in the last 14 days  Anticoagulant: ELIQUIS, Last dose: 1/27

## 2024-01-28 NOTE — ASSESSMENT & PLAN NOTE
Patient normally runs mid 100s.    No schistocytes on peripheral smear  Acute on chronic, likely due to acute infection, now back at baseline- apixaban resumed  Plts increasing  Following cbc intermittently

## 2024-01-28 NOTE — ASSESSMENT & PLAN NOTE
Present on admission  Source UTI/Pyelo less likely resp  Blood cultures +GNRs 2/2, pseudomonas and proteus, repeat cultures so far negative  Abx changed to fortaz to reduce c dif risk

## 2024-01-28 NOTE — ASSESSMENT & PLAN NOTE
Baseline creat runs around 1.3  In setting of sepsis  Likely ATN related to this.  Improved, following intermittently  Follow daily BMP and urine output  Ferro is in place  Renally dose medications as appropriate

## 2024-01-28 NOTE — ASSESSMENT & PLAN NOTE
Speech continues to follow  Weaning oxygen as tolerated, oxygen requirements down to 3L,continue to titrate as tolerated  Continue O2 and RT protocols  Continue inhaled corticosteroid, and LABA  Meds and some food despite aspiration risks, see note from 1/31/24, plan reviewed with his other daughter Madhavi as well  He remains stable on 3 L

## 2024-01-28 NOTE — ED NOTES
Indwelling catheter placed aseptic technique.  Noted red urine output.  Sample sent to lab  Respiratory swab collected,poc completed.

## 2024-01-28 NOTE — ASSESSMENT & PLAN NOTE
Present when Ferro was placed, unknown if this was pre-existing  In setting of UTI/Pyelo  CT confirms L pyelo/cystitis.  No other significant findings  Monitor for signs of obstruction  Resolved, has not recurred even back on apixiban

## 2024-01-28 NOTE — ED TRIAGE NOTES
Chief Complaint   Patient presents with    ALOC     Pt bib ems from [facility] for altered mental status. Per ems last known well was Friday condition worsened last night .   Pt arrived a/ox0, does not follow command or answer any questions.   Sepsis score=5, protocol initiated.

## 2024-01-28 NOTE — H&P
Hospital Medicine History & Physical Note    Date of Service  1/28/2024    Primary Care Physician  Pcp Pt States None    Consultants      Specialist Names:     Code Status  Full Code    Chief Complaint  Chief Complaint   Patient presents with    ALOC       History of Presenting Illness  Wale Olivas is a 78 y.o. male who presented 1/28/2024 with history of dementia, COPD, dyslipidemia, primary hypertension, severe Alzheimer's dementia, type 2 diabetes, chronic anticoagulation atrial fibrillation    [facility]  He was sent to us due to altered mentation.  History from his daughter is that she last saw him yesterday, he would normally recognize her and is oriented to himself, but yesterday he did not recommend her.  On arrival to the ED, the patient was hypoxic, tachycardic, with good pressures however.  Workup has shown a chest x-ray with a left lower lobe infiltrate, UA suspicious for UTI, white count of 24 with platelet count of 80 creatinine 1.86 up from his baseline of 1.5, initial lactic acid greater than 4.  Patient has been treated with fluid bolus, Rocephin and azithromycin.    Dw patient's daughter Jose by phone and updated her.  Confirmed pt is FULL CODE per his expressed wishes to his family before he became confused by his dementia    I discussed the plan of care with family.    Review of Systems  Review of Systems   Unable to perform ROS: Mental status change       Past Medical History   has a past medical history of Anxiety, BPH (benign prostatic hyperplasia), CKD (chronic kidney disease), COPD (chronic obstructive pulmonary disease) ([facility]), COVID-19, Dementia ([facility]), GERD (gastroesophageal reflux disease), Hyperlipidemia, Insomnia, Major depressive disorder, and Type II diabetes mellitus ([facility]).    Surgical History   has no past surgical history on file.     Family History  family history is not on file.   Family history reviewed with patient. There is no family  history that is pertinent to the chief complaint.     Social History   reports that he has never smoked. He has never used smokeless tobacco. He reports that he does not currently use alcohol. He reports that he does not currently use drugs.    Allergies  Allergies   Allergen Reactions    Metformin Unspecified     Unknown reaction, listed on MAR     Morphine Unspecified     Unknown reaction, listed on MAR     Simvastatin Unspecified     Unknown reaction, listed on MAR     Spironolactone Unspecified     Unknown reaction, listed on MAR        Medications  Prior to Admission Medications   Prescriptions Last Dose Informant Patient Reported? Taking?   DULoxetine HCl 30 MG Capsule Delayed Release Sprinkle 1/27/2024 at PM MAR from Other Facility Yes No   Sig: Take 30 mg by mouth every day.   Umeclidinium Bromide (INCRUSE ELLIPTA) 62.5 MCG/ACT AEROSOL POWDER, BREATH ACTIVATED 1/27/2024 at AM MAR from Other Facility Yes No   Sig: Inhale 1 Puff every evening.   acetaminophen (TYLENOL) 325 MG Tab 1/27/2024 at PM MAR from Other Facility Yes No   Sig: Take 650 mg by mouth 2 times a day as needed for Moderate Pain.   amLODIPine (NORVASC) 5 MG Tab 1/27/2024 at PM MAR from Other Facility Yes No   Sig: Take 5 mg by mouth every day.   apixaban (ELIQUIS) 2.5mg Tab 1/27/2024 at PM MAR from Other Facility Yes No   Sig: Take 2.5 mg by mouth 2 times a day.   baclofen (LIORESAL) 10 MG Tab 1/27/2024 at PM MAR from Other Facility Yes No   Sig: Take 10 mg by mouth 2 times a day.   clotrimazole-betamethasone (LOTRISONE) 1-0.05 % Cream 1/27/2024 at PM MAR from Other Facility Yes Yes   Sig: Apply 1 Application topically 2 times a day. Apply to back and thights for rash   divalproex (DEPAKOTE SPRINKLE) 125 MG Capsule Delayed Release Sprinkle 1/28/2024 at AM MAR from Other Facility Yes Yes   Sig: Take 500 mg by mouth in the morning, at noon, and at bedtime. 4 tablets (500 mg), three times daily   finasteride (PROSCAR) 5 MG Tab 1/27/2024 at AM MAR  from Other Facility Yes No   Sig: Take 5 mg by mouth every day.   fluticasone furoate-vilanterol (BREO) 100-25 MCG/ACT AEROSOL POWDER, BREATH ACTIVATED 1/27/2024 at AM MAR from Other Facility Yes No   Sig: Inhale 1 Puff every morning.   insulin glargine (LANTUS) 100 UNIT/ML SC SOLN 1/27/2024 at PM MAR from Other Facility No No   Sig: Inject 10 Units under the skin 2 times a day.   Patient taking differently: Inject 10 Units under the skin 2 times a day.   insulin regular (NOVOLIN R) 100 Unit/mL Solution 1/27/2024 at 2100 MAR from Other Facility Yes No   Sig: Inject 2-10 Units under the skin 4 Times a Day,Before Meals and at Bedtime. Sliding Scale  201-250= 2 units  251-300= 4 units  301-350= 6 units  351-400= 8 units  Greater than 400 call MD   ipratropium-albuterol (DUONEB) 0.5-2.5 (3) MG/3ML nebulizer solution 1/27/2024 at PM MAR from Other Facility Yes No   Sig: Take 3 mL by nebulization 2 times a day.   lidocaine (ASPERFLEX) 4 % Patch 1/27/2024 at AM MAR from Other Facility Yes No   Sig: Place 2 Patches on the skin every 12 hours. Lower back side by side  12 hours on and 12 hours off   nystatin (MYCOSTATIN) powder 1/27/2024 at PM MAR from Other Facility Yes Yes   Sig: Apply 1 Application topically 2 times a day. Apply under abd folds for 14 days   pantoprazole (PROTONIX) 40 MG Tablet Delayed Response 1/28/2024 at AM MAR from Other Facility Yes No   Sig: Take 40 mg by mouth every day.   polyethylene glycol/lytes (MIRALAX) 17 g Pack 1/27/2024 at AM MAR from Other Facility Yes No   Sig: Take 17 g by mouth every day. Hold for loose stools   pravastatin (PRAVACHOL) 20 MG Tab 1/27/2024 at PM MAR from Other Facility Yes No   Sig: Take 80 mg by mouth every evening. 4 tablets (80 mg)   propranolol (INDERAL) 20 MG Tab 1/27/2024 at PM MAR from Other Facility Yes No   Sig: Take 20 mg by mouth every 12 hours.   risperiDONE (RISPERDAL) 1 MG Tab 1/27/2024 at PM MAR from Other Facility Yes No   Sig: Take 1 mg by mouth 3 times a  day. Indications: psychosis   sennosides (SENOKOT) 8.6 MG Tab 1/27/2024 at PM MAR from Other Facility Yes Yes   Sig: Take 8.6 mg by mouth 2 times a day.   tamsulosin (FLOMAX) 0.4 MG capsule 1/27/2024 at AM MAR from Other Facility Yes No   Sig: Take 0.4 mg by mouth every day.      Facility-Administered Medications: None       Physical Exam  Temp:  [37.6 °C (99.6 °F)] 37.6 °C (99.6 °F)  Pulse:  [] 90  Resp:  [14-34] 18  BP: (101-195)/(52-76) 136/74  SpO2:  [87 %-100 %] 96 %  Blood Pressure : 136/74   Temperature: 37.6 °C (99.6 °F)   Pulse: 90   Respiration: 18   Pulse Oximetry: 96 %       Physical Exam  Constitutional:       General: He is not in acute distress.     Appearance: He is well-developed. He is not diaphoretic.   HENT:      Head: Normocephalic and atraumatic.      Mouth/Throat:      Mouth: Mucous membranes are dry.   Eyes:      Conjunctiva/sclera: Conjunctivae normal.   Neck:      Vascular: No JVD.   Cardiovascular:      Rate and Rhythm: Normal rate.      Heart sounds: Murmur heard.      No gallop.   Pulmonary:      Effort: Pulmonary effort is normal. No respiratory distress.      Breath sounds: No stridor. No wheezing or rales.   Abdominal:      Palpations: Abdomen is soft.      Tenderness: There is no abdominal tenderness. There is no guarding or rebound.   Musculoskeletal:      Right lower leg: No edema.      Left lower leg: No edema.   Skin:     General: Skin is warm and dry.      Findings: Bruising present. No rash.   Neurological:      Comments: On my examination the patient is somnolent having just received Haldol.  Per bedside RN, he has been screaming since he came here for about 2 hours until he received the Haldol.  By report he was moving all 4 extremities purposefully         Laboratory:  Recent Labs     01/28/24  0815   WBC 24.2*   RBC 3.90*   HEMOGLOBIN 12.3*   HEMATOCRIT 34.4*   MCV 88.2   MCH 31.5   MCHC 35.8   RDW 48.8   PLATELETCT 80*   MPV 9.4     Recent Labs     01/28/24  0815  "  SODIUM 131*   POTASSIUM 4.8   CHLORIDE 95*   CO2 24   GLUCOSE 210*   BUN 29*   CREATININE 1.86*   CALCIUM 9.0     Recent Labs     01/28/24  0815   ALTSGPT 9   ASTSGOT 24   ALKPHOSPHAT 70   TBILIRUBIN 0.7   GLUCOSE 210*         No results for input(s): \"NTPROBNP\" in the last 72 hours.      No results for input(s): \"TROPONINT\" in the last 72 hours.    Imaging:  CT-CHEST,ABDOMEN,PELVIS W/O   Final Result      1.  Abnormal appearance of left kidney. Left perinephric fat stranding. Mild left hydronephrosis. Mild left urothelial thickening suggested. Findings could be due to pyelonephritis versus recently passed stone.   2.  Thick-walled urinary bladder with surrounding fat stranding suspicious for cystitis.   3.  Bibasilar pulmonary infiltrates and bronchitis.   4.  Moderate compression fracture of the superior L5 vertebral body which is interval new since prior. It appears acute or subacute with fracture line visible.      DX-CHEST-PORTABLE (1 VIEW)   Final Result         Hazy opacity in the left lower lung, concerning for pneumonia.      CT-HEAD W/O   Final Result         1. No acute intracranial abnormality. No evidence of acute intracranial hemorrhage or mass lesion.      2. Unchanged large right parietal encephalomalacia.                   X-Ray:  I have personally reviewed the images and compared with prior images.  EKG:  I have personally reviewed the images and compared with prior images.    Assessment/Plan:  Justification for Admission Status  I anticipate this patient will require at least two midnights for appropriate medical management, necessitating inpatient admission because sepsis    Patient will need a Intermediate Care (Adult and Pediatrics) bed on MEDICAL service .  The need is secondary to sepsis.    * Sepsis (HCC)- (present on admission)  Assessment & Plan  This is Sepsis Present on admission  SIRS criteria identified on my evaluation include: Tachycardia, with heart rate greater than 90 BPM and " Leukocytosis, with WBC greater than 12,000  Clinical indicators of end organ dysfunction include urinary versus respiratoryLactic Acid greater than 2, Toxic Metabolic Encephalopathy, Acute On Chronic Renal Failure, with creatinine >0.5 above baseline level, and Thrombocytopenia, with platelets less than 100  Source is   Sepsis protocol initiated  Crystalloid Fluid Administration: Fluid resuscitation ordered per standard protocol - 30 mL/kg per current or ideal body weight  IV antibiotics as appropriate for source of sepsis  Reassessment: I have reassessed the patient's hemodynamic status    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      Thrombocytopenia (HCC)  Assessment & Plan  Patient normally runs mid 100s, is currently at 80  No schistocytes on peripheral smear  Some small petechiae   Likely secondary to sepsis however will need to monitor closely  Continue apixaban for the moment  Should he drop more, may need further workup    Type 2 diabetes mellitus with hyperglycemia, with long-term current use of insulin (HCC)- (present on admission)  Assessment & Plan  Placed on sliding scale    Severe early onset Alzheimer's dementia (HCC)- (present on admission)  Assessment & Plan  Baseline oriented x 1    Primary hypertension- (present on admission)  Assessment & Plan  Maintained on amlodipine 5 mg daily and propranolol 20 mg every 12 hours   continue with parameters      JOSE ENRIQUE (acute kidney injury) ([facility])-  (present on admission)  Assessment & Plan  In setting of sepsis  Likely ATN related to this.  Continue fluid resuscitation  Follow daily BMP and urine output  Ferro is in place  Renally dose medications as appropriate    Chronic anticoagulation- (present on admission)  Assessment & Plan  Patient is on apixaban for history of atrial fibrillation    Chronic obstructive pulmonary disease with acute exacerbation (HCC)- (present on admission)  Assessment & Plan  Some expiratory wheezes are noted however good air movement  Placed on O2 and RT protocols  Continue inhaled corticosteroid, and LABA        VTE prophylaxis: therapeutic anticoagulation with apixaban

## 2024-01-28 NOTE — ASSESSMENT & PLAN NOTE
Patient is on apixaban for history of atrial fibrillation  Plts now back to baseline, apixaban resumed  Plts increased  repeating cbc intermittently

## 2024-01-28 NOTE — ASSESSMENT & PLAN NOTE
Maintained on amlodipine 5 mg daily and propranolol 20 mg every 12 hours   continue with parameters  following

## 2024-01-28 NOTE — CARE PLAN
The patient is Watcher - Medium risk of patient condition declining or worsening    Shift Goals  Clinical Goals: agitation control, montor abnormal labs  Patient Goals: CJ  Family Goals: CJ    Progress made toward(s) clinical / shift goals:    Problem: Knowledge Deficit - Standard  Goal: Patient and family/care givers will demonstrate understanding of plan of care, disease process/condition, diagnostic tests and medications  Outcome: Progressing     Problem: Hemodynamics  Goal: Patient's hemodynamics, fluid balance and neurologic status will be stable or improve  Outcome: Progressing     Problem: Urinary - Renal Perfusion  Goal: Ability to achieve and maintain adequate renal perfusion and functioning will improve  Outcome: Progressing     Problem: Respiratory  Goal: Patient will achieve/maintain optimum respiratory ventilation and gas exchange  Outcome: Progressing     Problem: Pain - Standard  Goal: Alleviation of pain or a reduction in pain to the patient’s comfort goal  Outcome: Progressing     Problem: Skin Integrity  Goal: Skin integrity is maintained or improved  Outcome: Progressing     Problem: Fall Risk  Goal: Patient will remain free from falls  Outcome: Progressing       Patient is not progressing towards the following goals:

## 2024-01-28 NOTE — ED NOTES
Pt incontinence of stool and urine, removed soiled brief perineal care provided.  Noted perineal excoriation, pt on waffle bed, q2turn

## 2024-01-28 NOTE — ED NOTES
from Lab called with critical result of lactic acid 4.4 at 0854. Critical lab result read back to .   Dr. Davey notified of critical lab result at 0856.  Critical lab result read back by Dr. Davey.

## 2024-01-29 PROBLEM — R78.81 BACTEREMIA DUE TO GRAM-NEGATIVE BACTERIA: Status: ACTIVE | Noted: 2024-01-29

## 2024-01-29 LAB
ANION GAP SERPL CALC-SCNC: 10 MMOL/L (ref 7–16)
BUN SERPL-MCNC: 24 MG/DL (ref 8–22)
CALCIUM SERPL-MCNC: 8.6 MG/DL (ref 8.5–10.5)
CHLORIDE SERPL-SCNC: 105 MMOL/L (ref 96–112)
CO2 SERPL-SCNC: 24 MMOL/L (ref 20–33)
CREAT SERPL-MCNC: 1.52 MG/DL (ref 0.5–1.4)
ERYTHROCYTE [DISTWIDTH] IN BLOOD BY AUTOMATED COUNT: 49.5 FL (ref 35.9–50)
GFR SERPLBLD CREATININE-BSD FMLA CKD-EPI: 47 ML/MIN/1.73 M 2
GLUCOSE BLD STRIP.AUTO-MCNC: 115 MG/DL (ref 65–99)
GLUCOSE BLD STRIP.AUTO-MCNC: 118 MG/DL (ref 65–99)
GLUCOSE BLD STRIP.AUTO-MCNC: 121 MG/DL (ref 65–99)
GLUCOSE BLD STRIP.AUTO-MCNC: 148 MG/DL (ref 65–99)
GLUCOSE SERPL-MCNC: 118 MG/DL (ref 65–99)
HCT VFR BLD AUTO: 28.9 % (ref 42–52)
HGB BLD-MCNC: 10 G/DL (ref 14–18)
MCH RBC QN AUTO: 30.9 PG (ref 27–33)
MCHC RBC AUTO-ENTMCNC: 34.6 G/DL (ref 32.3–36.5)
MCV RBC AUTO: 89.2 FL (ref 81.4–97.8)
PLATELET # BLD AUTO: 48 K/UL (ref 164–446)
PLATELETS.RETICULATED NFR BLD AUTO: 1.3 % (ref 0.6–13.1)
PMV BLD AUTO: 8.7 FL (ref 9–12.9)
POTASSIUM SERPL-SCNC: 3.8 MMOL/L (ref 3.6–5.5)
RBC # BLD AUTO: 3.24 M/UL (ref 4.7–6.1)
SODIUM SERPL-SCNC: 139 MMOL/L (ref 135–145)
WBC # BLD AUTO: 13 K/UL (ref 4.8–10.8)

## 2024-01-29 PROCEDURE — 99233 SBSQ HOSP IP/OBS HIGH 50: CPT | Performed by: HOSPITALIST

## 2024-01-29 PROCEDURE — 85055 RETICULATED PLATELET ASSAY: CPT

## 2024-01-29 PROCEDURE — 700102 HCHG RX REV CODE 250 W/ 637 OVERRIDE(OP): Performed by: HOSPITALIST

## 2024-01-29 PROCEDURE — 80048 BASIC METABOLIC PNL TOTAL CA: CPT

## 2024-01-29 PROCEDURE — A9270 NON-COVERED ITEM OR SERVICE: HCPCS | Performed by: HOSPITALIST

## 2024-01-29 PROCEDURE — 94640 AIRWAY INHALATION TREATMENT: CPT

## 2024-01-29 PROCEDURE — 770001 HCHG ROOM/CARE - MED/SURG/GYN PRIV*

## 2024-01-29 PROCEDURE — 82962 GLUCOSE BLOOD TEST: CPT | Mod: 91

## 2024-01-29 PROCEDURE — 700111 HCHG RX REV CODE 636 W/ 250 OVERRIDE (IP): Performed by: HOSPITALIST

## 2024-01-29 PROCEDURE — 700101 HCHG RX REV CODE 250: Performed by: HOSPITALIST

## 2024-01-29 PROCEDURE — 85027 COMPLETE CBC AUTOMATED: CPT

## 2024-01-29 PROCEDURE — 700105 HCHG RX REV CODE 258: Performed by: HOSPITALIST

## 2024-01-29 RX ORDER — AMLODIPINE BESYLATE 5 MG/1
5 TABLET ORAL
Status: DISCONTINUED | OUTPATIENT
Start: 2024-01-29 | End: 2024-02-05 | Stop reason: HOSPADM

## 2024-01-29 RX ORDER — HYDRALAZINE HYDROCHLORIDE 20 MG/ML
10 INJECTION INTRAMUSCULAR; INTRAVENOUS ONCE
Status: ACTIVE | OUTPATIENT
Start: 2024-01-29 | End: 2024-01-30

## 2024-01-29 RX ORDER — IPRATROPIUM BROMIDE AND ALBUTEROL SULFATE 2.5; .5 MG/3ML; MG/3ML
3 SOLUTION RESPIRATORY (INHALATION) 2 TIMES DAILY
Status: DISCONTINUED | OUTPATIENT
Start: 2024-01-29 | End: 2024-02-05 | Stop reason: HOSPADM

## 2024-01-29 RX ORDER — HALOPERIDOL 5 MG/ML
2.5-5 INJECTION INTRAMUSCULAR EVERY 6 HOURS PRN
Status: DISCONTINUED | OUTPATIENT
Start: 2024-01-29 | End: 2024-01-30

## 2024-01-29 RX ADMIN — CEFTRIAXONE SODIUM 2000 MG: 10 INJECTION, POWDER, FOR SOLUTION INTRAVENOUS at 05:26

## 2024-01-29 RX ADMIN — HALOPERIDOL LACTATE 5 MG: 5 INJECTION, SOLUTION INTRAMUSCULAR at 08:26

## 2024-01-29 RX ADMIN — SODIUM CHLORIDE, POTASSIUM CHLORIDE, SODIUM LACTATE AND CALCIUM CHLORIDE: 600; 310; 30; 20 INJECTION, SOLUTION INTRAVENOUS at 19:23

## 2024-01-29 RX ADMIN — IPRATROPIUM BROMIDE AND ALBUTEROL SULFATE 3 ML: 2.5; .5 SOLUTION RESPIRATORY (INHALATION) at 20:30

## 2024-01-29 RX ADMIN — CLOTRIMAZOLE AND BETAMETHASONE DIPROPIONATE 1 APPLICATION: 10; .5 CREAM TOPICAL at 22:11

## 2024-01-29 RX ADMIN — AZITHROMYCIN FOR INJECTION INJECTION, POWDER, LYOPHILIZED, FOR SOLUTION 500 MG: 500 INJECTION INTRAVENOUS at 06:10

## 2024-01-29 RX ADMIN — CLOTRIMAZOLE AND BETAMETHASONE DIPROPIONATE 1 APPLICATION: 10; .5 CREAM TOPICAL at 06:10

## 2024-01-29 ASSESSMENT — PAIN DESCRIPTION - PAIN TYPE
TYPE: ACUTE PAIN

## 2024-01-29 ASSESSMENT — PAIN SCALES - PAIN ASSESSMENT IN ADVANCED DEMENTIA (PAINAD)
CONSOLABILITY: NO NEED TO CONSOLE
TOTALSCORE: 0
BREATHING: NORMAL
FACIALEXPRESSION: SMILING OR INEXPRESSIVE
BODYLANGUAGE: RELAXED

## 2024-01-29 ASSESSMENT — FIBROSIS 4 INDEX: FIB4 SCORE: 7.6

## 2024-01-29 NOTE — PROGRESS NOTES
4 Eyes Skin Assessment Completed by NAHEED Herndon and NAHEED García.    Head WDL  Ears Redness, scabbing.   Nose WDL  Mouth WDL  Neck WDL  Breast/Chest Bruising  Shoulder Blades WDL  Spine WDL  (R) Arm/Elbow/Hand Bruising; generalized throughout  (L) Arm/Elbow/Hand Bruising; generalized throughout  Abdomen WDL  Groin Excoriation  Scrotum/Coccyx/Buttocks Redness and Blanching; small area on coccyx  (R) Leg WDL  (L) Leg Scab  (R) Heel/Foot/Toe WDL  (L) Heel/Foot/Toe WDL          Devices In Places SCD's and Condom Cath      Interventions In Place NC W/Ear Foams    Possible Skin Injury No    Pictures Uploaded Into Epic N/A  Wound Consult Placed N/A  RN Wound Prevention Protocol Ordered No

## 2024-01-29 NOTE — CARE PLAN
The patient is Watcher - Medium risk of patient condition declining or worsening    Shift Goals  Clinical Goals: agitation control, montor abnormal labs  Patient Goals: CJ  Family Goals: CJ    Progress made toward(s) clinical / shift goals:      Problem: Knowledge Deficit - Standard  Goal: Patient and family/care givers will demonstrate understanding of plan of care, disease process/condition, diagnostic tests and medications  Outcome: Not Progressing     Problem: Hemodynamics  Goal: Patient's hemodynamics, fluid balance and neurologic status will be stable or improve  Outcome: Progressing     Problem: Fluid Volume  Goal: Fluid volume balance will be maintained  Outcome: Progressing     Problem: Urinary - Renal Perfusion  Goal: Ability to achieve and maintain adequate renal perfusion and functioning will improve  Outcome: Progressing     Problem: Respiratory  Goal: Patient will achieve/maintain optimum respiratory ventilation and gas exchange  Outcome: Progressing     Problem: Physical Regulation  Goal: Diagnostic test results will improve  Outcome: Progressing  Goal: Signs and symptoms of infection will decrease  Outcome: Progressing     Problem: Pain - Standard  Goal: Alleviation of pain or a reduction in pain to the patient’s comfort goal  Outcome: Progressing     Problem: Skin Integrity  Goal: Skin integrity is maintained or improved  Outcome: Progressing     Problem: Fall Risk  Goal: Patient will remain free from falls  Outcome: Progressing       Patient is not progressing towards the following goals:      Problem: Knowledge Deficit - Standard  Goal: Patient and family/care givers will demonstrate understanding of plan of care, disease process/condition, diagnostic tests and medications  Outcome: Not Progressing

## 2024-01-29 NOTE — PROGRESS NOTES
"Hospital Medicine Daily Progress Note    Date of Service  1/29/2024    Chief Complaint  Wale Olivas is a 78 y.o. male admitted 1/28/2024 with altered mental status    Hospital Course  Wale Olivas is a 78 y.o. male who presented 1/28/2024 with history of dementia, COPD, dyslipidemia, primary hypertension, severe Alzheimer's dementia, type 2 diabetes, chronic anticoagulation atrial fibrillation.  Sent from SNF with altered mentation.  Found to have UTI and pneumonia    Interval Problem Update  ROS unobtainable due to baseline dementia and acute delerium  Pt moaning however when asked says he feels \"fine\".  resting comfortably    AFebrile  Sinus 60-70s  SBP 15-160s  On 4 LNC  UOP 3300ml/24hrs    I have discussed this patient's plan of care and discharge plan at IDT rounds today with Case Management, Nursing, Nursing leadership, and other members of the IDT team.    Consultants/Specialty      Code Status  Full Code    Disposition  The patient is not medically cleared for discharge to home or a post-acute facility.      I have placed the appropriate orders for post-discharge needs.    Review of Systems  Review of Systems   Unable to perform ROS: Other        Physical Exam  Temp:  [35.4 °C (95.7 °F)-37.6 °C (99.6 °F)] 35.4 °C (95.7 °F)  Pulse:  [] 58  Resp:  [12-34] 15  BP: ()/(52-77) 152/72  SpO2:  [87 %-100 %] 98 %    Physical Exam  Constitutional:       General: He is not in acute distress.     Appearance: He is well-developed. He is not diaphoretic.   HENT:      Head: Normocephalic and atraumatic.   Eyes:      Conjunctiva/sclera: Conjunctivae normal.   Neck:      Vascular: No JVD.   Cardiovascular:      Rate and Rhythm: Normal rate.      Heart sounds: Murmur heard.      No gallop.   Pulmonary:      Effort: Pulmonary effort is normal. No respiratory distress.      Breath sounds: No stridor. No wheezing or rales.   Abdominal:      Palpations: Abdomen is soft.      Tenderness: There is no " abdominal tenderness. There is no guarding or rebound.   Musculoskeletal:      Right lower leg: No edema.      Left lower leg: No edema.   Skin:     General: Skin is warm and dry.      Findings: No rash.   Neurological:      Mental Status: He is alert.      Comments: Pt is alert and mumbling  Answers question that are put to him  O to self only  Does not follow for strength testing but moves uppers purposefully         Fluids    Intake/Output Summary (Last 24 hours) at 1/29/2024 0641  Last data filed at 1/29/2024 0600  Gross per 24 hour   Intake 3956.15 ml   Output 3300 ml   Net 656.15 ml       Laboratory  Recent Labs     01/28/24  0815 01/29/24  0307   WBC 24.2* 13.0*   RBC 3.90* 3.24*   HEMOGLOBIN 12.3* 10.0*   HEMATOCRIT 34.4* 28.9*   MCV 88.2 89.2   MCH 31.5 30.9   MCHC 35.8 34.6   RDW 48.8 49.5   PLATELETCT 80* 48*   MPV 9.4 8.7*     Recent Labs     01/28/24  0815 01/29/24  0307   SODIUM 131* 139   POTASSIUM 4.8 3.8   CHLORIDE 95* 105   CO2 24 24   GLUCOSE 210* 118*   BUN 29* 24*   CREATININE 1.86* 1.52*   CALCIUM 9.0 8.6     Recent Labs     01/28/24  0815   INR 1.27*               Imaging  CT-CHEST,ABDOMEN,PELVIS W/O   Final Result      1.  Abnormal appearance of left kidney. Left perinephric fat stranding. Mild left hydronephrosis. Mild left urothelial thickening suggested. Findings could be due to pyelonephritis versus recently passed stone.   2.  Thick-walled urinary bladder with surrounding fat stranding suspicious for cystitis.   3.  Bibasilar pulmonary infiltrates and bronchitis.   4.  Moderate compression fracture of the superior L5 vertebral body which is interval new since prior. It appears acute or subacute with fracture line visible.      DX-CHEST-PORTABLE (1 VIEW)   Final Result         Hazy opacity in the left lower lung, concerning for pneumonia.      CT-HEAD W/O   Final Result         1. No acute intracranial abnormality. No evidence of acute intracranial hemorrhage or mass lesion.      2.  Unchanged large right parietal encephalomalacia.                    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  No other significant findings  Monitor for signs of obstruction  DC apixaban      Thrombocytopenia (HCC)  Assessment & Plan  Patient normally runs mid 100s.    No schistocytes on peripheral smear  Some small petechiae   Likely secondary to sepsis however will need to monitor closely  DC Apixaban as count now<50  Given GNR bacteremia this would be an obvious explanation  Cont to monitor closely    Type 2 diabetes mellitus with hyperglycemia, with long-term current use of insulin (HCC)- (present on admission)  Assessment & Plan  Placed on sliding scale    Severe early onset Alzheimer's dementia ([facility])- (present on admission)  Assessment & Plan  Baseline oriented x 1    Primary hypertension- (present on admission)  Assessment & Plan  Maintained on amlodipine 5 mg daily and propranolol 20 mg every 12 hours   continue with parameters      JOSE ENRIQUE (acute kidney injury) ([facility])- (present on admission)  Assessment & Plan  Baseline creat runs around 1.3  In setting of sepsis  Likely ATN related to this.  Creat trending down  Continue fluid resuscitation  Follow daily BMP and urine  output  Ferro is in place  Renally dose medications as appropriate    Chronic anticoagulation- (present on admission)  Assessment & Plan  Patient is on apixaban for history of atrial fibrillation  Holding as platelet count<50    Chronic obstructive pulmonary disease with acute exacerbation (HCC)- (present on admission)  Assessment & Plan  Some expiratory wheezes are noted however good air movement  Placed on O2 and RT protocols  Continue inhaled corticosteroid, and LABA         VTE prophylaxis:   SCDs/TEDs      I have performed a physical exam and reviewed and updated ROS and Plan today (1/29/2024). In review of yesterday's note (1/28/2024), there are no changes except as documented above.

## 2024-01-29 NOTE — DISCHARGE PLANNING
Case Management Discharge Planning    Admission Date: 1/28/2024  GMLOS: 5.1  ALOS: 1    6-Clicks ADL Score: 6  6-Clicks Mobility Score: 6  PT and/or OT Eval ordered: Yes  Post-acute Referrals Ordered: No  Post-acute Choice Obtained: Yes  Has referral(s) been sent to post-acute provider:  Yes      Anticipated Discharge Dispo: Discharge Disposition: D/T to SNF with Medicare cert in anticipation of skilled care (03)    DME Needed: No    Action(s) Taken[facility] [phone]    Escalations Completed: None    Medically Clear: No    Next Steps: LMSW to follow for placement or DC planning.     Barriers to Discharge: Pending Placement    Is the patient up for discharge tomorrow: No

## 2024-01-29 NOTE — CARE PLAN
Pt AO x0, only moans and screams, will sometimes speak profanities.  Pt on 4L NC.  Pt roomed close to nurses station.  Call light within reach.  Bed locked and in lowest position.  Hourly rounding.  Needs met at this time.           The patient is Watcher - Medium risk of patient condition declining or worsening    Shift Goals  Clinical Goals: pt will remain safe throughout shift.  Patient Goals: JC  Family Goals: CJ    Progress made toward(s) clinical / shift goals:    Pt was kept safe and incident free during shift.     Patient is not progressing towards the following goals:  Pt currently AO x0.  Unable to be educated at this time.     Problem: Knowledge Deficit - Standard  Goal: Patient and family/care givers will demonstrate understanding of plan of care, disease process/condition, diagnostic tests and medications  Outcome: Not Progressing

## 2024-01-29 NOTE — THERAPY
Speech Language Therapy Contact Note    Patient Name: Wale Olivas  Age:  78 y.o., Sex:  male  Medical Record #: [id number]  Today's Date: 1/29/2024 01/29/24 1350   Treatment Variance   Reason For Missed Therapy Medical - Other (Please Comment)   Initial Contact Note    Initial Contact Note  Order Received and Verified, Speech Therapy Evaluation in Progress with Full Report to Follow.   Interdisciplinary Plan of Care Collaboration   IDT Collaboration with  Nursing;Physician   Patient Position at End of Therapy In Bed   Collaboration Comments Discussed case with RN and MD. Patient is highly irritable with any stimulation. Unable to participate in SLP evaluation this date. Unclear baseline diet, however, reportedly patient requires 1:1 feeding. Hopeful participation/mental status will improve allowing for patient to participate in PO trials. Recommend palliative care consult. SLP will re-attempt swallow evaluation in coming days.

## 2024-01-29 NOTE — PROGRESS NOTES
PALLIATIVE CARE SOCIAL WORK NOTE    Patient: Wale Olivas  Age: 76  Gender: Male  MRN: [id number]  Insurance: [facility]  Date Admitted: 1/28/24  Date of Service: 1/29/24    LMMARIAH sent advance directive/POLST request to [facility].    Megan Castillo LMSW  Palliative Care

## 2024-01-30 LAB
ANION GAP SERPL CALC-SCNC: 9 MMOL/L (ref 7–16)
BACTERIA BLD CULT: ABNORMAL
BACTERIA UR CULT: ABNORMAL
BUN SERPL-MCNC: 25 MG/DL (ref 8–22)
CALCIUM SERPL-MCNC: 9.2 MG/DL (ref 8.5–10.5)
CHLORIDE SERPL-SCNC: 104 MMOL/L (ref 96–112)
CO2 SERPL-SCNC: 27 MMOL/L (ref 20–33)
CREAT SERPL-MCNC: 1.55 MG/DL (ref 0.5–1.4)
ERYTHROCYTE [DISTWIDTH] IN BLOOD BY AUTOMATED COUNT: 51.8 FL (ref 35.9–50)
GFR SERPLBLD CREATININE-BSD FMLA CKD-EPI: 46 ML/MIN/1.73 M 2
GLUCOSE BLD STRIP.AUTO-MCNC: 130 MG/DL (ref 65–99)
GLUCOSE BLD STRIP.AUTO-MCNC: 136 MG/DL (ref 65–99)
GLUCOSE BLD STRIP.AUTO-MCNC: 140 MG/DL (ref 65–99)
GLUCOSE BLD STRIP.AUTO-MCNC: 150 MG/DL (ref 65–99)
GLUCOSE SERPL-MCNC: 148 MG/DL (ref 65–99)
HCT VFR BLD AUTO: 30.6 % (ref 42–52)
HGB BLD-MCNC: 10.6 G/DL (ref 14–18)
MCH RBC QN AUTO: 31.3 PG (ref 27–33)
MCHC RBC AUTO-ENTMCNC: 34.6 G/DL (ref 32.3–36.5)
MCV RBC AUTO: 90.3 FL (ref 81.4–97.8)
PLATELET # BLD AUTO: 69 K/UL (ref 164–446)
PLATELETS.RETICULATED NFR BLD AUTO: 1.9 % (ref 0.6–13.1)
PMV BLD AUTO: 9.7 FL (ref 9–12.9)
POTASSIUM SERPL-SCNC: 4 MMOL/L (ref 3.6–5.5)
RBC # BLD AUTO: 3.39 M/UL (ref 4.7–6.1)
SIGNIFICANT IND 70042: ABNORMAL
SITE SITE: ABNORMAL
SODIUM SERPL-SCNC: 140 MMOL/L (ref 135–145)
SOURCE SOURCE: ABNORMAL
WBC # BLD AUTO: 11.8 K/UL (ref 4.8–10.8)

## 2024-01-30 PROCEDURE — 700111 HCHG RX REV CODE 636 W/ 250 OVERRIDE (IP): Performed by: HOSPITALIST

## 2024-01-30 PROCEDURE — 85027 COMPLETE CBC AUTOMATED: CPT

## 2024-01-30 PROCEDURE — 99497 ADVNCD CARE PLAN 30 MIN: CPT | Performed by: NURSE PRACTITIONER

## 2024-01-30 PROCEDURE — 700105 HCHG RX REV CODE 258: Performed by: HOSPITALIST

## 2024-01-30 PROCEDURE — 94640 AIRWAY INHALATION TREATMENT: CPT

## 2024-01-30 PROCEDURE — 770001 HCHG ROOM/CARE - MED/SURG/GYN PRIV*

## 2024-01-30 PROCEDURE — 85055 RETICULATED PLATELET ASSAY: CPT

## 2024-01-30 PROCEDURE — 700111 HCHG RX REV CODE 636 W/ 250 OVERRIDE (IP): Mod: JG

## 2024-01-30 PROCEDURE — 36415 COLL VENOUS BLD VENIPUNCTURE: CPT

## 2024-01-30 PROCEDURE — 99221 1ST HOSP IP/OBS SF/LOW 40: CPT | Mod: 25 | Performed by: NURSE PRACTITIONER

## 2024-01-30 PROCEDURE — 700101 HCHG RX REV CODE 250: Performed by: HOSPITALIST

## 2024-01-30 PROCEDURE — 80048 BASIC METABOLIC PNL TOTAL CA: CPT

## 2024-01-30 PROCEDURE — 92610 EVALUATE SWALLOWING FUNCTION: CPT

## 2024-01-30 PROCEDURE — 82962 GLUCOSE BLOOD TEST: CPT

## 2024-01-30 PROCEDURE — 94760 N-INVAS EAR/PLS OXIMETRY 1: CPT

## 2024-01-30 PROCEDURE — 99232 SBSQ HOSP IP/OBS MODERATE 35: CPT | Performed by: HOSPITALIST

## 2024-01-30 PROCEDURE — 87040 BLOOD CULTURE FOR BACTERIA: CPT

## 2024-01-30 PROCEDURE — 700111 HCHG RX REV CODE 636 W/ 250 OVERRIDE (IP): Mod: JZ | Performed by: HOSPITALIST

## 2024-01-30 RX ORDER — HALOPERIDOL 5 MG/ML
0.5 INJECTION INTRAMUSCULAR EVERY 6 HOURS
Status: DISCONTINUED | OUTPATIENT
Start: 2024-01-30 | End: 2024-01-30

## 2024-01-30 RX ORDER — HALOPERIDOL 5 MG/ML
0.5 INJECTION INTRAMUSCULAR EVERY 6 HOURS PRN
Status: DISCONTINUED | OUTPATIENT
Start: 2024-01-30 | End: 2024-02-05 | Stop reason: HOSPADM

## 2024-01-30 RX ORDER — LABETALOL HYDROCHLORIDE 5 MG/ML
10 INJECTION, SOLUTION INTRAVENOUS EVERY 6 HOURS PRN
Status: DISCONTINUED | OUTPATIENT
Start: 2024-01-30 | End: 2024-02-05 | Stop reason: HOSPADM

## 2024-01-30 RX ADMIN — CEFTAZIDIME 2000 MG: 1 INJECTION, POWDER, FOR SOLUTION INTRAMUSCULAR; INTRAVENOUS at 17:34

## 2024-01-30 RX ADMIN — IPRATROPIUM BROMIDE AND ALBUTEROL SULFATE 3 ML: 2.5; .5 SOLUTION RESPIRATORY (INHALATION) at 08:15

## 2024-01-30 RX ADMIN — CEFTRIAXONE SODIUM 2000 MG: 10 INJECTION, POWDER, FOR SOLUTION INTRAVENOUS at 05:16

## 2024-01-30 RX ADMIN — LABETALOL HYDROCHLORIDE 10 MG: 5 INJECTION, SOLUTION INTRAVENOUS at 21:00

## 2024-01-30 RX ADMIN — CLOTRIMAZOLE AND BETAMETHASONE DIPROPIONATE 1 APPLICATION: 10; .5 CREAM TOPICAL at 17:34

## 2024-01-30 RX ADMIN — CLOTRIMAZOLE AND BETAMETHASONE DIPROPIONATE 1 APPLICATION: 10; .5 CREAM TOPICAL at 05:21

## 2024-01-30 RX ADMIN — SODIUM CHLORIDE, POTASSIUM CHLORIDE, SODIUM LACTATE AND CALCIUM CHLORIDE: 600; 310; 30; 20 INJECTION, SOLUTION INTRAVENOUS at 16:46

## 2024-01-30 RX ADMIN — IPRATROPIUM BROMIDE AND ALBUTEROL SULFATE 3 ML: 2.5; .5 SOLUTION RESPIRATORY (INHALATION) at 19:50

## 2024-01-30 RX ADMIN — SODIUM CHLORIDE, POTASSIUM CHLORIDE, SODIUM LACTATE AND CALCIUM CHLORIDE: 600; 310; 30; 20 INJECTION, SOLUTION INTRAVENOUS at 04:26

## 2024-01-30 ASSESSMENT — PAIN SCALES - PAIN ASSESSMENT IN ADVANCED DEMENTIA (PAINAD)
CONSOLABILITY: NO NEED TO CONSOLE
TOTALSCORE: 0
BODYLANGUAGE: RELAXED
BODYLANGUAGE: RELAXED
CONSOLABILITY: NO NEED TO CONSOLE
FACIALEXPRESSION: SMILING OR INEXPRESSIVE
NEGVOCALIZATION: OCCASIONAL MOAN/GROAN, LOW SPEECH, NEGATIVE/DISAPPROVING QUALITY
TOTALSCORE: 1
FACIALEXPRESSION: SMILING OR INEXPRESSIVE
BREATHING: NORMAL
BREATHING: NORMAL

## 2024-01-30 ASSESSMENT — PAIN DESCRIPTION - PAIN TYPE
TYPE: ACUTE PAIN
TYPE: ACUTE PAIN

## 2024-01-30 NOTE — PROGRESS NOTES
[facility] Medicine Daily Progress Note    Date of Service  1/30/2024    Chief Complaint  Wale Olivas is a 78 y.o. male admitted 1/28/2024 with altered mental status    Hospital Course  Wale Olivas is a 78 y.o. male who presented to [facility] on 1/28/2024. He has a history of dementia, COPD, dyslipidemia, primary hypertension, severe Alzheimer's dementia, type 2 diabetes, chronic anticoagulation atrial fibrillation.  He was sent to [facility]  from [facility] with altered mentation and was found to have a UTI and pneumonia    Interval Problem Update  Speaking some, axox person only, following some commands, has productive cough. I spoke with speech, he is coughing with ice chips, they recommend npo for now and will re eval with family present tomorrow. Plts improving, no s/o bleeding. ROS otherwise negative. Stable on 3.5L of oxygen, also discussed with Palliative care    I have discussed this patient's plan of care and discharge plan at IDT rounds today with Case Management, Nursing, Nursing leadership, and other members of the IDT team.    Consultants/Specialty  Palliative care    Code Status  Full Code    Disposition  The patient is not medically cleared for discharge to home or a post-acute facility.      I have placed the appropriate orders for post-discharge needs.    Review of Systems  Review of Systems   Unable to perform ROS: Other        Physical Exam  Temp:  [36.4 °C (97.5 °F)-36.9 °C (98.4 °F)] 36.9 °C (98.4 °F)  Pulse:  [] 91  Resp:  [16-18] 18  BP: (103-164)/(67-84) 164/79  SpO2:  [93 %-100 %] 96 %    Physical Exam  Constitutional:       General: He is not in acute distress.     Appearance: He is well-developed. He is not diaphoretic.   HENT:      Head: Normocephalic and atraumatic.   Eyes:      Conjunctiva/sclera: Conjunctivae normal.   Neck:      Vascular: No JVD.   Cardiovascular:      Rate and Rhythm: Normal rate.      Heart sounds: Murmur heard.      No gallop.   Pulmonary:      Effort:  Pulmonary effort is normal. No respiratory distress.      Breath sounds: No stridor. No wheezing or rales.   Abdominal:      Palpations: Abdomen is soft.      Tenderness: There is no abdominal tenderness. There is no guarding or rebound.   Musculoskeletal:      Right lower leg: No edema.      Left lower leg: No edema.   Skin:     General: Skin is warm and dry.      Findings: No rash.   Neurological:      Mental Status: He is alert.      Comments: Pt is alert and mumbling  Answers question that are put to him           Fluids    Intake/Output Summary (Last 24 hours) at 1/30/2024 [facility]  Last data filed at 1/30/2024 0815  Gross per 24 hour   Intake 0 ml   Output 300 ml   Net -300 ml       Laboratory  Recent Labs     01/28/24  0815 01/29/24  0307 01/30/24  0811   WBC 24.2* 13.0* 11.8*   RBC 3.90* 3.24* 3.39*   HEMOGLOBIN 12.3* 10.0* 10.6*   HEMATOCRIT 34.4* 28.9* 30.6*   MCV 88.2 89.2 90.3   MCH 31.5 30.9 31.3   MCHC 35.8 34.6 34.6   RDW 48.8 49.5 51.8*   PLATELETCT 80* 48* 69*   MPV 9.4 8.7* 9.7     Recent Labs     01/28/24  0815 01/29/24  0307 01/30/24  0811   SODIUM 131* 139 140   POTASSIUM 4.8 3.8 4.0   CHLORIDE 95* 105 104   CO2 24 24 27   GLUCOSE 210* 118* 148*   BUN 29* 24* 25*   CREATININE 1.86* 1.52* 1.55*   CALCIUM 9.0 8.6 9.2     Recent Labs     01/28/24  0815   INR 1.27*               Imaging  CT-CHEST,ABDOMEN,PELVIS W/O   Final Result      1.  Abnormal appearance of left kidney. Left perinephric fat stranding. Mild left hydronephrosis. Mild left urothelial thickening suggested. Findings could be due to pyelonephritis versus recently passed stone.   2.  Thick-walled urinary bladder with surrounding fat stranding suspicious for cystitis.   3.  Bibasilar pulmonary infiltrates and bronchitis.   4.  Moderate compression fracture of the superior L5 vertebral body which is interval new since prior. It appears acute or subacute with fracture line visible.      DX-CHEST-PORTABLE (1 VIEW)   Final Result         Hazy  opacity in the left lower lung, concerning for pneumonia.      CT-HEAD W/O   Final Result         1. No acute intracranial abnormality. No evidence of acute intracranial hemorrhage or mass lesion.      2. Unchanged large right parietal encephalomalacia.                    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  No other significant findings  Monitor for signs of obstruction  DC apixaban      Thrombocytopenia (HCC)  Assessment & Plan  Patient normally runs mid 100s.    No schistocytes on peripheral smear  Some small petechiae   Likely secondary to sepsis however will need to monitor closely  DC Apixaban as count now<50, plts improving, will repeat cbc in am  Given GNR bacteremia this would be an obvious explanation      Type 2 diabetes mellitus with hyperglycemia, with long-term current use of insulin (HCC)- (present on admission)  Assessment & Plan  sliding scale    Severe early onset Alzheimer's dementia (HCC)- (present on admission)  Assessment & Plan  Baseline oriented x 1    Primary hypertension- (present on admission)  Assessment & Plan  Maintained on amlodipine 5 mg daily and propranolol 20 mg every 12 hours   continue with  parameters      JOSE ENRIQUE (acute kidney injury) (HCC)- (present on admission)  Assessment & Plan  Baseline creat runs around 1.3  In setting of sepsis  Likely ATN related to this.  Improved overnight  Continue fluid resuscitation but decrease rate, will repeat bmp in am  Follow daily BMP and urine output  Ferro is in place  Renally dose medications as appropriate    Chronic anticoagulation- (present on admission)  Assessment & Plan  Patient is on apixaban for history of atrial fibrillation  Holding as platelet count<50, plts are increasing, will repeat cbc in am    Chronic obstructive pulmonary disease with acute exacerbation (HCC)- (present on admission)  Assessment & Plan  Speech following  Weaning oxygen as tolerated, currently 3.5 L  Placed on O2 and RT protocols  Continue inhaled corticosteroid, and LABA         VTE prophylaxis: SCD    I have performed a physical exam and reviewed and updated ROS and Plan today (1/30/2024). In review of yesterday's note (1/29/2024), there are no changes except as documented above.

## 2024-01-30 NOTE — CARE PLAN
Pt disoriented x4.  Pt shows no signs of pain at this time.  Call light within reach.  Pt room close to nurses station.  Bed locked and in the lowest position.  Hourly rounding.  Needs currently met.           The patient is Watcher - Medium risk of patient condition declining or worsening    Shift Goals  Clinical Goals: maintain pt safety and perform neuro checks  Patient Goals: CJ  Family Goals: CJ    Progress made toward(s) clinical / shift goals:    Pt was incident and fall free throughout shift.  Pt was able to answer few questions appropriately during shift.       Problem: Knowledge Deficit - Standard  Goal: Patient and family/care givers will demonstrate understanding of plan of care, disease process/condition, diagnostic tests and medications  Outcome: Progressing

## 2024-01-30 NOTE — CARE PLAN
The patient is Stable - Low risk of patient condition declining or worsening    Shift Goals  Clinical Goals: ensure safety and monitor labs  Patient Goals: chas  Family Goals: chas    Progress made toward(s) clinical / shift goals: Received patient in bed confused and incoherent. The patient was uncooperative and responded only by screaming or moaning. The condom cath was detached, and urine had spread all over the bed sheets. We cleaned the patient and applied barrier cream to his groin area. Strict NPO. Hourly rounds performed. Fall precautions in place. All other needs met at this time.      Patient is not progressing towards the following goals:    Problem: Knowledge Deficit - Standard  Goal: Patient and family/care givers will demonstrate understanding of plan of care, disease process/condition, diagnostic tests and medications  Description: Target End Date:  1-3 days or as soon as patient condition allows    Document in Patient Education  1.  Patient and family/caregiver oriented to unit, equipment, visitation policy and means for communicating concern  2.  Complete/review Learning Assessment  3.  Assess knowledge level of disease process/condition, treatment plan, diagnostic tests and medications  4.  Explain disease process/condition, treatment plan, diagnostic tests and medications  Outcome: Not Progressing

## 2024-01-30 NOTE — CONSULTS
SUMMARY patient remains a full code at this time PC to continue to conduct ongoing goals of care conversation with patient's daughter.      MRN: [id number]  Date of palliative consult: 1/29/24  Reason for consult: Complex ACP/GOC  Referring provider: Jaime  Location of consult: Medical nephrology [facility]  Additional consulting services: Hospital medicine    HPI:   Wale Olivas is a 78 y.o. male past medical history of dementia, COPD, dyslipidemia, hypertension, severe Alzheimer's dementia, type 2 diabetes, chronic anticoagulation/atrial fibrillation who presented on January 28, 2024 from the [facility] where he is a resident secondary to altered mentation.  Per his daughter who last saw him the day prior he would normally recognize her and is oriented to himself, however yesterday he did not recognize her.  On upon arrival to the emergency department patient was hypoxic, tachycardic, however adequate blood pressures.  Workup reveals chest x-ray with left lower lobe infiltrate and urinalysis suspicious for UTI, white blood cell count of 24 with a platelet count of 80, creatinine 1.86 with a baseline of 1.5, and initial lactic acid greater than 4.  Patient was treated with fluids Rocephin and azithromycin.  He was admitted for further workup and treatment.    Significant/pertinent past medical history: Anxiety, BPH, CKD, COVID-19, GERD, insomnia, major depressive disorder.  Non-smoker, no alcohol use or drug use.    Pain History: Unable to elicit  Onset:   Location:   Duration:   Characteristics:   Aggravating factors:   Alleviating factors:   Radiation:   Treatments:   Severity:     Additional symptoms: Per daughter, patient has outbursts which can be quite distressing to both patient and bystanders.  He was placed on medication in June for this.  No other symptoms reported.     Interval History: Patient is alert mumbles and moans but does answer some questions unable to follow commands for strength  testing but moving upper extremities purposefully.  Blood cultures positive for gram-negative rods 2 out of 2, white count improved as well as creatinine.    Medication Allergy/Sensitivities:  Allergies   Allergen Reactions    Metformin Unspecified     Unknown reaction, listed on MAR     Morphine Unspecified     Unknown reaction, listed on MAR     Simvastatin Unspecified     Unknown reaction, listed on MAR     Spironolactone Unspecified     Unknown reaction, listed on MAR        ROS:    Review of Systems   Unable to perform ROS: Dementia       PE:   Recent vital signs  BMI: Body mass index is 27.93 kg/m².    Temp (24hrs), Av.6 °C (97.8 °F), Min:36.2 °C (97.2 °F), Max:36.9 °C (98.4 °F)  Temperature: 36.9 °C (98.4 °F)  Pulse  Av.6  Min: 58  Max: 113   Blood Pressure : (!) 164/79       Physical Exam  Vitals and nursing note reviewed.   Constitutional:       General: He is not in acute distress.     Appearance: He is overweight. He is ill-appearing.      Interventions: Nasal cannula in place.   HENT:      Head: Normocephalic.   Cardiovascular:      Rate and Rhythm: Rhythm irregular.      Heart sounds: Murmur heard.   Musculoskeletal:      Right lower le+ Pitting Edema present.      Left lower le+ Pitting Edema present.   Neurological:      Mental Status: He is alert.   Psychiatric:         Behavior: Behavior is uncooperative.       Recent Labs     24  0815 24  0307 24  0811   SODIUM 131* 139 140   POTASSIUM 4.8 3.8 4.0   CHLORIDE 95* 105 104   CO2 24 24 27   GLUCOSE 210* 118* 148*   BUN 29* 24* 25*   CREATININE 1.86* 1.52* 1.55*   CALCIUM 9.0 8.6 9.2     Recent Labs     24  0815 24  0307   WBC 24.2* 13.0*   RBC 3.90* 3.24*   HEMOGLOBIN 12.3* 10.0*   HEMATOCRIT 34.4* 28.9*   MCV 88.2 89.2   MCH 31.5 30.9   MCHC 35.8 34.6   RDW 48.8 49.5   PLATELETCT 80* 48*   MPV 9.4 8.7*       ASSESSMENT/PLAN WITH SHARED DECISION MAKING:   Review  Pertinent imaging reviewed.    PHYSICAL  ASPECTS OF CARE  Palliative Performance Scale: 30%    # Bacteremia secondary to gram-negative bacteria  # Sepsis  # Chronic inferior vena cava obstruction  # Pneumonia  # Gross hematuria post Ferro placement CT confirms left pyelonephritis /cystitis no other significant findings  # Type 2 diabetes  #Severe Alzheimer's dementia with behaviors  - Recommend scheduling Haldol  -Discussed with primary team  #Hypertension  #Acute on chronic kidney injury  #COPD  #Atrial fibrillation/chronic anticoagulation  #Thrombocytopenia  #Frailty/geriatric syndrome, high risk for falls.  -Ensure patient gets proper sleep at night, denies interruptions and promote daytime wakefulness.  - Minimize sedation  - Reorient frequently    SOCIAL ASPECTS OF CARE  All information is obtained from patient's daughter Jose via telephone call.  Patient was diagnosed with Alzheimer's in 2021.  It progressed quickly.  His wife of 53 years  in 2022 and at that time he [facility].  He has 2 adult daughters Jose and dandre who reside in [place].  Patient was in the [employer], a , and a  prior to retiring.  Jose reports patient is wheelchair-bound.  [facility] and attends activities with a group of men and women in the memory care setting.  He also states that his mood and behaviors depend on the staff during any particular day.  He also states that he spent 7 weeks at [facility] last year for ongoing behaviors including yelling.    Jose also reports that patient was not on a modified diet.  He was a one-to-one feeder because of his behaviors and his choices of food would vary from day to day.  She states that sometimes he has difficulty with eating and sometimes he does not.  She is convinced that he is not aspirating although we did discuss the possibility of silent aspiration to which she conceded could be a possibility.      SPIRITUAL  "ASPECTS OF CARE   Not addressed.     GOALS OF CARE/SERIOUS ILLNESS CONVERSATION  Patient seen lying in bed speech therapy has just finished with an assessment.  Patient is moaning and groaning intermittently and at times is very loud.  Speech is unable to conduct any type of diagnostic study secondary to patient's behaviors at this time.  Patient appears frail and weak.  Audible wheezing noted.    Telephone call placed to Jose, patient's daughter.  Explored Jose's understanding of patient's current medical condition to which she has good understanding.  She states patient has been hospitalized on multiple occasions over the last 6 months.  She reports difficulty with communication with staff and the on staff physician at the [facility].  She speaks of advocating for her father.  In discussing current advance directive obtained from the [facility] administration she states that this document is invalid.  She speaks of a document that a staff member attempted to \"talk my dad into a DNR\".  I have informed her this document does not elucidate a DNR, it actually states patient wants full care/ full treatment. She states she will send me the most updated document and that she is first healthcare agent.  She speaks of attempting to respect her father's wishes regarding decisions that he made when he was cognitively intact including conversations that she and her sister had with him.  In these conversations that she reports that her dad wanted everything done.  We did discuss the difficulty with this given his progressive diagnosis of Alzheimer's with potential complications including dysphagia, ongoing issues with pneumonia etc.  Jose informs me that she understands all of the potential complications.      Jose has many concerns regarding medications and states that part of the reason her dad is having behaviors is that he is not getting his scheduled risperidone, Depakote, Cymbalta.  She states that once he started " these medications it helped his behaviors quite a lot specifically the risperidone.  We discussed alternatives to oral risperidone including intramuscular risperidone and IV Haldol.  She expressed some concerns that Haldol was sedating for her dad.  Jose also expresses concern regarding patient's history of seizures and not receiving his antiseizure medication. I have assured her I will discuss with primary team to possibly initiate scheduled dosing of either medication as appropriate.  Her goal is to respect her dad's wishes and in this regard she agrees that we need to obtain a diagnostic swallowing evaluation prior to any oral feeding or medications.    As such, we explored CODE STATUS.  I expressed concern regarding patient's full CODE STATUS given his progressive Alzheimer's dementia, his age.  We discussed difficulty with resuscitating Wale being him on a ventilator and whether or not this would be of ultimate benefit to him given his underlying comorbid conditions.  Family is not ready to make any decisions at this time due to discuss amongst themselves and with palliative care.    Code Status: Full code    ACP Documents: Current document scanned in today, will verify what his daughter shares most recent updated version.    22 minutes spent discussing advance care planning, this time excludes any other billed services.    I spent a total of 52 minutes reviewing medical records, direct face-to-face time with the patient and/or family, documentation and coordination of care. This is separate from the time spent on advance care planning, which is documented above.    Bia Chapin, MSN, APRN, ACNPC-AG.  Palliative Care Nurse Practitioner  [phone]

## 2024-01-30 NOTE — RESPIRATORY CARE
COPD EDUCATION by COPD CLINICAL EDUCATOR  1/30/2024 at 8:29 AM by Shoshana Guadarrama RRT     Patient reviewed by COPD education team. Patient does not qualify for the COPD program due to severe Alzheimer's dementia making him inappropriate for education. [facility]

## 2024-01-31 PROBLEM — R13.10 DYSPHAGIA: Status: ACTIVE | Noted: 2024-01-31

## 2024-01-31 LAB
GLUCOSE BLD STRIP.AUTO-MCNC: 137 MG/DL (ref 65–99)
GLUCOSE BLD STRIP.AUTO-MCNC: 144 MG/DL (ref 65–99)
GLUCOSE BLD STRIP.AUTO-MCNC: 146 MG/DL (ref 65–99)
GLUCOSE BLD STRIP.AUTO-MCNC: 152 MG/DL (ref 65–99)

## 2024-01-31 PROCEDURE — A9270 NON-COVERED ITEM OR SERVICE: HCPCS | Performed by: HOSPITALIST

## 2024-01-31 PROCEDURE — 700102 HCHG RX REV CODE 250 W/ 637 OVERRIDE(OP)

## 2024-01-31 PROCEDURE — 700102 HCHG RX REV CODE 250 W/ 637 OVERRIDE(OP): Performed by: HOSPITALIST

## 2024-01-31 PROCEDURE — 94640 AIRWAY INHALATION TREATMENT: CPT

## 2024-01-31 PROCEDURE — 99232 SBSQ HOSP IP/OBS MODERATE 35: CPT | Performed by: HOSPITALIST

## 2024-01-31 PROCEDURE — 700105 HCHG RX REV CODE 258: Performed by: HOSPITALIST

## 2024-01-31 PROCEDURE — 700111 HCHG RX REV CODE 636 W/ 250 OVERRIDE (IP): Mod: JZ | Performed by: HOSPITALIST

## 2024-01-31 PROCEDURE — 82962 GLUCOSE BLOOD TEST: CPT | Mod: 91

## 2024-01-31 PROCEDURE — A9270 NON-COVERED ITEM OR SERVICE: HCPCS

## 2024-01-31 PROCEDURE — 97602 WOUND(S) CARE NON-SELECTIVE: CPT

## 2024-01-31 PROCEDURE — 700101 HCHG RX REV CODE 250: Performed by: HOSPITALIST

## 2024-01-31 PROCEDURE — 92526 ORAL FUNCTION THERAPY: CPT

## 2024-01-31 PROCEDURE — 770001 HCHG ROOM/CARE - MED/SURG/GYN PRIV*

## 2024-01-31 PROCEDURE — 94669 MECHANICAL CHEST WALL OSCILL: CPT

## 2024-01-31 RX ORDER — SCOLOPAMINE TRANSDERMAL SYSTEM 1 MG/1
1 PATCH, EXTENDED RELEASE TRANSDERMAL
Status: DISCONTINUED | OUTPATIENT
Start: 2024-01-31 | End: 2024-02-05 | Stop reason: HOSPADM

## 2024-01-31 RX ORDER — DIVALPROEX SODIUM 125 MG/1
500 CAPSULE, COATED PELLETS ORAL 3 TIMES DAILY
Status: DISCONTINUED | OUTPATIENT
Start: 2024-01-31 | End: 2024-01-31

## 2024-01-31 RX ADMIN — CLOTRIMAZOLE AND BETAMETHASONE DIPROPIONATE 1 APPLICATION: 10; .5 CREAM TOPICAL at 18:36

## 2024-01-31 RX ADMIN — IPRATROPIUM BROMIDE AND ALBUTEROL SULFATE 3 ML: 2.5; .5 SOLUTION RESPIRATORY (INHALATION) at 20:16

## 2024-01-31 RX ADMIN — VALPROIC ACID 500 MG: 500 SOLUTION ORAL at 12:00

## 2024-01-31 RX ADMIN — CEFTAZIDIME 2000 MG: 1 INJECTION, POWDER, FOR SOLUTION INTRAMUSCULAR; INTRAVENOUS at 17:13

## 2024-01-31 RX ADMIN — PROPRANOLOL HYDROCHLORIDE 20 MG: 10 TABLET ORAL at 17:13

## 2024-01-31 RX ADMIN — DULOXETINE HYDROCHLORIDE 30 MG: 30 CAPSULE, DELAYED RELEASE ORAL at 17:15

## 2024-01-31 RX ADMIN — RISPERIDONE 1 MG: 1 TABLET, FILM COATED ORAL at 17:16

## 2024-01-31 RX ADMIN — CEFTAZIDIME 2000 MG: 1 INJECTION, POWDER, FOR SOLUTION INTRAMUSCULAR; INTRAVENOUS at 05:16

## 2024-01-31 RX ADMIN — RISPERIDONE 1 MG: 1 TABLET, FILM COATED ORAL at 12:00

## 2024-01-31 RX ADMIN — IPRATROPIUM BROMIDE AND ALBUTEROL SULFATE 3 ML: 2.5; .5 SOLUTION RESPIRATORY (INHALATION) at 08:53

## 2024-01-31 RX ADMIN — SCOPOLAMINE 1 PATCH: 1.5 PATCH, EXTENDED RELEASE TRANSDERMAL at 05:17

## 2024-01-31 RX ADMIN — FLUTICASONE FUROATE, UMECLIDINIUM BROMIDE AND VILANTEROL TRIFENATATE 1 PUFF: 200; 62.5; 25 POWDER RESPIRATORY (INHALATION) at 09:06

## 2024-01-31 RX ADMIN — PRAVASTATIN SODIUM 80 MG: 20 TABLET ORAL at 21:08

## 2024-01-31 RX ADMIN — CLOTRIMAZOLE AND BETAMETHASONE DIPROPIONATE 1 APPLICATION: 10; .5 CREAM TOPICAL at 05:20

## 2024-01-31 RX ADMIN — VALPROIC ACID 500 MG: 500 SOLUTION ORAL at 17:13

## 2024-01-31 RX ADMIN — DOCUSATE SODIUM 50 MG AND SENNOSIDES 8.6 MG 2 TABLET: 8.6; 5 TABLET, FILM COATED ORAL at 17:15

## 2024-01-31 ASSESSMENT — PAIN SCALES - PAIN ASSESSMENT IN ADVANCED DEMENTIA (PAINAD)
TOTALSCORE: 1
NEGVOCALIZATION: OCCASIONAL MOAN/GROAN, LOW SPEECH, NEGATIVE/DISAPPROVING QUALITY
FACIALEXPRESSION: SMILING OR INEXPRESSIVE
BREATHING: NORMAL
NEGVOCALIZATION: OCCASIONAL MOAN/GROAN, LOW SPEECH, NEGATIVE/DISAPPROVING QUALITY
BODYLANGUAGE: RELAXED
TOTALSCORE: 1
BODYLANGUAGE: RELAXED
CONSOLABILITY: NO NEED TO CONSOLE
FACIALEXPRESSION: SMILING OR INEXPRESSIVE
CONSOLABILITY: NO NEED TO CONSOLE
BREATHING: NORMAL

## 2024-01-31 ASSESSMENT — PAIN DESCRIPTION - PAIN TYPE
TYPE: ACUTE PAIN
TYPE: ACUTE PAIN

## 2024-01-31 NOTE — PROGRESS NOTES
Assumed care of pt. No c/o pain at this time. Medicated for elevated BP per MAR, all oral medications held d/t strict NPO status pending speech re-eval. IV fluids infusing. Pt still unable to answer appropriatley to orientation or general questions, will yell when touched/repositioned despite telling pt in advance.   Suctioned intermittently throughout night d/t sputum/coughing. Scoplamine patch administered.   Pt resting in bed w/ call light and belongings in reach, bed alarm on, hourly rounding in place. No additional needs at this time.

## 2024-01-31 NOTE — CARE PLAN
The patient is Stable - Low risk of patient condition declining or worsening    Shift Goals  Clinical Goals: Safety, monitor for agitation, rest, comfort  Patient Goals: CJ  Family Goals: CJ    Progress made toward(s) clinical / shift goals:  Bed alarm in place w/ pt close to nurses station. Pt sleeping intermittently throughout night w/ chest rise/fall noted in no obvious distress or discomfort.    Patient is not progressing towards the following goals: Pt unable to answer questions appropriately, easily agitated when care being administered.     Problem: Knowledge Deficit - Standard  Goal: Patient and family/care givers will demonstrate understanding of plan of care, disease process/condition, diagnostic tests and medications  Outcome: Progressing     Problem: Hemodynamics  Goal: Patient's hemodynamics, fluid balance and neurologic status will be stable or improve  Outcome: Progressing     Problem: Pain - Standard  Goal: Alleviation of pain or a reduction in pain to the patient’s comfort goal  Outcome: Progressing     Problem: Skin Integrity  Goal: Skin integrity is maintained or improved  Outcome: Progressing     Problem: Fall Risk  Goal: Patient will remain free from falls  Outcome: Progressing

## 2024-01-31 NOTE — THERAPY
Speech Language Pathology   Daily Treatment     Patient Name: Wale Olivas  AGE:  78 y.o., SEX:  male  Medical Record #: [id number]  Date of Service: 1/31/2024      Precautions:  Precautions: (P) Swallow Precautions         Subjective  Pt with some words and with intermittent yelling with stimulation.       Assessment  Pt yelling with stimulation and at times verbalizing with intelligible words. Oral care provided. Pt given single ice chips, 3-1/2 tsp amounts of thin water, and 2- 1 tsp amounts of applesauce. One swallow triggered in approx 3-5 seconds. Approx 1-2 minutes following intake, pt with coughing for approx 30 seconds with moderate amount of thick green secretions suctioned orally.     Two phone calls placed to Jose at [phone] before and following tx.Jose is pt's dgtr. Per Jose, baseline is minced food with TNO by straw. Jose noting pt is not at his baseline and voiced concerns regarding pt not receiving Resperidol. Educ provided regarding pt with s/s of dysphagia and possible airway penetration and or aspiration. Pt is not a the level to participate or tolerate a diagnostic swallow eval. Options of continuing NPO versus starting a lower level texture diet per family preference and despite risks for possible aspiration provided.     SLP collaborated with MD and RN regarding phone conversation with Jose. MD to call Jose later today.     Clinical Impressions  NPO currently, ice as tolerated following oral care and as tolerated. No source of nutrition for meds at this time.   ADDENDUM-later in day, MD communicating with SLP that dgtr's preference is for po meds and oral intake despite risks. SLP communicated with MD regarding full liquid TNO and crushed meds in applesauce. MD placed diet order. SLP posted swallow strategies HOB.       Recommendations  Treatment Completed: (P) Dysphagia Treatment       Dysphagia Treatment  Diet Consistency: (P) NPO, no source for meds or  nutrition.  Instrumentation: (P)  (Pt not at the level to tolerate/participate in diagnostic.)  Medication: (P) Non Oral  Positioning: (P) Fully upright and midline during oral intake (for limited ice with swallow strategies posted.)  Oral Care: (P) Q4h                     SLP Treatment Plan  Treatment Plan: (P) Dysphagia Treatment  SLP Frequency: (P) 3x Per Week  Estimated Duration: (P) Until Therapy Goals Met      Anticipated Discharge Needs  Discharge Recommendations: (P)  (depenedent on pt status and POC)         Patient / Family Goals  Patient / Family Goal #1: (P) 1/31 per dgtr-to take premorbid meds.  Goal #1 Outcome: (P) Goal not met  Short Term Goals  Short Term Goal # 1: (P) Pt will consume single ice chips at 3-5 per hour after oral care and as tolerated.  Goal Outcome # 1: (P) Progressing slower than expected  Short Term Goal # 2: (P) Pt will comsume TNO, ice chips and puree with SLP without s/s of difficulty during oral intake.  Goal Outcome # 2 : (P) Goal not met  Short Term Goal # 3: (P) Pt, and his family will verbalize understanding of pt's swallow status following educ with SLP.  Goal Outcome  # 3: (P) Progressing as expected      Latrice Bianchi, SLP

## 2024-01-31 NOTE — ASSESSMENT & PLAN NOTE
Advanced dementia contributing  See above 25 minute discussion with Madhavi and her  regarding status, goals of care, possible hospice at some point, they live in [place] and would be open to volunteer hospice, we discussed anticipated progression of disease, ongoing risk of aspiration, artificial nutrition. She plans to speak with her sister Jose - awaiting follow up

## 2024-01-31 NOTE — DISCHARGE PLANNING
Case Management Discharge Planning    Admission Date: 1/28/2024  GMLOS: 5.1  ALOS: 3    6-Clicks ADL Score: 6  6-Clicks Mobility Score: 6  PT and/or OT Eval ordered: Yes  Post-acute Referrals Ordered: Yes  Post-acute Choice Obtained: Yes  Has referral(s) been sent to post-acute provider:  Yes      Anticipated Discharge Dispo: Discharge Disposition: D/T to SNF with Medicare cert in anticipation of skilled care (03)    DME Needed: No    Action(s) Taken: [facility]W updated by [facility]. [facility] faxing medications they have utilized with the pt as the pt has been to [facility]. LMSW to scan medication list into the chart.    Escalations Completed: None    Medically Clear: No    Next Steps: Pt not cleared for DC.     Barriers to Discharge: Medical clearance    Is the patient up for discharge tomorrow: No

## 2024-01-31 NOTE — CARE PLAN
The patient is Stable - Low risk of patient condition declining or worsening    Shift Goals  Clinical Goals: free from falls or injury, Swallow Eval  Patient Goals: chas  Family Goals: chas    Progress made toward(s) clinical / shift goals:      Problem: Hemodynamics  Goal: Patient's hemodynamics, fluid balance and neurologic status will be stable or improve  Outcome: Progressing     Problem: Urinary - Renal Perfusion  Goal: Ability to achieve and maintain adequate renal perfusion and functioning will improve  Outcome: Progressing     Problem: Pain - Standard  Goal: Alleviation of pain or a reduction in pain to the patient’s comfort goal  Outcome: Progressing     Problem: Fall Risk  Goal: Patient will remain free from falls  Outcome: Progressing       Patient is not progressing towards the following goals:      Problem: Knowledge Deficit - Standard  Goal: Patient and family/care givers will demonstrate understanding of plan of care, disease process/condition, diagnostic tests and medications  Outcome: Not Progressing        Axo1. Unable to make needs known. Staff anticipates needs. SLPdid junie cohen. MD, Family RN made aware of SLP findings. Diet advanced to full liquid. Family is aware of risks. Due medications given as ordered. Q2 turns observed. Glucose monitoring. Placed call light and personal belongings within reach. Needs met at this time.

## 2024-01-31 NOTE — THERAPY
Speech Language Pathology   Clinical Swallow Evaluation     Patient Name: Wale Olivas  AGE:  78 y.o., SEX:  male  Medical Record #: [id number]  Date of Service: 1/30/2024      History of Present Illness   78 y.o. male admitted from SNF of 1/28/2024 with altered mental status    PMHx: COPD, dyslipidemia, primary hypertension, severe Alzheimer's dementia, type 2 diabetes, chronic anticoagulation atrial fibrillation      General Information:  Vitals  O2 Delivery Device: (P) Nasal Cannula     Patient Behaviors: (P) Confused            Prior Living Situation & Level of Function:  Comments: (P) Per notes, from a facility.     Communication: (P) hist of dementia  Swallowing: (P) Required feeding, unsure of texture.       Oral Mechanism Evaluation:  Dentition: (P) Some missing dentition   Facial Symmetry: (P) Pt did not follow commands to assess  Facial Sensation: (P) Pt did not follow commands to assess     Labial Observations: (P) Pt did not follow commands to assess   Lingual Observations: (P) Pt did not follow commands to assess, Xerostomia  Motor Speech: (P) Yelling out, variabile use of single words.            Laryngeal Function:  Secretion Management: (P) Expectoration of secretions           Cough: (P) Perceptually WNL         Subjective  (P) pt did  not state      Assessment  Current Method of Nutrition: (P) NPO until cleared by speech pathology  Positioning: (P) Arcos's (60-90 degrees)  Bolus Administration: (P) SLP    O2 Delivery Device: (P) Nasal Cannula  Factor(s) Affecting Performance: (P) Impaired endurance, Impaired mental status, Impaired command following, Other (see comments) (wheezing and coughing post single ice chips.)             Swallowing Trials:  Swallowing Trials  Ice: (P) Impaired      Comments: Oral suction set up. Pt allowing and tolerating oral care with xerostomia noted. Pt also with moderate amount of thick and light green secretions at posterior oral cavity. Pt with expiratory wheeze  and coughing post single ice chips. Pt did expectorate large amount of thick secretions at end of session with oral suction used. Pt with intermittent yelling out with stimulation. No liquid or food trials r/t pt's current lower level responsiveness and coughing post ice chips. Pt sleeping when not stimulated.       Clinical Impressions  NPO currently.  Swallow strategy sign posted HOB regarding limited single ice chips following oral care to mitigate non-use atrophy and xerostomia. NPO for nutrition and meds. SLP collaborated with MD, Palliative provider, and RN.     Recommendations  Diet Consistency: (P) NPO, no source of nutrition at this time.  Instrumentation: (P)  (Pt not at the level to tolerate/participate in diagnostic.)  Medication: (P) Non Oral           Oral Care: (P) Q4h         SLP Treatment Plan  Treatment Plan: (P) Dysphagia Treatment  SLP Frequency: (P) 3x Per Week  Estimated Duration: (P) Until Therapy Goals Met      Anticipated Discharge Needs  Discharge Recommendations: (P) Recommend post-acute placement for additional speech therapy services prior to discharge home            Patient / Family Goals  Patient / Family Goal #1: (P) pt did not state.  Short Term Goals  Short Term Goal # 1: (P) Pt will consume single ice chips at 3-5 per hour after oral care and as tolerated.  Goal Outcome # 1: (P) Goal not met      Latrice Bianchi, SLP

## 2024-01-31 NOTE — PROGRESS NOTES
[facility] Medicine Daily Progress Note    Date of Service  1/31/2024    Chief Complaint  Wale Olivas is a 78 y.o. male admitted 1/28/2024 with altered mental status    Hospital Course  Wale Olivas is a 78 y.o. male who presented to [facility] on 1/28/2024. He has a history of dementia, COPD, dyslipidemia, primary hypertension, severe Alzheimer's dementia, type 2 diabetes, chronic anticoagulation atrial fibrillation.  He was sent to [facility]  from [facility] with altered mentation and was found to have a UTI and pneumonia    Interval Problem Update  He remains alert, speaking some, following some commands, denies pain, stable on 3L, exam stable, wbc decreasing. I spoke with speech therapy who also spoke with patient's daughter Jose this am. I also spoke with Jose by phone and we discussed the patient's challenging situation and made the decision to proceed with taking meds with small amount of food despite risks of aspiration, in hopes that resuming his psych meds may improve his swallowing, I updated speech therapy and she will provide specific recommendations for nursing. We also discussed the thrombocytopenia, if plts continue to improve will consider restarting anticoagulation tomorrow. ROS otherwise negative    I have discussed this patient's plan of care and discharge plan at IDT rounds today with Case Management, Nursing, Nursing leadership, and other members of the IDT team.    Consultants/Specialty  Palliative care    Code Status  Full Code    Disposition  The patient is not medically cleared for discharge to home or a post-acute facility.      I have placed the appropriate orders for post-discharge needs.    Review of Systems  Review of Systems   Unable to perform ROS: Other        Physical Exam  Temp:  [36.6 °C (97.8 °F)-37.3 °C (99.2 °F)] 37.2 °C (98.9 °F)  Pulse:  [] 91  Resp:  [16-19] 18  BP: (133-187)/(55-95) 183/95  SpO2:  [93 %-100 %] 93 %    Physical Exam  Constitutional:       General: He  is not in acute distress.     Appearance: He is well-developed. He is not diaphoretic.   HENT:      Head: Normocephalic and atraumatic.   Eyes:      Conjunctiva/sclera: Conjunctivae normal.   Neck:      Vascular: No JVD.   Cardiovascular:      Rate and Rhythm: Normal rate.      Heart sounds: Murmur heard.      No gallop.   Pulmonary:      Effort: Pulmonary effort is normal. No respiratory distress.      Breath sounds: No stridor. No wheezing or rales.   Abdominal:      Palpations: Abdomen is soft.      Tenderness: There is no abdominal tenderness. There is no guarding or rebound.   Musculoskeletal:      Right lower leg: No edema.      Left lower leg: No edema.   Skin:     General: Skin is warm and dry.      Findings: No rash.   Neurological:      Mental Status: He is alert.      Comments: Verbalizing some and following some commands           Fluids    Intake/Output Summary (Last 24 hours) at 1/31/2024 1339  Last data filed at 1/30/2024 1700  Gross per 24 hour   Intake 0 ml   Output --   Net 0 ml       Laboratory  Recent Labs     01/29/24  0307 01/30/24  0811   WBC 13.0* 11.8*   RBC 3.24* 3.39*   HEMOGLOBIN 10.0* 10.6*   HEMATOCRIT 28.9* 30.6*   MCV 89.2 90.3   MCH 30.9 31.3   MCHC 34.6 34.6   RDW 49.5 51.8*   PLATELETCT 48* 69*   MPV 8.7* 9.7     Recent Labs     01/29/24  0307 01/30/24  0811   SODIUM 139 140   POTASSIUM 3.8 4.0   CHLORIDE 105 104   CO2 24 27   GLUCOSE 118* 148*   BUN 24* 25*   CREATININE 1.52* 1.55*   CALCIUM 8.6 9.2                   Imaging  CT-CHEST,ABDOMEN,PELVIS W/O   Final Result      1.  Abnormal appearance of left kidney. Left perinephric fat stranding. Mild left hydronephrosis. Mild left urothelial thickening suggested. Findings could be due to pyelonephritis versus recently passed stone.   2.  Thick-walled urinary bladder with surrounding fat stranding suspicious for cystitis.   3.  Bibasilar pulmonary infiltrates and bronchitis.   4.  Moderate compression fracture of the superior L5  vertebral body which is interval new since prior. It appears acute or subacute with fracture line visible.      DX-CHEST-PORTABLE (1 VIEW)   Final Result         Hazy opacity in the left lower lung, concerning for pneumonia.      CT-HEAD W/O   Final Result         1. No acute intracranial abnormality. No evidence of acute intracranial hemorrhage or mass lesion.      2. Unchanged large right parietal encephalomalacia.                    Assessment/Plan  * Sepsis (HCC)- (present on admission)  Assessment & Plan  Present on admission  Source UTI/Pyelo less likely resp  Blood cultures +GNRs 2/2, pseudomonas and proteus  Abx changed to fortaz to reduce c dif risk       Dysphagia  Assessment & Plan  Advanced dementia contributing  Discussed with family and speech therapy, see above, plan to take meds despite risk after discussion with family  Following  Speech continues to follow    Bacteremia due to Gram-negative bacteria  Assessment & Plan  Probable urine source  CT A/P neg for abscess but suggestive of L pyelo and cystitis  fortaz    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  No other significant findings  Monitor for signs of obstruction  Resolved  Apixaban held  Repeat cbc in am        Thrombocytopenia (HCC)  Assessment & Plan  Patient normally runs mid 100s.    No schistocytes on peripheral smear    Likely secondary to sepsis however will need to monitor closely  DC Apixaban as count now<75, plts improving  Will repeat cbc in am        Type 2 diabetes mellitus with hyperglycemia, with long-term current use of insulin (HCC)- (present on admission)  Assessment & Plan  sliding scale    Severe early onset  Alzheimer's dementia (HCC)- (present on admission)  Assessment & Plan  Baseline oriented x 1    Primary hypertension- (present on admission)  Assessment & Plan  Maintained on amlodipine 5 mg daily and propranolol 20 mg every 12 hours   continue with parameters      JOSE ENRIQUE (acute kidney injury) (HCC)- (present on admission)  Assessment & Plan  Baseline creat runs around 1.3  In setting of sepsis  Likely ATN related to this.  Improved, following intermittently  Follow daily BMP and urine output  Ferro is in place  Renally dose medications as appropriate    Chronic anticoagulation- (present on admission)  Assessment & Plan  Patient is on apixaban for history of atrial fibrillation  Holding as platelet count<75, plts are increasing,  Will repeat cbc in am    Chronic obstructive pulmonary disease with acute exacerbation (HCC)- (present on admission)  Assessment & Plan  Speech following  Weaning oxygen as tolerated, oxygen requirements down to 3L,continue to titrate as tolerated  Placed on O2 and RT protocols  Continue inhaled corticosteroid, and LABA         VTE prophylaxis: SCD    I have performed a physical exam and reviewed and updated ROS and Plan today (1/31/2024). In review of yesterday's note (1/30/2024), there are no changes except as documented above.

## 2024-01-31 NOTE — DOCUMENTATION QUERY
[facility]                                                                       Query Response Note      PATIENT:               FRENCH TUBBS  ACCT #:                  [id number]  MRN:                     [id number]  :                      1945  ADMIT DATE:       2024 8:02 AM  DISCH DATE:          RESPONDING  PROVIDER #:        [id number]           QUERY TEXT:    Lactic acid 4.4 is noted in the  Lab Results.  Can a diagnosis be specified to support this finding?    The patient's clinical indicators include:   Lactic acid: 4.4, 4.0, 2.8, 1.6  H&P:  Initial lactic acid > 4  Risk Factors: sepsis  Treatment: serial lactic acid, IVF    Thank you,  Paul Arauz RN, BSN, CCDS  Clinical   Connect via Voalte Messenger  Options provided:   -- Lactic acidosis   -- Findings of no clinical significance   -- Other explanation, (please specify the other explanation)   -- Unable to determine      Query created by: Paul Arauz on 2024 11:58 AM    RESPONSE TEXT:    Lactic acidosis          Electronically signed by:  BRITT LEDEZMA MD 2024 2:56 PM

## 2024-02-01 LAB
ANION GAP SERPL CALC-SCNC: 11 MMOL/L (ref 7–16)
BASOPHILS # BLD AUTO: 0.8 % (ref 0–1.8)
BASOPHILS # BLD: 0.06 K/UL (ref 0–0.12)
BUN SERPL-MCNC: 26 MG/DL (ref 8–22)
CALCIUM SERPL-MCNC: 8.8 MG/DL (ref 8.5–10.5)
CHLORIDE SERPL-SCNC: 105 MMOL/L (ref 96–112)
CO2 SERPL-SCNC: 26 MMOL/L (ref 20–33)
CREAT SERPL-MCNC: 1.34 MG/DL (ref 0.5–1.4)
EOSINOPHIL # BLD AUTO: 0.5 K/UL (ref 0–0.51)
EOSINOPHIL NFR BLD: 6.8 % (ref 0–6.9)
ERYTHROCYTE [DISTWIDTH] IN BLOOD BY AUTOMATED COUNT: 49.6 FL (ref 35.9–50)
GFR SERPLBLD CREATININE-BSD FMLA CKD-EPI: 55 ML/MIN/1.73 M 2
GLUCOSE BLD STRIP.AUTO-MCNC: 122 MG/DL (ref 65–99)
GLUCOSE BLD STRIP.AUTO-MCNC: 140 MG/DL (ref 65–99)
GLUCOSE BLD STRIP.AUTO-MCNC: 159 MG/DL (ref 65–99)
GLUCOSE BLD STRIP.AUTO-MCNC: 167 MG/DL (ref 65–99)
GLUCOSE SERPL-MCNC: 151 MG/DL (ref 65–99)
HCT VFR BLD AUTO: 28.5 % (ref 42–52)
HGB BLD-MCNC: 9.6 G/DL (ref 14–18)
IMM GRANULOCYTES # BLD AUTO: 0.13 K/UL (ref 0–0.11)
IMM GRANULOCYTES NFR BLD AUTO: 1.8 % (ref 0–0.9)
LYMPHOCYTES # BLD AUTO: 2.2 K/UL (ref 1–4.8)
LYMPHOCYTES NFR BLD: 29.9 % (ref 22–41)
MCH RBC QN AUTO: 30.7 PG (ref 27–33)
MCHC RBC AUTO-ENTMCNC: 33.7 G/DL (ref 32.3–36.5)
MCV RBC AUTO: 91.1 FL (ref 81.4–97.8)
MONOCYTES # BLD AUTO: 0.88 K/UL (ref 0–0.85)
MONOCYTES NFR BLD AUTO: 12 % (ref 0–13.4)
NEUTROPHILS # BLD AUTO: 3.58 K/UL (ref 1.82–7.42)
NEUTROPHILS NFR BLD: 48.7 % (ref 44–72)
NRBC # BLD AUTO: 0 K/UL
NRBC BLD-RTO: 0 /100 WBC (ref 0–0.2)
PLATELET # BLD AUTO: 98 K/UL (ref 164–446)
PLATELETS.RETICULATED NFR BLD AUTO: 1.6 % (ref 0.6–13.1)
PMV BLD AUTO: 8.8 FL (ref 9–12.9)
POTASSIUM SERPL-SCNC: 3.7 MMOL/L (ref 3.6–5.5)
RBC # BLD AUTO: 3.13 M/UL (ref 4.7–6.1)
SODIUM SERPL-SCNC: 142 MMOL/L (ref 135–145)
WBC # BLD AUTO: 7.4 K/UL (ref 4.8–10.8)

## 2024-02-01 PROCEDURE — 700111 HCHG RX REV CODE 636 W/ 250 OVERRIDE (IP): Mod: JZ | Performed by: HOSPITALIST

## 2024-02-01 PROCEDURE — 700105 HCHG RX REV CODE 258: Performed by: HOSPITALIST

## 2024-02-01 PROCEDURE — 80048 BASIC METABOLIC PNL TOTAL CA: CPT

## 2024-02-01 PROCEDURE — 700102 HCHG RX REV CODE 250 W/ 637 OVERRIDE(OP): Performed by: HOSPITALIST

## 2024-02-01 PROCEDURE — 94640 AIRWAY INHALATION TREATMENT: CPT

## 2024-02-01 PROCEDURE — 94669 MECHANICAL CHEST WALL OSCILL: CPT

## 2024-02-01 PROCEDURE — A9270 NON-COVERED ITEM OR SERVICE: HCPCS | Performed by: HOSPITALIST

## 2024-02-01 PROCEDURE — 36415 COLL VENOUS BLD VENIPUNCTURE: CPT

## 2024-02-01 PROCEDURE — 82962 GLUCOSE BLOOD TEST: CPT

## 2024-02-01 PROCEDURE — 85055 RETICULATED PLATELET ASSAY: CPT

## 2024-02-01 PROCEDURE — 85025 COMPLETE CBC W/AUTO DIFF WBC: CPT

## 2024-02-01 PROCEDURE — 700101 HCHG RX REV CODE 250: Performed by: HOSPITALIST

## 2024-02-01 PROCEDURE — 770001 HCHG ROOM/CARE - MED/SURG/GYN PRIV*

## 2024-02-01 PROCEDURE — 92526 ORAL FUNCTION THERAPY: CPT

## 2024-02-01 PROCEDURE — 99232 SBSQ HOSP IP/OBS MODERATE 35: CPT | Performed by: HOSPITALIST

## 2024-02-01 RX ADMIN — CEFTAZIDIME 2000 MG: 1 INJECTION, POWDER, FOR SOLUTION INTRAMUSCULAR; INTRAVENOUS at 05:44

## 2024-02-01 RX ADMIN — CLOTRIMAZOLE AND BETAMETHASONE DIPROPIONATE 1 APPLICATION: 10; .5 CREAM TOPICAL at 05:37

## 2024-02-01 RX ADMIN — IPRATROPIUM BROMIDE AND ALBUTEROL SULFATE 3 ML: 2.5; .5 SOLUTION RESPIRATORY (INHALATION) at 07:41

## 2024-02-01 RX ADMIN — VALPROIC ACID 500 MG: 500 SOLUTION ORAL at 11:50

## 2024-02-01 RX ADMIN — IPRATROPIUM BROMIDE AND ALBUTEROL SULFATE 3 ML: 2.5; .5 SOLUTION RESPIRATORY (INHALATION) at 18:40

## 2024-02-01 RX ADMIN — DOCUSATE SODIUM 50 MG AND SENNOSIDES 8.6 MG 2 TABLET: 8.6; 5 TABLET, FILM COATED ORAL at 17:50

## 2024-02-01 RX ADMIN — INSULIN HUMAN 2 UNITS: 100 INJECTION, SOLUTION PARENTERAL at 11:44

## 2024-02-01 RX ADMIN — DOCUSATE SODIUM 50 MG AND SENNOSIDES 8.6 MG 2 TABLET: 8.6; 5 TABLET, FILM COATED ORAL at 05:35

## 2024-02-01 RX ADMIN — PROPRANOLOL HYDROCHLORIDE 20 MG: 10 TABLET ORAL at 05:35

## 2024-02-01 RX ADMIN — CEFTAZIDIME 2000 MG: 1 INJECTION, POWDER, FOR SOLUTION INTRAMUSCULAR; INTRAVENOUS at 17:44

## 2024-02-01 RX ADMIN — RISPERIDONE 1 MG: 1 TABLET, FILM COATED ORAL at 11:50

## 2024-02-01 RX ADMIN — DULOXETINE HYDROCHLORIDE 30 MG: 30 CAPSULE, DELAYED RELEASE ORAL at 17:50

## 2024-02-01 RX ADMIN — RISPERIDONE 1 MG: 1 TABLET, FILM COATED ORAL at 17:50

## 2024-02-01 RX ADMIN — AMLODIPINE BESYLATE 5 MG: 5 TABLET ORAL at 05:34

## 2024-02-01 RX ADMIN — VALPROIC ACID 500 MG: 500 SOLUTION ORAL at 05:51

## 2024-02-01 RX ADMIN — RISPERIDONE 1 MG: 1 TABLET, FILM COATED ORAL at 05:34

## 2024-02-01 RX ADMIN — APIXABAN 2.5 MG: 2.5 TABLET, FILM COATED ORAL at 17:50

## 2024-02-01 RX ADMIN — FINASTERIDE 5 MG: 5 TABLET, FILM COATED ORAL at 05:34

## 2024-02-01 RX ADMIN — SODIUM CHLORIDE, POTASSIUM CHLORIDE, SODIUM LACTATE AND CALCIUM CHLORIDE: 600; 310; 30; 20 INJECTION, SOLUTION INTRAVENOUS at 07:55

## 2024-02-01 RX ADMIN — VALPROIC ACID 500 MG: 500 SOLUTION ORAL at 18:02

## 2024-02-01 RX ADMIN — OMEPRAZOLE 40 MG: 20 CAPSULE, DELAYED RELEASE ORAL at 05:35

## 2024-02-01 RX ADMIN — PROPRANOLOL HYDROCHLORIDE 20 MG: 10 TABLET ORAL at 17:50

## 2024-02-01 RX ADMIN — FLUTICASONE FUROATE, UMECLIDINIUM BROMIDE AND VILANTEROL TRIFENATATE 1 PUFF: 200; 62.5; 25 POWDER RESPIRATORY (INHALATION) at 07:42

## 2024-02-01 RX ADMIN — INSULIN HUMAN 2 UNITS: 100 INJECTION, SOLUTION PARENTERAL at 20:18

## 2024-02-01 RX ADMIN — PRAVASTATIN SODIUM 80 MG: 20 TABLET ORAL at 20:11

## 2024-02-01 RX ADMIN — CLOTRIMAZOLE AND BETAMETHASONE DIPROPIONATE 1 APPLICATION: 10; .5 CREAM TOPICAL at 17:50

## 2024-02-01 RX ADMIN — TAMSULOSIN HYDROCHLORIDE 0.4 MG: 0.4 CAPSULE ORAL at 05:34

## 2024-02-01 ASSESSMENT — PAIN SCALES - PAIN ASSESSMENT IN ADVANCED DEMENTIA (PAINAD)
BODYLANGUAGE: RELAXED
CONSOLABILITY: NO NEED TO CONSOLE
FACIALEXPRESSION: SMILING OR INEXPRESSIVE
NEGVOCALIZATION: OCCASIONAL MOAN/GROAN, LOW SPEECH, NEGATIVE/DISAPPROVING QUALITY
TOTALSCORE: 1
BREATHING: NORMAL

## 2024-02-01 ASSESSMENT — PAIN DESCRIPTION - PAIN TYPE: TYPE: ACUTE PAIN

## 2024-02-01 NOTE — WOUND TEAM
[facility]  Inpatient Services  Wound and Skin Care Brief Evaluation    Admission Date: 1/28/2024     Last order of IP CONSULT TO WOUND CARE was found on 1/30/2024 from Hospital Encounter on 1/28/2024     HPI, PMH, SH: Reviewed    Chief Complaint   Patient presents with    ALOC     Diagnosis: Sepsis (HCC) [A41.9]    Unit where seen by Wound Team: S600/00     Wound consult placed regarding Penis, Sacrum, LLE. Chart and images reviewed. This discussed with bedside RN Oscar. This clinician in to assess patient. Patient pleasant and agreeable. . Non-selectively debrided with Moist warm washcloth.                   No pressure injuries or advanced wound care needs identified. Wound consult completed. No further follow up unless indicated and consulted.          PREVENTATIVE INTERVENTIONS:    Q shift Hector - performed per nursing policy  Q shift pressure point assessments - performed per nursing policy    Surface/Positioning  Standard/trauma mattress - Currently in Place  Reposition q 2 hours - Applied this Visit  TAPs Turning system - Ordered    Offloading/Redistribution  Heel offloading dressing (Silicone dressing) - Ordered      Respiratory  Silicone O2 tubing - Currently in Place  Gray Foam Ear protectors - Currently in Place    Mobilization      Unable to assess

## 2024-02-01 NOTE — THERAPY
Speech Language Pathology   Daily Treatment     Patient Name: Wale Olivas  AGE:  78 y.o., SEX:  male  Medical Record #: [id number]  Date of Service: 2/1/2024      Precautions:  Precautions: Swallow Precautions    Subjective  Patient pleasant upon arrival; agreeable to SLP svcs for swallowing dysfunction treatment. However, patient became agitated upon introduction of straw to his lower lip; demonstrating orally aversive behavior throughout session.    Assessment  Patient seen at the bedside this session. SLP administered limited trials of ice chips and 0-Thin liquids by straw this session. Patient unreliable in reporting globus sensation following trials; marked cough responses to trials of 0-Thin liquids by straw, however, patient refused to reposition in bed to better position himself from PO intake. Discussed current diet and POC for SLP svcs, including advancement of diet given improved participation in tx. Patient reports he is not hungry and requested clinician to leave the room.    Clinical Impressions  Patient diet changed by RN 1/31 to fully liquidized diet. Patient unreliably reporting sx following trials of 3- Liquidized textures this session; unclear whether globus sensation present following any trials. Noted cough responses with 0-Thin liquids by straw. SLP to continue to follow; considerations for instrumental assessment of swallow as tolerated and appropriate given pt's hx of dysphagia and baseline diet of 5- Minced & Moist textures.    Recommendations  Treatment Completed: Dysphagia Treatment  Dysphagia Treatment  Diet Consistency: Full liquid  Medication: Non Oral  Positioning: Fully upright and midline during oral intake (for limited ice with swallow strategies posted.)  Oral Care: Q4h    SLP Treatment Plan  Treatment Plan: Dysphagia Treatment  SLP Frequency: 3x Per Week  Estimated Duration: Until Therapy Goals Met    Anticipated Discharge Needs  Discharge Recommendations: Recommend  post-acute placement for additional speech therapy services prior to discharge home (depenedent on pt status and POC)    Patient / Family Goals  Patient / Family Goal #1: 1/31 per dgtr-to take premorbid meds.  Goal #1 Outcome: Goal not met  Short Term Goals  Short Term Goal # 1: Pt will consume single ice chips at 3-5 per hour after oral care and as tolerated.  Goal Outcome # 1: Progressing slower than expected  Short Term Goal # 2: Pt will comsume TNO, ice chips and puree with SLP without s/s of difficulty during oral intake.  Goal Outcome # 2 : Progressing slower than expected  Short Term Goal # 3: Pt, and his family will verbalize understanding of pt's swallow status following educ with SLP.  Goal Outcome  # 3: Progressing slower than expected    DOMINICK Deluca

## 2024-02-01 NOTE — PROGRESS NOTES
Received report and assumed care at shift change. A&O 1, alert only to self, slow to respond with slurred speech, continue to yell out at times, Cooperative with cares. Fall precautions in place, bed locked and in lowest position. Needs attended to.

## 2024-02-01 NOTE — PROGRESS NOTES
[facility] Medicine Daily Progress Note    Date of Service  2/1/2024    Chief Complaint  Wale Olivas is a 78 y.o. male admitted 1/28/2024 with altered mental status    Hospital Course  Wale Olivas is a 78 y.o. male who presented to [facility] on 1/28/2024. He has a history of dementia, COPD, dyslipidemia, primary hypertension, severe Alzheimer's dementia, type 2 diabetes, chronic anticoagulation atrial fibrillation.  He was sent to [facility]  from [facility] with altered mentation and was found to have a UTI and pneumonia    Interval Problem Update  No obvious aspiration today, behavior and mentation improved, seems to be tolerating his medications, exam and vitals stable, remains on 3L, repeat blood cultures so far negative. Continue to follow closely    I have discussed this patient's plan of care and discharge plan at IDT rounds today with Case Management, Nursing, Nursing leadership, and other members of the IDT team.    Consultants/Specialty  Palliative care    Code Status  Full Code    Disposition  The patient is not medically cleared for discharge to home or a post-acute facility.      I have placed the appropriate orders for post-discharge needs.    Review of Systems  Review of Systems   Unable to perform ROS: Other        Physical Exam  Temp:  [36.4 °C (97.6 °F)-37.6 °C (99.6 °F)] 36.4 °C (97.6 °F)  Pulse:  [54-85] 54  Resp:  [16-19] 16  BP: (133-165)/(59-80) 158/79  SpO2:  [92 %-98 %] 98 %    Physical Exam  Constitutional:       General: He is not in acute distress.     Appearance: He is well-developed. He is not diaphoretic.   HENT:      Head: Normocephalic and atraumatic.   Eyes:      Conjunctiva/sclera: Conjunctivae normal.   Neck:      Vascular: No JVD.   Cardiovascular:      Rate and Rhythm: Normal rate.      Heart sounds: Murmur heard.      No gallop.   Pulmonary:      Effort: Pulmonary effort is normal. No respiratory distress.      Breath sounds: No stridor. No wheezing or rales.   Abdominal:       Palpations: Abdomen is soft.      Tenderness: There is no abdominal tenderness. There is no guarding or rebound.   Musculoskeletal:      Right lower leg: No edema.      Left lower leg: No edema.   Skin:     General: Skin is warm and dry.      Findings: No rash.   Neurological:      Mental Status: He is alert.      Comments: Increasingly verbal and appropriate, following simple commands well           Fluids    Intake/Output Summary (Last 24 hours) at 2/1/2024 1353  Last data filed at 2/1/2024 1043  Gross per 24 hour   Intake 120 ml   Output 0 ml   Net 120 ml       Laboratory  Recent Labs     01/30/24  0811 02/01/24  0832   WBC 11.8* 7.4   RBC 3.39* 3.13*   HEMOGLOBIN 10.6* 9.6*   HEMATOCRIT 30.6* 28.5*   MCV 90.3 91.1   MCH 31.3 30.7   MCHC 34.6 33.7   RDW 51.8* 49.6   PLATELETCT 69* 98*   MPV 9.7 8.8*     Recent Labs     01/30/24  0811 02/01/24  0832   SODIUM 140 142   POTASSIUM 4.0 3.7   CHLORIDE 104 105   CO2 27 26   GLUCOSE 148* 151*   BUN 25* 26*   CREATININE 1.55* 1.34   CALCIUM 9.2 8.8                   Imaging  CT-CHEST,ABDOMEN,PELVIS W/O   Final Result      1.  Abnormal appearance of left kidney. Left perinephric fat stranding. Mild left hydronephrosis. Mild left urothelial thickening suggested. Findings could be due to pyelonephritis versus recently passed stone.   2.  Thick-walled urinary bladder with surrounding fat stranding suspicious for cystitis.   3.  Bibasilar pulmonary infiltrates and bronchitis.   4.  Moderate compression fracture of the superior L5 vertebral body which is interval new since prior. It appears acute or subacute with fracture line visible.      DX-CHEST-PORTABLE (1 VIEW)   Final Result         Hazy opacity in the left lower lung, concerning for pneumonia.      CT-HEAD W/O   Final Result         1. No acute intracranial abnormality. No evidence of acute intracranial hemorrhage or mass lesion.      2. Unchanged large right parietal encephalomalacia.                     Assessment/Plan  * Sepsis (HCC)- (present on admission)  Assessment & Plan  Present on admission  Source UTI/Pyelo less likely resp  Blood cultures +GNRs 2/2, pseudomonas and proteus, repeat cultures so far negative  Abx changed to fortaz to reduce c dif risk       Dysphagia  Assessment & Plan  Advanced dementia contributing  Discussed with family and speech therapy, see above, feeding and meds despite aspiration risk after discussion with family  Following  Speech continues to follow    Bacteremia due to Gram-negative bacteria  Assessment & Plan  Probable urine source  CT A/P neg for abscess but suggestive of L pyelo and cystitis  fortaz    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  No other significant findings  Monitor for signs of obstruction  Resolved  Apixaban held  Repeat cbc in am        Thrombocytopenia (HCC)  Assessment & Plan  Patient normally runs mid 100s.    No schistocytes on peripheral smear  Acute on chronic, likely due to acute infection, now back at baseline- will resume apixaban  Repeat cbc in am        Type 2 diabetes mellitus with hyperglycemia, with long-term current use of insulin ([facility])- (present on admission)  Assessment & Plan  sliding scale    Severe early onset Alzheimer's dementia ([facility])- (present on admission)  Assessment & Plan  Baseline oriented x 1    Primary hypertension- (present on admission)  Assessment & Plan  Maintained on amlodipine 5 mg daily and propranolol 20 mg every 12 hours   continue with parameters  following      JOSE ENRIQUE (acute kidney injury) ([facility])- (present on admission)  Assessment & Plan  Baseline creat runs around 1.3  In setting of sepsis  Likely ATN  related to this.  Improved, following intermittently  Follow daily BMP and urine output  Ferro is in place  Renally dose medications as appropriate    Chronic anticoagulation- (present on admission)  Assessment & Plan  Patient is on apixaban for history of atrial fibrillation  Plts now back to baseline, will resume apixaban  Following  Repeat cbc in am    Chronic obstructive pulmonary disease with acute exacerbation (HCC)- (present on admission)  Assessment & Plan  Speech following  Weaning oxygen as tolerated, oxygen requirements down to 3L,continue to titrate as tolerated  Continue O2 and RT protocols  Continue inhaled corticosteroid, and LABA  Meds and some food despite aspiration risks, see note from 1/31/24         VTE prophylaxis: SCD    I have performed a physical exam and reviewed and updated ROS and Plan today (2/1/2024). In review of yesterday's note (1/31/2024), there are no changes except as documented above.

## 2024-02-01 NOTE — CARE PLAN
Problem: Skin Integrity  Goal: Skin integrity is maintained or improved  Outcome: Progressing    Problem: Fall Risk  Goal: Patient will remain free from falls  Outcome: Progressing      The patient is Stable - Low risk of patient condition declining or worsening    Shift Goals  Clinical Goals: patient will remain free from falls  Patient Goals: chas  Family Goals: chas    Progress made toward(s) clinical / shift goals:  fall precautions in place, patient in a desk bed room.     Patient is not progressing towards the following goals:

## 2024-02-01 NOTE — CARE PLAN
Problem: Hyperinflation  Goal: Prevent or improve atelectasis  Description: Target End Date:  3 to 4 days    1. Instruct incentive spirometry usage  2.  Perform hyperinflation therapy as indicated  Outcome: Progressing        Respiratory Update  Treatment Modality: PEP  Frequency: QID    Pt tolerating current treatments well with no adverse reactions, will continue to monitor

## 2024-02-02 LAB
BASOPHILS # BLD AUTO: 0.8 % (ref 0–1.8)
BASOPHILS # BLD: 0.06 K/UL (ref 0–0.12)
EOSINOPHIL # BLD AUTO: 0.83 K/UL (ref 0–0.51)
EOSINOPHIL NFR BLD: 11 % (ref 0–6.9)
ERYTHROCYTE [DISTWIDTH] IN BLOOD BY AUTOMATED COUNT: 49.5 FL (ref 35.9–50)
GLUCOSE BLD STRIP.AUTO-MCNC: 126 MG/DL (ref 65–99)
GLUCOSE BLD STRIP.AUTO-MCNC: 144 MG/DL (ref 65–99)
GLUCOSE BLD STRIP.AUTO-MCNC: 190 MG/DL (ref 65–99)
GLUCOSE BLD STRIP.AUTO-MCNC: 219 MG/DL (ref 65–99)
HCT VFR BLD AUTO: 31.3 % (ref 42–52)
HGB BLD-MCNC: 10.5 G/DL (ref 14–18)
IMM GRANULOCYTES # BLD AUTO: 0.15 K/UL (ref 0–0.11)
IMM GRANULOCYTES NFR BLD AUTO: 2 % (ref 0–0.9)
LYMPHOCYTES # BLD AUTO: 1.98 K/UL (ref 1–4.8)
LYMPHOCYTES NFR BLD: 26.3 % (ref 22–41)
MCH RBC QN AUTO: 30.7 PG (ref 27–33)
MCHC RBC AUTO-ENTMCNC: 33.5 G/DL (ref 32.3–36.5)
MCV RBC AUTO: 91.5 FL (ref 81.4–97.8)
MONOCYTES # BLD AUTO: 0.88 K/UL (ref 0–0.85)
MONOCYTES NFR BLD AUTO: 11.7 % (ref 0–13.4)
NEUTROPHILS # BLD AUTO: 3.62 K/UL (ref 1.82–7.42)
NEUTROPHILS NFR BLD: 48.2 % (ref 44–72)
NRBC # BLD AUTO: 0 K/UL
NRBC BLD-RTO: 0 /100 WBC (ref 0–0.2)
PLATELET # BLD AUTO: 101 K/UL (ref 164–446)
PMV BLD AUTO: 9.2 FL (ref 9–12.9)
RBC # BLD AUTO: 3.42 M/UL (ref 4.7–6.1)
WBC # BLD AUTO: 7.5 K/UL (ref 4.8–10.8)

## 2024-02-02 PROCEDURE — 82962 GLUCOSE BLOOD TEST: CPT

## 2024-02-02 PROCEDURE — 700105 HCHG RX REV CODE 258: Performed by: HOSPITALIST

## 2024-02-02 PROCEDURE — 85025 COMPLETE CBC W/AUTO DIFF WBC: CPT

## 2024-02-02 PROCEDURE — 700111 HCHG RX REV CODE 636 W/ 250 OVERRIDE (IP): Mod: JZ | Performed by: HOSPITALIST

## 2024-02-02 PROCEDURE — A9270 NON-COVERED ITEM OR SERVICE: HCPCS | Performed by: HOSPITALIST

## 2024-02-02 PROCEDURE — 700101 HCHG RX REV CODE 250: Performed by: HOSPITALIST

## 2024-02-02 PROCEDURE — 94640 AIRWAY INHALATION TREATMENT: CPT

## 2024-02-02 PROCEDURE — 700102 HCHG RX REV CODE 250 W/ 637 OVERRIDE(OP): Performed by: HOSPITALIST

## 2024-02-02 PROCEDURE — 99232 SBSQ HOSP IP/OBS MODERATE 35: CPT | Mod: 25 | Performed by: HOSPITALIST

## 2024-02-02 PROCEDURE — 92526 ORAL FUNCTION THERAPY: CPT

## 2024-02-02 PROCEDURE — 99497 ADVNCD CARE PLAN 30 MIN: CPT | Performed by: HOSPITALIST

## 2024-02-02 PROCEDURE — 94669 MECHANICAL CHEST WALL OSCILL: CPT

## 2024-02-02 PROCEDURE — 36415 COLL VENOUS BLD VENIPUNCTURE: CPT

## 2024-02-02 PROCEDURE — 770001 HCHG ROOM/CARE - MED/SURG/GYN PRIV*

## 2024-02-02 PROCEDURE — 94760 N-INVAS EAR/PLS OXIMETRY 1: CPT

## 2024-02-02 RX ADMIN — RISPERIDONE 1 MG: 1 TABLET, FILM COATED ORAL at 17:53

## 2024-02-02 RX ADMIN — FINASTERIDE 5 MG: 5 TABLET, FILM COATED ORAL at 05:04

## 2024-02-02 RX ADMIN — IPRATROPIUM BROMIDE AND ALBUTEROL SULFATE 3 ML: 2.5; .5 SOLUTION RESPIRATORY (INHALATION) at 19:35

## 2024-02-02 RX ADMIN — APIXABAN 2.5 MG: 2.5 TABLET, FILM COATED ORAL at 05:04

## 2024-02-02 RX ADMIN — CEFTAZIDIME 2000 MG: 1 INJECTION, POWDER, FOR SOLUTION INTRAMUSCULAR; INTRAVENOUS at 14:51

## 2024-02-02 RX ADMIN — CEFTAZIDIME 2000 MG: 1 INJECTION, POWDER, FOR SOLUTION INTRAMUSCULAR; INTRAVENOUS at 05:10

## 2024-02-02 RX ADMIN — AMLODIPINE BESYLATE 5 MG: 5 TABLET ORAL at 05:03

## 2024-02-02 RX ADMIN — DOCUSATE SODIUM 50 MG AND SENNOSIDES 8.6 MG 2 TABLET: 8.6; 5 TABLET, FILM COATED ORAL at 17:53

## 2024-02-02 RX ADMIN — VALPROIC ACID 500 MG: 500 SOLUTION ORAL at 12:15

## 2024-02-02 RX ADMIN — PROPRANOLOL HYDROCHLORIDE 20 MG: 10 TABLET ORAL at 05:03

## 2024-02-02 RX ADMIN — DULOXETINE HYDROCHLORIDE 30 MG: 30 CAPSULE, DELAYED RELEASE ORAL at 17:53

## 2024-02-02 RX ADMIN — INSULIN HUMAN 2 UNITS: 100 INJECTION, SOLUTION PARENTERAL at 20:12

## 2024-02-02 RX ADMIN — FLUTICASONE FUROATE, UMECLIDINIUM BROMIDE AND VILANTEROL TRIFENATATE 1 PUFF: 200; 62.5; 25 POWDER RESPIRATORY (INHALATION) at 07:05

## 2024-02-02 RX ADMIN — PRAVASTATIN SODIUM 80 MG: 20 TABLET ORAL at 20:10

## 2024-02-02 RX ADMIN — APIXABAN 5 MG: 5 TABLET, FILM COATED ORAL at 17:53

## 2024-02-02 RX ADMIN — VALPROIC ACID 500 MG: 500 SOLUTION ORAL at 17:53

## 2024-02-02 RX ADMIN — CLOTRIMAZOLE AND BETAMETHASONE DIPROPIONATE 1 APPLICATION: 10; .5 CREAM TOPICAL at 17:54

## 2024-02-02 RX ADMIN — DOCUSATE SODIUM 50 MG AND SENNOSIDES 8.6 MG 2 TABLET: 8.6; 5 TABLET, FILM COATED ORAL at 05:03

## 2024-02-02 RX ADMIN — RISPERIDONE 1 MG: 1 TABLET, FILM COATED ORAL at 12:15

## 2024-02-02 RX ADMIN — CEFTAZIDIME 2000 MG: 1 INJECTION, POWDER, FOR SOLUTION INTRAMUSCULAR; INTRAVENOUS at 21:05

## 2024-02-02 RX ADMIN — RISPERIDONE 1 MG: 1 TABLET, FILM COATED ORAL at 05:03

## 2024-02-02 RX ADMIN — VALPROIC ACID 500 MG: 500 SOLUTION ORAL at 05:04

## 2024-02-02 RX ADMIN — IPRATROPIUM BROMIDE AND ALBUTEROL SULFATE 3 ML: 2.5; .5 SOLUTION RESPIRATORY (INHALATION) at 07:01

## 2024-02-02 RX ADMIN — PROPRANOLOL HYDROCHLORIDE 20 MG: 10 TABLET ORAL at 17:53

## 2024-02-02 RX ADMIN — OMEPRAZOLE 40 MG: 20 CAPSULE, DELAYED RELEASE ORAL at 05:04

## 2024-02-02 RX ADMIN — INSULIN HUMAN 3 UNITS: 100 INJECTION, SOLUTION PARENTERAL at 17:51

## 2024-02-02 RX ADMIN — TAMSULOSIN HYDROCHLORIDE 0.4 MG: 0.4 CAPSULE ORAL at 05:04

## 2024-02-02 RX ADMIN — CLOTRIMAZOLE AND BETAMETHASONE DIPROPIONATE 1 APPLICATION: 10; .5 CREAM TOPICAL at 05:05

## 2024-02-02 RX ADMIN — SODIUM CHLORIDE, POTASSIUM CHLORIDE, SODIUM LACTATE AND CALCIUM CHLORIDE: 600; 310; 30; 20 INJECTION, SOLUTION INTRAVENOUS at 04:16

## 2024-02-02 ASSESSMENT — COGNITIVE AND FUNCTIONAL STATUS - GENERAL
MOVING TO AND FROM BED TO CHAIR: UNABLE
DRESSING REGULAR UPPER BODY CLOTHING: TOTAL
WALKING IN HOSPITAL ROOM: TOTAL
TURNING FROM BACK TO SIDE WHILE IN FLAT BAD: UNABLE
MOVING FROM LYING ON BACK TO SITTING ON SIDE OF FLAT BED: UNABLE
SUGGESTED CMS G CODE MODIFIER DAILY ACTIVITY: CN
STANDING UP FROM CHAIR USING ARMS: TOTAL
EATING MEALS: TOTAL
HELP NEEDED FOR BATHING: TOTAL
TOILETING: TOTAL
MOBILITY SCORE: 6
PERSONAL GROOMING: TOTAL
DRESSING REGULAR LOWER BODY CLOTHING: TOTAL
DAILY ACTIVITIY SCORE: 6
SUGGESTED CMS G CODE MODIFIER MOBILITY: CN
CLIMB 3 TO 5 STEPS WITH RAILING: TOTAL

## 2024-02-02 ASSESSMENT — PAIN DESCRIPTION - PAIN TYPE: TYPE: ACUTE PAIN

## 2024-02-02 NOTE — CARE PLAN
The patient is Stable - Low risk of patient condition declining or worsening    Shift Goals  Clinical Goals: Patient remain free from fall  Patient Goals: CJ  Family Goals: CJ    Progress made toward(s) clinical / shift goals:  Patient  maintain free oif falls. Able to sleep. Call light in reach.     Patient is not progressing towards the following goals:

## 2024-02-02 NOTE — THERAPY
"Speech Language Pathology   Daily Treatment     Patient Name: Wale Olivas  AGE:  78 y.o., SEX:  male  Medical Record #: [id number]  Date of Service: 2/2/2024      Precautions:  Precautions: Fall Risk, Swallow Precautions         Subjective  Pt verbalizing \"hi\" to familiar person from [facility]      Assessment  Pt sitting up at greater than 60 degrees. Nurs giving small whole pill with pudding and sips of liquid medication. Pt with congested and non-productive coughing intermittently. SLP provided educ to RN regarding crushing meds that can be crushed as per recommended and posted swallow strategies. Pt taking small sips of TNO with SLP with 1-2 swallows triggered and continued intermittent coughing post swallow. Per [facility] representative, who knows pt, estimated pt roughly 50% of his normal behavior. Pt with yelling x1 during session.       Clinical Impressions  Pt demonstrating continued s/s of possible dysphagia and decreased airway protection with LQ3/TNO. Per staff, pt's behavior is improving. Plan: cont with LQ3/TNO 1-1 feeding per family preference and despite risks for aspiration. Pt is not recommended for texture upgrade at this time.       Recommendations  Treatment Completed: Dysphagia Treatment       Dysphagia Treatment  Diet Consistency: LQ3/TNO with 1-1 assist as tolerated.  Instrumentation:  (Pt not at the level to tolerate.)  Medication: Crush with applesauce, as appropriate  Supervision: Careful 1:1 hand feeding  Positioning: Fully upright and midline during oral intake  Risk Management : Small bites/sips, Alternate bites and sips, Slow rate of intake, Liquids by cup only, Physical mobility, as tolerated  Oral Care: Q4h                     SLP Treatment Plan  Treatment Plan: Dysphagia Treatment  SLP Frequency: 3x Per Week  Estimated Duration: Until Therapy Goals Met      Anticipated Discharge Needs  Discharge Recommendations: Recommend post-acute placement for additional speech therapy " services prior to discharge home  Therapy Recommendations Upon DC: Dysphagia Training, Patient / Family / Caregiver Education      Patient / Family Goals  Patient / Family Goal #1: 1/31 per dgtr-to take premorbid meds.  Goal #1 Outcome: Progressing slower than expected  Short Term Goals  Short Term Goal # 1: Pt will consume single ice chips at 3-5 per hour after oral care and as tolerated.  Goal Outcome # 1: Progressing slower than expected  Short Term Goal # 2: Pt will comsume TNO, ice chips and puree with SLP without s/s of difficulty during oral intake.  Goal Outcome # 2 : Progressing slower than expected  Short Term Goal # 3: Pt, and his family will verbalize understanding of pt's swallow status following educ with SLP.  Goal Outcome  # 3: Progressing as expected      Latrice Bianchi, SLP

## 2024-02-02 NOTE — DIETARY
Nutrition Update:    Day 5 of admit.  Wale Olivas is a 78 y.o. male with admitting DX of Sepsis (HCC) [A41.9].  Patient being followed to optimize nutrition.    Current Diet: Full liquids    Pt w/ dysphagia, SLP assessment determine NPO most appropriate, however diet ordered per family preference, and MD hoping resuming pt's psych meds will improve his swallow.    So far on a diet, pt has consumed 0% x 1 meal and 25-50% x 1 meal. To aid in total nutrient intake, RD to order Ensure Plus BID for pt to trial.    Please encourage intake of meals and supplements.    Today is day 5 inadequate oral intake. If pt unable to meet goal of >50% intake by Monday 2/5, consider escalating nutrition support.    Problem: Nutritional:  Goal: Achieve adequate nutritional intake  Description: Patient will consume >50% of meals      RD following

## 2024-02-02 NOTE — PROGRESS NOTES
[facility] Medicine Daily Progress Note    Date of Service  2/2/2024    Chief Complaint  Wale Olivas is a 78 y.o. male admitted 1/28/2024 with altered mental status    Hospital Course  Wale Olivas is a 78 y.o. male who presented to [facility] on 1/28/2024. He has a history of dementia, COPD, dyslipidemia, primary hypertension, severe Alzheimer's dementia, type 2 diabetes, chronic anticoagulation atrial fibrillation.  He was sent to [facility]  from [facility] with altered mentation and was found to have a UTI and pneumonia    Interval Problem Update  No obvious aspiration again today, much less agitated, patient now axox1, appears more comfortable, denies pain, following some commands. I met with his daughter Madhavi and son in law at bedside, we discussed his prognosis, medical issues and goals of care. They are open to hospice in [place] if patient continues to decline as he has been in a cycle of illness and repeated hospitalizations and they are interested in optimizing his quality of life. We also discussed code status. Madhavi plans to discuss patient with her sister Jose and Jose is expected at [facility] tomorrow, I will follow up with her after their discussion. Now exam and vitals stable, remains on 3L. ROS otherwise negative.    I have discussed this patient's plan of care and discharge plan at IDT rounds today with Case Management, Nursing, Nursing leadership, and other members of the IDT team.    Consultants/Specialty  Palliative care    Code Status  Full Code    Disposition  The patient is not medically cleared for discharge to home or a post-acute facility.      I have placed the appropriate orders for post-discharge needs.    Review of Systems  Review of Systems   Unable to perform ROS: Other        Physical Exam  Temp:  [36.2 °C (97.2 °F)-36.6 °C (97.9 °F)] 36.2 °C (97.2 °F)  Pulse:  [54-63] 60  Resp:  [16-19] 17  BP: (138-168)/(57-76) 138/57  SpO2:  [95 %-99 %] 96 %    Physical Exam  Constitutional:        General: He is not in acute distress.     Appearance: He is well-developed. He is not diaphoretic.   HENT:      Head: Normocephalic and atraumatic.   Eyes:      Conjunctiva/sclera: Conjunctivae normal.   Neck:      Vascular: No JVD.   Cardiovascular:      Rate and Rhythm: Normal rate.      Heart sounds: Murmur heard.      No gallop.   Pulmonary:      Effort: Pulmonary effort is normal. No respiratory distress.      Breath sounds: No stridor. No wheezing or rales.   Abdominal:      Palpations: Abdomen is soft.      Tenderness: There is no abdominal tenderness. There is no guarding or rebound.   Musculoskeletal:      Right lower leg: No edema.      Left lower leg: No edema.   Skin:     General: Skin is warm and dry.      Findings: No rash.   Neurological:      Mental Status: He is alert.      Comments: Increasingly verbal and appropriate, following simple commands well           Fluids    Intake/Output Summary (Last 24 hours) at 2/2/2024 1248  Last data filed at 2/2/2024 1100  Gross per 24 hour   Intake 120 ml   Output --   Net 120 ml       Laboratory  Recent Labs     02/01/24  0832 02/02/24  0426   WBC 7.4 7.5   RBC 3.13* 3.42*   HEMOGLOBIN 9.6* 10.5*   HEMATOCRIT 28.5* 31.3*   MCV 91.1 91.5   MCH 30.7 30.7   MCHC 33.7 33.5   RDW 49.6 49.5   PLATELETCT 98* 101*   MPV 8.8* 9.2     Recent Labs     02/01/24  0832   SODIUM 142   POTASSIUM 3.7   CHLORIDE 105   CO2 26   GLUCOSE 151*   BUN 26*   CREATININE 1.34   CALCIUM 8.8                   Imaging  CT-CHEST,ABDOMEN,PELVIS W/O   Final Result      1.  Abnormal appearance of left kidney. Left perinephric fat stranding. Mild left hydronephrosis. Mild left urothelial thickening suggested. Findings could be due to pyelonephritis versus recently passed stone.   2.  Thick-walled urinary bladder with surrounding fat stranding suspicious for cystitis.   3.  Bibasilar pulmonary infiltrates and bronchitis.   4.  Moderate compression fracture of the superior L5 vertebral body  which is interval new since prior. It appears acute or subacute with fracture line visible.      DX-CHEST-PORTABLE (1 VIEW)   Final Result         Hazy opacity in the left lower lung, concerning for pneumonia.      CT-HEAD W/O   Final Result         1. No acute intracranial abnormality. No evidence of acute intracranial hemorrhage or mass lesion.      2. Unchanged large right parietal encephalomalacia.                    Assessment/Plan  * Sepsis (HCC)- (present on admission)  Assessment & Plan  Present on admission  Source UTI/Pyelo less likely resp  Blood cultures +GNRs 2/2, pseudomonas and proteus, repeat cultures so far negative  Abx changed to fortaz to reduce c dif risk       Dysphagia  Assessment & Plan  Advanced dementia contributing  See above 25 minute discussion with Madhavi and her  regarding status, goals of care, possible hospice at some point, they live in [place] and would be open to volunteer hospice, we discussed anticipated progression of disease, ongoing risk of aspiration, artificial nutrition. 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  No other significant findings  Monitor for signs of obstruction  Resolved  Apixaban held  Repeat cbc in am        Thrombocytopenia (HCC)  Assessment & Plan  Patient normally runs mid 100s.    No schistocytes on peripheral smear  Acute on chronic, likely due to acute infection, now back at baseline-  apixaban resumed  Repeat cbc in am        Type 2 diabetes mellitus with hyperglycemia, with long-term current use of insulin (HCC)- (present on admission)  Assessment & Plan  sliding scale    Severe early onset Alzheimer's dementia (HCC)- (present on admission)  Assessment & Plan  Baseline oriented x 1    Primary hypertension- (present on admission)  Assessment & Plan  Maintained on amlodipine 5 mg daily and propranolol 20 mg every 12 hours   continue with parameters  following      JOSE ENRIQUE (acute kidney injury) (HCC)- (present on admission)  Assessment & Plan  Baseline creat runs around 1.3  In setting of sepsis  Likely ATN related to this.  Improved, following intermittently  Follow daily BMP and urine output  Ferro is in place  Renally dose medications as appropriate    Chronic anticoagulation- (present on admission)  Assessment & Plan  Patient is on apixaban for history of atrial fibrillation  Plts now back to baseline, apixaban resumed  Continue to follow  Cbc in am    Chronic obstructive pulmonary disease with acute exacerbation (HCC)- (present on admission)  Assessment & Plan  Speech continues to follow  Weaning oxygen as tolerated, oxygen requirements down to 3L,continue to titrate as tolerated  Continue O2 and RT protocols  Continue inhaled corticosteroid, and LABA  Meds and some food despite aspiration risks, see note from 1/31/24, plan reviewed with his other daughter Madhavi as well         VTE prophylaxis: SCD    I have performed a physical exam and reviewed and updated ROS and Plan today (2/2/2024). In review of yesterday's note (2/1/2024), there are no changes except as documented above.

## 2024-02-02 NOTE — CARE PLAN
Problem: Hyperinflation  Goal: Prevent or improve atelectasis  Description: Target End Date:  3 to 4 days    1. Instruct incentive spirometry usage  2.  Perform hyperinflation therapy as indicated  Outcome: Not Met  Flowsheets (Taken 1/31/2024 0254 by Kishan Chowdhury, RRT)  Hyperinflation Protocol Goals/Outcome: Improvement in chest x-ray/atelectasis  Hyperinflation Protocol Indications: Pneumonia       Respiratory Update    Treatment modality: PEP  Frequency: QID    Pt tolerating current treatments well with no adverse reactions.

## 2024-02-02 NOTE — CARE PLAN
The patient is Stable - Low risk of patient condition declining or worsening    Shift Goals  Clinical Goals: free from injury or fall, q2 turns, abx  Patient Goals: chas  Family Goals: chas    Progress made toward(s) clinical / shift goals:      Problem: Fluid Volume  Goal: Fluid volume balance will be maintained  Outcome: Progressing     Problem: Urinary - Renal Perfusion  Goal: Ability to achieve and maintain adequate renal perfusion and functioning will improve  Outcome: Progressing     Problem: Pain - Standard  Goal: Alleviation of pain or a reduction in pain to the patient’s comfort goal  Outcome: Progressing     Problem: Fall Risk  Goal: Patient will remain free from falls  Outcome: Progressing       Patient is not progressing towards the following goals:    Problem: Skin Integrity  Goal: Skin integrity is maintained or improved  Note: Q2 turns observed, heel foam protectors and heel boots in place for prophylaxis     Axo2. Unable to make needs known. VSS WNL. Denies pain. Q2 turns, heel boots and heel foam mepilex in place. Afebrile. Remains free from injury of falls. Due medications given as ordered. Placed call light and personal belongings within reach. Needs met at this time. Care plan ongoing.

## 2024-02-03 LAB
BASOPHILS # BLD AUTO: 0.9 % (ref 0–1.8)
BASOPHILS # BLD: 0.06 K/UL (ref 0–0.12)
EOSINOPHIL # BLD AUTO: 0.77 K/UL (ref 0–0.51)
EOSINOPHIL NFR BLD: 11.2 % (ref 0–6.9)
ERYTHROCYTE [DISTWIDTH] IN BLOOD BY AUTOMATED COUNT: 46.5 FL (ref 35.9–50)
GLUCOSE BLD STRIP.AUTO-MCNC: 113 MG/DL (ref 65–99)
GLUCOSE BLD STRIP.AUTO-MCNC: 178 MG/DL (ref 65–99)
GLUCOSE BLD STRIP.AUTO-MCNC: 187 MG/DL (ref 65–99)
GLUCOSE BLD STRIP.AUTO-MCNC: 261 MG/DL (ref 65–99)
HCT VFR BLD AUTO: 28.7 % (ref 42–52)
HGB BLD-MCNC: 9.9 G/DL (ref 14–18)
IMM GRANULOCYTES # BLD AUTO: 0.1 K/UL (ref 0–0.11)
IMM GRANULOCYTES NFR BLD AUTO: 1.5 % (ref 0–0.9)
LYMPHOCYTES # BLD AUTO: 1.81 K/UL (ref 1–4.8)
LYMPHOCYTES NFR BLD: 26.3 % (ref 22–41)
MCH RBC QN AUTO: 30.5 PG (ref 27–33)
MCHC RBC AUTO-ENTMCNC: 34.5 G/DL (ref 32.3–36.5)
MCV RBC AUTO: 88.3 FL (ref 81.4–97.8)
MONOCYTES # BLD AUTO: 0.77 K/UL (ref 0–0.85)
MONOCYTES NFR BLD AUTO: 11.2 % (ref 0–13.4)
NEUTROPHILS # BLD AUTO: 3.37 K/UL (ref 1.82–7.42)
NEUTROPHILS NFR BLD: 48.9 % (ref 44–72)
NRBC # BLD AUTO: 0 K/UL
NRBC BLD-RTO: 0 /100 WBC (ref 0–0.2)
PLATELET # BLD AUTO: 120 K/UL (ref 164–446)
PMV BLD AUTO: 9 FL (ref 9–12.9)
RBC # BLD AUTO: 3.25 M/UL (ref 4.7–6.1)
WBC # BLD AUTO: 6.9 K/UL (ref 4.8–10.8)

## 2024-02-03 PROCEDURE — A9270 NON-COVERED ITEM OR SERVICE: HCPCS | Performed by: HOSPITALIST

## 2024-02-03 PROCEDURE — 700105 HCHG RX REV CODE 258: Performed by: HOSPITALIST

## 2024-02-03 PROCEDURE — 700111 HCHG RX REV CODE 636 W/ 250 OVERRIDE (IP): Mod: JZ | Performed by: HOSPITALIST

## 2024-02-03 PROCEDURE — 700102 HCHG RX REV CODE 250 W/ 637 OVERRIDE(OP)

## 2024-02-03 PROCEDURE — 94640 AIRWAY INHALATION TREATMENT: CPT

## 2024-02-03 PROCEDURE — 36415 COLL VENOUS BLD VENIPUNCTURE: CPT

## 2024-02-03 PROCEDURE — 700102 HCHG RX REV CODE 250 W/ 637 OVERRIDE(OP): Performed by: HOSPITALIST

## 2024-02-03 PROCEDURE — A9270 NON-COVERED ITEM OR SERVICE: HCPCS

## 2024-02-03 PROCEDURE — 770001 HCHG ROOM/CARE - MED/SURG/GYN PRIV*

## 2024-02-03 PROCEDURE — 99232 SBSQ HOSP IP/OBS MODERATE 35: CPT | Performed by: HOSPITALIST

## 2024-02-03 PROCEDURE — 700101 HCHG RX REV CODE 250: Performed by: HOSPITALIST

## 2024-02-03 PROCEDURE — 85025 COMPLETE CBC W/AUTO DIFF WBC: CPT

## 2024-02-03 PROCEDURE — 82962 GLUCOSE BLOOD TEST: CPT | Mod: 91

## 2024-02-03 RX ADMIN — RISPERIDONE 1 MG: 1 TABLET, FILM COATED ORAL at 04:30

## 2024-02-03 RX ADMIN — VALPROIC ACID 500 MG: 500 SOLUTION ORAL at 04:29

## 2024-02-03 RX ADMIN — INSULIN HUMAN 2 UNITS: 100 INJECTION, SOLUTION PARENTERAL at 12:24

## 2024-02-03 RX ADMIN — CEFTAZIDIME 2000 MG: 1 INJECTION, POWDER, FOR SOLUTION INTRAMUSCULAR; INTRAVENOUS at 04:31

## 2024-02-03 RX ADMIN — SODIUM CHLORIDE, POTASSIUM CHLORIDE, SODIUM LACTATE AND CALCIUM CHLORIDE: 600; 310; 30; 20 INJECTION, SOLUTION INTRAVENOUS at 03:29

## 2024-02-03 RX ADMIN — DOCUSATE SODIUM 50 MG AND SENNOSIDES 8.6 MG 2 TABLET: 8.6; 5 TABLET, FILM COATED ORAL at 17:05

## 2024-02-03 RX ADMIN — OMEPRAZOLE 40 MG: 20 CAPSULE, DELAYED RELEASE ORAL at 04:30

## 2024-02-03 RX ADMIN — PROPRANOLOL HYDROCHLORIDE 20 MG: 10 TABLET ORAL at 17:05

## 2024-02-03 RX ADMIN — SCOPOLAMINE 1 PATCH: 1.5 PATCH, EXTENDED RELEASE TRANSDERMAL at 05:50

## 2024-02-03 RX ADMIN — DOCUSATE SODIUM 50 MG AND SENNOSIDES 8.6 MG 2 TABLET: 8.6; 5 TABLET, FILM COATED ORAL at 04:29

## 2024-02-03 RX ADMIN — TAMSULOSIN HYDROCHLORIDE 0.4 MG: 0.4 CAPSULE ORAL at 04:30

## 2024-02-03 RX ADMIN — INSULIN HUMAN 2 UNITS: 100 INJECTION, SOLUTION PARENTERAL at 17:14

## 2024-02-03 RX ADMIN — CEFTAZIDIME 2000 MG: 1 INJECTION, POWDER, FOR SOLUTION INTRAMUSCULAR; INTRAVENOUS at 14:06

## 2024-02-03 RX ADMIN — APIXABAN 5 MG: 5 TABLET, FILM COATED ORAL at 04:30

## 2024-02-03 RX ADMIN — CEFTAZIDIME 2000 MG: 1 INJECTION, POWDER, FOR SOLUTION INTRAMUSCULAR; INTRAVENOUS at 20:26

## 2024-02-03 RX ADMIN — RISPERIDONE 1 MG: 1 TABLET, FILM COATED ORAL at 17:05

## 2024-02-03 RX ADMIN — DULOXETINE HYDROCHLORIDE 30 MG: 30 CAPSULE, DELAYED RELEASE ORAL at 17:05

## 2024-02-03 RX ADMIN — VALPROIC ACID 500 MG: 500 SOLUTION ORAL at 17:06

## 2024-02-03 RX ADMIN — RISPERIDONE 1 MG: 1 TABLET, FILM COATED ORAL at 12:21

## 2024-02-03 RX ADMIN — AMLODIPINE BESYLATE 5 MG: 5 TABLET ORAL at 04:30

## 2024-02-03 RX ADMIN — IPRATROPIUM BROMIDE AND ALBUTEROL SULFATE 3 ML: 2.5; .5 SOLUTION RESPIRATORY (INHALATION) at 20:00

## 2024-02-03 RX ADMIN — IPRATROPIUM BROMIDE AND ALBUTEROL SULFATE 3 ML: 2.5; .5 SOLUTION RESPIRATORY (INHALATION) at 06:18

## 2024-02-03 RX ADMIN — CLOTRIMAZOLE AND BETAMETHASONE DIPROPIONATE 1 APPLICATION: 10; .5 CREAM TOPICAL at 17:05

## 2024-02-03 RX ADMIN — FINASTERIDE 5 MG: 5 TABLET, FILM COATED ORAL at 04:30

## 2024-02-03 RX ADMIN — VALPROIC ACID 500 MG: 500 SOLUTION ORAL at 12:21

## 2024-02-03 RX ADMIN — PRAVASTATIN SODIUM 80 MG: 20 TABLET ORAL at 20:19

## 2024-02-03 RX ADMIN — INSULIN HUMAN 5 UNITS: 100 INJECTION, SOLUTION PARENTERAL at 20:22

## 2024-02-03 RX ADMIN — PROPRANOLOL HYDROCHLORIDE 20 MG: 10 TABLET ORAL at 04:29

## 2024-02-03 RX ADMIN — APIXABAN 5 MG: 5 TABLET, FILM COATED ORAL at 17:05

## 2024-02-03 RX ADMIN — CLOTRIMAZOLE AND BETAMETHASONE DIPROPIONATE 1 APPLICATION: 10; .5 CREAM TOPICAL at 04:41

## 2024-02-03 ASSESSMENT — PAIN SCALES - PAIN ASSESSMENT IN ADVANCED DEMENTIA (PAINAD)
BODYLANGUAGE: RELAXED
CONSOLABILITY: NO NEED TO CONSOLE
BREATHING: NORMAL
FACIALEXPRESSION: SMILING OR INEXPRESSIVE
NEGVOCALIZATION: OCCASIONAL MOAN/GROAN, LOW SPEECH, NEGATIVE/DISAPPROVING QUALITY
TOTALSCORE: 1

## 2024-02-03 ASSESSMENT — FIBROSIS 4 INDEX: FIB4 SCORE: 5.07

## 2024-02-03 NOTE — THERAPY
Physical Therapy Contact Note    Patient Name: Wale Olivas  Age:  78 y.o., Sex:  male  Medical Record #: [id number]  Today's Date: 2/3/2024    PT consult received, chart reviewed; [facility]; 9/2023 was w/c bound but could stand pivot with staff assist; unclear if achieved this level of mobility upon returning as any attempts at skilled PT intervention here was met with agitation; chart stating possible home with hospice, pt would likely be bed bound and need 2 assist for care; will follow up when able/needed to discuss with family re: DME and family training if home with hospice is pursued;     Tika PAEZ, PT,  [phone]

## 2024-02-03 NOTE — PROGRESS NOTES
[facility] Medicine Daily Progress Note    Date of Service  2/3/2024    Chief Complaint  Wale Olivas is a 78 y.o. male admitted 1/28/2024 with altered mental status    Hospital Course  Wale Olivas is a 78 y.o. male who presented to [facility] on 1/28/2024. He has a history of dementia, COPD, dyslipidemia, primary hypertension, severe Alzheimer's dementia, type 2 diabetes, chronic anticoagulation atrial fibrillation.  He was sent to [facility]  from [facility] with altered mentation and was found to have a UTI and pneumonia    Interval Problem Update  He remains axox1, calm this am, following some commands, exam and vitals stable. I spoke with both daughters Madhavi and Jose on phone they do not want to pursue hospice at this time, patient continues to improve and [facility] antibiotics are completed on Monday. ROS otherwise negative    I have discussed this patient's plan of care and discharge plan at IDT rounds today with Case Management, Nursing, Nursing leadership, and other members of the IDT team.    Consultants/Specialty  Palliative care    Code Status  Full Code    Disposition  The patient is not medically cleared for discharge to home or a post-acute facility.      I have placed the appropriate orders for post-discharge needs.    Review of Systems  Review of Systems   Unable to perform ROS: Other        Physical Exam  Temp:  [36.2 °C (97.1 °F)-36.6 °C (97.9 °F)] 36.6 °C (97.9 °F)  Pulse:  [59-70] 59  Resp:  [15-18] 15  BP: ()/(56-81) 167/75  SpO2:  [95 %-100 %] 100 %    Physical Exam  Constitutional:       General: He is not in acute distress.     Appearance: He is well-developed. He is not diaphoretic.   HENT:      Head: Normocephalic and atraumatic.   Eyes:      Conjunctiva/sclera: Conjunctivae normal.   Neck:      Vascular: No JVD.   Cardiovascular:      Rate and Rhythm: Normal rate.      Heart sounds: Murmur heard.      No gallop.   Pulmonary:      Effort:  Pulmonary effort is normal. No respiratory distress.      Breath sounds: No stridor. No wheezing or rales.   Abdominal:      Palpations: Abdomen is soft.      Tenderness: There is no abdominal tenderness. There is no guarding or rebound.   Musculoskeletal:      Right lower leg: No edema.      Left lower leg: No edema.   Skin:     General: Skin is warm and dry.      Findings: No rash.   Neurological:      Mental Status: He is alert.      Comments: Increasingly verbal and appropriate, following simple commands            Fluids    Intake/Output Summary (Last 24 hours) at 2/3/2024 1453  Last data filed at 2/3/2024 1352  Gross per 24 hour   Intake 360 ml   Output --   Net 360 ml       Laboratory  Recent Labs     02/01/24  0832 02/02/24  0426 02/03/24  0528   WBC 7.4 7.5 6.9   RBC 3.13* 3.42* 3.25*   HEMOGLOBIN 9.6* 10.5* 9.9*   HEMATOCRIT 28.5* 31.3* 28.7*   MCV 91.1 91.5 88.3   MCH 30.7 30.7 30.5   MCHC 33.7 33.5 34.5   RDW 49.6 49.5 46.5   PLATELETCT 98* 101* 120*   MPV 8.8* 9.2 9.0     Recent Labs     02/01/24  0832   SODIUM 142   POTASSIUM 3.7   CHLORIDE 105   CO2 26   GLUCOSE 151*   BUN 26*   CREATININE 1.34   CALCIUM 8.8                   Imaging  CT-CHEST,ABDOMEN,PELVIS W/O   Final Result      1.  Abnormal appearance of left kidney. Left perinephric fat stranding. Mild left hydronephrosis. Mild left urothelial thickening suggested. Findings could be due to pyelonephritis versus recently passed stone.   2.  Thick-walled urinary bladder with surrounding fat stranding suspicious for cystitis.   3.  Bibasilar pulmonary infiltrates and bronchitis.   4.  Moderate compression fracture of the superior L5 vertebral body which is interval new since prior. It appears acute or subacute with fracture line visible.      DX-CHEST-PORTABLE (1 VIEW)   Final Result         Hazy opacity in the left lower lung, concerning for pneumonia.      CT-HEAD W/O   Final Result         1. No acute intracranial abnormality. No evidence of  acute intracranial hemorrhage or mass lesion.      2. Unchanged large right parietal encephalomalacia.                    Assessment/Plan  * Sepsis (HCC)- (present on admission)  Assessment & Plan  Present on admission  Source UTI/Pyelo less likely resp  Blood cultures +GNRs 2/2, pseudomonas and proteus, repeat cultures so far negative  Abx changed to fortaz to reduce c dif risk       Dysphagia  Assessment & Plan  Advanced dementia contributing  See above 25 minute discussion with Madhavi and her  regarding status, goals of care, possible hospice at some point, they live in [place] and would be open to volunteer hospice, we discussed anticipated progression of disease, ongoing risk of aspiration, artificial nutrition. 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  No other significant findings  Monitor for signs of obstruction  Resolved, has not recurred even back on apixiban        Thrombocytopenia (HCC)  Assessment & Plan  Patient normally runs mid 100s.    No schistocytes on peripheral smear  Acute on chronic, likely due to acute infection, now back at baseline- apixaban resumed  Plts increasing  Following cbc intermittently        Type 2 diabetes mellitus with hyperglycemia, with long-term current use of insulin (HCC)- (present on admission)  Assessment & Plan  sliding scale    Severe early onset Alzheimer's dementia (HCC)- (present on admission)  Assessment &  Plan  Baseline oriented x 1    Primary hypertension- (present on admission)  Assessment & Plan  Maintained on amlodipine 5 mg daily and propranolol 20 mg every 12 hours   continue with parameters  following      JOSE ENRIQUE (acute kidney injury) (HCC)- (present on admission)  Assessment & Plan  Baseline creat runs around 1.3  In setting of sepsis  Likely ATN related to this.  Improved, following intermittently  Follow daily BMP and urine output  Ferro is in place  Renally dose medications as appropriate    Chronic anticoagulation- (present on admission)  Assessment & Plan  Patient is on apixaban for history of atrial fibrillation  Plts now back to baseline, apixaban resumed  Plts continue to increase  Will repeat cbc intermittently    Chronic obstructive pulmonary disease with acute exacerbation (HCC)- (present on admission)  Assessment & Plan  Speech continues to follow  Weaning oxygen as tolerated, oxygen requirements down to 3L,continue to titrate as tolerated  Continue O2 and RT protocols  Continue inhaled corticosteroid, and LABA  Meds and some food despite aspiration risks, see note from 1/31/24, plan reviewed with his other daughter Madhavi as well  He remains stable on 3 L         VTE prophylaxis: SCD    I have performed a physical exam and reviewed and updated ROS and Plan today (2/3/2024). In review of yesterday's note (2/2/2024), there are no changes except as documented above.

## 2024-02-03 NOTE — CARE PLAN
The patient is Stable - Low risk of patient condition declining or worsening    Shift Goals  Clinical Goals: remain free from injury or falls, q2 turns, monitor aspiration  Patient Goals: chas  Family Goals: chas    Progress made toward(s) clinical / shift goals:      Problem: Fluid Volume  Goal: Fluid volume balance will be maintained  Outcome: Progressing     Problem: Urinary - Renal Perfusion  Goal: Ability to achieve and maintain adequate renal perfusion and functioning will improve  Outcome: Progressing     Problem: Respiratory  Goal: Patient will achieve/maintain optimum respiratory ventilation and gas exchange  Outcome: Progressing     Problem: Pain - Standard  Goal: Alleviation of pain or a reduction in pain to the patient’s comfort goal  Outcome: Progressing     Problem: Skin Integrity  Goal: Skin integrity is maintained or improved  Outcome: Progressing  Note: Q2 turns, heel floated with boots and pillows     Problem: Fall Risk  Goal: Patient will remain free from falls  Outcome: Progressing       Patient is not progressing towards the following goals:      Problem: Knowledge Deficit - Standard  Goal: Patient and family/care givers will demonstrate understanding of plan of care, disease process/condition, diagnostic tests and medications  Outcome: Not Progressing     Axo1. Needs anticipated by healthcare team. Seen with condom cath patent and draining well. VSS WNL. Denies pain. Afebrile. Due medications given as ordered. Q2 turns observed, heels floated with boots and pillows. Remains free from injury or fall. Needs met at this time.

## 2024-02-03 NOTE — CARE PLAN
The patient is Stable - Low risk of patient condition declining or worsening    Shift Goals  Clinical Goals: Patient will remain free from injury/fall  Patient Goals: CJ  Family Goals: CJ    Progress made toward(s) clinical / shift goals:  Patient remain safe from injury. Able to sleep comfortably. Call light in reach.     Patient is not progressing towards the following goals:      Problem: Knowledge Deficit - Standard  Goal: Patient and family/care givers will demonstrate understanding of plan of care, disease process/condition, diagnostic tests and medications  Description: Target End Date:  1-3 days or as soon as patient condition allows    Document in Patient Education    1.  Patient and family/caregiver oriented to unit, equipment, visitation policy and means for communicating concern  2.  Complete/review Learning Assessment  3.  Assess knowledge level of disease process/condition, treatment plan, diagnostic tests and medications  4.  Explain disease process/condition, treatment plan, diagnostic tests and medications  Outcome: Not Progressing

## 2024-02-04 LAB
BACTERIA BLD CULT: NORMAL
BACTERIA BLD CULT: NORMAL
BASOPHILS # BLD AUTO: 0.6 % (ref 0–1.8)
BASOPHILS # BLD: 0.05 K/UL (ref 0–0.12)
EOSINOPHIL # BLD AUTO: 0.63 K/UL (ref 0–0.51)
EOSINOPHIL NFR BLD: 7.7 % (ref 0–6.9)
ERYTHROCYTE [DISTWIDTH] IN BLOOD BY AUTOMATED COUNT: 45.5 FL (ref 35.9–50)
GLUCOSE BLD STRIP.AUTO-MCNC: 115 MG/DL (ref 65–99)
GLUCOSE BLD STRIP.AUTO-MCNC: 117 MG/DL (ref 65–99)
GLUCOSE BLD STRIP.AUTO-MCNC: 137 MG/DL (ref 65–99)
GLUCOSE BLD STRIP.AUTO-MCNC: 314 MG/DL (ref 65–99)
HCT VFR BLD AUTO: 29.2 % (ref 42–52)
HGB BLD-MCNC: 10.1 G/DL (ref 14–18)
IMM GRANULOCYTES # BLD AUTO: 0.08 K/UL (ref 0–0.11)
IMM GRANULOCYTES NFR BLD AUTO: 1 % (ref 0–0.9)
LYMPHOCYTES # BLD AUTO: 1.43 K/UL (ref 1–4.8)
LYMPHOCYTES NFR BLD: 17.5 % (ref 22–41)
MCH RBC QN AUTO: 30.1 PG (ref 27–33)
MCHC RBC AUTO-ENTMCNC: 34.6 G/DL (ref 32.3–36.5)
MCV RBC AUTO: 87.2 FL (ref 81.4–97.8)
MONOCYTES # BLD AUTO: 0.83 K/UL (ref 0–0.85)
MONOCYTES NFR BLD AUTO: 10.2 % (ref 0–13.4)
NEUTROPHILS # BLD AUTO: 5.15 K/UL (ref 1.82–7.42)
NEUTROPHILS NFR BLD: 63 % (ref 44–72)
NRBC # BLD AUTO: 0 K/UL
NRBC BLD-RTO: 0 /100 WBC (ref 0–0.2)
PLATELET # BLD AUTO: 132 K/UL (ref 164–446)
PMV BLD AUTO: 9.2 FL (ref 9–12.9)
RBC # BLD AUTO: 3.35 M/UL (ref 4.7–6.1)
SIGNIFICANT IND 70042: NORMAL
SIGNIFICANT IND 70042: NORMAL
SITE SITE: NORMAL
SITE SITE: NORMAL
SOURCE SOURCE: NORMAL
SOURCE SOURCE: NORMAL
WBC # BLD AUTO: 8.2 K/UL (ref 4.8–10.8)

## 2024-02-04 PROCEDURE — 36415 COLL VENOUS BLD VENIPUNCTURE: CPT

## 2024-02-04 PROCEDURE — 700111 HCHG RX REV CODE 636 W/ 250 OVERRIDE (IP): Mod: JZ | Performed by: HOSPITALIST

## 2024-02-04 PROCEDURE — 700101 HCHG RX REV CODE 250: Performed by: HOSPITALIST

## 2024-02-04 PROCEDURE — 94640 AIRWAY INHALATION TREATMENT: CPT

## 2024-02-04 PROCEDURE — 99232 SBSQ HOSP IP/OBS MODERATE 35: CPT | Performed by: HOSPITALIST

## 2024-02-04 PROCEDURE — 700105 HCHG RX REV CODE 258: Performed by: HOSPITALIST

## 2024-02-04 PROCEDURE — A9270 NON-COVERED ITEM OR SERVICE: HCPCS | Performed by: HOSPITALIST

## 2024-02-04 PROCEDURE — 770001 HCHG ROOM/CARE - MED/SURG/GYN PRIV*

## 2024-02-04 PROCEDURE — 700102 HCHG RX REV CODE 250 W/ 637 OVERRIDE(OP): Performed by: HOSPITALIST

## 2024-02-04 PROCEDURE — 85025 COMPLETE CBC W/AUTO DIFF WBC: CPT

## 2024-02-04 PROCEDURE — 94760 N-INVAS EAR/PLS OXIMETRY 1: CPT

## 2024-02-04 PROCEDURE — 82962 GLUCOSE BLOOD TEST: CPT | Mod: 91

## 2024-02-04 RX ADMIN — APIXABAN 5 MG: 5 TABLET, FILM COATED ORAL at 16:57

## 2024-02-04 RX ADMIN — RISPERIDONE 1 MG: 1 TABLET, FILM COATED ORAL at 12:27

## 2024-02-04 RX ADMIN — FLUTICASONE FUROATE, UMECLIDINIUM BROMIDE AND VILANTEROL TRIFENATATE 1 PUFF: 200; 62.5; 25 POWDER RESPIRATORY (INHALATION) at 08:59

## 2024-02-04 RX ADMIN — IPRATROPIUM BROMIDE AND ALBUTEROL SULFATE 3 ML: 2.5; .5 SOLUTION RESPIRATORY (INHALATION) at 21:03

## 2024-02-04 RX ADMIN — CEFTAZIDIME 2000 MG: 1 INJECTION, POWDER, FOR SOLUTION INTRAMUSCULAR; INTRAVENOUS at 15:22

## 2024-02-04 RX ADMIN — PROPRANOLOL HYDROCHLORIDE 20 MG: 10 TABLET ORAL at 05:28

## 2024-02-04 RX ADMIN — IPRATROPIUM BROMIDE AND ALBUTEROL SULFATE 3 ML: 2.5; .5 SOLUTION RESPIRATORY (INHALATION) at 08:56

## 2024-02-04 RX ADMIN — SODIUM CHLORIDE, POTASSIUM CHLORIDE, SODIUM LACTATE AND CALCIUM CHLORIDE: 600; 310; 30; 20 INJECTION, SOLUTION INTRAVENOUS at 01:10

## 2024-02-04 RX ADMIN — VALPROIC ACID 500 MG: 500 SOLUTION ORAL at 05:30

## 2024-02-04 RX ADMIN — VALPROIC ACID 500 MG: 500 SOLUTION ORAL at 12:27

## 2024-02-04 RX ADMIN — INSULIN HUMAN 6 UNITS: 100 INJECTION, SOLUTION PARENTERAL at 17:29

## 2024-02-04 RX ADMIN — CEFTAZIDIME 2000 MG: 1 INJECTION, POWDER, FOR SOLUTION INTRAMUSCULAR; INTRAVENOUS at 21:49

## 2024-02-04 RX ADMIN — SODIUM CHLORIDE, POTASSIUM CHLORIDE, SODIUM LACTATE AND CALCIUM CHLORIDE: 600; 310; 30; 20 INJECTION, SOLUTION INTRAVENOUS at 21:48

## 2024-02-04 RX ADMIN — AMLODIPINE BESYLATE 5 MG: 5 TABLET ORAL at 05:29

## 2024-02-04 RX ADMIN — FINASTERIDE 5 MG: 5 TABLET, FILM COATED ORAL at 05:29

## 2024-02-04 RX ADMIN — RISPERIDONE 1 MG: 1 TABLET, FILM COATED ORAL at 16:19

## 2024-02-04 RX ADMIN — OMEPRAZOLE 40 MG: 20 CAPSULE, DELAYED RELEASE ORAL at 05:28

## 2024-02-04 RX ADMIN — CLOTRIMAZOLE AND BETAMETHASONE DIPROPIONATE 1 APPLICATION: 10; .5 CREAM TOPICAL at 16:08

## 2024-02-04 RX ADMIN — DULOXETINE HYDROCHLORIDE 30 MG: 30 CAPSULE, DELAYED RELEASE ORAL at 16:08

## 2024-02-04 RX ADMIN — CLOTRIMAZOLE AND BETAMETHASONE DIPROPIONATE 1 APPLICATION: 10; .5 CREAM TOPICAL at 05:29

## 2024-02-04 RX ADMIN — VALPROIC ACID 500 MG: 500 SOLUTION ORAL at 16:08

## 2024-02-04 RX ADMIN — APIXABAN 5 MG: 5 TABLET, FILM COATED ORAL at 05:28

## 2024-02-04 RX ADMIN — PROPRANOLOL HYDROCHLORIDE 20 MG: 10 TABLET ORAL at 16:08

## 2024-02-04 RX ADMIN — RISPERIDONE 1 MG: 1 TABLET, FILM COATED ORAL at 05:29

## 2024-02-04 RX ADMIN — DOCUSATE SODIUM 50 MG AND SENNOSIDES 8.6 MG 2 TABLET: 8.6; 5 TABLET, FILM COATED ORAL at 05:28

## 2024-02-04 RX ADMIN — PRAVASTATIN SODIUM 80 MG: 20 TABLET ORAL at 20:45

## 2024-02-04 RX ADMIN — CEFTAZIDIME 2000 MG: 1 INJECTION, POWDER, FOR SOLUTION INTRAMUSCULAR; INTRAVENOUS at 05:19

## 2024-02-04 RX ADMIN — DOCUSATE SODIUM 50 MG AND SENNOSIDES 8.6 MG 2 TABLET: 8.6; 5 TABLET, FILM COATED ORAL at 16:07

## 2024-02-04 RX ADMIN — TAMSULOSIN HYDROCHLORIDE 0.4 MG: 0.4 CAPSULE ORAL at 05:29

## 2024-02-04 ASSESSMENT — PAIN DESCRIPTION - PAIN TYPE: TYPE: ACUTE PAIN

## 2024-02-04 NOTE — PROGRESS NOTES
[facility] Medicine Daily Progress Note    Date of Service  2/4/2024    Chief Complaint  Wale Olivas is a 78 y.o. male admitted 1/28/2024 with altered mental status    Hospital Course  Wale Olivas is a 78 y.o. male who presented to [facility] on 1/28/2024. He has a history of dementia, COPD, dyslipidemia, primary hypertension, severe Alzheimer's dementia, type 2 diabetes, chronic anticoagulation atrial fibrillation.  He was sent to [facility]  from [facility] with altered mentation and was found to have a UTI and pneumonia    Interval Problem Update  He remains axox1, no sob, no obvious aspiration, continues meds and eating with risk. He denies pain, following some exams. Per discussion with family  [facility] his iv abx. ROS otherwise negative    I have discussed this patient's plan of care and discharge plan at IDT rounds today with Case Management, Nursing, Nursing leadership, and other members of the IDT team.    Consultants/Specialty  Palliative care    Code Status  Full Code    Disposition  The patient is not medically cleared for discharge to home or a post-acute facility.      I have placed the appropriate orders for post-discharge needs.    Review of Systems  Review of Systems   Unable to perform ROS: Other        Physical Exam  Temp:  [36.2 °C (97.1 °F)-36.8 °C (98.2 °F)] 36.2 °C (97.1 °F)  Pulse:  [58-70] 58  Resp:  [16-18] 18  BP: (132-166)/(69-80) 166/80  SpO2:  [97 %-100 %] 100 %    Physical Exam  Constitutional:       General: He is not in acute distress.     Appearance: He is well-developed. He is not diaphoretic.   HENT:      Head: Normocephalic and atraumatic.   Eyes:      Conjunctiva/sclera: Conjunctivae normal.   Neck:      Vascular: No JVD.   Cardiovascular:      Rate and Rhythm: Normal rate.      Heart sounds: Murmur heard.      No gallop.   Pulmonary:      Effort: Pulmonary effort is normal. No respiratory distress.      Breath sounds: No stridor. No  wheezing or rales.   Abdominal:      Palpations: Abdomen is soft.      Tenderness: There is no abdominal tenderness. There is no guarding or rebound.   Musculoskeletal:      Right lower leg: No edema.      Left lower leg: No edema.   Skin:     General: Skin is warm and dry.      Findings: No rash.   Neurological:      Mental Status: He is alert.      Comments: Increasingly verbal and appropriate, following simple commands            Fluids    Intake/Output Summary (Last 24 hours) at 2/4/2024 1012  Last data filed at 2/3/2024 1352  Gross per 24 hour   Intake 120 ml   Output --   Net 120 ml       Laboratory  Recent Labs     02/02/24  0426 02/03/24  0528 02/04/24  0354   WBC 7.5 6.9 8.2   RBC 3.42* 3.25* 3.35*   HEMOGLOBIN 10.5* 9.9* 10.1*   HEMATOCRIT 31.3* 28.7* 29.2*   MCV 91.5 88.3 87.2   MCH 30.7 30.5 30.1   MCHC 33.5 34.5 34.6   RDW 49.5 46.5 45.5   PLATELETCT 101* 120* 132*   MPV 9.2 9.0 9.2                       Imaging  CT-CHEST,ABDOMEN,PELVIS W/O   Final Result      1.  Abnormal appearance of left kidney. Left perinephric fat stranding. Mild left hydronephrosis. Mild left urothelial thickening suggested. Findings could be due to pyelonephritis versus recently passed stone.   2.  Thick-walled urinary bladder with surrounding fat stranding suspicious for cystitis.   3.  Bibasilar pulmonary infiltrates and bronchitis.   4.  Moderate compression fracture of the superior L5 vertebral body which is interval new since prior. It appears acute or subacute with fracture line visible.      DX-CHEST-PORTABLE (1 VIEW)   Final Result         Hazy opacity in the left lower lung, concerning for pneumonia.      CT-HEAD W/O   Final Result         1. No acute intracranial abnormality. No evidence of acute intracranial hemorrhage or mass lesion.      2. Unchanged large right parietal encephalomalacia.                    Assessment/Plan  * Sepsis (HCC)- (present on admission)  Assessment & Plan  Present on admission  Source  UTI/Pyelo less likely resp  Blood cultures +GNRs 2/2, pseudomonas and proteus, repeat cultures so far negative  Abx changed to fortaz to reduce c dif risk       Dysphagia  Assessment & Plan  Advanced dementia contributing  See above 25 minute discussion with Madhavi and her  regarding status, goals of care, possible hospice at some point, they live in [place] and would be open to volunteer hospice, we discussed anticipated progression of disease, ongoing risk of aspiration, artificial nutrition. 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  No other significant findings  Monitor for signs of obstruction  Resolved, has not recurred even back on apixiban        Thrombocytopenia (HCC)  Assessment & Plan  Patient normally runs mid 100s.    No schistocytes on peripheral smear  Acute on chronic, likely due to acute infection, now back at baseline- apixaban resumed  Plts increasing  Following cbc intermittently        Type 2 diabetes mellitus with hyperglycemia, with long-term current use of insulin (HCC)- (present on admission)  Assessment & Plan  sliding scale  Bs not at goal, will resume lantus as lower dose, following    Severe early onset Alzheimer's dementia (HCC)- (present on admission)  Assessment & Plan  Baseline oriented x 1    Primary hypertension- (present on admission)  Assessment & Plan  Maintained on amlodipine 5 mg daily and propranolol 20 mg every 12 hours    continue with parameters  following      JOSE ENRIQUE (acute kidney injury) (HCC)- (present on admission)  Assessment & Plan  Baseline creat runs around 1.3  In setting of sepsis  Likely ATN related to this.  Improved, following intermittently  Follow daily BMP and urine output  Ferro is in place  Renally dose medications as appropriate    Chronic anticoagulation- (present on admission)  Assessment & Plan  Patient is on apixaban for history of atrial fibrillation  Plts now back to baseline, apixaban resumed  Plts increased  repeating cbc intermittently    Chronic obstructive pulmonary disease with acute exacerbation (HCC)- (present on admission)  Assessment & Plan  Speech continues to follow  Weaning oxygen as tolerated, oxygen requirements down to 3L,continue to titrate as tolerated  Continue O2 and RT protocols  Continue inhaled corticosteroid, and LABA  Meds and some food despite aspiration risks, see note from 1/31/24, plan reviewed with his other daughter Madhavi as well  He remains stable on 3 L         VTE prophylaxis: SCD    I have performed a physical exam and reviewed and updated ROS and Plan today (2/4/2024). In review of yesterday's note (2/3/2024), there are no changes except as documented above.

## 2024-02-04 NOTE — CARE PLAN
The patient is Stable - Low risk of patient condition declining or worsening    Shift Goals  Clinical Goals: Remain free from injury  Patient Goals: CJ  Family Goals: CJ    Progress made toward(s) clinical / shift goals:  Patient bed bound q2 turns, IVF continuous running. Patient able to sleep comfortably. Call light in reach.     Patient is not progressing towards the following goals:      Problem: Knowledge Deficit - Standard  Goal: Patient and family/care givers will demonstrate understanding of plan of care, disease process/condition, diagnostic tests and medications  Description: Target End Date:  1-3 days or as soon as patient condition allows    Document in Patient Education    1.  Patient and family/caregiver oriented to unit, equipment, visitation policy and means for communicating concern  2.  Complete/review Learning Assessment  3.  Assess knowledge level of disease process/condition, treatment plan, diagnostic tests and medications  4.  Explain disease process/condition, treatment plan, diagnostic tests and medications  Outcome: Not Progressing

## 2024-02-04 NOTE — CARE PLAN
The patient is Stable - Low risk of patient condition declining or worsening    Shift Goals  Clinical Goals: Pt safety throughout shift, reposition every 2 hours  Patient Goals: CJ  Family Goals: CJ    Progress made toward(s) clinical / shift goals:  Pt alert and responsive. Scheduled meds given. No complaints of pain or discomfort. Vitals within normal limits. Blood glucose monitoring done. Fall precautions in place. Bed alarm on with bed placed in low and locked position. Repositioned. Needs met. Call light within reach.      Problem: Knowledge Deficit - Standard  Goal: Patient and family/care givers will demonstrate understanding of plan of care, disease process/condition, diagnostic tests and medications  Outcome: Progressing     Problem: Hemodynamics  Goal: Patient's hemodynamics, fluid balance and neurologic status will be stable or improve  Outcome: Progressing     Problem: Fluid Volume  Goal: Fluid volume balance will be maintained  Outcome: Progressing     Problem: Urinary - Renal Perfusion  Goal: Ability to achieve and maintain adequate renal perfusion and functioning will improve  Outcome: Progressing     Problem: Respiratory  Goal: Patient will achieve/maintain optimum respiratory ventilation and gas exchange  Outcome: Progressing     Problem: Mechanical Ventilation  Goal: Safe management of artificial airway and ventilation  Outcome: Progressing     Problem: Pain - Standard  Goal: Alleviation of pain or a reduction in pain to the patient’s comfort goal  Outcome: Progressing     Problem: Skin Integrity  Goal: Skin integrity is maintained or improved  Outcome: Progressing     Problem: Fall Risk  Goal: Patient will remain free from falls  Outcome: Progressing       Patient is not progressing towards the following goals:

## 2024-02-05 VITALS
DIASTOLIC BLOOD PRESSURE: 40 MMHG | RESPIRATION RATE: 18 BRPM | WEIGHT: 188.49 LBS | HEART RATE: 75 BPM | HEIGHT: 70 IN | BODY MASS INDEX: 26.99 KG/M2 | OXYGEN SATURATION: 96 % | TEMPERATURE: 97.6 F | SYSTOLIC BLOOD PRESSURE: 112 MMHG

## 2024-02-05 PROBLEM — N17.9 AKI (ACUTE KIDNEY INJURY) (HCC): Status: RESOLVED | Noted: 2023-01-29 | Resolved: 2024-02-05

## 2024-02-05 PROBLEM — A41.9 SEPSIS (HCC): Status: RESOLVED | Noted: 2024-01-28 | Resolved: 2024-02-05

## 2024-02-05 PROBLEM — R31.0 GROSS HEMATURIA: Status: RESOLVED | Noted: 2024-01-28 | Resolved: 2024-02-05

## 2024-02-05 PROBLEM — R78.81 BACTEREMIA DUE TO GRAM-NEGATIVE BACTERIA: Status: RESOLVED | Noted: 2024-01-29 | Resolved: 2024-02-05

## 2024-02-05 PROBLEM — J18.9 PNEUMONIA OF LEFT LOWER LOBE DUE TO INFECTIOUS ORGANISM: Status: RESOLVED | Noted: 2024-01-28 | Resolved: 2024-02-05

## 2024-02-05 PROBLEM — J44.1 CHRONIC OBSTRUCTIVE PULMONARY DISEASE WITH ACUTE EXACERBATION (HCC): Status: RESOLVED | Noted: 2023-01-29 | Resolved: 2024-02-05

## 2024-02-05 LAB
BASOPHILS # BLD AUTO: 0.7 % (ref 0–1.8)
BASOPHILS # BLD: 0.04 K/UL (ref 0–0.12)
EOSINOPHIL # BLD AUTO: 0.52 K/UL (ref 0–0.51)
EOSINOPHIL NFR BLD: 8.7 % (ref 0–6.9)
ERYTHROCYTE [DISTWIDTH] IN BLOOD BY AUTOMATED COUNT: 46.7 FL (ref 35.9–50)
GLUCOSE BLD STRIP.AUTO-MCNC: 113 MG/DL (ref 65–99)
GLUCOSE BLD STRIP.AUTO-MCNC: 159 MG/DL (ref 65–99)
HCT VFR BLD AUTO: 28.3 % (ref 42–52)
HGB BLD-MCNC: 9.5 G/DL (ref 14–18)
IMM GRANULOCYTES # BLD AUTO: 0.07 K/UL (ref 0–0.11)
IMM GRANULOCYTES NFR BLD AUTO: 1.2 % (ref 0–0.9)
LYMPHOCYTES # BLD AUTO: 1.64 K/UL (ref 1–4.8)
LYMPHOCYTES NFR BLD: 27.3 % (ref 22–41)
MCH RBC QN AUTO: 30.1 PG (ref 27–33)
MCHC RBC AUTO-ENTMCNC: 33.6 G/DL (ref 32.3–36.5)
MCV RBC AUTO: 89.6 FL (ref 81.4–97.8)
MONOCYTES # BLD AUTO: 0.56 K/UL (ref 0–0.85)
MONOCYTES NFR BLD AUTO: 9.3 % (ref 0–13.4)
NEUTROPHILS # BLD AUTO: 3.17 K/UL (ref 1.82–7.42)
NEUTROPHILS NFR BLD: 52.8 % (ref 44–72)
NRBC # BLD AUTO: 0 K/UL
NRBC BLD-RTO: 0 /100 WBC (ref 0–0.2)
PLATELET # BLD AUTO: 145 K/UL (ref 164–446)
PMV BLD AUTO: 8.9 FL (ref 9–12.9)
RBC # BLD AUTO: 3.16 M/UL (ref 4.7–6.1)
WBC # BLD AUTO: 6 K/UL (ref 4.8–10.8)

## 2024-02-05 PROCEDURE — 700102 HCHG RX REV CODE 250 W/ 637 OVERRIDE(OP): Performed by: HOSPITALIST

## 2024-02-05 PROCEDURE — 36415 COLL VENOUS BLD VENIPUNCTURE: CPT

## 2024-02-05 PROCEDURE — 99239 HOSP IP/OBS DSCHRG MGMT >30: CPT | Performed by: HOSPITALIST

## 2024-02-05 PROCEDURE — A9270 NON-COVERED ITEM OR SERVICE: HCPCS | Performed by: HOSPITALIST

## 2024-02-05 PROCEDURE — 700111 HCHG RX REV CODE 636 W/ 250 OVERRIDE (IP): Mod: JZ | Performed by: HOSPITALIST

## 2024-02-05 PROCEDURE — 82962 GLUCOSE BLOOD TEST: CPT

## 2024-02-05 PROCEDURE — 700105 HCHG RX REV CODE 258: Performed by: HOSPITALIST

## 2024-02-05 PROCEDURE — 85025 COMPLETE CBC W/AUTO DIFF WBC: CPT

## 2024-02-05 RX ADMIN — APIXABAN 5 MG: 5 TABLET, FILM COATED ORAL at 05:13

## 2024-02-05 RX ADMIN — RISPERIDONE 1 MG: 1 TABLET, FILM COATED ORAL at 05:13

## 2024-02-05 RX ADMIN — CEFTAZIDIME 2000 MG: 1 INJECTION, POWDER, FOR SOLUTION INTRAMUSCULAR; INTRAVENOUS at 05:10

## 2024-02-05 RX ADMIN — FINASTERIDE 5 MG: 5 TABLET, FILM COATED ORAL at 05:13

## 2024-02-05 RX ADMIN — RISPERIDONE 1 MG: 1 TABLET, FILM COATED ORAL at 11:26

## 2024-02-05 RX ADMIN — TAMSULOSIN HYDROCHLORIDE 0.4 MG: 0.4 CAPSULE ORAL at 05:13

## 2024-02-05 RX ADMIN — CLOTRIMAZOLE AND BETAMETHASONE DIPROPIONATE 1 APPLICATION: 10; .5 CREAM TOPICAL at 05:13

## 2024-02-05 RX ADMIN — DOCUSATE SODIUM 50 MG AND SENNOSIDES 8.6 MG 2 TABLET: 8.6; 5 TABLET, FILM COATED ORAL at 05:13

## 2024-02-05 RX ADMIN — VALPROIC ACID 500 MG: 500 SOLUTION ORAL at 11:26

## 2024-02-05 RX ADMIN — OMEPRAZOLE 40 MG: 20 CAPSULE, DELAYED RELEASE ORAL at 05:13

## 2024-02-05 RX ADMIN — PROPRANOLOL HYDROCHLORIDE 20 MG: 10 TABLET ORAL at 05:13

## 2024-02-05 RX ADMIN — INSULIN HUMAN 2 UNITS: 100 INJECTION, SOLUTION PARENTERAL at 12:04

## 2024-02-05 RX ADMIN — VALPROIC ACID 500 MG: 500 SOLUTION ORAL at 05:11

## 2024-02-05 RX ADMIN — CEFTAZIDIME 2000 MG: 1 INJECTION, POWDER, FOR SOLUTION INTRAMUSCULAR; INTRAVENOUS at 11:27

## 2024-02-05 RX ADMIN — AMLODIPINE BESYLATE 5 MG: 5 TABLET ORAL at 05:13

## 2024-02-05 ASSESSMENT — PAIN DESCRIPTION - PAIN TYPE: TYPE: ACUTE PAIN

## 2024-02-05 NOTE — CARE PLAN
The patient is Stable - Low risk of patient condition declining or worsening    Shift Goals  Clinical Goals: Patient safety throughout this shift  Patient Goals: CJ  Family Goals: CJ    Progress made toward(s) clinical / shift goals:  Patient q2 turns , able to sleep comfortably. Bed alarm set, call light in reach.    Patient is not progressing towards the following goals:      Problem: Knowledge Deficit - Standard  Goal: Patient and family/care givers will demonstrate understanding of plan of care, disease process/condition, diagnostic tests and medications  Description: Target End Date:  1-3 days or as soon as patient condition allows    Document in Patient Education    1.  Patient and family/caregiver oriented to unit, equipment, visitation policy and means for communicating concern  2.  Complete/review Learning Assessment  3.  Assess knowledge level of disease process/condition, treatment plan, diagnostic tests and medications  4.  Explain disease process/condition, treatment plan, diagnostic tests and medications  Outcome: Not Progressing

## 2024-02-05 NOTE — DISCHARGE SUMMARY
Discharge Summary    CHIEF COMPLAINT ON ADMISSION  Chief Complaint   Patient presents with    ALOC       Reason for Admission  EMS    Admission Date  1/28/2024     CODE STATUS  Full Code    HPI & HOSPITAL COURSE  Wale Olivas is a 78 y.o. male who presented to [facility] on 1/28/2024. He has a history of dementia, COPD, dyslipidemia, primary hypertension, severe Alzheimer's dementia, type 2 diabetes, chronic anticoagulation atrial fibrillation.  He was sent to [facility]  from [facility] with altered mentation and was found to have a UTI, pneumonia, proteus mirabilis bacteremia and a uti due to pseudomonas aeruginosa and proteus mirabilis. He also had associated metabolic encephalopathy and acute on chronic dysphagia. His repeat blood cultures are negative and he completed a course of IV fortaz.    He worked closely with speech therapy and after discussions regarding goals of care and prognosis with family, the decision was made to eat and take medications despite risk of aspiration and the associated risk of pneumonia. We also discussed the option of hospice in the future which was declined at this time as patient's acute encephalopathy improved once the infection was treated and he was able to resume his chronic oral medications. He should continue speech, PT and OT at the [facility]. He will return there today. He should continue on his chronic oxygen. Intermittent monitoring of his chronic anemia and mild thrombocytopenia are recommended. He had hematuria while here associated the the acute uti/ hemorrhagic cystis and sepsis that resolved. He had an acute copd exacerbation that has resolved and acute renal failure associated with atn and now back in baseline range.     Therefore, he is discharged in fair and stable condition to skilled nursing facility.    The patient met 2-midnight criteria for an inpatient stay at the time of discharge.      FOLLOW UP ITEMS POST DISCHARGE  [facility]  [facility]    DISCHARGE  DIAGNOSES  Principal Problem:    Sepsis (HCC) (POA: Yes)  Active Problems:    Chronic obstructive pulmonary disease with acute exacerbation (HCC) (POA: Yes)    Chronic anticoagulation (POA: Yes)    JOSE ENRIQUE (acute kidney injury) (HCC) (POA: Yes)    Primary hypertension (POA: Yes)    Dyslipidemia (POA: Yes)    Severe early onset Alzheimer's dementia (HCC) (POA: Yes)    Advanced care planning/counseling discussion (POA: Yes)    Type 2 diabetes mellitus with hyperglycemia, with long-term current use of insulin (HCC) (POA: Yes)    Thrombocytopenia (HCC) (POA: Unknown)    Gross hematuria (POA: Unknown)    Pneumonia of left lower lobe due to infectious organism (POA: Unknown)    IVC (inferior vena cava obstruction) (POA: Unknown)    Bacteremia due to Gram-negative bacteria (POA: Unknown)    Dysphagia (POA: Unknown)  Resolved Problems:    Sepsis secondary to UTI, JOSE ENRIQUE  (HCC) (POA: Yes)      FOLLOW UP  No future appointments.  No follow-up provider specified.    MEDICATIONS ON DISCHARGE     Medication List        ASK your doctor about these medications        Instructions   acetaminophen 325 MG Tabs  Commonly known as: Tylenol   Take 650 mg by mouth 2 times a day as needed for Moderate Pain.  Dose: 650 mg     amLODIPine 5 MG Tabs  Commonly known as: Norvasc   Take 5 mg by mouth every day.  Dose: 5 mg     baclofen 10 MG Tabs  Commonly known as: Lioresal   Take 10 mg by mouth 2 times a day.  Dose: 10 mg     clotrimazole-betamethasone 1-0.05 % Crea  Commonly known as: Lotrisone   Apply 1 Application topically 2 times a day. Apply to back and thights for rash  Dose: 1 Application     divalproex 125 MG Csdr  Commonly known as: Depakote Sprinkle  Ask about: Which instructions should I use?   Take 500 mg by mouth in the morning, at noon, and at bedtime. 4 tablets (500 mg), three times daily  Dose: 500 mg     DULoxetine HCl 30 MG Csdr   Take 30 mg by mouth every day.  Dose: 30 mg     Eliquis 2.5mg Tabs  Generic drug: apixaban   Take 2.5  mg by mouth 2 times a day.  Dose: 2.5 mg     finasteride 5 MG Tabs  Commonly known as: Proscar   Take 5 mg by mouth every day.  Dose: 5 mg     fluticasone furoate-vilanterol 100-25 MCG/ACT Aepb  Commonly known as: Breo   Inhale 1 Puff every morning.  Dose: 1 Puff     Incruse Ellipta 62.5 MCG/ACT Aepb inhaler  Generic drug: umeclidinium bromide   Inhale 1 Puff every evening.  Dose: 1 Puff     insulin glargine 100 UNIT/ML Soln  Commonly known as: Lantus   Inject 10 Units under the skin 2 times a day.  Dose: 10 Units     ipratropium-albuterol 0.5-2.5 (3) MG/3ML nebulizer solution  Commonly known as: Duoneb   Take 3 mL by nebulization 2 times a day.  Dose: 3 mL     lidocaine 4 % Ptch  Commonly known as: Asperflex   Place 2 Patches on the skin every 12 hours. Lower back side by side  12 hours on and 12 hours off  Dose: 2 Patch     NovoLIN R 100 Unit/mL Soln  Generic drug: insulin regular   Inject 2-10 Units under the skin 4 Times a Day,Before Meals and at Bedtime. Sliding Scale  201-250= 2 units  251-300= 4 units  301-350= 6 units  351-400= 8 units  Greater than 400 call MD  Dose: 2-10 Units     nystatin powder  Commonly known as: Mycostatin   Apply 1 Application topically 2 times a day. Apply under abd folds for 14 days  Dose: 1 Application     pantoprazole 40 MG Tbec  Commonly known as: Protonix   Take 40 mg by mouth every day.  Dose: 40 mg     polyethylene glycol/lytes Pack  Commonly known as: Miralax   Take 17 g by mouth every day. Hold for loose stools  Dose: 17 g     pravastatin 20 MG Tabs  Commonly known as: Pravachol   Take 80 mg by mouth every evening. 4 tablets (80 mg)  Dose: 80 mg     propranolol 20 MG Tabs  Commonly known as: Inderal   Take 20 mg by mouth every 12 hours.  Dose: 20 mg     risperiDONE 1 MG Tabs  Commonly known as: RisperDAL   Take 1 mg by mouth 3 times a day. Indications: psychosis  Dose: 1 mg     sennosides 8.6 MG Tabs  Commonly known as: Senokot   Take 8.6 mg by mouth 2 times a day.  Dose:  8.6 mg     tamsulosin 0.4 MG capsule  Commonly known as: Flomax   Take 0.4 mg by mouth every day.  Dose: 0.4 mg              Allergies  Allergies   Allergen Reactions    Metformin Unspecified     Unknown reaction, listed on MAR     Morphine Unspecified     Unknown reaction, listed on MAR     Simvastatin Unspecified     Unknown reaction, listed on MAR     Spironolactone Unspecified     Unknown reaction, listed on MAR        DIET  Orders Placed This Encounter   Procedures    Diet Order Diet: Full Liquid (with meds as tolerated despite risk of aspiration)     Standing Status:   Standing     Number of Occurrences:   1     Order Specific Question:   Diet:     Answer:   Full Liquid [11]     Comments:   with meds as tolerated despite risk of aspiration       ACTIVITY  As tolerated and directed by skilled nursing.  Weight bearing as tolerated    LINES, DRAINS, AND WOUNDS  This is an automated list. Peripheral IVs will be removed prior to discharge.  Peripheral IV 01/28/24 20 G Anterior;Right Forearm (Active)   Site Assessment Clean;Dry;Intact 02/04/24 2045   Dressing Type Transparent 02/04/24 2045   Line Status Scrubbed the hub prior to access;Flushed;Infusing 02/04/24 2045   Dressing Status Clean;Dry;Intact 02/04/24 2045   Dressing Intervention N/A 02/04/24 2045   Dressing Change Due 02/03/24 01/31/24 2110   Date Primary Tubing Changed 02/03/24 02/03/24 2100   NEXT Primary Tubing Change  02/03/24 01/30/24 2100   NEXT Secondary Tubing Change  02/10/24 02/03/24 2100   [place] 0 02/04/24 0800   Phlebitis Scale ([facility] Only) 0 02/04/24 0800       Peripheral IV 01/28/24 18 G Anterior;Left Upper arm (Active)   Site Assessment Clean;Dry;Intact 02/04/24 2045   Dressing Type Transparent 02/04/24 2045   Line Status Scrubbed the hub prior to access;Flushed;Saline locked 02/04/24 2045   Dressing Status Clean;Dry;Intact 02/04/24 2045   Dressing Intervention N/A 02/04/24 2045   Dressing Change Due 02/03/24  01/30/24 2100   Date Primary Tubing Changed 02/03/24 02/03/24 2100   NEXT Primary Tubing Change  02/10/24 02/03/24 2100   NEXT Secondary Tubing Change  02/03/24 01/30/24 2100   Infiltration Grading ([facility], [facility]) 0 02/04/24 0800   Phlebitis Scale ([facility] Only) 0 02/04/24 0800       Wound 11/02/23 Coccyx POA DTI (Active)       Wound 11/04/23 Partial Thickness Wound Penis Skin Tear (Active)       Wound 01/10/24 Pressure Injury Leg;Knee Lateral Left POA DTI (Active)       Wound 01/10/24 Pressure Injury Pretibial Anterior Right POA stage 1 (Active)       Wound 01/30/24 Skin Tear Pretibial Left (Active)   Wound Image   01/30/24 2100   Site Assessment Clean;Intact;Dry 02/04/24 2101   Periwound Assessment Clean;Dry;Intact 02/04/24 2101   Drainage Amount Scant 01/31/24 2110   Drainage Description Sanguineous 01/31/24 2110   Treatments Site care 01/31/24 2110   Wound Cleansing Soap and Water 01/31/24 2110   Dressing Status Clean;Intact 01/31/24 2110   Dressing Changed Observed 02/04/24 2101   Dressing Cleansing/Solutions Not Applicable 01/30/24 2100   Dressing Options Mepitel One 02/04/24 2101   Dressing Change/Treatment Frequency As Needed 02/03/24 2022   NEXT Dressing Change/Treatment Date 02/06/24 01/30/24 2100   NEXT Weekly Photo (Inpatient Only) 02/07/24 01/31/24 2110       Peripheral IV 01/28/24 20 G Anterior;Right Forearm (Active)   Site Assessment Clean;Dry;Intact 02/04/24 2045   Dressing Type Transparent 02/04/24 2045   Line Status Scrubbed the hub prior to access;Flushed;Infusing 02/04/24 2045   Dressing Status Clean;Dry;Intact 02/04/24 2045   Dressing Intervention N/A 02/04/24 2045   Dressing Change Due 02/03/24 01/31/24 2110   Date Primary Tubing Changed 02/03/24 02/03/24 2100   NEXT Primary Tubing Change  02/03/24 01/30/24 2100   NEXT Secondary Tubing Change  02/10/24 02/03/24 2100   Infiltration Grading ([facility], [facility]) 0 02/04/24 0800   Phlebitis Scale ([facility] Only) 0 02/04/24 0800       Peripheral IV 01/28/24  18 G Anterior;Left Upper arm (Active)   Site Assessment Clean;Dry;Intact 02/04/24 2045   Dressing Type Transparent 02/04/24 2045   Line Status Scrubbed the hub prior to access;Flushed;Saline locked 02/04/24 2045   Dressing Status Clean;Dry;Intact 02/04/24 2045   Dressing Intervention N/A 02/04/24 2045   Dressing Change Due 02/03/24 01/30/24 2100   Date Primary Tubing Changed 02/03/24 02/03/24 2100   NEXT Primary Tubing Change  02/10/24 02/03/24 2100   NEXT Secondary Tubing Change  02/03/24 01/30/24 2100   [facility] 0 02/04/24 0800   Phlebitis Scale ([facility] Only) 0 02/04/24 0800               MENTAL STATUS ON TRANSFER  Axox1     CONSULTATIONS  Palliative care    PROCEDURES  CT-CHEST,ABDOMEN,PELVIS W/O   Final Result      1.  Abnormal appearance of left kidney. Left perinephric fat stranding. Mild left hydronephrosis. Mild left urothelial thickening suggested. Findings could be due to pyelonephritis versus recently passed stone.   2.  Thick-walled urinary bladder with surrounding fat stranding suspicious for cystitis.   3.  Bibasilar pulmonary infiltrates and bronchitis.   4.  Moderate compression fracture of the superior L5 vertebral body which is interval new since prior. It appears acute or subacute with fracture line visible.      DX-CHEST-PORTABLE (1 VIEW)   Final Result         Hazy opacity in the left lower lung, concerning for pneumonia.      CT-HEAD W/O   Final Result         1. No acute intracranial abnormality. No evidence of acute intracranial hemorrhage or mass lesion.      2. Unchanged large right parietal encephalomalacia.                     LABORATORY  Lab Results   Component Value Date    SODIUM 142 02/01/2024    POTASSIUM 3.7 02/01/2024    CHLORIDE 105 02/01/2024    CO2 26 02/01/2024    GLUCOSE 151 (H) 02/01/2024    BUN 26 (H) 02/01/2024    CREATININE 1.34 02/01/2024        Lab Results   Component Value Date    WBC 6.0 02/05/2024    HEMOGLOBIN 9.5 (L) 02/05/2024     HEMATOCRIT 28.3 (L) 02/05/2024    PLATELETCT 145 (L) 02/05/2024        Total time of the discharge process exceeds 45 minutes.

## 2024-02-05 NOTE — DISCHARGE PLANNING
Case Management Discharge Planning    Admission Date: 1/28/2024  GMLOS: 5.1  ALOS: 8    6-Clicks ADL Score: 6  6-Clicks Mobility Score: 6    Anticipated Discharge Dispo: Discharge Disposition: D/T to SNF with Medicare cert in anticipation of skilled care (03)  Discharge Address: [address] [facility]    DME Needed: No    Action(s) Taken: Patient Conference, LMSW let pt know of DC to [facility], pt agreeing. LMSW left VM for pts daughter.   LMSW set up transport for 1400 via [facility].  LMSW gave IMM at bedside.     Escalations Completed: None    Medically Clear: Yes    Barriers to Discharge: Transportation coming at 1400    Is the patient up for discharge tomorrow: No, today.

## 2024-02-05 NOTE — DISCHARGE PLANNING
DC Transport Scheduled    Transport Company Scheduled:  [employer]    Scheduled Date: 2/5/2024  Scheduled Time: 1400    Destination: [facility]  [address] [facility] [place]    Notified care team of scheduled transport via [employer].     If there are any changes needed to the DC transportation scheduled, please contact [facility] at ext. [phone] between the hours of 8265-1352 Mon-Fri. If outside those hours, contact the ED Case Manager at ext. [phone].

## 2024-02-05 NOTE — CARE PLAN
The patient is Stable - Low risk of patient condition declining or worsening        Problem: Hemodynamics  Goal: Patient's hemodynamics, fluid balance and neurologic status will be stable or improve  Outcome: Progressing     Problem: Pain - Standard  Goal: Alleviation of pain or a reduction in pain to the patient’s comfort goal  Outcome: Progressing     Shift Goals  Clinical Goals: Safety, IV abx, 1:1 feeding  Patient Goals: CJ  Family Goals: CJ    Progress made toward(s) clinical / shift goals:      Pt alert and oriented to self only. VSS. On o2 3L via nasal cannula. No complaints of pain. Last dose of IV antibiotics was given. [facility]. Reinforced education on fall precaution.

## 2024-02-05 NOTE — DIETARY
Nutrition Update:    Day 8 of admit.  Wale Olivas is a 78 y.o. male with admitting DX of Sepsis   Patient being followed to optimize nutrition.    Current Diet: Full liquids with supplements. Pt continues with poor intake of meals with <50% of all recent recorded meals. Pt is drinking 50-75% of supplements (only 2 recorded). RD to change supplements from Ensure plus to Boost VHC to help provide additional nutrients. RD encourages intake of all meals and supplements. Today is day 8 inadequate nutrition while in acute care. Per MD note family considering artificial nutrition. Given pts dysphagia, inadequate intake and advanced dementia pt would benefit from long term artificial nutrition to help meet estimated needs if within pt's POC/GOC.     Problem: Nutritional:  Goal: Achieve adequate nutritional intake  Description: Patient will consume >/=50% of meals and supplements   Outcome: not Met    RD following

## 2024-02-08 ENCOUNTER — APPOINTMENT (OUTPATIENT)
Dept: RADIOLOGY | Facility: MEDICAL CENTER | Age: 79
End: 2024-02-08
Attending: EMERGENCY MEDICINE
Payer: COMMERCIAL

## 2024-02-08 ENCOUNTER — HOSPITAL ENCOUNTER (EMERGENCY)
Facility: MEDICAL CENTER | Age: 79
End: 2024-02-08
Attending: EMERGENCY MEDICINE
Payer: COMMERCIAL

## 2024-02-08 VITALS
OXYGEN SATURATION: 97 % | BODY MASS INDEX: 26.99 KG/M2 | HEART RATE: 63 BPM | RESPIRATION RATE: 114 BRPM | SYSTOLIC BLOOD PRESSURE: 140 MMHG | TEMPERATURE: 97.9 F | DIASTOLIC BLOOD PRESSURE: 100 MMHG | WEIGHT: 188.49 LBS | HEIGHT: 70 IN

## 2024-02-08 DIAGNOSIS — N39.0 URINARY TRACT INFECTION ASSOCIATED WITH INDWELLING URETHRAL CATHETER, INITIAL ENCOUNTER (HCC): ICD-10-CM

## 2024-02-08 DIAGNOSIS — F03.C11 SEVERE DEMENTIA WITH AGITATION, UNSPECIFIED DEMENTIA TYPE (HCC): ICD-10-CM

## 2024-02-08 DIAGNOSIS — T83.511A URINARY TRACT INFECTION ASSOCIATED WITH INDWELLING URETHRAL CATHETER, INITIAL ENCOUNTER (HCC): ICD-10-CM

## 2024-02-08 LAB
ALBUMIN SERPL BCP-MCNC: 3.2 G/DL (ref 3.2–4.9)
ALBUMIN/GLOB SERPL: 0.7 G/DL
ALP SERPL-CCNC: 63 U/L (ref 30–99)
ALT SERPL-CCNC: 10 U/L (ref 2–50)
ANION GAP SERPL CALC-SCNC: 8 MMOL/L (ref 7–16)
APPEARANCE UR: CLEAR
AST SERPL-CCNC: 16 U/L (ref 12–45)
BACTERIA #/AREA URNS HPF: NEGATIVE /HPF
BASOPHILS # BLD AUTO: 0.5 % (ref 0–1.8)
BASOPHILS # BLD: 0.05 K/UL (ref 0–0.12)
BILIRUB SERPL-MCNC: 0.3 MG/DL (ref 0.1–1.5)
BILIRUB UR QL STRIP.AUTO: NEGATIVE
BUN SERPL-MCNC: 10 MG/DL (ref 8–22)
CALCIUM ALBUM COR SERPL-MCNC: 9.7 MG/DL (ref 8.5–10.5)
CALCIUM SERPL-MCNC: 9.1 MG/DL (ref 8.5–10.5)
CHLORIDE SERPL-SCNC: 106 MMOL/L (ref 96–112)
CO2 SERPL-SCNC: 31 MMOL/L (ref 20–33)
COLOR UR: YELLOW
CREAT SERPL-MCNC: 1.12 MG/DL (ref 0.5–1.4)
EKG IMPRESSION: NORMAL
EOSINOPHIL # BLD AUTO: 0.82 K/UL (ref 0–0.51)
EOSINOPHIL NFR BLD: 8.7 % (ref 0–6.9)
EPI CELLS #/AREA URNS HPF: NEGATIVE /HPF
ERYTHROCYTE [DISTWIDTH] IN BLOOD BY AUTOMATED COUNT: 48.5 FL (ref 35.9–50)
GFR SERPLBLD CREATININE-BSD FMLA CKD-EPI: 68 ML/MIN/1.73 M 2
GLOBULIN SER CALC-MCNC: 4.5 G/DL (ref 1.9–3.5)
GLUCOSE SERPL-MCNC: 76 MG/DL (ref 65–99)
GLUCOSE UR STRIP.AUTO-MCNC: NEGATIVE MG/DL
HCT VFR BLD AUTO: 34.1 % (ref 42–52)
HGB BLD-MCNC: 11.5 G/DL (ref 14–18)
HYALINE CASTS #/AREA URNS LPF: ABNORMAL /LPF
IMM GRANULOCYTES # BLD AUTO: 0.05 K/UL (ref 0–0.11)
IMM GRANULOCYTES NFR BLD AUTO: 0.5 % (ref 0–0.9)
KETONES UR STRIP.AUTO-MCNC: NEGATIVE MG/DL
LACTATE SERPL-SCNC: 1.5 MMOL/L (ref 0.5–2)
LEUKOCYTE ESTERASE UR QL STRIP.AUTO: ABNORMAL
LYMPHOCYTES # BLD AUTO: 1.57 K/UL (ref 1–4.8)
LYMPHOCYTES NFR BLD: 16.6 % (ref 22–41)
MCH RBC QN AUTO: 30.4 PG (ref 27–33)
MCHC RBC AUTO-ENTMCNC: 33.7 G/DL (ref 32.3–36.5)
MCV RBC AUTO: 90.2 FL (ref 81.4–97.8)
MICRO URNS: ABNORMAL
MONOCYTES # BLD AUTO: 0.53 K/UL (ref 0–0.85)
MONOCYTES NFR BLD AUTO: 5.6 % (ref 0–13.4)
NEUTROPHILS # BLD AUTO: 6.42 K/UL (ref 1.82–7.42)
NEUTROPHILS NFR BLD: 68.1 % (ref 44–72)
NITRITE UR QL STRIP.AUTO: NEGATIVE
NRBC # BLD AUTO: 0 K/UL
NRBC BLD-RTO: 0 /100 WBC (ref 0–0.2)
PH UR STRIP.AUTO: 7.5 [PH] (ref 5–8)
PLATELET # BLD AUTO: 194 K/UL (ref 164–446)
PMV BLD AUTO: 8.6 FL (ref 9–12.9)
POTASSIUM SERPL-SCNC: 3.6 MMOL/L (ref 3.6–5.5)
PROT SERPL-MCNC: 7.7 G/DL (ref 6–8.2)
PROT UR QL STRIP: 100 MG/DL
RBC # BLD AUTO: 3.78 M/UL (ref 4.7–6.1)
RBC # URNS HPF: ABNORMAL /HPF
RBC UR QL AUTO: ABNORMAL
SODIUM SERPL-SCNC: 145 MMOL/L (ref 135–145)
SP GR UR STRIP.AUTO: 1.01
UROBILINOGEN UR STRIP.AUTO-MCNC: 0.2 MG/DL
WBC # BLD AUTO: 9.4 K/UL (ref 4.8–10.8)
WBC #/AREA URNS HPF: ABNORMAL /HPF

## 2024-02-08 PROCEDURE — 36415 COLL VENOUS BLD VENIPUNCTURE: CPT

## 2024-02-08 PROCEDURE — 81001 URINALYSIS AUTO W/SCOPE: CPT

## 2024-02-08 PROCEDURE — 700111 HCHG RX REV CODE 636 W/ 250 OVERRIDE (IP): Mod: JZ | Performed by: EMERGENCY MEDICINE

## 2024-02-08 PROCEDURE — 96374 THER/PROPH/DIAG INJ IV PUSH: CPT

## 2024-02-08 PROCEDURE — 80053 COMPREHEN METABOLIC PANEL: CPT

## 2024-02-08 PROCEDURE — 51701 INSERT BLADDER CATHETER: CPT

## 2024-02-08 PROCEDURE — 87086 URINE CULTURE/COLONY COUNT: CPT

## 2024-02-08 PROCEDURE — 99285 EMERGENCY DEPT VISIT HI MDM: CPT

## 2024-02-08 PROCEDURE — 93005 ELECTROCARDIOGRAM TRACING: CPT | Performed by: EMERGENCY MEDICINE

## 2024-02-08 PROCEDURE — 70450 CT HEAD/BRAIN W/O DYE: CPT

## 2024-02-08 PROCEDURE — 700101 HCHG RX REV CODE 250: Performed by: EMERGENCY MEDICINE

## 2024-02-08 PROCEDURE — 85025 COMPLETE CBC W/AUTO DIFF WBC: CPT

## 2024-02-08 PROCEDURE — 71045 X-RAY EXAM CHEST 1 VIEW: CPT

## 2024-02-08 PROCEDURE — 87040 BLOOD CULTURE FOR BACTERIA: CPT

## 2024-02-08 PROCEDURE — 83605 ASSAY OF LACTIC ACID: CPT

## 2024-02-08 RX ORDER — HALOPERIDOL 5 MG/ML
5 INJECTION INTRAMUSCULAR ONCE
Status: COMPLETED | OUTPATIENT
Start: 2024-02-08 | End: 2024-02-08

## 2024-02-08 RX ORDER — PROPRANOLOL HYDROCHLORIDE 20 MG/1
20 TABLET ORAL EVERY 12 HOURS
Status: SHIPPED | COMMUNITY
End: 2024-02-26

## 2024-02-08 RX ORDER — GRANULES FOR ORAL 3 G/1
3 POWDER ORAL ONCE
Status: COMPLETED | OUTPATIENT
Start: 2024-02-08 | End: 2024-02-08

## 2024-02-08 RX ORDER — PROPRANOLOL HYDROCHLORIDE 10 MG/1
10 TABLET ORAL EVERY 12 HOURS
COMMUNITY

## 2024-02-08 RX ADMIN — HALOPERIDOL LACTATE 5 MG: 5 INJECTION, SOLUTION INTRAMUSCULAR at 06:06

## 2024-02-08 RX ADMIN — FOSFOMYCIN TROMETHAMINE 3 G: 3 GRANULE, FOR SOLUTION ORAL at 09:46

## 2024-02-08 ASSESSMENT — FIBROSIS 4 INDEX: FIB4 SCORE: 4.19

## 2024-02-08 NOTE — ED NOTES
PIV est w/ difficulty, labs collected and sent including first set of cultures  PIV secured w/ coban   Pt on the monitor and on baseline 2L NC      Bed alarm on and armed  Fall risk precautions in place, bed rails up, bed in locked position  This RN making frequent rounds on pt  Pt continues to scream and throw arms, jumbled speech

## 2024-02-08 NOTE — ED TRIAGE NOTES
"Chief Complaint   Patient presents with    ALOC     Pt BIBA from [facility] for AMS, unusual for baseline. LKW 1800 last night, Hx dementia/Alzheimers      BP (!) 157/96   Pulse 93   Temp 36.7 °C (98.1 °F) (Temporal)   Resp 20   Ht 1.778 m (5' 10\")   Wt 85.5 kg (188 lb 7.9 oz)   SpO2 96%   BMI 27.05 kg/m²     Pt BIBA from [facility] for above cc  Staff reports LKW at 1800 last night and also reports pt is baseline A&O x1-3? Pt has hx of strokes, seizures, dementia, Alzheimers and staff also reported pt usually communicates more    Pt admitted/ discharged recently from here for sepsis  Pt arrives at EMS reported baseline  Pt yelling and screaming w/ garbled speech    EMS also reports that staff reported they have not been giving him his meds since his d/c (inc abx) bc he will not cooperate     Baseline 2L NC, baseline does not walk, VSS    BS en route 93  "

## 2024-02-08 NOTE — ED PROVIDER NOTES
ED Provider Note    CHIEF COMPLAINT  No chief complaint on file.      EXTERNAL RECORDS REVIEWED  Recent admission from 1/28/2024 to 2/5/2024.  Patient was found to have urinary tract infection, pneumonia and Proteus mirabilis bacteremia, and UTI.    HPI/ROS  LIMITATION TO HISTORY   Select: Altered mental status / Confusion  OUTSIDE HISTORIAN(S):  Report taken directly from EMS, see below for the details they provided      Wale Olivas is a 78 y.o. male who presents for confusion.  Patient was just discharged from the hospital 3 days ago for similar.  Patient with a longstanding history of dementia, Alzheimer's, and multiple prior CVAs.  He has a resident at the [facility].  Patient apparently with mild change in his behavior over the last 48 hours.  Per EMS report patient is typically ANO x 1, this is not significantly changed however per [facility] patient was less conversant than normal.  No reported falls.  Patient is not vomiting.  Per EMS there is some question whether patient is receiving his oral medication that he was discharged on.    PAST MEDICAL HISTORY   has a past medical history of Anxiety, BPH (benign prostatic hyperplasia), CKD (chronic kidney disease), COPD (chronic obstructive pulmonary disease) ([facility]), COVID-19, Dementia ([facility]), GERD (gastroesophageal reflux disease), Hyperlipidemia, Insomnia, Major depressive disorder, and Type II diabetes mellitus ([facility]).    SURGICAL HISTORY  patient denies any surgical history    FAMILY HISTORY  No family history on file.    SOCIAL HISTORY  Social History     Tobacco Use    Smoking status: Never    Smokeless tobacco: Never   Vaping Use    Vaping Use: Never used   Substance and Sexual Activity    Alcohol use: Not Currently    Drug use: Not Currently    Sexual activity: Not on file       CURRENT MEDICATIONS  Home Medications    **Home medications have not yet been reviewed for this encounter**         ALLERGIES  Allergies   Allergen Reactions     Metformin Unspecified     Unknown reaction, listed on MAR     Morphine Unspecified     Unknown reaction, listed on MAR     Simvastatin Unspecified     Unknown reaction, listed on MAR     Spironolactone Unspecified     Unknown reaction, listed on MAR        PHYSICAL EXAM  VITAL SIGNS: There were no vitals taken for this visit.   Physical Exam  Constitutional:       Appearance: Normal appearance.   HENT:      Head: Normocephalic.      Right Ear: Tympanic membrane normal.      Left Ear: Tympanic membrane normal.      Nose: Nose normal.      Mouth/Throat:      Mouth: Mucous membranes are moist.   Eyes:      Extraocular Movements: Extraocular movements intact.      Pupils: Pupils are equal, round, and reactive to light.   Cardiovascular:      Rate and Rhythm: Normal rate and regular rhythm.   Pulmonary:      Effort: Pulmonary effort is normal. No respiratory distress.      Breath sounds: Normal breath sounds. No stridor. No wheezing or rales.   Chest:      Chest wall: No tenderness.   Abdominal:      General: Abdomen is flat. There is no distension.      Palpations: Abdomen is soft. There is no mass.      Tenderness: There is no abdominal tenderness.   Genitourinary:     Comments: Wet diaper, no associated sacral decubitus ulcers  Musculoskeletal:      Cervical back: Normal range of motion.   Skin:     General: Skin is warm.      Capillary Refill: Capillary refill takes less than 2 seconds.   Neurological:      Mental Status: He is alert.      Comments: Alert and oriented x 1   Psychiatric:         Mood and Affect: Mood normal.           DIAGNOSTIC STUDIES / PROCEDURES  EKG  I have independently interpreted this EKG  EKG is atrial fibrillation, periodic PVC, normal axis, normal intervals, no associated regional ST elevation or depression    LABS  Results for orders placed or performed during the hospital encounter of 02/08/24   CBC WITH DIFFERENTIAL   Result Value Ref Range    WBC 9.4 4.8 - 10.8 K/uL    RBC 3.78 (L) 4.70  - 6.10 M/uL    Hemoglobin 11.5 (L) 14.0 - 18.0 g/dL    Hematocrit 34.1 (L) 42.0 - 52.0 %    MCV 90.2 81.4 - 97.8 fL    MCH 30.4 27.0 - 33.0 pg    MCHC 33.7 32.3 - 36.5 g/dL    RDW 48.5 35.9 - 50.0 fL    Platelet Count 194 164 - 446 K/uL    MPV 8.6 (L) 9.0 - 12.9 fL    Neutrophils-Polys 68.10 44.00 - 72.00 %    Lymphocytes 16.60 (L) 22.00 - 41.00 %    Monocytes 5.60 0.00 - 13.40 %    Eosinophils 8.70 (H) 0.00 - 6.90 %    Basophils 0.50 0.00 - 1.80 %    Immature Granulocytes 0.50 0.00 - 0.90 %    Nucleated RBC 0.00 0.00 - 0.20 /100 WBC    Neutrophils (Absolute) 6.42 1.82 - 7.42 K/uL    Lymphs (Absolute) 1.57 1.00 - 4.80 K/uL    Monos (Absolute) 0.53 0.00 - 0.85 K/uL    Eos (Absolute) 0.82 (H) 0.00 - 0.51 K/uL    Baso (Absolute) 0.05 0.00 - 0.12 K/uL    Immature Granulocytes (abs) 0.05 0.00 - 0.11 K/uL    NRBC (Absolute) 0.00 K/uL   CMP   Result Value Ref Range    Sodium 145 135 - 145 mmol/L    Potassium 3.6 3.6 - 5.5 mmol/L    Chloride 106 96 - 112 mmol/L    Co2 31 20 - 33 mmol/L    Anion Gap 8.0 7.0 - 16.0    Glucose 76 65 - 99 mg/dL    Bun 10 8 - 22 mg/dL    Creatinine 1.12 0.50 - 1.40 mg/dL    Calcium 9.1 8.5 - 10.5 mg/dL    Correct Calcium 9.7 8.5 - 10.5 mg/dL    AST(SGOT) 16 12 - 45 U/L    ALT(SGPT) 10 2 - 50 U/L    Alkaline Phosphatase 63 30 - 99 U/L    Total Bilirubin 0.3 0.1 - 1.5 mg/dL    Albumin 3.2 3.2 - 4.9 g/dL    Total Protein 7.7 6.0 - 8.2 g/dL    Globulin 4.5 (H) 1.9 - 3.5 g/dL    A-G Ratio 0.7 g/dL   URINALYSIS    Specimen: Urine   Result Value Ref Range    Color Yellow     Character Clear     Specific Gravity 1.010 <1.035    Ph 7.5 5.0 - 8.0    Glucose Negative Negative mg/dL    Ketones Negative Negative mg/dL    Protein 100 (A) Negative mg/dL    Bilirubin Negative Negative    Urobilinogen, Urine 0.2 Negative    Nitrite Negative Negative    Leukocyte Esterase Small (A) Negative    Occult Blood Moderate (A) Negative    Micro Urine Req Microscopic    Lactic Acid   Result Value Ref Range    Lactic Acid  1.5 0.5 - 2.0 mmol/L   ESTIMATED GFR   Result Value Ref Range    GFR (CKD-EPI) 68 >60 mL/min/1.73 m 2   URINE MICROSCOPIC (W/UA)   Result Value Ref Range    WBC 10-20 (A) /hpf    RBC 20-50 (A) /hpf    Bacteria Negative None /hpf    Epithelial Cells Negative /hpf    Hyaline Cast 0-2 /lpf   EKG   Result Value Ref Range    Report       [facility] Emergency Dept.    Test Date:  2024  Pt Name:    FRENCH TUBBS                Department: ER  MRN:        [id number]                      Room:        08  Gender:     Male                         Technician: [id number]  :        [id number]                   Requested By:IFTIKHAR ROQUE  Order #:    [id number]                    Reading MD:    Measurements  Intervals                                Axis  Rate:       87                           P:          0  WI:         0                            QRS:        19  QRSD:       85                           T:          119  QT:         374  QTc:        450    Interpretive Statements  Atrial fibrillation  Low voltage, extremity leads  Nonspecific repol abnormality, diffuse leads  Artifact in lead(s) I,II,III,aVR,aVL,aVF,V1,V3,V4,V5,V6  Compared to ECG 2024 08:06:45  Low QRS voltage now present  Early repolarization now present  ST (T wave) deviation no longer present           RADIOLOGY  I have independently interpreted the diagnostic imaging associated with this visit and am waiting the final reading from the radiologist.   My preliminary interpretation is as follows: CT head is unremarkable, chest is also unremarkable  Radiologist interpretation:   CT-HEAD W/O   Final Result         1.  No acute intracranial abnormality is identified, there are nonspecific white matter changes, commonly associated with small vessel ischemic disease.  Associated mild cerebral atrophy is noted.   2.  Bilateral maxillary and ethmoid sinusitis changes   3.  Atherosclerosis.         DX-CHEST-PORTABLE (1 VIEW)   Final Result          1.  No acute cardiopulmonary disease.            COURSE & MEDICAL DECISION MAKING        INITIAL ASSESSMENT, COURSE AND PLAN  Care Narrative: Patient here, recently discharged after being treated for bacteremia, pneumonia and urinary tract infection.  Questionable compliance to outpatient antibiotic regimen at the [facility], per EMS.  I attempted to call the [facility] but no one is answering.  I left a message.  Patient appears well, he appears at his baseline.  Given that the report is that this is a change however we will check CT head, and basic labs, will send sepsis orders to ensure patient is no longer bacteremic there was suspicion of this is low in this patient without any associated fever.  Pt labs are unrevealing and reassuring.  Patient was having some behavioral issues, he was given a dose of Haldol and has been sleeping comfortably after this.  I did believe this was necessary in order to help facilitate CT.  CT fails to reveal any concerning new findings.  Urinalysis questionable for mild infection though my suspicion is relatively low here.  Will give fosfomycin single dose.  I was able to finally get a hold of the nurse for patient at the [facility], she has no other concerns.  I also discussed the case with patient's daughter who does not have any further concerns.  Return precautions reviewed.        DISPOSITION AND DISCUSSIONS      Barriers to care at this time, including but not limited to: Patient lacks transportation .     Decision tools and prescription drugs considered including, but not limited to: Outpatient empiric antibiotics were deferred as my suspicion of ongoing septicemia was very low    FINAL DIAGNOSIS  1. Severe dementia with agitation, unspecified dementia type (HCC)    2. Urinary tract infection associated with indwelling urethral catheter, initial encounter ([facility])

## 2024-02-08 NOTE — DISCHARGE INSTRUCTIONS
Patient had a questionable urinary tract infection, we treated him with a dose of fosfomycin, this should help with any ongoing infection, he does not need multiple doses of this, single dose is generally sufficient.  Patient's labs and imaging including CT head were otherwise unremarkable for concerning findings

## 2024-02-08 NOTE — DISCHARGE PLANNING
Per RN pt [facility].  CM called daughter for permission as pt is dememted. Called Jose @ # on FS.  She gives [facility].  No notes from MD yet available, COBRA prepared, faxed PCS and FS to [facility].  Called  [facility] to confirm receipt, transfer set for 1010.  Primary RN updated.

## 2024-02-08 NOTE — ED NOTES
Bedside report received from NAHEED Richmond. Assumed patient care. Verified patient identification. Patient resting in bed, connected to monitor, respirations even and unlabored. Patient currently on 2L.  Standard safety precautions in place. Gurney in low position, side rail up for pt safety. Call light within reach. ED tech at bedside to perform EKG.

## 2024-02-08 NOTE — ED NOTES
Bedside report to NAHEED Peguero. Patient remains on 2L NC. Social work/Case management contacted for transportation assistance.

## 2024-02-08 NOTE — ED NOTES
Telephone report given to Shahab at the [facility]. They are ready to accept the patient back when [facility] transport is arranged. Case Management and Social Workers coordinating transport.

## 2024-02-08 NOTE — ED NOTES
Med rec partially completed per partial mar via [facility] [place] [phone].    Waiting for insulin info to be faxed over from facility.

## 2024-02-08 NOTE — ED NOTES
Pt. Cleansed of incontinent urine.  Awaiting ride via [facility] back to [facility] nursing.

## 2024-02-10 LAB
BACTERIA UR CULT: NORMAL
SIGNIFICANT IND 70042: NORMAL
SITE SITE: NORMAL
SOURCE SOURCE: NORMAL

## 2024-02-26 ENCOUNTER — HOSPITAL ENCOUNTER (EMERGENCY)
Facility: MEDICAL CENTER | Age: 79
End: 2024-02-26
Attending: EMERGENCY MEDICINE
Payer: MEDICARE

## 2024-02-26 VITALS
BODY MASS INDEX: 26.92 KG/M2 | WEIGHT: 188 LBS | TEMPERATURE: 96.8 F | OXYGEN SATURATION: 100 % | HEIGHT: 70 IN | DIASTOLIC BLOOD PRESSURE: 66 MMHG | HEART RATE: 65 BPM | SYSTOLIC BLOOD PRESSURE: 147 MMHG | RESPIRATION RATE: 18 BRPM

## 2024-02-26 DIAGNOSIS — Z79.01 CHRONIC ANTICOAGULATION: ICD-10-CM

## 2024-02-26 DIAGNOSIS — S81.812A LEG LACERATION, LEFT, INITIAL ENCOUNTER: ICD-10-CM

## 2024-02-26 PROCEDURE — 700101 HCHG RX REV CODE 250: Performed by: EMERGENCY MEDICINE

## 2024-02-26 PROCEDURE — 304217 HCHG IRRIGATION SYSTEM

## 2024-02-26 PROCEDURE — 99284 EMERGENCY DEPT VISIT MOD MDM: CPT

## 2024-02-26 PROCEDURE — A6403 STERILE GAUZE>16 <= 48 SQ IN: HCPCS

## 2024-02-26 PROCEDURE — 304999 HCHG REPAIR-SIMPLE/INTERMED LEVEL 1

## 2024-02-26 PROCEDURE — 303747 HCHG EXTRA SUTURE

## 2024-02-26 RX ORDER — BISACODYL 10 MG
10 SUPPOSITORY, RECTAL RECTAL PRN
COMMUNITY

## 2024-02-26 RX ORDER — LIDOCAINE HYDROCHLORIDE AND EPINEPHRINE 10; 10 MG/ML; UG/ML
20 INJECTION, SOLUTION INFILTRATION; PERINEURAL ONCE
Status: COMPLETED | OUTPATIENT
Start: 2024-02-26 | End: 2024-02-26

## 2024-02-26 RX ADMIN — LIDOCAINE HYDROCHLORIDE AND EPINEPHRINE 20 ML: 10; 10 INJECTION, SOLUTION INFILTRATION; PERINEURAL at 09:00

## 2024-02-26 ASSESSMENT — FIBROSIS 4 INDEX: FIB4 SCORE: 1.98

## 2024-02-26 NOTE — ED NOTES
Med rec is complete per mar from [facility].  Allergies reviewed.    Has patient had any outside antibiotics in the last 30 days? N    Any Anticoagulants (rivaroxaban, apixaban, edoxaban, dabigatran, warfarin, enoxaparin) taken in the last 14 days? Y  Anticoagulant name: Eliquis , Last dose: 2/25'24 @ 9551-1148.        [facility]

## 2024-02-26 NOTE — ED TRIAGE NOTES
"Chief Complaint   Patient presents with    Wound Check     BIB[facility] from [facility] for concern of skin tear to left shin.  Pt was being transferred from the  to bed last night and shin scraped on WC.  Wound has not stopped bleeding since then. Per EMS, dressing was changed 3-4 times overnight.  Pt on eliquis.      ALOC     Per EMS, pt has dementia at baseline.  Per staff at the facility pt normally converses with staff but over the past couple of weeks pt has intermittently stopped talking.  Pt alert on arrival but nonverbal currently.  Blood sugar 178 for EMS.      BP (!) 162/74   Pulse 61   Temp 35.8 °C (96.5 °F) (Temporal)   Resp 14   Ht 1.778 m (5' 10\")   Wt 85.3 kg (188 lb)   SpO2 100%   BMI 26.98 kg/m²     Pt placed on the monitor. Bed alarm placed under pt.  Pt on 2L NC which is baseline per EMS for COPD.  Pt has POLST at bedside with DNR.    "

## 2024-02-26 NOTE — ED PROVIDER NOTES
ED Provider Note    CHIEF COMPLAINT  Chief Complaint   Patient presents with    Wound Check     BIBA from [facility] for concern of skin tear to left shin.  Pt was being transferred from the  to bed last night and shin scraped on WC.  Wound has not stopped bleeding since then. Per EMS, dressing was changed 3-4 times overnight.  Pt on eliquis.      ALOC     Per EMS, pt has dementia at baseline.  Per staff at the facility pt normally converses with staff but over the past couple of weeks pt has intermittently stopped talking.  Pt alert on arrival but nonverbal currently.  Blood sugar 178 for EMS.        EXTERNAL RECORDS REVIEWED  Patient's last encounter was an ED visit February 8 of this year he was seen with early sepsis detection.  He presented with confusion after recent discharge from the hospital.  Patient has a known history of dementia, multiple prior CVAs.  He is a resident at the [facility].  At baseline, he is alert and oriented x 1.    HPI/ROS  LIMITATION TO HISTORY   Select: Altered mental status / Confusion  OUTSIDE HISTORIAN(S):  EMS multiple dressing changes    Wale Olivas is a 78 y.o. male who presents to the emergency department via EMS.  Apparently, staff was moving him from his wheelchair earlier today when his left lower extremity was injured.  Dressing applied but he has soaked through the dressing multiple times.  He is on Eliquis.  Due to the lack of hemostasis, ambulance was called to transport him here for further evaluation.  EMS does note that patient soaked through the dressing prior to their arrival.  No history is available from the patient.    PAST MEDICAL HISTORY   has a past medical history of Alzheimer's dementia ([facility]), Anxiety, BPH (benign prostatic hyperplasia), CKD (chronic kidney disease), COPD (chronic obstructive pulmonary disease) ([facility]), COVID-19, Dementia ([facility]), Dementia ([facility]), GERD (gastroesophageal reflux disease), Hyperlipidemia,  Hypertension, Insomnia, Major depressive disorder, Seizure disorder (HCC), Stroke (HCC), and Type II diabetes mellitus (HCC).    SURGICAL HISTORY  patient denies any surgical history    FAMILY HISTORY  No family history on file.    SOCIAL HISTORY  Social History     Tobacco Use    Smoking status: Never    Smokeless tobacco: Never   Vaping Use    Vaping Use: Never used   Substance and Sexual Activity    Alcohol use: Not Currently    Drug use: Not Currently    Sexual activity: Not on file       CURRENT MEDICATIONS  Home Medications       Reviewed by Valerio Desouza (Pharmacy Tech) on 02/26/24 at 1055  Med List Status: Unable to Obtain     Medication Last Dose Status   acetaminophen (TYLENOL) 325 MG Tab  Active   amLODIPine (NORVASC) 5 MG Tab  Active   apixaban (ELIQUIS) 2.5mg Tab  Active   baclofen (LIORESAL) 10 MG Tab  Active   clotrimazole-betamethasone (LOTRISONE) 1-0.05 % Cream  Active   divalproex (DEPAKOTE SPRINKLE) 125 MG Capsule Delayed Release Sprinkle  Active   DULoxetine HCl 30 MG Capsule Delayed Release Sprinkle  Active   finasteride (PROSCAR) 5 MG Tab  Active   fluticasone furoate-vilanterol (BREO) 100-25 MCG/ACT AEROSOL POWDER, BREATH ACTIVATED  Active   insulin glargine (LANTUS) 100 UNIT/ML SC SOLN  Active   insulin regular (NOVOLIN R) 100 Unit/mL Solution  Active   ipratropium-albuterol (DUONEB) 0.5-2.5 (3) MG/3ML nebulizer solution  Active   lidocaine (ASPERFLEX) 4 % Patch  Active   pantoprazole (PROTONIX) 40 MG Tablet Delayed Response  Active   polyethylene glycol/lytes (MIRALAX) 17 g Pack  Active   pravastatin (PRAVACHOL) 20 MG Tab  Active   propranolol (INDERAL) 10 MG Tab  Active   propranolol (INDERAL) 20 MG Tab  Active   risperiDONE (RISPERDAL) 1 MG Tab  Active   sennosides (SENOKOT) 8.6 MG Tab  Active   tamsulosin (FLOMAX) 0.4 MG capsule  Active   Umeclidinium Bromide (INCRUSE ELLIPTA) 62.5 MCG/ACT AEROSOL POWDER, BREATH ACTIVATED  Active                    ALLERGIES  Allergies   Allergen  "Reactions    Metformin Unspecified     Unknown reaction, listed on MAR     Morphine Unspecified     Unknown reaction, listed on MAR     Simvastatin Unspecified     Unknown reaction, listed on MAR     Spironolactone Unspecified     Unknown reaction, listed on MAR        PHYSICAL EXAM  VITAL SIGNS: BP (!) 167/74   Pulse 63   Temp 35.8 °C (96.5 °F) (Temporal)   Resp 13   Ht 1.778 m (5' 10\")   Wt 85.3 kg (188 lb)   SpO2 100%   BMI 26.98 kg/m²    Vitals reviewed.  Constitutional: Patient is oriented to person. No distress.    Head: Normocephalic and atraumatic.   Eyes: Conjunctivae are normal.  Cardiovascular: Normal rate, regular rhythm and normal heart sounds. Normal peripheral pulses.  Pulmonary/Chest: Effort normal and breath sounds normal. No respiratory distress  Abdominal: Soft. Bowel sounds are normal. There is no tenderness  Musculoskeletal: No edema and no tenderness.   Neurological: limited exam  Skin: Skin is warm and dry. No erythema. No pallor. Large laceration to the lateral aspect of the left lower extremity.  There continues to be a small amount of oozing from the wound.  Psychiatric: Patient has a normal mood and affect.     DIAGNOSTIC STUDIES / PROCEDURES    Laceration Repair Procedure    Indication: Laceration    Location/Description: Left lateral leg, lower    Procedure: The patient was placed in the appropriate position and anesthesia around the laceration was obtained by infiltration using 1% Lidocaine with epinephrine. The area was then cleansed using NS. The laceration was closed with 4-0 Ethilon using interrupted sutures. There were no additional lacerations requiring repair. The wound area was then dressed with a sterile dressing.      Total repaired wound length: 14 cm.     Other Items: Suture count: 12    The patient tolerated the procedure well.    Complications: None    LABS    RADIOLOGY    COURSE & MEDICAL DECISION MAKING    ED Observation Status? No; Patient does not meet criteria " for ED Observation.     INITIAL ASSESSMENT, COURSE AND PLAN  Care Narrative:     This is a 78-year-old male.  [facility].  He has a history of dementia.  Oriented to 1 at baseline only.  No history is available from the patient although when asked if his leg hurts he says yes.  He is advised of need for irrigation and I think it is amenable to repair which will help with hemostasis.  Patient has a POLST form at bedside.  It is dated February 13 of this year.  He is a DNR.    Patient tolerated laceration repair well.  Plan for discharge back to his care home.  He is stable.    DISPOSITION AND DISCUSSIONS  I have discussed management of the patient with the following physicians and DEANNA's:  None    Discussion of management with other QHP or appropriate source(s): None     Escalation of care considered, and ultimately not performed: None    Barriers to care at this time, including but not limited to: None.     Decision tools and prescription drugs considered including, but not limited to: None.    FINAL DIAGNOSIS  1. Leg laceration, left, initial encounter    2. Chronic anticoagulation           Electronically signed by: Meghana Owen D.O., 2/26/2024 9:38 AM

## 2024-02-26 NOTE — DISCHARGE INSTRUCTIONS
Sutures will need to be removed in approximately 5 to 7 days.  Keep wound clean and covered.  Watch for signs of infection: Redness, swelling, drainage, odor.  If any of these develop, return for reevaluation.

## 2024-02-26 NOTE — ED NOTES
[facility]. Pts placed in wheelchair and the  took pt. Pt was 99% on RA. D/C instructions given to . Telephone report was given to [facility] RN.

## 2024-02-26 NOTE — DISCHARGE PLANNING
Received report that Pt is medically cleared for  transport back to facility, contacted [facility], spoke to Albert from admissions at [phone] and [phone], he transferred me to [facility] and she reports that their  van will pick him up shortly.  Writer spoke to Pt's daughter/DPOA Jose Olivas  [phone], [facility].    COBRA/transfer  packet placed in ED chart.   ED RN notified

## 2024-04-12 ENCOUNTER — HOSPITAL ENCOUNTER (EMERGENCY)
Facility: MEDICAL CENTER | Age: 79
End: 2024-04-13
Attending: STUDENT IN AN ORGANIZED HEALTH CARE EDUCATION/TRAINING PROGRAM
Payer: MEDICARE

## 2024-04-12 ENCOUNTER — APPOINTMENT (OUTPATIENT)
Dept: RADIOLOGY | Facility: MEDICAL CENTER | Age: 79
End: 2024-04-12
Attending: STUDENT IN AN ORGANIZED HEALTH CARE EDUCATION/TRAINING PROGRAM
Payer: MEDICARE

## 2024-04-12 DIAGNOSIS — J18.9 PNEUMONIA OF RIGHT UPPER LOBE DUE TO INFECTIOUS ORGANISM: ICD-10-CM

## 2024-04-12 LAB
ALBUMIN SERPL BCP-MCNC: 2.7 G/DL (ref 3.2–4.9)
ALBUMIN/GLOB SERPL: 0.6 G/DL
ALP SERPL-CCNC: 61 U/L (ref 30–99)
ALT SERPL-CCNC: 6 U/L (ref 2–50)
ANION GAP SERPL CALC-SCNC: 9 MMOL/L (ref 7–16)
APPEARANCE UR: CLEAR
AST SERPL-CCNC: 12 U/L (ref 12–45)
BASOPHILS # BLD AUTO: 0.8 % (ref 0–1.8)
BASOPHILS # BLD: 0.05 K/UL (ref 0–0.12)
BILIRUB SERPL-MCNC: 0.2 MG/DL (ref 0.1–1.5)
BILIRUB UR QL STRIP.AUTO: NEGATIVE
BUN SERPL-MCNC: 20 MG/DL (ref 8–22)
CALCIUM ALBUM COR SERPL-MCNC: 9.6 MG/DL (ref 8.5–10.5)
CALCIUM SERPL-MCNC: 8.6 MG/DL (ref 8.5–10.5)
CHLORIDE SERPL-SCNC: 101 MMOL/L (ref 96–112)
CO2 SERPL-SCNC: 26 MMOL/L (ref 20–33)
COLOR UR: YELLOW
CREAT SERPL-MCNC: 1.48 MG/DL (ref 0.5–1.4)
EOSINOPHIL # BLD AUTO: 1.18 K/UL (ref 0–0.51)
EOSINOPHIL NFR BLD: 17.9 % (ref 0–6.9)
ERYTHROCYTE [DISTWIDTH] IN BLOOD BY AUTOMATED COUNT: 45.6 FL (ref 35.9–50)
GFR SERPLBLD CREATININE-BSD FMLA CKD-EPI: 48 ML/MIN/1.73 M 2
GLOBULIN SER CALC-MCNC: 4.5 G/DL (ref 1.9–3.5)
GLUCOSE SERPL-MCNC: 208 MG/DL (ref 65–99)
GLUCOSE UR STRIP.AUTO-MCNC: 250 MG/DL
HCT VFR BLD AUTO: 31.4 % (ref 42–52)
HGB BLD-MCNC: 10.4 G/DL (ref 14–18)
IMM GRANULOCYTES # BLD AUTO: 0.11 K/UL (ref 0–0.11)
IMM GRANULOCYTES NFR BLD AUTO: 1.7 % (ref 0–0.9)
KETONES UR STRIP.AUTO-MCNC: NEGATIVE MG/DL
LACTATE SERPL-SCNC: 1.9 MMOL/L (ref 0.5–2)
LEUKOCYTE ESTERASE UR QL STRIP.AUTO: NEGATIVE
LYMPHOCYTES # BLD AUTO: 2.11 K/UL (ref 1–4.8)
LYMPHOCYTES NFR BLD: 32 % (ref 22–41)
MAGNESIUM SERPL-MCNC: 1.8 MG/DL (ref 1.5–2.5)
MCH RBC QN AUTO: 30.8 PG (ref 27–33)
MCHC RBC AUTO-ENTMCNC: 33.1 G/DL (ref 32.3–36.5)
MCV RBC AUTO: 92.9 FL (ref 81.4–97.8)
MICRO URNS: ABNORMAL
MONOCYTES # BLD AUTO: 0.55 K/UL (ref 0–0.85)
MONOCYTES NFR BLD AUTO: 8.3 % (ref 0–13.4)
NEUTROPHILS # BLD AUTO: 2.6 K/UL (ref 1.82–7.42)
NEUTROPHILS NFR BLD: 39.3 % (ref 44–72)
NITRITE UR QL STRIP.AUTO: NEGATIVE
NRBC # BLD AUTO: 0 K/UL
NRBC BLD-RTO: 0 /100 WBC (ref 0–0.2)
PH UR STRIP.AUTO: 6.5 [PH] (ref 5–8)
PLATELET # BLD AUTO: 107 K/UL (ref 164–446)
PMV BLD AUTO: 8.7 FL (ref 9–12.9)
POTASSIUM SERPL-SCNC: 4.2 MMOL/L (ref 3.6–5.5)
PROCALCITONIN SERPL-MCNC: 0.07 NG/ML
PROT SERPL-MCNC: 7.2 G/DL (ref 6–8.2)
PROT UR QL STRIP: NEGATIVE MG/DL
RBC # BLD AUTO: 3.38 M/UL (ref 4.7–6.1)
RBC UR QL AUTO: NEGATIVE
SODIUM SERPL-SCNC: 136 MMOL/L (ref 135–145)
SP GR UR STRIP.AUTO: 1.01
UROBILINOGEN UR STRIP.AUTO-MCNC: 0.2 MG/DL
WBC # BLD AUTO: 6.6 K/UL (ref 4.8–10.8)

## 2024-04-12 PROCEDURE — 70450 CT HEAD/BRAIN W/O DYE: CPT

## 2024-04-12 PROCEDURE — 700117 HCHG RX CONTRAST REV CODE 255: Performed by: STUDENT IN AN ORGANIZED HEALTH CARE EDUCATION/TRAINING PROGRAM

## 2024-04-12 PROCEDURE — 83735 ASSAY OF MAGNESIUM: CPT

## 2024-04-12 PROCEDURE — 80053 COMPREHEN METABOLIC PANEL: CPT

## 2024-04-12 PROCEDURE — 71045 X-RAY EXAM CHEST 1 VIEW: CPT

## 2024-04-12 PROCEDURE — 84145 PROCALCITONIN (PCT): CPT

## 2024-04-12 PROCEDURE — 85025 COMPLETE CBC W/AUTO DIFF WBC: CPT

## 2024-04-12 PROCEDURE — 93005 ELECTROCARDIOGRAM TRACING: CPT

## 2024-04-12 PROCEDURE — 99285 EMERGENCY DEPT VISIT HI MDM: CPT

## 2024-04-12 PROCEDURE — 81003 URINALYSIS AUTO W/O SCOPE: CPT

## 2024-04-12 PROCEDURE — 36415 COLL VENOUS BLD VENIPUNCTURE: CPT

## 2024-04-12 PROCEDURE — 93005 ELECTROCARDIOGRAM TRACING: CPT | Performed by: STUDENT IN AN ORGANIZED HEALTH CARE EDUCATION/TRAINING PROGRAM

## 2024-04-12 PROCEDURE — 700105 HCHG RX REV CODE 258: Performed by: STUDENT IN AN ORGANIZED HEALTH CARE EDUCATION/TRAINING PROGRAM

## 2024-04-12 PROCEDURE — 83605 ASSAY OF LACTIC ACID: CPT

## 2024-04-12 PROCEDURE — 71260 CT THORAX DX C+: CPT

## 2024-04-12 RX ORDER — SODIUM CHLORIDE, SODIUM LACTATE, POTASSIUM CHLORIDE, CALCIUM CHLORIDE 600; 310; 30; 20 MG/100ML; MG/100ML; MG/100ML; MG/100ML
1000 INJECTION, SOLUTION INTRAVENOUS ONCE
Status: COMPLETED | OUTPATIENT
Start: 2024-04-12 | End: 2024-04-12

## 2024-04-12 RX ADMIN — IOHEXOL 80 ML: 350 INJECTION, SOLUTION INTRAVENOUS at 22:50

## 2024-04-12 RX ADMIN — SODIUM CHLORIDE, POTASSIUM CHLORIDE, SODIUM LACTATE AND CALCIUM CHLORIDE 1000 ML: 600; 310; 30; 20 INJECTION, SOLUTION INTRAVENOUS at 20:50

## 2024-04-12 ASSESSMENT — FIBROSIS 4 INDEX: FIB4 SCORE: 2.01

## 2024-04-13 VITALS
RESPIRATION RATE: 15 BRPM | WEIGHT: 188.05 LBS | HEART RATE: 63 BPM | TEMPERATURE: 98 F | DIASTOLIC BLOOD PRESSURE: 67 MMHG | SYSTOLIC BLOOD PRESSURE: 155 MMHG | HEIGHT: 70 IN | OXYGEN SATURATION: 96 % | BODY MASS INDEX: 26.92 KG/M2

## 2024-04-13 LAB — EKG IMPRESSION: NORMAL

## 2024-04-13 NOTE — ED NOTES
PASTOR at bedside. Pt is wet with urine. Pt and bedding changed. Report to [facility]. POLST transported with pt. Pt 22 GA IV remains in place that he arrived with that [facility] is using for [facility] administration.

## 2024-04-13 NOTE — ED NOTES
"Pt awoken to voice. Pr says, \"hello.\" Pt is relaxed and confused. Bed alarm remains in use. Call light within reach.   "

## 2024-04-13 NOTE — ED PROVIDER NOTES
ED Provider Note    Scribed for Dr. Rolan Gutierrez MD. by Howard Lord. 4/12/2024  10:00 PM     Primary Care Provider: Pt States None.    CHIEF COMPLAINT  Chief Complaint   Patient presents with    ALOC     Resident at [facility]. Staff called 911 because pt has been more altered than usual x 1 hour. Pt normally A & O x 1. Pt mumbling, to questions. Pt has recent pneumonia and is still receiving IV antibiotics.       EXTERNAL RECORDS REVIEWED  Inpatient Notes Patient seen on 2/8/24 reportedly off baseline from nursing home, however extensive workup performed showed normal limits. Patient was found to be not off of his baseline. Patient was also admitted on 1/28/24 for pneumonia, UTI, and proteus bacteremia    HPI/ROS  LIMITATION TO HISTORY   Select: Altered mental status / Confusion  OUTSIDE HISTORIAN(S):  None    Wale Olivas is a 79 y.o. male who presents for evaluation due to being more altered than usual onset prior to arrival. Patient does not respond upon initial questioning, but started to yell profanity during the physical exam. No alleviating or exacerbating factors noted. No known drug allergies.     PAST MEDICAL HISTORY   has a past medical history of Alzheimer's dementia ([facility]), Anxiety, BPH (benign prostatic hyperplasia), CKD (chronic kidney disease), COPD (chronic obstructive pulmonary disease) ([facility]), COVID-19, Dementia (HCC), Dementia (HCC), GERD (gastroesophageal reflux disease), Hyperlipidemia, Hypertension, Insomnia, Major depressive disorder, Seizure disorder ([facility]), Stroke ([facility]), and Type II diabetes mellitus ([facility]).    SURGICAL HISTORY  patient denies any surgical history    FAMILY HISTORY  History reviewed. No pertinent family history.    SOCIAL HISTORY  Social History     Tobacco Use    Smoking status: Never    Smokeless tobacco: Never   Vaping Use    Vaping Use: Never used   Substance and Sexual Activity    Alcohol use: Not Currently    Drug use: Not Currently     "Sexual activity: Not on file     CURRENT MEDICATIONS  Home Medications       Reviewed by eDbi Watters R.N. (Registered Nurse) on 04/12/24 at 2007  Med List Status: Unable to Obtain     Medication Last Dose Status   acetaminophen (TYLENOL) 325 MG Tab  Active   amLODIPine (NORVASC) 5 MG Tab  Active   apixaban (ELIQUIS) 2.5mg Tab  Active   baclofen (LIORESAL) 10 MG Tab  Active   bisacodyl (DULCOLAX) 10 MG Suppos  Active   divalproex (DEPAKOTE SPRINKLE) 125 MG Capsule Delayed Release Sprinkle  Active   DULoxetine HCl 30 MG Capsule Delayed Release Sprinkle  Active   finasteride (PROSCAR) 5 MG Tab  Active   fluticasone furoate-vilanterol (BREO) 100-25 MCG/ACT AEROSOL POWDER, BREATH ACTIVATED  Active   insulin glargine (LANTUS) 100 UNIT/ML SC SOLN  Active   insulin regular (NOVOLIN R) 100 Unit/mL Solution  Active   ipratropium-albuterol (DUONEB) 0.5-2.5 (3) MG/3ML nebulizer solution  Active   lidocaine (ASPERFLEX) 4 % Patch  Active   magnesium hydroxide (MILK OF MAGNESIA) 400 MG/5ML Suspension  Active   pantoprazole (PROTONIX) 40 MG Tablet Delayed Response  Active   polyethylene glycol/lytes (MIRALAX) 17 g Pack  Active   pravastatin (PRAVACHOL) 20 MG Tab  Active   propranolol (INDERAL) 10 MG Tab  Active   risperiDONE (RISPERDAL) 1 MG Tab  Active   sennosides (SENOKOT) 8.6 MG Tab  Active   tamsulosin (FLOMAX) 0.4 MG capsule  Active   Umeclidinium Bromide (INCRUSE ELLIPTA) 62.5 MCG/ACT AEROSOL POWDER, BREATH ACTIVATED  Active                  ALLERGIES  Allergies   Allergen Reactions    Metformin Unspecified     Unknown reaction, listed on MAR     Morphine Unspecified     Unknown reaction, listed on MAR     Simvastatin Unspecified     Unknown reaction, listed on MAR     Spironolactone Unspecified     Unknown reaction, listed on MAR      PHYSICAL EXAM  VITAL SIGNS: BP (!) 152/70   Pulse 70   Temp 36.5 °C (97.7 °F) (Temporal)   Resp 16   Ht 1.778 m (5' 10\")   Wt 85.3 kg (188 lb 0.8 oz)   SpO2 98%   BMI 26.98 kg/m²  "   Physical Exam  Vitals and nursing note reviewed.   Constitutional:       Appearance: He is well-developed.   HENT:      Head: Normocephalic.   Cardiovascular:      Rate and Rhythm: Normal rate and regular rhythm.      Heart sounds: No murmur heard.  Pulmonary:      Effort: Pulmonary effort is normal.      Breath sounds: Normal breath sounds.   Abdominal:      Palpations: Abdomen is soft.      Tenderness: There is no abdominal tenderness.   Musculoskeletal:         General: Normal range of motion.      Right lower leg: No edema.      Left lower leg: No edema.   Skin:     General: Skin is warm.   Neurological:      General: No focal deficit present.      Mental Status: He is alert. Mental status is at baseline. He is confused.      Comments: Verbal response to physical stimulus.    Does not follow verbal commands withdraws to painful stimulus       EKG/LABS  Results for orders placed or performed during the hospital encounter of 04/12/24   CBC WITH DIFFERENTIAL   Result Value Ref Range    WBC 6.6 4.8 - 10.8 K/uL    RBC 3.38 (L) 4.70 - 6.10 M/uL    Hemoglobin 10.4 (L) 14.0 - 18.0 g/dL    Hematocrit 31.4 (L) 42.0 - 52.0 %    MCV 92.9 81.4 - 97.8 fL    MCH 30.8 27.0 - 33.0 pg    MCHC 33.1 32.3 - 36.5 g/dL    RDW 45.6 35.9 - 50.0 fL    Platelet Count 107 (L) 164 - 446 K/uL    MPV 8.7 (L) 9.0 - 12.9 fL    Neutrophils-Polys 39.30 (L) 44.00 - 72.00 %    Lymphocytes 32.00 22.00 - 41.00 %    Monocytes 8.30 0.00 - 13.40 %    Eosinophils 17.90 (H) 0.00 - 6.90 %    Basophils 0.80 0.00 - 1.80 %    Immature Granulocytes 1.70 (H) 0.00 - 0.90 %    Nucleated RBC 0.00 0.00 - 0.20 /100 WBC    Neutrophils (Absolute) 2.60 1.82 - 7.42 K/uL    Lymphs (Absolute) 2.11 1.00 - 4.80 K/uL    Monos (Absolute) 0.55 0.00 - 0.85 K/uL    Eos (Absolute) 1.18 (H) 0.00 - 0.51 K/uL    Baso (Absolute) 0.05 0.00 - 0.12 K/uL    Immature Granulocytes (abs) 0.11 0.00 - 0.11 K/uL    NRBC (Absolute) 0.00 K/uL   COMP METABOLIC PANEL   Result Value Ref Range     Sodium 136 135 - 145 mmol/L    Potassium 4.2 3.6 - 5.5 mmol/L    Chloride 101 96 - 112 mmol/L    Co2 26 20 - 33 mmol/L    Anion Gap 9.0 7.0 - 16.0    Glucose 208 (H) 65 - 99 mg/dL    Bun 20 8 - 22 mg/dL    Creatinine 1.48 (H) 0.50 - 1.40 mg/dL    Calcium 8.6 8.5 - 10.5 mg/dL    Correct Calcium 9.6 8.5 - 10.5 mg/dL    AST(SGOT) 12 12 - 45 U/L    ALT(SGPT) 6 2 - 50 U/L    Alkaline Phosphatase 61 30 - 99 U/L    Total Bilirubin 0.2 0.1 - 1.5 mg/dL    Albumin 2.7 (L) 3.2 - 4.9 g/dL    Total Protein 7.2 6.0 - 8.2 g/dL    Globulin 4.5 (H) 1.9 - 3.5 g/dL    A-G Ratio 0.6 g/dL   MAGNESIUM   Result Value Ref Range    Magnesium 1.8 1.5 - 2.5 mg/dL   LACTIC ACID   Result Value Ref Range    Lactic Acid 1.9 0.5 - 2.0 mmol/L   URINALYSIS,CULTURE IF INDICATED    Specimen: Urine, Cath   Result Value Ref Range    Color Yellow     Character Clear     Specific Gravity 1.015 <1.035    Ph 6.5 5.0 - 8.0    Glucose 250 (A) Negative mg/dL    Ketones Negative Negative mg/dL    Protein Negative Negative mg/dL    Bilirubin Negative Negative    Urobilinogen, Urine 0.2 Negative    Nitrite Negative Negative    Leukocyte Esterase Negative Negative    Occult Blood Negative Negative    Micro Urine Req see below    ESTIMATED GFR   Result Value Ref Range    GFR (CKD-EPI) 48 (A) >60 mL/min/1.73 m 2   PROCALCITONIN   Result Value Ref Range    Procalcitonin 0.07 <0.25 ng/mL   EKG (NOW)   Result Value Ref Range    Report       [facility] Emergency Dept[facility]    Test Date:  2024  Pt Name:    FRENCH TUBBS                Department: ER  MRN:        [id number]                      Room:        27  Gender:     Male                         Technician: [id number]  :        1945                   Requested By:ER TRIAGE PROTOCOL  Order #:    [id number]                    Ziggy MD: Rolan Gutierrez    Measurements  Intervals                                Axis  Rate:       70                           P:          -71  NJ:         215                           QRS:        11  QRSD:       83                           T:          57  QT:         386  QTc:        417    Interpretive Statements  Sinus or ectopic atrial rhythm  Low voltage, extremity leads  Compared to ECG 02/08/2024 07:20:02  Ectopic atrial rhythm now present  Low QRS voltage now present  Atrial fibrillation no longer present  Ventricular premature complex(es) no longer present  ST (T wave) deviation no longer present  Electronically Si gned On 04- 00:06:51 PDT by Rolan Gutierrez       I have independently interpreted this EKG    CARDIAC MONITORING    The cardiac monitor revealed sinus rhythm as interpreted by me. The cardiac monitor was ordered secondary to the patient’s complaint of bradycardia and to monitor the patient for dysrhythmia      COURSE & MEDICAL DECISION MAKING    ASSESSMENT, COURSE AND PLAN  Care Narrative:   Hydration: Based on the patient's presentation of Andrei ALOC the patient was given IV fluids. IV Hydration was used because oral hydration was not adequate alone. Upon recheck following hydration, the patient was improved.      10:18 PM - Patient was seen and evaluated at bedside. Patient presents to the ED for an ALOC.  After my exam, I discussed with the patient the plan of care, which includes treating the patient with medication for their symptoms, as well as obtaining lab work and imaging for further evaluation. Patient understands and verbalizes agreement to plan of care. Patient will be treated with LR bolus. Ordered DX-Chest-Portable, CT-Head w/o, Procalcitonin, Estimated GFR, Magnesium, CMP, CBC w/ diff., Lactic acid, and UA culture if indicated to evaluate.     12:02 AM - Called the patient's nurse at the [facility] Home to confirm whether the patient was taking IV antibiotics and what their concerns were when bringing him into the ED today.  They report diagnosing a right upper lobe pneumonia few days ago and placing him on IV antibiotics.  Today he was less  talkative than usual and also had some periods of bradycardia in the 40s.  This was on pulse oximeter and was not confirmed with other equipment.  Upon EMS arrival when he was switched to their monitor it was reading consistently in the 60s and there was no bradycardia for EMS.  Patient has been on telemetry here for the past 4 hours with no bradycardic episodes.  I suspect this is likely equipment error and not actual bradycardia.  Patient otherwise at baseline workup reassuring will plan for discharge and return back to nursing facility.    ADDITIONAL PROBLEMS MANAGED  Past Medical History:   Diagnosis Date    Alzheimer's dementia (HCC)     Anxiety     BPH (benign prostatic hyperplasia)     CKD (chronic kidney disease)     COPD (chronic obstructive pulmonary disease) (HCC)     COVID-19     Dementia (HCC)     Dementia (HCC)     GERD (gastroesophageal reflux disease)     Hyperlipidemia     Hypertension     Insomnia     Major depressive disorder     Seizure disorder (HCC)     Stroke ([facility])     Type II diabetes mellitus ([facility])        DISPOSITION AND DISCUSSIONS  I have discussed management of the patient with the following physicians and DEANNA's:  None    Discussion of management with other [facility] or appropriate source(s):  Spoke with [facility] nursing staff.      Escalation of care considered, and ultimately not performed:    Barriers to care at this time, including but not limited to: Patient does not have established PCP.     Decision tools and prescription drugs considered including, but not limited to: .    The patient will return for new or worsening symptoms and is stable at the time of discharge.    The patient is referred to a primary physician for blood pressure management, diabetic screening, and for all other preventative health concerns.    DISPOSITION:  Patient will be discharged home in stable condition.    FOLLOW UP:  [facility], Emergency Dept  [address]  [address]  [phone]        OUTPATIENT MEDICATIONS:  Discharge Medication List as of 4/13/2024  1:16 AM        FINAL DIAGNOSIS  1. Pneumonia of right upper lobe due to infectious organism       Howard VALENZUELA (Lewisibe), am scribing for, and in the presence of, Rolan uGtierrez M.D..    Electronically signed by: Howard Lord (Lewisibe), 4/12/2024    Rolan VALENZUELA M.D. personally performed the services described in this documentation, as scribed by Howard Lord in my presence, and it is both accurate and complete.

## 2024-04-13 NOTE — ED TRIAGE NOTES
"Chief Complaint   Patient presents with    ALOC     Resident at [facility]. Staff called 911 because pt has been more altered than usual x 1 hour. Pt normally A & O x 1. Pt mumbling, to questions. Pt has recent pneumonia and is still receiving IV antibiotics.     BIB EMS. VS: 133/ 69, HR 71, SPO2 98 % @l/ min NC (baseline), GCS 12, . 20 GA left AC.     Pt lethargic, opens eyes and states, \"Why the fuck do you want to know,\" when RN asks name. No further verbal responses other than moaning. Pt in brief.     DNR transported with pt, remains at bedside.     BP (!) 152/70   Pulse 70   Temp 36.5 °C (97.7 °F) (Temporal)   Resp 16   Ht 1.778 m (5' 10\")   Wt 85.3 kg (188 lb 0.8 oz)   SpO2 98%   BMI 26.98 kg/m²     "

## 2024-04-13 NOTE — DISCHARGE PLANNING
Spoke with eDbi WESTON RN to set up transportation back to the [facility] [address] [place].   Writer called and confirmed that this Pt does in fact live there in room D211.  Writer faxed PCS facesheet to [facility]: Pt does require gurney and continuous oxygen at 2LPM/NC.  Dx Dementia F03.90 and Oxygen Dependence Z99.81  Writer called non-emergency line for [facility], paperwork received,  time will be 0100. Notified Debi WESTON RN and the [facility] nurse and informed them of estimated  time.

## 2024-04-13 NOTE — ED NOTES
"Pt is alert. Pt says yes to a blanket and \"thank you. That's much better\" when given blanket.   "

## 2024-05-01 ENCOUNTER — APPOINTMENT (OUTPATIENT)
Dept: RADIOLOGY | Facility: MEDICAL CENTER | Age: 79
End: 2024-05-01
Attending: EMERGENCY MEDICINE
Payer: COMMERCIAL

## 2024-05-01 ENCOUNTER — HOSPITAL ENCOUNTER (EMERGENCY)
Facility: MEDICAL CENTER | Age: 79
End: 2024-05-01
Attending: EMERGENCY MEDICINE
Payer: COMMERCIAL

## 2024-05-01 VITALS
SYSTOLIC BLOOD PRESSURE: 164 MMHG | HEART RATE: 67 BPM | WEIGHT: 188 LBS | TEMPERATURE: 97.7 F | BODY MASS INDEX: 26.98 KG/M2 | OXYGEN SATURATION: 94 % | DIASTOLIC BLOOD PRESSURE: 74 MMHG | RESPIRATION RATE: 17 BRPM

## 2024-05-01 DIAGNOSIS — R06.02 SOB (SHORTNESS OF BREATH): ICD-10-CM

## 2024-05-01 LAB
ALBUMIN SERPL BCP-MCNC: 2.8 G/DL (ref 3.2–4.9)
ALBUMIN/GLOB SERPL: 0.7 G/DL
ALP SERPL-CCNC: 62 U/L (ref 30–99)
ALT SERPL-CCNC: 5 U/L (ref 2–50)
ANION GAP SERPL CALC-SCNC: 8 MMOL/L (ref 7–16)
AST SERPL-CCNC: 10 U/L (ref 12–45)
BASOPHILS # BLD AUTO: 0 % (ref 0–1.8)
BASOPHILS # BLD: 0 K/UL (ref 0–0.12)
BILIRUB SERPL-MCNC: <0.2 MG/DL (ref 0.1–1.5)
BUN SERPL-MCNC: 13 MG/DL (ref 8–22)
CALCIUM ALBUM COR SERPL-MCNC: 9.7 MG/DL (ref 8.5–10.5)
CALCIUM SERPL-MCNC: 8.7 MG/DL (ref 8.5–10.5)
CHLORIDE SERPL-SCNC: 105 MMOL/L (ref 96–112)
CO2 SERPL-SCNC: 29 MMOL/L (ref 20–33)
CREAT SERPL-MCNC: 1.2 MG/DL (ref 0.5–1.4)
EKG IMPRESSION: NORMAL
EOSINOPHIL # BLD AUTO: 2.18 K/UL (ref 0–0.51)
EOSINOPHIL NFR BLD: 25.9 % (ref 0–6.9)
ERYTHROCYTE [DISTWIDTH] IN BLOOD BY AUTOMATED COUNT: 48 FL (ref 35.9–50)
EXTRA TUBE BLU BLU: NORMAL
FLUAV RNA SPEC QL NAA+PROBE: NEGATIVE
FLUBV RNA SPEC QL NAA+PROBE: NEGATIVE
GFR SERPLBLD CREATININE-BSD FMLA CKD-EPI: 62 ML/MIN/1.73 M 2
GLOBULIN SER CALC-MCNC: 4.2 G/DL (ref 1.9–3.5)
GLUCOSE SERPL-MCNC: 142 MG/DL (ref 65–99)
HCT VFR BLD AUTO: 28.8 % (ref 42–52)
HGB BLD-MCNC: 9.4 G/DL (ref 14–18)
LACTATE SERPL-SCNC: 1 MMOL/L (ref 0.5–2)
LYMPHOCYTES # BLD AUTO: 2.02 K/UL (ref 1–4.8)
LYMPHOCYTES NFR BLD: 24.1 % (ref 22–41)
MANUAL DIFF BLD: NORMAL
MCH RBC QN AUTO: 30.7 PG (ref 27–33)
MCHC RBC AUTO-ENTMCNC: 32.6 G/DL (ref 32.3–36.5)
MCV RBC AUTO: 94.1 FL (ref 81.4–97.8)
MONOCYTES # BLD AUTO: 0.29 K/UL (ref 0–0.85)
MONOCYTES NFR BLD AUTO: 3.4 % (ref 0–13.4)
MORPHOLOGY BLD-IMP: NORMAL
NEUTROPHILS # BLD AUTO: 3.91 K/UL (ref 1.82–7.42)
NEUTROPHILS NFR BLD: 46.6 % (ref 44–72)
NRBC # BLD AUTO: 0 K/UL
NRBC BLD-RTO: 0 /100 WBC (ref 0–0.2)
PLATELET # BLD AUTO: 131 K/UL (ref 164–446)
PLATELET BLD QL SMEAR: NORMAL
PMV BLD AUTO: 8.7 FL (ref 9–12.9)
POTASSIUM SERPL-SCNC: 4.1 MMOL/L (ref 3.6–5.5)
PROT SERPL-MCNC: 7 G/DL (ref 6–8.2)
RBC # BLD AUTO: 3.06 M/UL (ref 4.7–6.1)
RBC BLD AUTO: NORMAL
RSV RNA SPEC QL NAA+PROBE: NEGATIVE
SARS-COV-2 RNA RESP QL NAA+PROBE: NOTDETECTED
SODIUM SERPL-SCNC: 142 MMOL/L (ref 135–145)
WBC # BLD AUTO: 8.4 K/UL (ref 4.8–10.8)

## 2024-05-01 RX ORDER — SODIUM CHLORIDE, SODIUM LACTATE, POTASSIUM CHLORIDE, CALCIUM CHLORIDE 600; 310; 30; 20 MG/100ML; MG/100ML; MG/100ML; MG/100ML
1000 INJECTION, SOLUTION INTRAVENOUS ONCE
Status: COMPLETED | OUTPATIENT
Start: 2024-05-01 | End: 2024-05-01

## 2024-05-01 RX ADMIN — SODIUM CHLORIDE, POTASSIUM CHLORIDE, SODIUM LACTATE AND CALCIUM CHLORIDE 1000 ML: 600; 310; 30; 20 INJECTION, SOLUTION INTRAVENOUS at 08:12

## 2024-05-01 ASSESSMENT — FIBROSIS 4 INDEX: FIB4 SCORE: 3.53

## 2024-05-01 NOTE — DISCHARGE PLANNING
SW contacted [facility]; [facility], [facility] but [facility] due to pt not being dependable to transfer in and out of wheelchair on his own at the current time.    BRIAN prepared and submitted [facility] PCS    [facility] scheduled for 1340, RN notified.

## 2024-05-01 NOTE — ED PROVIDER NOTES
CHIEF COMPLAINT  Chief Complaint   Patient presents with    Shortness of Breath     Patient was diagnosed with pneumonia 2 weeks ago and is now on his second course of antibiotics. Patient on 2L NC at baseline but was turned up to 4L for increasing shortness [facility]    ALOC     Patient A/Ox1 at baseline but oriented x 0 today. Patient alert and agitated        LIMITATION TO HISTORY   Patient's Alzheimer's.    HPI    Wale Olivas is a 79 y.o. male   Fortunate has Alzheimer's which is reducing his ability to talk  Apparently as per nurse is on antibiotics IV for pneumonia  From past charts patient has a history of pneumonia UTI Proteus bacteremia    And then history is only provided by the nurse.  She states the patient was hypoxic at times received albuterol treatment by EMS.  Is on 2 L now.  Was noted be more short of breath today by the [facility] staff    Patient unfortunate cannot give any history.  He has respiratory rate of 18.  He is mouth breathing.  Dry mouth.  Patient moves all extremities.  He responds to verbal stimuli.    OUTSIDE HISTORIAN(S):  The nurse is able to give me the history.    EXTERNAL RECORDS REVIEWED       Inpatient Notes Patient seen on 2/8/24 reportedly off baseline from nursing home, however extensive workup performed showed normal limits. Patient was found to be not off of his baseline. Patient was also admitted on 1/28/24 for pneumonia, UTI, and proteus bacteremia     REVIEW OF SYSTEMS  See above    PAST MEDICAL HISTORY  Past Medical History:   Diagnosis Date    Alzheimer's dementia (HCC)     Anxiety     BPH (benign prostatic hyperplasia)     CKD (chronic kidney disease)     COPD (chronic obstructive pulmonary disease) (HCC)     COVID-19     Dementia (HCC)     Dementia (HCC)     GERD (gastroesophageal reflux disease)     Hyperlipidemia     Hypertension     Insomnia     Major depressive disorder     Seizure disorder (HCC)     Stroke (HCC)     Type II diabetes  mellitus (HCC)        FAMILY HISTORY  History reviewed. No pertinent family history.    SOCIAL HISTORY  Social History     Tobacco Use    Smoking status: Never    Smokeless tobacco: Never   Vaping Use    Vaping Use: Never used   Substance Use Topics    Alcohol use: Not Currently    Drug use: Not Currently     Social History     Substance and Sexual Activity   Drug Use Not Currently       SURGICAL HISTORY  History reviewed. No pertinent surgical history.    CURRENT MEDICATIONS  No current facility-administered medications for this encounter.    Current Outpatient Medications:     bisacodyl (DULCOLAX) 10 MG Suppos, Insert 10 mg into the rectum as needed., Disp: , Rfl:     magnesium hydroxide (MILK OF MAGNESIA) 400 MG/5ML Suspension, Take 30 mL by mouth 1 time a day as needed. Indications: Constipation, Disp: , Rfl:     propranolol (INDERAL) 10 MG Tab, Take 10 mg by mouth every 12 hours., Disp: , Rfl:     sennosides (SENOKOT) 8.6 MG Tab, Take 8.6 mg by mouth 2 times a day., Disp: , Rfl:     divalproex (DEPAKOTE SPRINKLE) 125 MG Capsule Delayed Release Sprinkle, Take 500 mg by mouth in the morning, at noon, and at bedtime. 4 capsules (500 mg), three times daily, Disp: , Rfl:     lidocaine (ASPERFLEX) 4 % Patch, Place 2 Patches on the skin every 12 hours. Lower back side by side 12 hours on and 12 hours off, Disp: , Rfl:     acetaminophen (TYLENOL) 325 MG Tab, Take 650 mg by mouth 2 times a day as needed for Moderate Pain., Disp: , Rfl:     ipratropium-albuterol (DUONEB) 0.5-2.5 (3) MG/3ML nebulizer solution, Take 3 mL by nebulization 2 times a day., Disp: , Rfl:     insulin glargine (LANTUS) 100 UNIT/ML SC SOLN, Inject 10 Units under the skin 2 times a day. (Patient taking differently: Inject 10 Units under the skin 2 times a day.), Disp: 10 mL, Rfl:     baclofen (LIORESAL) 10 MG Tab, Take 10 mg by mouth 2 times a day., Disp: , Rfl:     insulin regular (NOVOLIN R) 100 Unit/mL Solution, Inject 2-10 Units under the skin 4  Times a Day,Before Meals and at Bedtime. Sliding Scale 201-250= 2 units 251-300= 4 units 301-350= 6 units 351-400= 8 units Greater than 400 call MD, Disp: , Rfl:     amLODIPine (NORVASC) 5 MG Tab, Take 5 mg by mouth every day., Disp: , Rfl:     apixaban (ELIQUIS) 2.5mg Tab, Take 2.5 mg by mouth 2 times a day., Disp: , Rfl:     risperiDONE (RISPERDAL) 1 MG Tab, Take 1 mg by mouth 3 times a day. After meals  Indications: psychosis, Disp: , Rfl:     fluticasone furoate-vilanterol (BREO) 100-25 MCG/ACT AEROSOL POWDER, BREATH ACTIVATED, Inhale 1 Puff every morning., Disp: , Rfl:     DULoxetine HCl 30 MG Capsule Delayed Release Sprinkle, Take 30 mg by mouth every day., Disp: , Rfl:     finasteride (PROSCAR) 5 MG Tab, Take 5 mg by mouth every day., Disp: , Rfl:     Umeclidinium Bromide (INCRUSE ELLIPTA) 62.5 MCG/ACT AEROSOL POWDER, BREATH ACTIVATED, Inhale 1 Puff every day., Disp: , Rfl:     pantoprazole (PROTONIX) 40 MG Tablet Delayed Response, Take 40 mg by mouth every day., Disp: , Rfl:     polyethylene glycol/lytes (MIRALAX) 17 g Pack, Take 17 g by mouth every day. Hold for loose stools, Disp: , Rfl:     pravastatin (PRAVACHOL) 20 MG Tab, Take 80 mg by mouth at bedtime. 4 tablets (80 mg), Disp: , Rfl:     tamsulosin (FLOMAX) 0.4 MG capsule, Take 0.4 mg by mouth every day., Disp: , Rfl:     ALLERGIES  Allergies   Allergen Reactions    Metformin Unspecified     Unknown reaction, listed on MAR     Morphine Unspecified     Unknown reaction, listed on MAR     Simvastatin Unspecified     Unknown reaction, listed on MAR     Spironolactone Unspecified     Unknown reaction, listed on MAR        PHYSICAL EXAM  VITAL SIGNS: BP (!) 164/75   Pulse 82   Temp 37.6 °C (99.6 °F)   Resp (!) 21   Wt 85.3 kg (188 lb)   SpO2 93%   BMI 26.98 kg/m²   Reviewed and noted patient has respiratory of 18 by myself.  Temperature 99.6  Constitutional: Well developed, Well nourished, no acute distress.  HENT: Normocephalic, atraumatic,  "bilateral external ears normal, No intraoral erythema, edema, exudate  Eyes: PERRLA, conjunctiva pink, no scleral icterus.   Cardiovascular: Regular rate and rhythm. No murmurs, rubs or gallops.  No dependent edema or calf tenderness  Respiratory: rhonchi/coarse breath sounds are noted on the right.  Some wheezing on the left.  Abdominal:  Abdomen soft, non-tender, non distended. No rebound, or guarding.    Skin: There is a peripheral IV in the left hand there is no streaking or erythema   Genitourinary: No costovertebral angle tenderness.   Musculoskeletal: no deformities.   Neurologic: Alert, no facial droop noted. All extra ocular muscles intact. Moves all extremities with out weakness noted  Psychiatric: Flat        MEDICAL DECISION MAKING:  PROBLEMS EVALUATED THIS VISIT:  Shortness of breath.  Patient is history of pneumonia patient currently is on 2 lines nasal cannula was up to 4 after and receiving nebulizers now 2 L here.  He has some wheezing slight on the left and some coarse breath sounds are possible rhonchi in the right with poor air movement.  Altered level of consciousness.  Patient is less talkative.  Patient hard to respond.  Apparently nurse tells me that he was \"going to hit her\".  Here the patient is not verbal to me.  He does respond with eye contact.  He is moving to be seems to be moving all extremities basically.         PLAN:  IV fluids the patient cannot fluids looks clinically dry  CBC  Metabolic panel  Lactate  These look for metabolic hematologic or infectious causes.  Point-of-care testing for flu RSV COVID.      RISK:  Patient at this point be discharged home.        Diagnostic tests and prescription drugs considered including, but not limited to: Select: Considered CT scan but the patient at this point Wells criteria did not like indication for PE DVT.  In addition the patient's hypoxia resolved quickly..    Escalation of care considered, and ultimately not performed: Improved with " treatment.  No abnormal signs noted..         RESULTS    LABS Ordered and Reviewed by Me:  Results for orders placed or performed during the hospital encounter of 05/01/24   CBC w/ Differential   Result Value Ref Range    WBC 8.4 4.8 - 10.8 K/uL    RBC 3.06 (L) 4.70 - 6.10 M/uL    Hemoglobin 9.4 (L) 14.0 - 18.0 g/dL    Hematocrit 28.8 (L) 42.0 - 52.0 %    MCV 94.1 81.4 - 97.8 fL    MCH 30.7 27.0 - 33.0 pg    MCHC 32.6 32.3 - 36.5 g/dL    RDW 48.0 35.9 - 50.0 fL    Platelet Count 131 (L) 164 - 446 K/uL    MPV 8.7 (L) 9.0 - 12.9 fL    Neutrophils-Polys 46.60 44.00 - 72.00 %    Lymphocytes 24.10 22.00 - 41.00 %    Monocytes 3.40 0.00 - 13.40 %    Eosinophils 25.90 (H) 0.00 - 6.90 %    Basophils 0.00 0.00 - 1.80 %    Nucleated RBC 0.00 0.00 - 0.20 /100 WBC    Neutrophils (Absolute) 3.91 1.82 - 7.42 K/uL    Lymphs (Absolute) 2.02 1.00 - 4.80 K/uL    Monos (Absolute) 0.29 0.00 - 0.85 K/uL    Eos (Absolute) 2.18 (H) 0.00 - 0.51 K/uL    Baso (Absolute) 0.00 0.00 - 0.12 K/uL    NRBC (Absolute) 0.00 K/uL   Complete Metabolic Panel (CMP)   Result Value Ref Range    Sodium 142 135 - 145 mmol/L    Potassium 4.1 3.6 - 5.5 mmol/L    Chloride 105 96 - 112 mmol/L    Co2 29 20 - 33 mmol/L    Anion Gap 8.0 7.0 - 16.0    Glucose 142 (H) 65 - 99 mg/dL    Bun 13 8 - 22 mg/dL    Creatinine 1.20 0.50 - 1.40 mg/dL    Calcium 8.7 8.5 - 10.5 mg/dL    Correct Calcium 9.7 8.5 - 10.5 mg/dL    AST(SGOT) 10 (L) 12 - 45 U/L    ALT(SGPT) 5 2 - 50 U/L    Alkaline Phosphatase 62 30 - 99 U/L    Total Bilirubin <0.2 0.1 - 1.5 mg/dL    Albumin 2.8 (L) 3.2 - 4.9 g/dL    Total Protein 7.0 6.0 - 8.2 g/dL    Globulin 4.2 (H) 1.9 - 3.5 g/dL    A-G Ratio 0.7 g/dL   LACTIC ACID   Result Value Ref Range    Lactic Acid 1.0 0.5 - 2.0 mmol/L   EXTRA TUBE,HÉCTOR   Result Value Ref Range    Extra Tube, Blue SORTED    ESTIMATED GFR   Result Value Ref Range    GFR (CKD-EPI) 62 >60 mL/min/1.73 m 2   DIFFERENTIAL MANUAL   Result Value Ref Range    Manual Diff Status  PERFORMED    PERIPHERAL SMEAR REVIEW   Result Value Ref Range    Peripheral Smear Review see below    PLATELET ESTIMATE   Result Value Ref Range    Plt Estimation Decreased    MORPHOLOGY   Result Value Ref Range    RBC Morphology Normal    EKG   Result Value Ref Range    Report       [facility] Emergency Dept.    Test Date:  2024  Pt Name:    FRENCH TUBBS                Department: ER  MRN:        [id number]                      Room:        13  Gender:     Male                         Technician: [id number]  :        [id number]                   Requested By:ER TRIAGE PROTOCOL  Order #:    [id number]                    Reading MD: Kenan CORDON MD    Measurements  Intervals                                Axis  Rate:       80                           P:          0  TN:         213                          QRS:        18  QRSD:       59                           T:          81  QT:         411  QTc:        475    Interpretive Statements  Sinus rhythm  Wandering sinus rhythm  Intervals TN's appear grossly normal  QRS normal  QTc elevated  Axis normal  Ischemia no ST segment elevations noted  PVCs noted  changed from EKG 2024  Electronically Signed On 2024 07:45:34 PDT by Kenan CORDON MD     POC CoV-2, FLU A/B, RSV by PCR   Result Value Ref Range    POC Influenza A RNA, PCR Negative Negative    POC Influenza B RNA, PCR Negative Negative    POC RSV, by PCR Negative Negative    POC SARS-CoV-2, PCR NotDetected          RADIOLOGY          Radiologist interpretation:   DX-CHEST-PORTABLE (1 VIEW)   Final Result      Mild bilateral interstitial patchy opacities which could be seen in setting edema and/or infection.            ED COURSE:    ED Observation Status? No   No noted need for observation for developing issue    INTERVENTIONS BY ME:  Medications   LR infusion (bolus) (1,000 mL Intravenous New Bag 24 0812)       Response on recheck:  Patient is resting  comfortably.  Looks well.  Not hypoxic on his baseline oxygen..    FINAL DISPO PLAN   [facility]  Continue [facility]  Consider repeat evaluation the patient decompensates    This is a very pleasant 79-year-old gentleman who apparently needed increased oxygen.  When patient came in after albuterol treatment patient actually resumed back to normal baseline.  He was tired appearing patient received IV fluids.  X-ray lab work not finding acute that required further evaluation the patchy infiltrates could be resolving pneumonia and the patient is on antibiotics.  White cell count is considered normal.  At this point with lab work and x-ray finding showed nothing acute and the [facility] he is getting early in the antibiotics and good care and if the patient knows return if symptoms worsen.    CONDITION: Improved..     FINAL IMPRESSION  1. SOB (shortness of breath)

## 2024-05-01 NOTE — ED TRIAGE NOTES
Chief Complaint   Patient presents with    Shortness of Breath     Patient was diagnosed with pneumonia 2 weeks ago and is now on his second course of antibiotics. Patient on 2L NC at baseline but was turned up to 4L for increasing shortness of breath [facility]    ALOC     Patient A/Ox1 at baseline but oriented x 0 today. Patient alert and agitated      [facility]. Patient given multiple breathing treatments without improvement. Patient given 1.5L NS. Chronic resendiz in place for retention.

## 2024-05-31 ENCOUNTER — APPOINTMENT (OUTPATIENT)
Dept: RADIOLOGY | Facility: MEDICAL CENTER | Age: 79
End: 2024-05-31
Attending: EMERGENCY MEDICINE
Payer: COMMERCIAL

## 2024-05-31 ENCOUNTER — HOSPITAL ENCOUNTER (OUTPATIENT)
Facility: MEDICAL CENTER | Age: 79
End: 2024-05-31
Attending: EMERGENCY MEDICINE | Admitting: HOSPITALIST
Payer: MEDICARE

## 2024-05-31 VITALS
OXYGEN SATURATION: 97 % | DIASTOLIC BLOOD PRESSURE: 65 MMHG | SYSTOLIC BLOOD PRESSURE: 144 MMHG | WEIGHT: 169.97 LBS | HEART RATE: 58 BPM | RESPIRATION RATE: 15 BRPM | HEIGHT: 70 IN | BODY MASS INDEX: 24.33 KG/M2 | TEMPERATURE: 97.3 F

## 2024-05-31 DIAGNOSIS — N39.0 ACUTE UTI: ICD-10-CM

## 2024-05-31 DIAGNOSIS — E86.0 DEHYDRATION: ICD-10-CM

## 2024-05-31 DIAGNOSIS — R41.82 ALTERED MENTAL STATUS, UNSPECIFIED ALTERED MENTAL STATUS TYPE: ICD-10-CM

## 2024-05-31 PROBLEM — G93.40 ACUTE ENCEPHALOPATHY: Status: ACTIVE | Noted: 2024-05-31

## 2024-05-31 LAB
ALBUMIN SERPL BCP-MCNC: 2.6 G/DL (ref 3.2–4.9)
ALBUMIN/GLOB SERPL: 0.6 G/DL
ALP SERPL-CCNC: 62 U/L (ref 30–99)
ALT SERPL-CCNC: 5 U/L (ref 2–50)
ANION GAP SERPL CALC-SCNC: 10 MMOL/L (ref 7–16)
APPEARANCE UR: CLEAR
AST SERPL-CCNC: 16 U/L (ref 12–45)
BACTERIA #/AREA URNS HPF: NEGATIVE /HPF
BACTERIA SPEC RESP CULT: NORMAL
BASOPHILS # BLD AUTO: 0.7 % (ref 0–1.8)
BASOPHILS # BLD: 0.05 K/UL (ref 0–0.12)
BILIRUB SERPL-MCNC: 0.3 MG/DL (ref 0.1–1.5)
BILIRUB UR QL STRIP.AUTO: NEGATIVE
BUN SERPL-MCNC: 9 MG/DL (ref 8–22)
CALCIUM ALBUM COR SERPL-MCNC: 10.3 MG/DL (ref 8.5–10.5)
CALCIUM SERPL-MCNC: 9.2 MG/DL (ref 8.5–10.5)
CHLORIDE SERPL-SCNC: 101 MMOL/L (ref 96–112)
CO2 SERPL-SCNC: 27 MMOL/L (ref 20–33)
COLOR UR: YELLOW
CREAT SERPL-MCNC: 1.18 MG/DL (ref 0.5–1.4)
EOSINOPHIL # BLD AUTO: 0.65 K/UL (ref 0–0.51)
EOSINOPHIL NFR BLD: 9.7 % (ref 0–6.9)
EPI CELLS #/AREA URNS HPF: ABNORMAL /HPF
ERYTHROCYTE [DISTWIDTH] IN BLOOD BY AUTOMATED COUNT: 45.2 FL (ref 35.9–50)
GFR SERPLBLD CREATININE-BSD FMLA CKD-EPI: 63 ML/MIN/1.73 M 2
GLOBULIN SER CALC-MCNC: 4.7 G/DL (ref 1.9–3.5)
GLUCOSE BLD STRIP.AUTO-MCNC: 92 MG/DL (ref 65–99)
GLUCOSE SERPL-MCNC: 111 MG/DL (ref 65–99)
GLUCOSE UR STRIP.AUTO-MCNC: NEGATIVE MG/DL
GRAM STN SPEC: NORMAL
GRAM STN SPEC: NORMAL
HCT VFR BLD AUTO: 29.7 % (ref 42–52)
HGB BLD-MCNC: 10.3 G/DL (ref 14–18)
HYALINE CASTS #/AREA URNS LPF: ABNORMAL /LPF
IMM GRANULOCYTES # BLD AUTO: 0.04 K/UL (ref 0–0.11)
IMM GRANULOCYTES NFR BLD AUTO: 0.6 % (ref 0–0.9)
KETONES UR STRIP.AUTO-MCNC: 15 MG/DL
LACTATE SERPL-SCNC: 1 MMOL/L (ref 0.5–2)
LEUKOCYTE ESTERASE UR QL STRIP.AUTO: ABNORMAL
LYMPHOCYTES # BLD AUTO: 2.33 K/UL (ref 1–4.8)
LYMPHOCYTES NFR BLD: 34.7 % (ref 22–41)
MCH RBC QN AUTO: 30.8 PG (ref 27–33)
MCHC RBC AUTO-ENTMCNC: 34.7 G/DL (ref 32.3–36.5)
MCV RBC AUTO: 88.9 FL (ref 81.4–97.8)
MICRO URNS: ABNORMAL
MONOCYTES # BLD AUTO: 0.69 K/UL (ref 0–0.85)
MONOCYTES NFR BLD AUTO: 10.3 % (ref 0–13.4)
NEUTROPHILS # BLD AUTO: 2.95 K/UL (ref 1.82–7.42)
NEUTROPHILS NFR BLD: 44 % (ref 44–72)
NITRITE UR QL STRIP.AUTO: NEGATIVE
NRBC # BLD AUTO: 0 K/UL
NRBC BLD-RTO: 0 /100 WBC (ref 0–0.2)
PH UR STRIP.AUTO: 6.5 [PH] (ref 5–8)
PLATELET # BLD AUTO: 96 K/UL (ref 164–446)
PLATELETS.RETICULATED NFR BLD AUTO: 1.3 % (ref 0.6–13.1)
PMV BLD AUTO: 8.7 FL (ref 9–12.9)
POTASSIUM SERPL-SCNC: 4 MMOL/L (ref 3.6–5.5)
PROT SERPL-MCNC: 7.3 G/DL (ref 6–8.2)
PROT UR QL STRIP: 100 MG/DL
RBC # BLD AUTO: 3.34 M/UL (ref 4.7–6.1)
RBC # URNS HPF: ABNORMAL /HPF
RBC UR QL AUTO: ABNORMAL
SIGNIFICANT IND 70042: NORMAL
SIGNIFICANT IND 70042: NORMAL
SITE SITE: NORMAL
SITE SITE: NORMAL
SODIUM SERPL-SCNC: 138 MMOL/L (ref 135–145)
SOURCE SOURCE: NORMAL
SOURCE SOURCE: NORMAL
SP GR UR STRIP.AUTO: 1.02
UROBILINOGEN UR STRIP.AUTO-MCNC: 0.2 MG/DL
WBC # BLD AUTO: 6.7 K/UL (ref 4.8–10.8)
WBC #/AREA URNS HPF: ABNORMAL /HPF

## 2024-05-31 PROCEDURE — G0378 HOSPITAL OBSERVATION PER HR: HCPCS

## 2024-05-31 PROCEDURE — 87070 CULTURE OTHR SPECIMN AEROBIC: CPT | Mod: XU

## 2024-05-31 PROCEDURE — 87086 URINE CULTURE/COLONY COUNT: CPT

## 2024-05-31 PROCEDURE — 94640 AIRWAY INHALATION TREATMENT: CPT

## 2024-05-31 PROCEDURE — A9270 NON-COVERED ITEM OR SERVICE: HCPCS | Performed by: STUDENT IN AN ORGANIZED HEALTH CARE EDUCATION/TRAINING PROGRAM

## 2024-05-31 PROCEDURE — 70450 CT HEAD/BRAIN W/O DYE: CPT

## 2024-05-31 PROCEDURE — 82962 GLUCOSE BLOOD TEST: CPT

## 2024-05-31 PROCEDURE — 700105 HCHG RX REV CODE 258: Performed by: EMERGENCY MEDICINE

## 2024-05-31 PROCEDURE — 700101 HCHG RX REV CODE 250: Performed by: STUDENT IN AN ORGANIZED HEALTH CARE EDUCATION/TRAINING PROGRAM

## 2024-05-31 PROCEDURE — 85025 COMPLETE CBC W/AUTO DIFF WBC: CPT

## 2024-05-31 PROCEDURE — 36415 COLL VENOUS BLD VENIPUNCTURE: CPT

## 2024-05-31 PROCEDURE — 700105 HCHG RX REV CODE 258: Performed by: STUDENT IN AN ORGANIZED HEALTH CARE EDUCATION/TRAINING PROGRAM

## 2024-05-31 PROCEDURE — 83605 ASSAY OF LACTIC ACID: CPT

## 2024-05-31 PROCEDURE — 700111 HCHG RX REV CODE 636 W/ 250 OVERRIDE (IP): Performed by: EMERGENCY MEDICINE

## 2024-05-31 PROCEDURE — 700102 HCHG RX REV CODE 250 W/ 637 OVERRIDE(OP): Performed by: STUDENT IN AN ORGANIZED HEALTH CARE EDUCATION/TRAINING PROGRAM

## 2024-05-31 PROCEDURE — 85055 RETICULATED PLATELET ASSAY: CPT

## 2024-05-31 PROCEDURE — 71045 X-RAY EXAM CHEST 1 VIEW: CPT

## 2024-05-31 PROCEDURE — 51798 US URINE CAPACITY MEASURE: CPT

## 2024-05-31 PROCEDURE — 81001 URINALYSIS AUTO W/SCOPE: CPT

## 2024-05-31 PROCEDURE — 80053 COMPREHEN METABOLIC PANEL: CPT

## 2024-05-31 PROCEDURE — 87205 SMEAR GRAM STAIN: CPT

## 2024-05-31 PROCEDURE — 96374 THER/PROPH/DIAG INJ IV PUSH: CPT

## 2024-05-31 PROCEDURE — 99222 1ST HOSP IP/OBS MODERATE 55: CPT | Performed by: STUDENT IN AN ORGANIZED HEALTH CARE EDUCATION/TRAINING PROGRAM

## 2024-05-31 PROCEDURE — 92610 EVALUATE SWALLOWING FUNCTION: CPT

## 2024-05-31 PROCEDURE — 99285 EMERGENCY DEPT VISIT HI MDM: CPT

## 2024-05-31 PROCEDURE — 87040 BLOOD CULTURE FOR BACTERIA: CPT | Mod: XU

## 2024-05-31 RX ORDER — POLYETHYLENE GLYCOL 3350 17 G/17G
17 POWDER, FOR SOLUTION ORAL DAILY
Status: ACTIVE | OUTPATIENT
Start: 2024-05-31

## 2024-05-31 RX ORDER — SODIUM CHLORIDE 9 MG/ML
1000 INJECTION, SOLUTION INTRAVENOUS ONCE
Status: COMPLETED | OUTPATIENT
Start: 2024-05-31 | End: 2024-05-31

## 2024-05-31 RX ORDER — FERROUS SULFATE 325(65) MG
325 TABLET ORAL EVERY EVENING
Status: ON HOLD | COMMUNITY

## 2024-05-31 RX ORDER — INSULIN ASPART 100 [IU]/ML
0-8 INJECTION, SOLUTION INTRAVENOUS; SUBCUTANEOUS
Status: ON HOLD | COMMUNITY

## 2024-05-31 RX ORDER — IPRATROPIUM BROMIDE AND ALBUTEROL SULFATE 2.5; .5 MG/3ML; MG/3ML
3 SOLUTION RESPIRATORY (INHALATION) EVERY 6 HOURS
Status: ACTIVE | OUTPATIENT
Start: 2024-05-31

## 2024-05-31 RX ORDER — INSULIN GLARGINE 100 [IU]/ML
5 INJECTION, SOLUTION SUBCUTANEOUS 2 TIMES DAILY
Status: ON HOLD | COMMUNITY

## 2024-05-31 RX ORDER — ALPRAZOLAM 0.25 MG/1
0.25 TABLET ORAL
Status: ACTIVE | OUTPATIENT
Start: 2024-05-31

## 2024-05-31 RX ORDER — DEXTROSE MONOHYDRATE AND SODIUM CHLORIDE 5; .9 G/100ML; G/100ML
INJECTION, SOLUTION INTRAVENOUS CONTINUOUS
Status: ACTIVE | OUTPATIENT
Start: 2024-05-31

## 2024-05-31 RX ORDER — TAMSULOSIN HYDROCHLORIDE 0.4 MG/1
0.4 CAPSULE ORAL DAILY
Status: DISPENSED | OUTPATIENT
Start: 2024-05-31

## 2024-05-31 RX ORDER — CEPHALEXIN 500 MG/1
500 CAPSULE ORAL EVERY 6 HOURS
Status: DISPENSED | OUTPATIENT
Start: 2024-05-31 | End: 2024-06-03

## 2024-05-31 RX ORDER — VALPROIC ACID 250 MG/5ML
500 SOLUTION ORAL EVERY 8 HOURS
Status: DISPENSED | OUTPATIENT
Start: 2024-05-31

## 2024-05-31 RX ORDER — RISPERIDONE 1 MG/1
1 TABLET ORAL EVERY 8 HOURS
Status: DISPENSED | OUTPATIENT
Start: 2024-05-31

## 2024-05-31 RX ORDER — CEFTRIAXONE 2 G/1
2 INJECTION, POWDER, FOR SOLUTION INTRAMUSCULAR; INTRAVENOUS DAILY
Status: ON HOLD | COMMUNITY

## 2024-05-31 RX ORDER — OMEPRAZOLE 20 MG/1
20 CAPSULE, DELAYED RELEASE ORAL DAILY
Status: DISPENSED | OUTPATIENT
Start: 2024-05-31

## 2024-05-31 RX ORDER — DULOXETIN HYDROCHLORIDE 30 MG/1
30 CAPSULE, DELAYED RELEASE ORAL DAILY
Status: DISPENSED | OUTPATIENT
Start: 2024-05-31

## 2024-05-31 RX ORDER — ACETAMINOPHEN 325 MG/1
650 TABLET ORAL EVERY 6 HOURS PRN
Status: ACTIVE | OUTPATIENT
Start: 2024-05-31

## 2024-05-31 RX ORDER — AMLODIPINE BESYLATE 5 MG/1
5 TABLET ORAL 2 TIMES DAILY
Status: DISPENSED | OUTPATIENT
Start: 2024-05-31

## 2024-05-31 RX ORDER — INSULIN GLARGINE 100 [IU]/ML
5 INJECTION, SOLUTION SUBCUTANEOUS 2 TIMES DAILY
Status: DISCONTINUED | OUTPATIENT
Start: 2024-05-31 | End: 2024-05-31

## 2024-05-31 RX ORDER — CIPROFLOXACIN 250 MG/1
250 TABLET, FILM COATED ORAL DAILY
Status: ON HOLD | COMMUNITY

## 2024-05-31 RX ORDER — PIPERACILLIN SODIUM, TAZOBACTAM SODIUM 3; .375 G/15ML; G/15ML
3.38 INJECTION, POWDER, LYOPHILIZED, FOR SOLUTION INTRAVENOUS EVERY 6 HOURS
Status: ON HOLD | COMMUNITY

## 2024-05-31 RX ORDER — SENNOSIDES A AND B 8.6 MG/1
8.6 TABLET, FILM COATED ORAL 2 TIMES DAILY
Status: DISPENSED | OUTPATIENT
Start: 2024-05-31

## 2024-05-31 RX ORDER — FINASTERIDE 5 MG/1
5 TABLET, FILM COATED ORAL DAILY
Status: DISPENSED | OUTPATIENT
Start: 2024-05-31

## 2024-05-31 RX ORDER — LABETALOL HYDROCHLORIDE 5 MG/ML
10 INJECTION, SOLUTION INTRAVENOUS EVERY 4 HOURS PRN
Status: ACTIVE | OUTPATIENT
Start: 2024-05-31

## 2024-05-31 RX ORDER — ENOXAPARIN SODIUM 100 MG/ML
40 INJECTION SUBCUTANEOUS DAILY
Status: DISCONTINUED | OUTPATIENT
Start: 2024-05-31 | End: 2024-05-31

## 2024-05-31 RX ORDER — PRAVASTATIN SODIUM 20 MG
80 TABLET ORAL
Status: DISPENSED | OUTPATIENT
Start: 2024-05-31

## 2024-05-31 RX ORDER — FLUTICASONE FUROATE AND VILANTEROL 100; 25 UG/1; UG/1
1 POWDER RESPIRATORY (INHALATION) EVERY MORNING
Status: DISCONTINUED | OUTPATIENT
Start: 2024-05-31 | End: 2024-05-31

## 2024-05-31 RX ORDER — VALPROIC ACID 250 MG/1
500 CAPSULE, LIQUID FILLED ORAL EVERY 8 HOURS
Status: DISCONTINUED | OUTPATIENT
Start: 2024-05-31 | End: 2024-05-31

## 2024-05-31 RX ORDER — FERROUS SULFATE 325(65) MG
325 TABLET ORAL EVERY EVENING
Status: DISPENSED | OUTPATIENT
Start: 2024-05-31

## 2024-05-31 RX ORDER — PREDNISONE 10 MG/1
10-40 TABLET ORAL DAILY
Status: ON HOLD | COMMUNITY
Start: 2024-05-02

## 2024-05-31 RX ORDER — PANTOPRAZOLE SODIUM 20 MG/1
20 TABLET, DELAYED RELEASE ORAL
Status: DISCONTINUED | OUTPATIENT
Start: 2024-05-31 | End: 2024-05-31

## 2024-05-31 RX ORDER — UREA 10 %
500 LOTION (ML) TOPICAL EVERY EVENING
Status: ON HOLD | COMMUNITY

## 2024-05-31 RX ORDER — LOPERAMIDE HYDROCHLORIDE 2 MG/1
2 TABLET ORAL EVERY 8 HOURS PRN
Status: ON HOLD | COMMUNITY

## 2024-05-31 RX ORDER — CIPROFLOXACIN 500 MG/1
250 TABLET, FILM COATED ORAL DAILY
Status: DISCONTINUED | OUTPATIENT
Start: 2024-05-31 | End: 2024-05-31

## 2024-05-31 RX ORDER — VALPROIC ACID 250 MG/5ML
500 SOLUTION ORAL 3 TIMES DAILY
Status: DISCONTINUED | OUTPATIENT
Start: 2024-05-31 | End: 2024-05-31

## 2024-05-31 RX ORDER — PROPRANOLOL HYDROCHLORIDE 10 MG/1
10 TABLET ORAL EVERY 12 HOURS
Status: DISPENSED | OUTPATIENT
Start: 2024-05-31

## 2024-05-31 RX ORDER — ALPRAZOLAM 0.25 MG/1
0.25 TABLET ORAL
Status: ON HOLD | COMMUNITY

## 2024-05-31 RX ORDER — CEFAZOLIN 2 G/1
2 INJECTION, POWDER, FOR SOLUTION INTRAMUSCULAR; INTRAVENOUS ONCE
Status: COMPLETED | OUTPATIENT
Start: 2024-05-31 | End: 2024-05-31

## 2024-05-31 RX ORDER — DIVALPROEX SODIUM 125 MG/1
500 CAPSULE, COATED PELLETS ORAL EVERY 8 HOURS
Status: DISCONTINUED | OUTPATIENT
Start: 2024-05-31 | End: 2024-05-31

## 2024-05-31 RX ADMIN — IPRATROPIUM BROMIDE AND ALBUTEROL SULFATE 3 ML: 2.5; .5 SOLUTION RESPIRATORY (INHALATION) at 18:00

## 2024-05-31 RX ADMIN — DULOXETINE HYDROCHLORIDE 30 MG: 30 CAPSULE, DELAYED RELEASE ORAL at 16:30

## 2024-05-31 RX ADMIN — CEPHALEXIN 500 MG: 500 CAPSULE ORAL at 20:19

## 2024-05-31 RX ADMIN — PRAVASTATIN SODIUM 80 MG: 20 TABLET ORAL at 21:20

## 2024-05-31 RX ADMIN — VALPROIC ACID 500 MG: 500 SOLUTION ORAL at 21:19

## 2024-05-31 RX ADMIN — MOMETASONE FUROATE AND FORMOTEROL FUMARATE DIHYDRATE 2 PUFF: 200; 5 AEROSOL RESPIRATORY (INHALATION) at 21:19

## 2024-05-31 RX ADMIN — VALPROIC ACID 500 MG: 500 SOLUTION ORAL at 17:55

## 2024-05-31 RX ADMIN — OMEPRAZOLE 20 MG: 20 CAPSULE, DELAYED RELEASE ORAL at 16:17

## 2024-05-31 RX ADMIN — CEFAZOLIN 2 G: 2 INJECTION, POWDER, FOR SOLUTION INTRAMUSCULAR; INTRAVENOUS at 11:21

## 2024-05-31 RX ADMIN — RISPERIDONE 1 MG: 1 TABLET, FILM COATED ORAL at 21:19

## 2024-05-31 RX ADMIN — DEXTROSE AND SODIUM CHLORIDE: 5; 900 INJECTION, SOLUTION INTRAVENOUS at 22:14

## 2024-05-31 RX ADMIN — TIOTROPIUM BROMIDE INHALATION SPRAY 5 MCG: 3.12 SPRAY, METERED RESPIRATORY (INHALATION) at 18:00

## 2024-05-31 RX ADMIN — PROPRANOLOL HYDROCHLORIDE 10 MG: 10 TABLET ORAL at 17:54

## 2024-05-31 RX ADMIN — FERROUS SULFATE TAB 325 MG (65 MG ELEMENTAL FE) 325 MG: 325 (65 FE) TAB at 17:53

## 2024-05-31 RX ADMIN — SODIUM CHLORIDE 1000 ML: 9 INJECTION, SOLUTION INTRAVENOUS at 11:21

## 2024-05-31 RX ADMIN — SENNOSIDES 8.6 MG: 8.6 TABLET, FILM COATED ORAL at 17:53

## 2024-05-31 RX ADMIN — TAMSULOSIN HYDROCHLORIDE 0.4 MG: 0.4 CAPSULE ORAL at 16:17

## 2024-05-31 RX ADMIN — AMLODIPINE BESYLATE 5 MG: 5 TABLET ORAL at 17:53

## 2024-05-31 RX ADMIN — APIXABAN 2.5 MG: 5 TABLET, FILM COATED ORAL at 17:53

## 2024-05-31 RX ADMIN — CIPROFLOXACIN 250 MG: 500 TABLET, FILM COATED ORAL at 16:17

## 2024-05-31 RX ADMIN — RISPERIDONE 1 MG: 1 TABLET, FILM COATED ORAL at 16:31

## 2024-05-31 RX ADMIN — FINASTERIDE 5 MG: 5 TABLET, FILM COATED ORAL at 16:17

## 2024-05-31 RX ADMIN — MOMETASONE FUROATE AND FORMOTEROL FUMARATE DIHYDRATE 2 PUFF: 200; 5 AEROSOL RESPIRATORY (INHALATION) at 18:00

## 2024-05-31 ASSESSMENT — CHA2DS2 SCORE
PRIOR STROKE OR TIA OR THROMBOEMBOLISM: YES
AGE 65 TO 74: NO
CHA2DS2 VASC SCORE: 6
DIABETES: YES
HYPERTENSION: YES
VASCULAR DISEASE: NO
AGE 75 OR GREATER: YES
SEX: MALE
CHF OR LEFT VENTRICULAR DYSFUNCTION: NO

## 2024-05-31 ASSESSMENT — FIBROSIS 4 INDEX
FIB4 SCORE: 5.74
FIB4 SCORE: 2.63

## 2024-05-31 NOTE — ED TRIAGE NOTES
"Chief Complaint   Patient presents with    ALOC     PT resides at [facility], per staff PT moaning constantly and unable to articulate why. PT removed PIV and staff unable to replace, no fluids since yesterday PM. DNR, POLST at bedside.        BIB EMS to red 5, pt on monitor, provided call bell and in gown, labs drawn and sent.    Medications given en route: 1000mg acetaminophen    BP (!) 154/90   Pulse 74   Temp 36.8 °C (98.3 °F) (Temporal)   Resp 16   Ht 1.778 m (5' 10\")   Wt 81.6 kg (180 lb)   SpO2 96%   BMI 25.83 kg/m²     "

## 2024-05-31 NOTE — ED NOTES
"Med rec completed per MAR sent with patient from [facility].     Allergies reviewed per MAR.    Outpatient antibiotics within the last 30 days: Patient was on ceftriaxone from 4/30/2024 - 5/2/2024, and Zosyn from 5/3/2024 - 5/13/2024 and again from 5/24/2024 - 5/30/2024. MAR indicates patient was on these antibiotics due to pneumonia. Patient is also on a 12 week course of Cipro started on 4/6/2024 for UTI prophylaxis due to \"frequent UTI.\" Patient has a new order for cefdinir (300 mg, 1 capsule 2 times per day for 10 days for pneumonia) which has not been started yet.    ANTICOAGULATION: Patient is on ELIQUIS. Last dose 5/30/2024 between [id number].    Pharmacy used by facility: [facility].  "

## 2024-05-31 NOTE — PROGRESS NOTES
4 Eyes Skin Assessment Completed by Jen, RN and Sindi, RN.    Head WDL  Ears WDL  Nose WDL  Mouth WDL  Neck WDL  Breast/Chest WDL  Shoulder Blades WDL  Spine WDL  (R) Arm/Elbow/Hand Bruising  (L) Arm/Elbow/Hand Bruising  Abdomen WDL  Groin WDL  Scrotum/Coccyx/Buttocks Redness and Blanching  (R) Leg WDL  (L) Leg Scab  (R) Heel/Foot/Toe WDL  (L) Heel/Foot/Toe WDL          Devices In Places Pulse Ox      Interventions In Place Gray Ear Foams and NC W/Ear Foams    Possible Skin Injury No    Pictures Uploaded Into Epic N/A  Wound Consult Placed N/A  RN Wound Prevention Protocol Ordered No

## 2024-05-31 NOTE — H&P
Hospital Medicine History & Physical Note    Date of Service  5/31/2024    Primary Care Physician  Pcp Pt States None    Consultants  N/A    Code Status  DNAR/DNI    Chief Complaint  Chief Complaint   Patient presents with    ALOC     PT resides at [facility], per staff PT moaning constantly and unable to articulate why. PT removed PIV and staff unable to replace, no fluids since yesterday PM. DNR, POLST at bedside.        History of Presenting Illness  Wale Olivas is a 79 y.o. male with severe Alzheimer's dementia, HTN, HLD, chronic Afib on eliquis, COPD, chronic UTI on Abx, chronic dysphagia, MDD, seizure disorder who [facility]. Per staff, the patient is moaning, non-verbal and not taking fluid since yesterday. History was very limited on admission.     In ED, afebrile, vitals wnl except elevated BP. Exam was notable for ill-appearing and Aox1.  Labs notable for pyuria. CT head was without acute intracranial abnormalities. CXR personally reviewed was with mild bilateral bronchial wall thickening.     Pt was provided with Ancef.     Pt was then referred to Hospitalist services for further management.     I discussed the plan of care with bedside RN and EPR .    Review of Systems  ROS    Past Medical History   has a past medical history of Alzheimer's dementia ([facility]), Anxiety, BPH (benign prostatic hyperplasia), CKD (chronic kidney disease), COPD (chronic obstructive pulmonary disease) ([facility]), COVID-19, Dementia (HCC), Dementia (HCC), GERD (gastroesophageal reflux disease), Hyperlipidemia, Hypertension, Insomnia, Major depressive disorder, Seizure disorder ([facility]), Stroke ([facility]), and Type II diabetes mellitus ([facility]).    Surgical History   has no past surgical history on file.     Family History  Family history reviewed with patient. There is no family history that is pertinent to the chief complaint.     Social History   reports that he has never smoked. He  has never used smokeless tobacco. He reports that he does not currently use alcohol. He reports that he does not currently use drugs.    Allergies  Allergies   Allergen Reactions    Metformin Unspecified     Unknown reaction, listed on MAR     Morphine Unspecified     Unknown reaction, listed on MAR     Simvastatin Unspecified     Unknown reaction, listed on MAR     Spironolactone Unspecified     Unknown reaction, listed on MAR        Medications  Prior to Admission Medications   Prescriptions Last Dose Informant Patient Reported? Taking?   ALPRAZolam (XANAX) 0.25 MG Tab 5/1/2024 at 2106 MAR from Other Facility Yes Yes   Sig: Take 0.25 mg by mouth every 24 hours as needed for Anxiety (or Restlessness).   DULoxetine HCl 30 MG Capsule Delayed Release Sprinkle 5/30/2024 at 5216-4749 MAR from Other Facility Yes Yes   Sig: Take 30 mg by mouth every day.   Saccharomyces boulardii (FLORASTOR PO) 5/30/2024 at 8077-1996 MAR from Other Facility Yes Yes   Sig: Take 1 Capsule by mouth 2 times a day.   Umeclidinium Bromide (INCRUSE ELLIPTA) 62.5 MCG/ACT AEROSOL POWDER, BREATH ACTIVATED 5/30/2024 at 1347-7720 MAR from Other Facility Yes Yes   Sig: Inhale 1 Puff every day.   acetaminophen (TYLENOL) 325 MG Tab 5/30/2024 at 2834-2076 MAR from Other Facility Yes Yes   Sig: Take 650 mg by mouth every 6 hours. 2 tablets = 650 mg. (SCHEDULED)  Indications: Pain   amLODIPine (NORVASC) 5 MG Tab 5/30/2024 at 7330-7515 MAR from Other Facility Yes Yes   Sig: Take 5 mg by mouth 2 times a day.   apixaban (ELIQUIS) 2.5mg Tab 5/30/2024 at 7717-6116 MAR from Other Facility Yes Yes   Sig: Take 2.5 mg by mouth 2 times a day.   baclofen (LIORESAL) 10 MG Tab 5/30/2024 at 7860-7758 MAR from Other Facility Yes Yes   Sig: Take 10 mg by mouth 2 times a day.   cefTRIAXone (ROCEPHIN) 2 GM Recon Soln 5/2/2024 at 2310; FINISHED MAR from Other Facility Yes Yes   Sig: Infuse 2 g into a venous catheter every day. 4/30/2024 - 5/2/2024.   ciprofloxacin (CIPRO)  250 MG Tab 5/30/2024 at 1633-7312 MAR from Other Facility Yes Yes   Sig: Take 250 mg by mouth every day. 12 week course started 4/6/2024.  Indications: UTI Prophylaxis   cyanocobalamin (VITAMIN B-12) 500 MCG Tab 5/29/2024 at 8338-8842 MAR from Other Facility Yes Yes   Sig: Take 500 mcg by mouth every evening.   divalproex (DEPAKOTE SPRINKLE) 125 MG Capsule Delayed Release Sprinkle 5/30/2024 at 1400 MAR from Other Facility Yes Yes   Sig: Take 500 mg by mouth 3 times a day. 4 capsules = 500 mg.   ferrous sulfate 325 (65 Fe) MG tablet 5/29/2024 at 0249-4996 MAR from Other Facility Yes Yes   Sig: Take 325 mg by mouth every evening.   finasteride (PROSCAR) 5 MG Tab 5/30/2024 at 7001-1792 MAR from Other Facility Yes Yes   Sig: Take 5 mg by mouth every day.   fluticasone furoate-vilanterol (BREO) 100-25 MCG/ACT AEROSOL POWDER, BREATH ACTIVATED 5/30/2024 at 6545-1981 MAR from Other Facility Yes Yes   Sig: Inhale 1 Puff every morning.   insulin aspart (NOVOLOG) 100 UNIT/ML Solution 5/30/2024 at 1130 MAR from Other Facility Yes Yes   Sig: Inject 0-8 Units under the skin 4 Times a Day,Before Meals and at Bedtime. Inject per sliding scale. For blood glucose:  150 - 200 = 0 units  201 - 250 = 2 units  251 - 300 = 4 units  301 - 350 = 6 units  351 - 400 = 8 units  If >400, call M.D.   insulin glargine (LANTUS) 100 UNIT/ML SC SOLN 5/30/2024 at 0340-6151 MAR from Other Facility Yes Yes   Sig: Inject 5 Units under the skin 2 times a day.   ipratropium-albuterol (DUONEB) 0.5-2.5 (3) MG/3ML nebulizer solution 5/31/2024 at 0600 MAR from Other Facility Yes Yes   Sig: Take 3 mL by nebulization every 6 hours.   lidocaine (LMX) 4 % Cream 5/30/2024 at PM MAR from Other Facility Yes Yes   Sig: Place 1 Application on the skin every 12 hours. Apply to lower back.   loperamide (IMODIUM A-D) 2 MG tablet 5/25/2024 at 1347 MAR from Other Facility Yes Yes   Sig: Take 2 mg by mouth every 8 hours as needed for Diarrhea.   pantoprazole (PROTONIX) 20  MG tablet 5/30/2024 at 0500 MAR from Other Facility Yes Yes   Sig: Take 20 mg by mouth every morning before breakfast.   piperacillin-tazobactam (ZOSYN) 3.375 (3-0.375) g Recon Soln 5/30/2024 at 0000; FINISHED MAR from Other Facility Yes Yes   Sig: Infuse 3.375 g into a venous catheter every 6 hours. 10 day course from 5/3/2024 - 5/13/2024, then 7 day course from 5/24/2024 - 5/30/2024.   polyethylene glycol/lytes (MIRALAX) 17 g Pack 5/30/2024 at 3668-2599 MAR from Other Facility Yes Yes   Sig: Take 17 g by mouth every day. Hold for loose stools.   pravastatin (PRAVACHOL) 20 MG Tab 5/29/2024 at 9507-6412 MAR from Other Facility Yes No   Sig: Take 80 mg by mouth at bedtime. 4 tablets = 80 mg.   predniSONE (DELTASONE) 10 MG Tab 5/10/2024 at 9953-1934; FINISHED MAR from Other Facility Yes Yes   Sig: Take 10-40 mg by mouth every day. Take 40 mg every day for 2 days, then 30 mg every day for 2 days, then 20 mg every day for 2 days, then 10 mg every day for 2 days, then stop.   propranolol (INDERAL) 10 MG Tab 5/30/2024 at 0800 MAR from Other Facility Yes Yes   Sig: Take 10 mg by mouth every 12 hours. Hold for HR less than 60.   risperiDONE (RISPERDAL) 1 MG Tab 5/30/2024 at 1300 MAR from Other Facility Yes Yes   Sig: Take 1 mg by mouth 3 times a day. After meals.  Indications: Psychosis   sennosides (SENOKOT) 8.6 MG Tab 5/30/2024 at 6267-7514 MAR from Other Facility Yes Yes   Sig: Take 8.6 mg by mouth 2 times a day. Hold for loose stools.  Indications: Constipation   tamsulosin (FLOMAX) 0.4 MG capsule 5/30/2024 at 9816-6882 MAR from Other Facility Yes Yes   Sig: Take 0.4 mg by mouth every day.      Facility-Administered Medications: None       Physical Exam  Temp:  [36.1 °C (97 °F)-36.8 °C (98.3 °F)] 36.1 °C (97 °F)  Pulse:  [57-78] 57  Resp:  [13-26] 16  BP: (151-180)/(72-90) 170/72  SpO2:  [92 %-97 %] 92 %  Blood Pressure : (!) 167/79   Temperature: 36.4 °C (97.5 °F)   Pulse: 74   Respiration: 20   Pulse Oximetry: 97 %  "      Physical Exam  Vitals and nursing note reviewed.   Constitutional:       General: He is not in acute distress.     Appearance: Normal appearance. He is ill-appearing.   HENT:      Head: Normocephalic and atraumatic.      Mouth/Throat:      Mouth: Mucous membranes are moist.      Pharynx: Oropharynx is clear.   Eyes:      General: No scleral icterus.     Conjunctiva/sclera: Conjunctivae normal.   Cardiovascular:      Rate and Rhythm: Normal rate and regular rhythm.      Pulses: Normal pulses.      Heart sounds: Normal heart sounds.   Pulmonary:      Effort: Pulmonary effort is normal. No respiratory distress.      Breath sounds: Normal breath sounds. No wheezing.   Abdominal:      General: Bowel sounds are normal. There is no distension.      Palpations: Abdomen is soft.      Tenderness: There is no abdominal tenderness.   Musculoskeletal:         General: No swelling or tenderness. Normal range of motion.   Skin:     General: Skin is warm and dry.      Capillary Refill: Capillary refill takes less than 2 seconds.   Neurological:      General: No focal deficit present.      Mental Status: He is alert.      Comments: Aox1 at best   Psychiatric:         Mood and Affect: Mood normal.         Laboratory:  Recent Labs     05/31/24  0818   WBC 6.7   RBC 3.34*   HEMOGLOBIN 10.3*   HEMATOCRIT 29.7*   MCV 88.9   MCH 30.8   MCHC 34.7   RDW 45.2   PLATELETCT 96*   MPV 8.7*     Recent Labs     05/31/24  0818   SODIUM 138   POTASSIUM 4.0   CHLORIDE 101   CO2 27   GLUCOSE 111*   BUN 9   CREATININE 1.18   CALCIUM 9.2     Recent Labs     05/31/24  0818   ALTSGPT 5   ASTSGOT 16   ALKPHOSPHAT 62   TBILIRUBIN 0.3   GLUCOSE 111*         No results for input(s): \"NTPROBNP\" in the last 72 hours.      No results for input(s): \"TROPONINT\" in the last 72 hours.    Imaging:  DX-CHEST-PORTABLE (1 VIEW)   Final Result      Mild bilateral bronchial wall thickening which can be seen in setting of acute and/or chronic bronchitis.    "   CT-HEAD W/O   Final Result         1.  No acute intracranial abnormality.             X-Ray:  I have personally reviewed the images and compared with prior images. and My impression is: mild bilateral bronchial wall thickening     Assessment/Plan:  Justification for Admission Status  I anticipate this patient is appropriate for observation status at this time because currently expect to complete encephalopathy workups within Obs time frame     Patient will need a Med/Surg bed on MEDICAL service .  The need is secondary to as above.    * Acute encephalopathy- (present on admission)  Assessment & Plan  Likely in the setting of UTI vs delirium  Ancef 2g in ED   Pt is on PPX   Follow cultures     Dysphagia- (present on admission)  Assessment & Plan  SLP consulted       Advanced care planning/counseling discussion- (present on admission)  Assessment & Plan  POLST reviewed in chart   DNR/DNI    Severe early onset Alzheimer's dementia (HCC)- (present on admission)  Assessment & Plan  Nonpharmacologic prevention of delirium  -Re-orient and re-direct patient (time/place/situation)  -Mobilize early and promote daytime activity (Lights ON and blinds OPEN during the day, no naps after 4 PM)  -Promote night time sleep (Lights OFF, TV OFF, cluster nursing care to minimize awakenings, reduce noise, warm blankets and document hours of sleep)  -Manage toileting and provide bowel care (monitor for constipation and/or urinary retention).   -Remove lines/tubing that is not needed  -Educate and involve family (encourage family to be at bedside during visiting hours, provide delirium education and request family bring in familiar objects from home).        Primary hypertension- (present on admission)  Assessment & Plan  C/w home BP meds     Chronic anticoagulation- (present on admission)  Assessment & Plan  C/w Eliquis     Type 2 diabetes mellitus with hyperglycemia, without long-term current use of insulin (HCC)- (present on  admission)  Assessment & Plan  Holding long acting for now   ISS and monitor coverage needs        VTE prophylaxis: therapeutic anticoagulation with Eliquis

## 2024-05-31 NOTE — PROGRESS NOTES
"Spoke with pt daughter Jose on the phone. Per Jose, pt has no dietary restrictions and pt eats a regular diet with thin liquids at baseline. Additionally, Jose states pt cognition and speech is \"hit or miss: at baseline. She states at times pt will have a meaningful conversation and can speak in a full sentence; however, other times the patient may not make much sense and may be limited to saying single words.  "

## 2024-05-31 NOTE — ED PROVIDER NOTES
ED Provider Note    CHIEF COMPLAINT  Chief Complaint   Patient presents with    ALOC     PT resides at [facility], per staff PT moaning constantly and unable to articulate why. PT removed PIV and staff unable to replace, no fluids since yesterday PM. DNR, POLST at bedside.        EXTERNAL RECORDS REVIEWED  Other discharge ER note from 5/1/2024 reviewedER note     HPI/ROS  LIMITATION TO HISTORY   Select: Altered mental status / Confusion    OUTSIDE HISTORIAN(S):  EMS per report    Wale Olivas is a 79 y.o. diabetic male with h/o Alzheimer's, chronic kidney disease, GERD, dyslipidemia, hypertension, and COPD who presents for evaluation of altered mental state.    Per staff, patient is moaning, nonverbal.  No vomiting but not taking fluids since yesterday.  Patient received Tylenol 1 g prior to arrival.    PAST MEDICAL HISTORY   has a past medical history of Alzheimer's dementia ([facility]), Anxiety, BPH (benign prostatic hyperplasia), CKD (chronic kidney disease), COPD (chronic obstructive pulmonary disease) ([facility]), COVID-19, Dementia ([facility]), Dementia ([facility]), GERD (gastroesophageal reflux disease), Hyperlipidemia, Hypertension, Insomnia, Major depressive disorder, Seizure disorder ([facility]), Stroke ([facility]), and Type II diabetes mellitus ([facility]).    SURGICAL HISTORY  patient denies any surgical history    FAMILY HISTORY  No family history on file.    SOCIAL HISTORY  Social History     Tobacco Use    Smoking status: Never    Smokeless tobacco: Never   Vaping Use    Vaping status: Never Used   Substance and Sexual Activity    Alcohol use: Not Currently    Drug use: Not Currently    Sexual activity: Not on file       CURRENT MEDICATIONS  Home Medications    **Home medications have not yet been reviewed for this encounter**       Audit from Redirected Encounters    **Home medications have not yet been reviewed for this encounter**         ALLERGIES  Allergies   Allergen Reactions    Metformin Unspecified     Unknown reaction,  "listed on MAR     Morphine Unspecified     Unknown reaction, listed on MAR     Simvastatin Unspecified     Unknown reaction, listed on MAR     Spironolactone Unspecified     Unknown reaction, listed on MAR        PHYSICAL EXAM  VITAL SIGNS: BP (!) 180/86   Pulse 72   Temp 36.8 °C (98.3 °F) (Temporal)   Resp 20   Ht 1.778 m (5' 10\")   Wt 81.6 kg (180 lb)   SpO2 97%   BMI 25.83 kg/m²    General:  WD thin male, alert, slow to respond to questions, unintelligible speech, V/S as above; afebrile, elevated blood pressure, not hypoxic  Skin: warm and dry; good color; no rash  HEENT: NCAT; EOMs intact; no scleral icterus; crusted left eye; dry lips and mouth  Neck: no stridor  Cardiovascular: Regular heart rate and rhythm.  No murmurs, rubs, or gallops; pulses 2+ bilaterally radially and DP areas  Lungs: No respiratory distress or tachypnea; Clear to auscultation with good air movement bilaterally.  No wheezes, rhonchi, or rales.   Abdomen: soft; NTND; no rebound, guarding, or rigidity.  No organomegaly or pulsatile mass  Back: No decubitus ulcers  Extremities: no pedal edema; neg Nicole's  Neurologic: No facial droop speech unintelligible  Psychiatric: Flat affect, normal mood      EKG/LABS  Results for orders placed or performed during the hospital encounter of 05/31/24   CBC WITH DIFFERENTIAL   Result Value Ref Range    WBC 6.7 4.8 - 10.8 K/uL    RBC 3.34 (L) 4.70 - 6.10 M/uL    Hemoglobin 10.3 (L) 14.0 - 18.0 g/dL    Hematocrit 29.7 (L) 42.0 - 52.0 %    MCV 88.9 81.4 - 97.8 fL    MCH 30.8 27.0 - 33.0 pg    MCHC 34.7 32.3 - 36.5 g/dL    RDW 45.2 35.9 - 50.0 fL    Platelet Count 96 (L) 164 - 446 K/uL    MPV 8.7 (L) 9.0 - 12.9 fL    Neutrophils-Polys 44.00 44.00 - 72.00 %    Lymphocytes 34.70 22.00 - 41.00 %    Monocytes 10.30 0.00 - 13.40 %    Eosinophils 9.70 (H) 0.00 - 6.90 %    Basophils 0.70 0.00 - 1.80 %    Immature Granulocytes 0.60 0.00 - 0.90 %    Nucleated RBC 0.00 0.00 - 0.20 /100 WBC    Neutrophils " (Absolute) 2.95 1.82 - 7.42 K/uL    Lymphs (Absolute) 2.33 1.00 - 4.80 K/uL    Monos (Absolute) 0.69 0.00 - 0.85 K/uL    Eos (Absolute) 0.65 (H) 0.00 - 0.51 K/uL    Baso (Absolute) 0.05 0.00 - 0.12 K/uL    Immature Granulocytes (abs) 0.04 0.00 - 0.11 K/uL    NRBC (Absolute) 0.00 K/uL   CMP   Result Value Ref Range    Sodium 138 135 - 145 mmol/L    Potassium 4.0 3.6 - 5.5 mmol/L    Chloride 101 96 - 112 mmol/L    Co2 27 20 - 33 mmol/L    Anion Gap 10.0 7.0 - 16.0    Glucose 111 (H) 65 - 99 mg/dL    Bun 9 8 - 22 mg/dL    Creatinine 1.18 0.50 - 1.40 mg/dL    Calcium 9.2 8.5 - 10.5 mg/dL    Correct Calcium 10.3 8.5 - 10.5 mg/dL    AST(SGOT) 16 12 - 45 U/L    ALT(SGPT) 5 2 - 50 U/L    Alkaline Phosphatase 62 30 - 99 U/L    Total Bilirubin 0.3 0.1 - 1.5 mg/dL    Albumin 2.6 (L) 3.2 - 4.9 g/dL    Total Protein 7.3 6.0 - 8.2 g/dL    Globulin 4.7 (H) 1.9 - 3.5 g/dL    A-G Ratio 0.6 g/dL   URINALYSIS    Specimen: Urine   Result Value Ref Range    Color Yellow     Character Clear     Specific Gravity 1.019 <1.035    Ph 6.5 5.0 - 8.0    Glucose Negative Negative mg/dL    Ketones 15 (A) Negative mg/dL    Protein 100 (A) Negative mg/dL    Bilirubin Negative Negative    Urobilinogen, Urine 0.2 Negative    Nitrite Negative Negative    Leukocyte Esterase Small (A) Negative    Occult Blood Trace (A) Negative    Micro Urine Req Microscopic    URINE MICROSCOPIC (W/UA)   Result Value Ref Range    WBC 20-50 (A) /hpf    RBC 10-20 (A) /hpf    Bacteria Negative None /hpf    Epithelial Cells Few /hpf    Hyaline Cast 3-5 (A) /lpf   IMMATURE PLT FRACTION   Result Value Ref Range    Imm. Plt Fraction 1.3 0.6 - 13.1 %   ESTIMATED GFR   Result Value Ref Range    GFR (CKD-EPI) 63 >60 mL/min/1.73 m 2   POCT glucose device results   Result Value Ref Range    POC Glucose, Blood 92 65 - 99 mg/dL       I have independently interpreted this EKG    RADIOLOGY/PROCEDURES   I have independently interpreted the diagnostic imaging associated with this visit  and am waiting the final reading from the radiologist.   My preliminary interpretation is as follows:   No ICH on CT head  No focal consolidation on chest x-ray    Radiologist interpretation:  DX-CHEST-PORTABLE (1 VIEW)   Final Result      Mild bilateral bronchial wall thickening which can be seen in setting of acute and/or chronic bronchitis.      CT-HEAD W/O   Final Result         1.  No acute intracranial abnormality.             COURSE & MEDICAL DECISION MAKING    ASSESSMENT, COURSE AND PLAN  Care Narrative: This is a 79-year-old male who presents from [facility] home for altered mental state.  Decreased p.o. intake noted over the last 24 to 48 hours.  Differential diagnosis includes intracranial hemorrhage, dehydration, electrolyte abnormality, UTI.    Patient is on a POLST and is DNR.    Labs demonstrate mild dehydration with ketones in the urine as well as a UTI with pyuria.  Urine culture, blood cultures, and lactic acid ordered along with Ancef.    CT head negative for intracranial hemorrhage.    Chest x-ray negative for focal consolidation    Labs demonstrated normal white blood cell count, stable anemia, no electrolyte abnormalities, and UTI.  Lactic acid 1.  Urine and blood cultures added.      Hydration: Based on the patient's presentation of Dehydration the patient was given IV fluids. IV Hydration was used because oral hydration failed due to refusing to take p.o. fluids. Upon recheck following hydration, the patient was stable.          DISPOSITION AND DISCUSSIONS  I have discussed management of the patient with the following physicians and DEANNA's:    [place]    Discussion of management with other QHP or appropri  FINAL DIAGNOSIS  1. Altered mental status, unspecified altered mental status type    2. Dehydration    3. Acute UTI      Electronically signed by: Roberta Stallworth M.D., 5/31/2024 8:30 AM

## 2024-05-31 NOTE — THERAPY
Speech Language Pathology   Clinical Swallow Evaluation     Patient Name: Wale Olivas  AGE:  79 y.o., SEX:  male  Medical Record #: [id number]  Date of Service: 5/31/2024      History of Present Illness  79 y.o. male who presented from [facility] on 1/28/2024 with altered mental status.     CMHx: AMS, dehydration, acute UTI  PMHx: COPD, dyslipidemia, primary hypertension, severe Alzheimer's dementia, type 2 diabetes, chronic anticoagulation atrial fibrillation, GERD, CVA    SLP hx:  2/2/24 Dysphagia tx: rec LQ3/TN0  10/13/2023 CSE: Oral phase deficits, rec MM5/TN0  09/21/2023 FEES: Mild oropharyngeal dysphagia rec SB6/TN0    CT-Head:  1.  No acute intracranial abnormality.     CXR:  Mild bilateral bronchial wall thickening which can be seen in setting of acute and/or chronic bronchitis.      General Information:  Vitals  O2 (LPM): 2  O2 Delivery Device: Silicone Nasal Cannula  Level of Consciousness: Awake  Orientation: Disoriented x 4  Follows Directives: Inconsistent      Prior Living Situation & Level of Function:  Housing / Facility:  ([facility])  Swallowing: Baseline diet of RG7/TN0, per daughter       Oral Mechanism Evaluation:  Dentition: Poor, Natural dentition, Some missing dentition   Facial Symmetry: Equal  Facial Sensation: Pt did not follow commands to assess     Labial Observations: WFL   Lingual Observations: Pt did not follow commands to assess          Laryngeal Function:  Secretion Management: Expectoration of secretions  Voice Quality: WFL   Cough: Perceptually weak       Subjective  Patient received awake. Unable to provide history. Pleasant and agreeable to PO trials.  Pt coughing prior to PO trials; RN in room collecting sample of expectorated secretions.       Assessment  Current Method of Nutrition: NPO until cleared by speech pathology  Positioning: Arcos's (60-90 degrees)  Bolus Administration: SLP, Patient  O2 (LPM): 2 O2 Delivery Device: Silicone Nasal Cannula  Factor(s)  "Affecting Performance: Impaired mental status  Tracheostomy : No       Swallowing Trials:  Swallowing Trials  Thin Liquid (TN0): Impaired  Liquidised (LQ3): WFL  Soft & Bite Sized (SB6): Impaired      Comments: Patient required assistance with feeding; able to hold cup however unable to bring all the way to oral cavity. Intact oral bolus acceptance and containment. Wet vocal quality and throat clear with all trials of thin liquids. Prolonged mastication of soft solids. Reflexive cough response with soft solid trial, resulting in pt expectorating bolus/secretions. Multiple swallows completed per bolus, concerning for possible pharyngeal residue.       Clinical Impressions  Patient presents with clinical indicators concerning for oropharyngeal dysphagia. Pt would benefit from a diagnostic swallow study prior to initiation of an oral diet; however, unable to be completed today due to scheduling conflicts. Recommend NPO except ice chips for comfort, oral hydration, reduced risk of disuse atrophy, and pharyngeal secretion management. Pt to complete MBSS next available date.       Recommendations  Diet Consistency: NPO excpet ice chips pending MBSS  Instrumentation: VFSS (MBSS)  Medication: Crush with applesauce, as appropriate   Oral Care: Q4h       SLP Treatment Plan  Treatment Plan: Dysphagia Treatment  SLP Frequency: 3x Per Week  Estimated Duration: Until Therapy Goals Met      Anticipated Discharge Needs  Discharge Recommendations:  (Pending MBSS)          Patient / Family Goals  Patient / Family Goal #1: \"Ok\" to PO  Short Term Goals  Short Term Goal # 1: Pt will complete a diagnostic swallow study to further assess swallow function and inform POC      DOMINICK Garcia   "

## 2024-05-31 NOTE — PROGRESS NOTES
Speech at bedside for swallow eval. Per speech, pt will be NPO, ice chips ok. MBSS tomorrow. Oral medication may be given crushed in applesauce.   Color consistent with ethnicity/race, warm, dry intact, resilient.

## 2024-06-01 ENCOUNTER — APPOINTMENT (OUTPATIENT)
Dept: RADIOLOGY | Facility: MEDICAL CENTER | Age: 79
End: 2024-06-01
Attending: STUDENT IN AN ORGANIZED HEALTH CARE EDUCATION/TRAINING PROGRAM
Payer: COMMERCIAL

## 2024-06-01 VITALS
WEIGHT: 169.97 LBS | OXYGEN SATURATION: 98 % | HEIGHT: 70 IN | SYSTOLIC BLOOD PRESSURE: 185 MMHG | HEART RATE: 61 BPM | RESPIRATION RATE: 16 BRPM | TEMPERATURE: 97 F | DIASTOLIC BLOOD PRESSURE: 79 MMHG | BODY MASS INDEX: 24.33 KG/M2

## 2024-06-01 PROCEDURE — G0378 HOSPITAL OBSERVATION PER HR: HCPCS

## 2024-06-01 PROCEDURE — 700102 HCHG RX REV CODE 250 W/ 637 OVERRIDE(OP): Performed by: STUDENT IN AN ORGANIZED HEALTH CARE EDUCATION/TRAINING PROGRAM

## 2024-06-01 PROCEDURE — 51798 US URINE CAPACITY MEASURE: CPT

## 2024-06-01 PROCEDURE — 74230 X-RAY XM SWLNG FUNCJ C+: CPT

## 2024-06-01 PROCEDURE — 94760 N-INVAS EAR/PLS OXIMETRY 1: CPT

## 2024-06-01 PROCEDURE — 94640 AIRWAY INHALATION TREATMENT: CPT

## 2024-06-01 PROCEDURE — 700101 HCHG RX REV CODE 250: Performed by: STUDENT IN AN ORGANIZED HEALTH CARE EDUCATION/TRAINING PROGRAM

## 2024-06-01 PROCEDURE — 92611 MOTION FLUOROSCOPY/SWALLOW: CPT

## 2024-06-01 PROCEDURE — 99239 HOSP IP/OBS DSCHRG MGMT >30: CPT | Performed by: STUDENT IN AN ORGANIZED HEALTH CARE EDUCATION/TRAINING PROGRAM

## 2024-06-01 PROCEDURE — A9270 NON-COVERED ITEM OR SERVICE: HCPCS | Performed by: STUDENT IN AN ORGANIZED HEALTH CARE EDUCATION/TRAINING PROGRAM

## 2024-06-01 RX ORDER — CIPROFLOXACIN 250 MG/1
250 TABLET, FILM COATED ORAL DAILY
Status: SHIPPED
Start: 2024-06-07

## 2024-06-01 RX ORDER — CEPHALEXIN 500 MG/1
500 CAPSULE ORAL EVERY 6 HOURS
Status: SHIPPED
Start: 2024-06-01 | End: 2024-06-06

## 2024-06-01 RX ADMIN — VALPROIC ACID 500 MG: 500 SOLUTION ORAL at 05:35

## 2024-06-01 RX ADMIN — CEPHALEXIN 500 MG: 500 CAPSULE ORAL at 00:45

## 2024-06-01 RX ADMIN — TIOTROPIUM BROMIDE INHALATION SPRAY 5 MCG: 3.12 SPRAY, METERED RESPIRATORY (INHALATION) at 04:46

## 2024-06-01 RX ADMIN — SENNOSIDES 8.6 MG: 8.6 TABLET, FILM COATED ORAL at 04:47

## 2024-06-01 RX ADMIN — IPRATROPIUM BROMIDE AND ALBUTEROL SULFATE 3 ML: 2.5; .5 SOLUTION RESPIRATORY (INHALATION) at 14:02

## 2024-06-01 RX ADMIN — TAMSULOSIN HYDROCHLORIDE 0.4 MG: 0.4 CAPSULE ORAL at 04:47

## 2024-06-01 RX ADMIN — APIXABAN 2.5 MG: 5 TABLET, FILM COATED ORAL at 04:48

## 2024-06-01 RX ADMIN — RISPERIDONE 1 MG: 1 TABLET, FILM COATED ORAL at 04:47

## 2024-06-01 RX ADMIN — AMLODIPINE BESYLATE 5 MG: 5 TABLET ORAL at 04:47

## 2024-06-01 RX ADMIN — DULOXETINE HYDROCHLORIDE 30 MG: 30 CAPSULE, DELAYED RELEASE ORAL at 04:46

## 2024-06-01 RX ADMIN — IPRATROPIUM BROMIDE AND ALBUTEROL SULFATE 3 ML: 2.5; .5 SOLUTION RESPIRATORY (INHALATION) at 03:01

## 2024-06-01 RX ADMIN — OMEPRAZOLE 20 MG: 20 CAPSULE, DELAYED RELEASE ORAL at 04:47

## 2024-06-01 RX ADMIN — IPRATROPIUM BROMIDE AND ALBUTEROL SULFATE 3 ML: 2.5; .5 SOLUTION RESPIRATORY (INHALATION) at 03:54

## 2024-06-01 RX ADMIN — PROPRANOLOL HYDROCHLORIDE 10 MG: 10 TABLET ORAL at 05:37

## 2024-06-01 RX ADMIN — MOMETASONE FUROATE AND FORMOTEROL FUMARATE DIHYDRATE 2 PUFF: 200; 5 AEROSOL RESPIRATORY (INHALATION) at 04:47

## 2024-06-01 RX ADMIN — FINASTERIDE 5 MG: 5 TABLET, FILM COATED ORAL at 04:47

## 2024-06-01 RX ADMIN — IPRATROPIUM BROMIDE AND ALBUTEROL SULFATE 3 ML: 2.5; .5 SOLUTION RESPIRATORY (INHALATION) at 07:19

## 2024-06-01 NOTE — PROGRESS NOTES
Attempted to call [facility] x2 for report. No answer. Left voicemail asking for a call back.

## 2024-06-01 NOTE — DISCHARGE SUMMARY
Discharge Summary    CHIEF COMPLAINT ON ADMISSION  Chief Complaint   Patient presents with    ALOC     PT resides at [facility], per staff PT moaning constantly and unable to articulate why. PT removed PIV and staff unable to replace, no fluids since yesterday PM. DNR, POLST at bedside.        Reason for Admission  Acute encephalopathy     Admission Date  5/31/2024    CODE STATUS  DNAR/DNI    HPI & HOSPITAL COURSE  Wale Olivas is a 79 y.o. male with severe Alzheimer's dementia, HTN, HLD, chronic Afib on eliquis, COPD, chronic UTI on Abx, chronic dysphagia, MDD, seizure disorder who [facility] Per staff, the patient is moaning, non-verbal and not taking fluid since yesterday. History was very limited on admission.      In ED, afebrile, vitals wnl except elevated BP. Exam was notable for ill-appearing and Aox1.  Labs notable for pyuria. CT head was without acute intracranial abnormalities. CXR personally reviewed was with mild bilateral bronchial wall thickening. Pt was provided with Ancef.      Pt was then referred to Hospitalist services for further management.     Hospital course: afebrile and vitals wnl. Exam at bedside was grossly unremarkable. Alert, More interactive, a bit more conversational and pleasant confused. Mentation appeared to be around his baseline. Labs and imagings findings discussed with patient and two daughters over the phone. SLP noel appreciated. Pt was started on treatment of UTI with cephalexin (last dose 6/6; then transition to chronic antibiotic suppressant with cipro starting 6/7). Notable course includes acute urinary retention (requiring straight cath x2; eventually, resendiz was placed). Voiding trial can be continued at Pt's skilled nursing facility. With mentation appeared baseline, at this point, deemed stable [facility].  With his advanced dementia, the patient is at risk for acute encephalopathy and delirium.      Therefore, he is discharged in fair and stable condition to home with organized home healthcare and close outpatient follow-up.    The patient recovered much more quickly than anticipated on admission.    Discharge Date  06/01/24    FOLLOW UP ITEMS POST DISCHARGE  N/A    DISCHARGE DIAGNOSES  Principal Problem:    Acute encephalopathy (POA: Yes)  Active Problems:    Type 2 diabetes mellitus with hyperglycemia, without long-term current use of insulin (HCC) (POA: Yes)    Chronic anticoagulation (POA: Yes)    Primary hypertension (POA: Yes)    Severe early onset Alzheimer's dementia (HCC) (POA: Yes)    Advanced care planning/counseling discussion (POA: Yes)    Dysphagia (POA: Yes)  Resolved Problems:    * No resolved hospital problems. *      FOLLOW UP  Please follow up with your primary care physician within 1 to 2 weeks of discharge. Tele health if available.      MEDICATIONS ON DISCHARGE     Medication List        START taking these medications        Instructions   cephALEXin 500 MG Caps  Commonly known as: Keflex   Take 1 Capsule by mouth every 6 hours for 5 days.  Dose: 500 mg            CHANGE how you take these medications        Instructions   ciprofloxacin 250 MG Tabs  Start taking on: June 7, 2024  What changed: These instructions start on June 7, 2024. If you are unsure what to do until then, ask your doctor or other care provider.  Commonly known as: Cipro   Take 1 Tablet by mouth every day. 12 week course started 4/6/2024.  Indications: UTI Prophylaxis  Dose: 250 mg            CONTINUE taking these medications        Instructions   acetaminophen 325 MG Tabs  Commonly known as: Tylenol   Take 650 mg by mouth every 6 hours. 2 tablets = 650 mg. (SCHEDULED)  Indications: Pain  Dose: 650 mg     ALPRAZolam 0.25 MG Tabs  Commonly known as: Xanax   Take 0.25 mg by mouth every 24 hours as needed for Anxiety (or Restlessness).  Dose: 0.25 mg     amLODIPine 5 MG Tabs  Commonly known as: Norvasc   Take 5 mg by  mouth 2 times a day.  Dose: 5 mg     baclofen 10 MG Tabs  Commonly known as: Lioresal   Take 10 mg by mouth 2 times a day.  Dose: 10 mg     cyanocobalamin 500 MCG Tabs  Commonly known as: Vitamin B-12   Take 500 mcg by mouth every evening.  Dose: 500 mcg     divalproex 125 MG Csdr  Commonly known as: Depakote Sprinkle   Take 500 mg by mouth 3 times a day. 4 capsules = 500 mg.  Dose: 500 mg     DULoxetine HCl 30 MG Csdr   Take 30 mg by mouth every day.  Dose: 30 mg     Eliquis 2.5mg Tabs  Generic drug: apixaban   Take 2.5 mg by mouth 2 times a day.  Dose: 2.5 mg     ferrous sulfate 325 (65 Fe) MG tablet   Take 325 mg by mouth every evening.  Dose: 325 mg     finasteride 5 MG Tabs  Commonly known as: Proscar   Take 5 mg by mouth every day.  Dose: 5 mg     FLORASTOR PO   Take 1 Capsule by mouth 2 times a day.  Dose: 1 Capsule     fluticasone furoate-vilanterol 100-25 MCG/ACT Aepb  Commonly known as: Breo   Inhale 1 Puff every morning.  Dose: 1 Puff     Incruse Ellipta 62.5 MCG/ACT Aepb inhaler  Generic drug: umeclidinium bromide   Inhale 1 Puff every day.  Dose: 1 Puff     insulin aspart 100 UNIT/ML Soln  Commonly known as: NovoLOG   Inject 0-8 Units under the skin 4 Times a Day,Before Meals and at Bedtime. Inject per sliding scale. For blood glucose:  150 - 200 = 0 units  201 - 250 = 2 units  251 - 300 = 4 units  301 - 350 = 6 units  351 - 400 = 8 units  If >400, call M.D.  Dose: 0-8 Units     ipratropium-albuterol 0.5-2.5 (3) MG/3ML nebulizer solution  Commonly known as: Duoneb   Take 3 mL by nebulization every 6 hours.  Dose: 3 mL     Lantus 100 UNIT/ML Soln  Generic drug: insulin glargine   Inject 5 Units under the skin 2 times a day.  Dose: 5 Units     lidocaine 4 % Crea  Commonly known as: Lmx   Place 1 Application on the skin every 12 hours. Apply to lower back.  Dose: 1 Application     loperamide 2 MG tablet  Commonly known as: Imodium A-D   Take 2 mg by mouth every 8 hours as needed for Diarrhea.  Dose: 2 mg      pantoprazole 20 MG tablet  Commonly known as: Protonix   Take 20 mg by mouth every morning before breakfast.  Dose: 20 mg     polyethylene glycol/lytes Pack  Commonly known as: Miralax   Take 17 g by mouth every day. Hold for loose stools.  Dose: 17 g     pravastatin 20 MG Tabs  Commonly known as: Pravachol   Take 80 mg by mouth at bedtime. 4 tablets = 80 mg.  Dose: 80 mg     propranolol 10 MG Tabs  Commonly known as: Inderal   Take 10 mg by mouth every 12 hours. Hold for HR less than 60.  Dose: 10 mg     risperiDONE 1 MG Tabs  Commonly known as: RisperDAL   Take 1 mg by mouth 3 times a day. After meals.  Indications: Psychosis  Dose: 1 mg     sennosides 8.6 MG Tabs  Commonly known as: Senokot   Take 8.6 mg by mouth 2 times a day. Hold for loose stools.  Indications: Constipation  Dose: 8.6 mg     tamsulosin 0.4 MG capsule  Commonly known as: Flomax   Take 0.4 mg by mouth every day.  Dose: 0.4 mg            STOP taking these medications      cefTRIAXone 2 GM Solr  Commonly known as: Rocephin     piperacillin-tazobactam 3.375 (3-0.375) g Solr  Commonly known as: Zosyn     predniSONE 10 MG Tabs  Commonly known as: Deltasone              Allergies  Allergies   Allergen Reactions    Metformin Unspecified     Unknown reaction, listed on MAR     Morphine Unspecified     Unknown reaction, listed on MAR     Simvastatin Unspecified     Unknown reaction, listed on MAR     Spironolactone Unspecified     Unknown reaction, listed on MAR        DIET  Orders Placed This Encounter   Procedures    Diet Order Diet: Level 4 - Pureed; Liquid level: Level 0 - Thin; Tray Modifications (optional): SLP - 1:1 Supervision by Nursing     Standing Status:   Standing     Number of Occurrences:   1     Order Specific Question:   Diet:     Answer:   Level 4 - Pureed [25]     Order Specific Question:   Liquid level     Answer:   Level 0 - Thin     Order Specific Question:   Tray Modifications (optional)     Answer:   SLP - 1:1 Supervision by  Nursing       ACTIVITY  As tolerated.  Weight bearing as tolerated    CONSULTATIONS  N/A    PROCEDURES  N/A    LABORATORY  Lab Results   Component Value Date    SODIUM 138 05/31/2024    POTASSIUM 4.0 05/31/2024    CHLORIDE 101 05/31/2024    CO2 27 05/31/2024    GLUCOSE 111 (H) 05/31/2024    BUN 9 05/31/2024    CREATININE 1.18 05/31/2024        Lab Results   Component Value Date    WBC 6.7 05/31/2024    HEMOGLOBIN 10.3 (L) 05/31/2024    HEMATOCRIT 29.7 (L) 05/31/2024    PLATELETCT 96 (L) 05/31/2024        Total time of the discharge process exceeds 37 minutes.

## 2024-06-01 NOTE — RESPIRATORY CARE
COPD EDUCATION by COPD CLINICAL EDUCATOR  6/1/2024 at 8:43 AM by Susana Jansen, RRT     Patient reviewed by COPD education team. Patient does not qualify for the COPD program due to severe Alzheimer's dementia making him inappropriate for education. [facility]

## 2024-06-01 NOTE — CARE PLAN
The patient is Watcher - Medium risk of patient condition declining or worsening    Shift Goals  Clinical Goals: Patient safety and comfort  Patient Goals: CJ  Family Goals: CJ    Progress made toward(s) clinical / shift goals:  Patient is non-verbal, remain free from fall this shift. Able to rest. Bed alarm set.     Patient is not progressing towards the following goals:      Problem: Knowledge Deficit - Standard  Goal: Patient and family/care givers will demonstrate understanding of plan of care, disease process/condition, diagnostic tests and medications  Description: Target End Date:  1-3 days or as soon as patient condition allows    Document in Patient Education    1.  Patient and family/caregiver oriented to unit, equipment, visitation policy and means for communicating concern  2.  Complete/review Learning Assessment  3.  Assess knowledge level of disease process/condition, treatment plan, diagnostic tests and medications  4.  Explain disease process/condition, treatment plan, diagnostic tests and medications  Outcome: Not Met

## 2024-06-01 NOTE — ASSESSMENT & PLAN NOTE
Nonpharmacologic prevention of delirium  -Re-orient and re-direct patient (time/place/situation)  -Mobilize early and promote daytime activity (Lights ON and blinds OPEN during the day, no naps after 4 PM)  -Promote night time sleep (Lights OFF, TV OFF, cluster nursing care to minimize awakenings, reduce noise, warm blankets and document hours of sleep)  -Manage toileting and provide bowel care (monitor for constipation and/or urinary retention).   -Remove lines/tubing that is not needed  -Educate and involve family (encourage family to be at bedside during visiting hours, provide delirium education and request family bring in familiar objects from home).

## 2024-06-01 NOTE — DISCHARGE PLANNING
Case Management Discharge Planning    Notified by MD pt is medically cleared to return to SNF. Pt is a longterm resident @ [facility].     Called [facility] and spoke with NAHEED Dougherty. Confirmed pt is able to return today.     REMSA form completed and faxed     Transport time for 1400 confirmed with Radha @ [facility]    Called pt's daughter, Tarun and notified her of transport time.     Kath @ [facility] notified of transport time     COBRA completed and given to RN

## 2024-06-01 NOTE — THERAPY
Speech Language Pathology   Videofluoroscopic Swallow Study Evaluation     Patient Name: Wale Olivas  AGE:  79 y.o., SEX:  male  Medical Record #: [id number]  Date of Service: 6/1/2024      History of Present Illness  79 y.o. male who presented from [facility] on 1/28/2024 with altered mental status.     CMHx: AMS, dehydration, acute UTI    PMHx: COPD, dyslipidemia, primary hypertension, severe Alzheimer's dementia, type 2 diabetes, chronic anticoagulation atrial fibrillation, GERD, CVA    SLP hx:  2/2/24 Dysphagia tx: rec LQ3/TN0  10/13/2023 CSE: Oral phase deficits, rec MM5/TN0  09/21/2023 FEES: Mild oropharyngeal dysphagia rec SB6/TN0    CT-Head 5/31/24:  1.  No acute intracranial abnormality.     CXR 5/31/24:  Mild bilateral bronchial wall thickening which can be seen in setting of acute and/or chronic bronchitis.      Pertinent Information  Current Method of Nutrition: NPO until cleared by speech pathology  Dentition: Poor, Natural dentition, Some missing dentition  Secretion Management: Adequate  Feeding Tube: None  Tracheostomy: No              Factor(s) Affecting Performance: Impaired mental status, Impaired command following      Discussed the risks, benefits, and alternatives of the VFSS procedure. Patient/family acknowledged and agreed to proceed.      Assessment  Videofluoroscopic Swallow Study was conducted in the Lateral projection(s) to evaluate oropharyngeal swallow function. A radiology tech was present to assist with the procedure.      Positioning: Seated upright in fluoroscopy chair  Anatomic View: WFL  Bolus Administration: SLP  PO Barium Contrast Trials: Varibar thin, Varibar pudding, Varibar nectar (mildly thick)      Consistency PAS Score Timing Residue Comments   Thin Liquid 8 Post swallow Vallecular Residue: Moderate (25%-50%)  Pyriform Sinus Residue: Moderate (25%-50%) Tsp/cup/straw   Mildly Thick 8 Post Swallow Vallecular Residue: Moderate (25%-50%)  Pyriform Sinus Residue:  Moderate (25%-50%) Cup/straw   Pudding 1 N/A Vallecular Residue: Severe (>50%)  Pyriform Sinus Residue: Mild (5%-25%)      Penetration-Aspiration Scale (PAS)  1     No contrast enters airway  2     Contrast enters the airway, remains above the vocal folds, and is ejected from the airway (not seen in the airway at the end of the swallow).  3     Contrast enters the airway, remains above the vocal folds, and is not ejected from the airway (is seen in the airway after the swallow).  4     Contrast enters the airway, contacts the vocal folds, and is ejected from the airway.  5     Contrast enters the airway, contacts the vocal folds, and is not ejected from the airway  6     Contrast enters the airway, crosses the plane of the vocal folds, and is ejected from the airway.  7     Contrast enters the airway, crosses the plane of the vocal folds, and is not ejected from the airway despite effort.  8     Contrast enters the airway, crosses the plane of the vocal folds, is not ejected from the airway and there is no response to aspiration.       Oral phase:  Intermittent severe oral holding (at times >1 minute) with lingual pumping   Swishing of bolus and significant piecemeal deglutition with thick and thin liquids       Pharyngeal phase: reduced tongue base retraction, reduced epiglottic inversion, minimal hyolaryngeal excursion, reduced laryngeal vestibule, mistimed laryngeal vestibule closure, and impaired sensation resulted in the following:   Consistent laryngeal penetration during the swallow following trials of thick and thin liquids. Residue in laryngeal vestibule would eventually trickle into the airway. Patient was not sensate to aspiration episodes.   Mild-severe residue in vallecula and mild-moderate residue in pyriform sinuses.       Compensatory Strategies:  Spontaneous second swallow EFFECTIVE to reduce pharyngeal residue.   Patient required MAX verbal cueing to initiate pharyngeal swallow.   Patient unable to  follow directives for cued cough.     Severity Rating:   Severity Rating: JOBY  JOBY: Severe      Clinical Impressions  The pt presents with a severe oropharyngeal dysphagia suspect acute (r/t AMS) on chronic (r/t hx of severe Alzheimer's dementia and COPD). Swallow safety is impaired; swallow efficiency is impaired. Pt appears to be at high risk for aspiration PNA, and high for malnutrition/dehydration. However, a non-oral source of nutrition is not indicated. There is no evidence to support improved pt survival or QOL after PEG placement in pts with advanced age + dementia + severe dysphagia. Burdens of NPO status + PEG placement in this pt w/ advanced illness likely include poor QOL, physical restraint, continued aspiration of secretions, aspiration of tube feeding, persistent malnutrition/dehydration, and increased risk of mortality. In this clinician's opinion, the risk of long-term enteral feeding (e.g., PEG) outweigh the risk of PO comfort feedings despite known dysphagia/aspiration. This complex bioethical decision is deferred to the pt's POA and physician. Dr. Kumar was updated regarding results and agreeable to place orders for modified diet. Swallow prognosis is poor given progressive nature of disease process; patient appears to be a  candidate for behavioral swallow rehabilitation but SLP will follow for ongoing family education/training.     Recommendations  Diet Consistency: Pureed and Thin Liquid diet   Medication: Crush with applesauce, as appropriate, Crush with pudding/puree, as appropriate  Supervision: 1:1 feeding with constant supervision  Positioning: Fully upright and midline during oral intake, Remain upright for 30-45 minutes after oral intake  Strategies: Small bites/sips, Slow rate of intake, Alternate bites and sips  Oral Care: Q4h  Additional Instrumentation: None         SLP Treatment Plan  Treatment Plan: Dysphagia Treatment, Patient/Family/Caregiver Training  SLP Frequency: 3x Per  "Week  Estimated Duration: Until Therapy Goals Met      Anticipated Discharge Needs  Discharge Recommendations: Recommend home health for continued speech therapy services   Therapy Recommendations Upon DC: Dysphagia Training, Patient / Family / Caregiver Education        Patient / Family Goals  Patient / Family Goal #1: \"Ok\" to PO  Goal #1 Outcome: Progressing as expected  Short Term Goal # 1: Pt will complete a diagnostic swallow study to further assess swallow function and inform POC  Goal Outcome # 1: Goal met, new goal added  Short Term Goal # 1 B : Patient will consume a PU/TN diet with timely initiation of swallow trigger.      Lisbet Banks, SLP   "

## 2024-06-01 NOTE — DISCHARGE INSTRUCTIONS
Change resendiz catheter every 30 days and as needed.    Change PIV dressing every Saturday and as needed. Flush with sterile saline daily and as needed.

## 2024-06-01 NOTE — PROGRESS NOTES
Report received from night shift RN. Patient A&Ox1, in bed resting comfortably. VSS, denies pain, bed in lowest position and locked, call light in reach.

## 2024-06-02 LAB
BACTERIA SPEC RESP CULT: NORMAL
BACTERIA UR CULT: NORMAL
GRAM STN SPEC: NORMAL
SIGNIFICANT IND 70042: NORMAL
SIGNIFICANT IND 70042: NORMAL
SITE SITE: NORMAL
SITE SITE: NORMAL
SOURCE SOURCE: NORMAL
SOURCE SOURCE: NORMAL

## 2024-06-13 ENCOUNTER — APPOINTMENT (OUTPATIENT)
Dept: RADIOLOGY | Facility: MEDICAL CENTER | Age: 79
End: 2024-06-13
Attending: STUDENT IN AN ORGANIZED HEALTH CARE EDUCATION/TRAINING PROGRAM
Payer: COMMERCIAL

## 2024-06-13 ENCOUNTER — HOSPITAL ENCOUNTER (EMERGENCY)
Facility: MEDICAL CENTER | Age: 79
End: 2024-06-13
Attending: STUDENT IN AN ORGANIZED HEALTH CARE EDUCATION/TRAINING PROGRAM
Payer: COMMERCIAL

## 2024-06-13 VITALS
SYSTOLIC BLOOD PRESSURE: 129 MMHG | HEART RATE: 51 BPM | RESPIRATION RATE: 12 BRPM | TEMPERATURE: 97.5 F | HEIGHT: 70 IN | BODY MASS INDEX: 24.34 KG/M2 | DIASTOLIC BLOOD PRESSURE: 60 MMHG | WEIGHT: 170 LBS | OXYGEN SATURATION: 99 %

## 2024-06-13 DIAGNOSIS — S01.112A LACERATION OF LEFT EYEBROW, INITIAL ENCOUNTER: ICD-10-CM

## 2024-06-13 DIAGNOSIS — D64.9 CHRONIC ANEMIA: ICD-10-CM

## 2024-06-13 DIAGNOSIS — S09.90XA CLOSED HEAD INJURY, INITIAL ENCOUNTER: ICD-10-CM
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
## 2024-06-13 LAB
ALBUMIN SERPL BCP-MCNC: 2.9 G/DL (ref 3.2–4.9)
ALBUMIN/GLOB SERPL: 0.8 G/DL
ALP SERPL-CCNC: 48 U/L (ref 30–99)
ALT SERPL-CCNC: 7 U/L (ref 2–50)
ANION GAP SERPL CALC-SCNC: 10 MMOL/L (ref 7–16)
AST SERPL-CCNC: 14 U/L (ref 12–45)
BASOPHILS # BLD AUTO: 0.8 % (ref 0–1.8)
BASOPHILS # BLD: 0.04 K/UL (ref 0–0.12)
BILIRUB SERPL-MCNC: 0.3 MG/DL (ref 0.1–1.5)
BUN SERPL-MCNC: 16 MG/DL (ref 8–22)
CALCIUM ALBUM COR SERPL-MCNC: 9.6 MG/DL (ref 8.5–10.5)
CALCIUM SERPL-MCNC: 8.7 MG/DL (ref 8.5–10.5)
CHLORIDE SERPL-SCNC: 98 MMOL/L (ref 96–112)
CO2 SERPL-SCNC: 29 MMOL/L (ref 20–33)
CREAT SERPL-MCNC: 0.95 MG/DL (ref 0.5–1.4)
EOSINOPHIL # BLD AUTO: 0.06 K/UL (ref 0–0.51)
EOSINOPHIL NFR BLD: 1.2 % (ref 0–6.9)
ERYTHROCYTE [DISTWIDTH] IN BLOOD BY AUTOMATED COUNT: 48.5 FL (ref 35.9–50)
GFR SERPLBLD CREATININE-BSD FMLA CKD-EPI: 82 ML/MIN/1.73 M 2
GLOBULIN SER CALC-MCNC: 3.6 G/DL (ref 1.9–3.5)
GLUCOSE SERPL-MCNC: 251 MG/DL (ref 65–99)
HCT VFR BLD AUTO: 29.8 % (ref 42–52)
HGB BLD-MCNC: 10.2 G/DL (ref 14–18)
IMM GRANULOCYTES # BLD AUTO: 0.07 K/UL (ref 0–0.11)
IMM GRANULOCYTES NFR BLD AUTO: 1.4 % (ref 0–0.9)
LYMPHOCYTES # BLD AUTO: 1.9 K/UL (ref 1–4.8)
LYMPHOCYTES NFR BLD: 37.2 % (ref 22–41)
MCH RBC QN AUTO: 30.6 PG (ref 27–33)
MCHC RBC AUTO-ENTMCNC: 34.2 G/DL (ref 32.3–36.5)
MCV RBC AUTO: 89.5 FL (ref 81.4–97.8)
MONOCYTES # BLD AUTO: 0.61 K/UL (ref 0–0.85)
MONOCYTES NFR BLD AUTO: 11.9 % (ref 0–13.4)
NEUTROPHILS # BLD AUTO: 2.43 K/UL (ref 1.82–7.42)
NEUTROPHILS NFR BLD: 47.5 % (ref 44–72)
NRBC # BLD AUTO: 0 K/UL
NRBC BLD-RTO: 0 /100 WBC (ref 0–0.2)
PLATELET # BLD AUTO: 121 K/UL (ref 164–446)
PMV BLD AUTO: 8.4 FL (ref 9–12.9)
POTASSIUM SERPL-SCNC: 3.5 MMOL/L (ref 3.6–5.5)
PROT SERPL-MCNC: 6.5 G/DL (ref 6–8.2)
RBC # BLD AUTO: 3.33 M/UL (ref 4.7–6.1)
SODIUM SERPL-SCNC: 137 MMOL/L (ref 135–145)
WBC # BLD AUTO: 5.1 K/UL (ref 4.8–10.8)

## 2024-06-13 PROCEDURE — 36415 COLL VENOUS BLD VENIPUNCTURE: CPT

## 2024-06-13 PROCEDURE — 80053 COMPREHEN METABOLIC PANEL: CPT

## 2024-06-13 PROCEDURE — 99285 EMERGENCY DEPT VISIT HI MDM: CPT

## 2024-06-13 PROCEDURE — 70450 CT HEAD/BRAIN W/O DYE: CPT

## 2024-06-13 PROCEDURE — 303747 HCHG EXTRA SUTURE

## 2024-06-13 PROCEDURE — 12013 RPR F/E/E/N/L/M 2.6-5.0 CM: CPT

## 2024-06-13 PROCEDURE — 70486 CT MAXILLOFACIAL W/O DYE: CPT

## 2024-06-13 PROCEDURE — 85025 COMPLETE CBC W/AUTO DIFF WBC: CPT

## 2024-06-13 PROCEDURE — 72125 CT NECK SPINE W/O DYE: CPT

## 2024-06-13 PROCEDURE — 72128 CT CHEST SPINE W/O DYE: CPT

## 2024-06-13 PROCEDURE — 304999 HCHG REPAIR-SIMPLE/INTERMED LEVEL 1

## 2024-06-13 PROCEDURE — 73090 X-RAY EXAM OF FOREARM: CPT | Mod: LT

## 2024-06-13 PROCEDURE — 72131 CT LUMBAR SPINE W/O DYE: CPT

## 2024-06-13 PROCEDURE — 73060 X-RAY EXAM OF HUMERUS: CPT | Mod: LT

## 2024-06-13 PROCEDURE — 700101 HCHG RX REV CODE 250: Performed by: STUDENT IN AN ORGANIZED HEALTH CARE EDUCATION/TRAINING PROGRAM

## 2024-06-13 RX ORDER — LIDOCAINE HYDROCHLORIDE AND EPINEPHRINE 10; 10 MG/ML; UG/ML
10 INJECTION, SOLUTION INFILTRATION; PERINEURAL ONCE
Status: COMPLETED | OUTPATIENT
Start: 2024-06-13 | End: 2024-06-13

## 2024-06-13 RX ADMIN — LIDOCAINE HYDROCHLORIDE AND EPINEPHRINE 10 ML: 10; 10 INJECTION, SOLUTION INFILTRATION; PERINEURAL at 20:00

## 2024-06-13 ASSESSMENT — FIBROSIS 4 INDEX: FIB4 SCORE: 5.74

## 2024-06-14 NOTE — ED PROVIDER NOTES
ED Provider Note    CHIEF COMPLAINT  Chief Complaint   Patient presents with    GLF     Per [facility], pt had unwitnessed GLF resulting in hematoma to L eye. +thinners, unknown LOC       EXTERNAL RECORDS REVIEWED  Inpatient Notes discharge summary on 6/1/2024 for altered mental status    HPI/ROS  LIMITATION TO HISTORY   Select: Altered mental status / Confusion  OUTSIDE HISTORIAN(S):  EMS reports patient has baseline mental status oriented x 1 per staff even after ground-level fall    Wale Olivas is a 79 y.o. male who presents with head trauma after a ground-level fall.  Patient reports neck back and arm pain.  EMS reports that the fall was unwitnessed.  Patient is on anticoagulation.  Patient had positive head strike per EMS.    PAST MEDICAL HISTORY   has a past medical history of Alzheimer's dementia ([facility]), Anxiety, BPH (benign prostatic hyperplasia), CKD (chronic kidney disease), COPD (chronic obstructive pulmonary disease) ([facility]), COVID-19, Dementia ([facility]), Dementia ([facility]), GERD (gastroesophageal reflux disease), Hyperlipidemia, Hypertension, Insomnia, Major depressive disorder, Seizure disorder ([facility]), Stroke ([facility]), and Type II diabetes mellitus ([facility]).    SURGICAL HISTORY  patient denies any surgical history    FAMILY HISTORY  No family history on file.    SOCIAL HISTORY  Social History     Tobacco Use    Smoking status: Never    Smokeless tobacco: Never   Vaping Use    Vaping status: Never Used   Substance and Sexual Activity    Alcohol use: Not Currently    Drug use: Not Currently    Sexual activity: Not on file       CURRENT MEDICATIONS  Home Medications    **Home medications have not yet been reviewed for this encounter**       Audit from Redirected Encounters    **Home medications have not yet been reviewed for this encounter**         ALLERGIES  Allergies   Allergen Reactions    Metformin Unspecified     Unknown reaction, listed on MAR     Morphine Unspecified     Unknown reaction, listed on MAR   "   Simvastatin Unspecified     Unknown reaction, listed on MAR     Spironolactone Unspecified     Unknown reaction, listed on MAR     Haldol [Haloperidol] Anxiety     Daughter reports it over-sedates pt       PHYSICAL EXAM  VITAL SIGNS: /60   Pulse (!) 51   Temp 36.3 °C (97.4 °F) (Temporal)   Resp 12   Ht 1.778 m (5' 10\")   Wt 77.1 kg (170 lb)   SpO2 99%   BMI 24.39 kg/m²    Vitals and nursing note reviewed.   Constitutional:       Comments: Patient is lying in bed supine, oriented to time person only  HENT:   Abrasion bruising and swelling and tenderness to the left forehead, brow and cheek  Eyes:      Extraocular Movements: Extraocular movements intact.      Conjunctiva/sclera: Conjunctivae normal.      Pupils: Pupils are equal, round, and reactive to light.   Cardiovascular:      Pulses: Normal pulses.      Comments: HR 51  Pulmonary:      Effort: Pulmonary effort is normal. No respiratory distress.   Abdomen:  Ferro catheter in place  Musculoskeletal:      Bruising and tenderness to the left upper extremity  Midline C/T/L-spine tenderness to palpation     Cervical back: Normal range of motion. No rigidity.   Skin:     General: Skin is warm and dry.      Capillary Refill: Capillary refill takes less than 2 seconds.   Neurological:      Mental Status: Oriented x 1.     RADIOLOGY/PROCEDURES   I have independently interpreted the diagnostic imaging associated with this visit and am waiting the final reading from the radiologist.   My preliminary interpretation is as follows: No intracranial hemorrhage    Radiologist interpretation:  DX-HUMERUS 2+ LEFT   Final Result      No evidence of fracture or dislocation.      DX-FOREARM LEFT   Final Result      No acute osseous abnormality.      CT-LSPINE W/O PLUS RECONS   Final Result      1.  No acute fracture or traumatic listhesis in the lumbar spine.   2.  Chronic L5 compression fracture, with slightly progressive vertebral body height loss. 2 mm posterior " retropulsion.   3.  Unchanged chronic anterior wedging of L2.   4.  Unchanged chronic retrolisthesis at L2-L3 level measuring approximately 5 mm.      CT-TSPINE W/O PLUS RECONS   Final Result      1.  No acute fracture or traumatic listhesis in the thoracic spine.   2.  Chronic T8 and T12 compression fractures.   3.  Some debris in the visualized trachea, nonspecific. Please correlate for possibility of aspiration.   4.  Partially visualized infectious/inflammatory opacities in the upper lungs, right greater than left.      CT-MAXILLOFACIAL W/O PLUS RECONS   Final Result      1.  No evidence of facial fracture.      2.  Left supraorbital soft tissue injury.      CT-CSPINE WITHOUT PLUS RECONS   Final Result      1.  No acute fracture or traumatic listhesis in the cervical spine.   2.  Nonspecific linear and tree-in-bud groundglass opacities in the upper lobes, right greater than left, infectious/inflammatory.      CT-HEAD W/O   Final Result      1.  No evidence of acute intracranial process.      2.  Chronic ischemic change.      3.  Atrophy.      4.  Prior right craniotomy.             COURSE & MEDICAL DECISION MAKING    ASSESSMENT, COURSE AND PLAN  Care Narrative: CBC to evaluate for acute anemia and leukocytosis.  CMP to evaluate for acute electrolyte abnormality, acute kidney injury, acute liver failure or dysfunction.  CT head to evaluate for intracranial hemorrhage or skull fracture.  CT face and C/T/L spine to evaluate for fracture versus location.  X-ray of the forearm and humerus to evaluate for fracture versus dislocation.  Patient is on antibiotics for urinary tract infection at this time, repeat urinalysis not indicated.    Electronically signed by: Manuel Gallagher M.D., 6/13/2024 5:38 PM    Laceration repair without complication.  Patient with unchanged anemia on lab work.  No evidence of intracranial hemorrhage on  CT head.  CT spine demonstrates no acute spine injury.  Patient does have extensive  burden of chronic spine injury, chronic compression fractures, chronic wedging and chronic retrolisthesis.     Repeat physical exam benign.  I doubt any serious emergency process at this time.  Patient and/or family, friends given strict return precautions for worsening symptoms and care instructions. They have demonstrated understanding of discharge instructions through teach back mechanism. Advised PCP follow-up in 1-2 days.  Patient/family/friend expresses understanding and agrees to plan.    This dictation has been created using voice recognition software. I am continuously working with the software to minimize the number of voice recognition errors and I have made every attempt to manually correct the errors within my dictation. However errors  related to this voice recognition software may still exist and should be interpreted within the appropriate context.     Electronically signed by: Manuel Gallagher M.D., 6/13/2024 8:35 PM    LACERATION REPAIR PROCEDURE NOTE  The patient's identification was confirmed and consent was obtained.  This procedure was performed by Dr. Gallagher at 8:30 PM.  Site: Left eyebrow  Sterile procedures observed  Anesthetic used (type and amt): 1% lidocaine with epinephrine  Suture type/size: 4-0 Ethilon  Length: 3 cm  # of Sutures: 4  Technique: Simple interrupted and figure-of-eight  Complexity: Complex with recurrent bleeding  Antibx ointment applied  Tetanus UTD   Site anesthetized, irrigated with NS, explored without evidence of foreign body, wound well approximated, site covered with dry, sterile dressing. Patient tolerated procedure well without complications. Instructions for care discussed verbally and patient provided with additional written instructions for homecare and f/u.      DISPOSITION AND DISCUSSIONS      Escalation of care considered, and ultimately not performed:acute inpatient care management, however at this time, the patient is most appropriate for outpatient  management    Barriers to care at this time, including but not limited to: Patient does not have established PCP.     Decision tools and prescription drugs considered including, but not limited to: Pain Medications   over-the-counter pain medications are appropriate, narcotics not indicated at this time  .    FINAL DIAGNOSIS  1. Laceration of left eyebrow, initial encounter    2. Closed head injury, initial encounter    3. Fall from ground level    4. Chronic anemia           Electronically signed by: Manuel Gallagher M.D., 6/13/2024 5:35 PM

## 2024-06-14 NOTE — ED TRIAGE NOTES
Pt BIBA    Chief Complaint   Patient presents with    GLF     Per [employer], pt had unwitnessed GLF resulting in hematoma to L eye. +thinners, unknown LOC     Pt nonverbal on assessment. Pt reported to be A+Ox1 per [employer]. ABCs intact. Vitals/tele on the monitor

## 2024-06-14 NOTE — ED NOTES
Assist RN: Discharge paperwork printed in anticipation for discharge back to [facility]

## 2024-06-14 NOTE — DISCHARGE PLANNING
Medical Social Work    MSW received a voalte message from bedside RN that pt is ready to return to the [facility].  RN will call nurse to nurse.  MSW faxed PCS and facesheet to [facility] and made follow up phone call to [facility] to arrange transport for 2120.  Bedside RN made aware of transport time.

## 2024-07-11 ENCOUNTER — APPOINTMENT (OUTPATIENT)
Dept: RADIOLOGY | Facility: MEDICAL CENTER | Age: 79
DRG: 689 | End: 2024-07-11
Attending: EMERGENCY MEDICINE
Payer: COMMERCIAL

## 2024-07-11 ENCOUNTER — HOSPITAL ENCOUNTER (INPATIENT)
Facility: MEDICAL CENTER | Age: 79
LOS: 4 days | DRG: 689 | End: 2024-07-15
Attending: EMERGENCY MEDICINE | Admitting: STUDENT IN AN ORGANIZED HEALTH CARE EDUCATION/TRAINING PROGRAM
Payer: COMMERCIAL

## 2024-07-11 ENCOUNTER — APPOINTMENT (OUTPATIENT)
Dept: RADIOLOGY | Facility: MEDICAL CENTER | Age: 79
DRG: 689 | End: 2024-07-11
Payer: COMMERCIAL

## 2024-07-11 DIAGNOSIS — R41.82 ALTERED MENTAL STATUS, UNSPECIFIED ALTERED MENTAL STATUS TYPE: ICD-10-CM

## 2024-07-11 DIAGNOSIS — N39.0 URINARY TRACT INFECTION ASSOCIATED WITH INDWELLING URETHRAL CATHETER, INITIAL ENCOUNTER (HCC): ICD-10-CM

## 2024-07-11 DIAGNOSIS — T83.511A URINARY TRACT INFECTION ASSOCIATED WITH INDWELLING URETHRAL CATHETER, INITIAL ENCOUNTER (HCC): ICD-10-CM

## 2024-07-11 PROBLEM — M79.89 LEFT UPPER EXTREMITY SWELLING: Status: ACTIVE | Noted: 2024-07-11

## 2024-07-11 PROBLEM — E11.9 DM (DIABETES MELLITUS) (HCC): Status: ACTIVE | Noted: 2023-01-29

## 2024-07-11 PROBLEM — I16.0 HYPERTENSIVE URGENCY: Status: ACTIVE | Noted: 2024-07-11

## 2024-07-11 PROBLEM — I48.0 PAF (PAROXYSMAL ATRIAL FIBRILLATION) (HCC): Status: ACTIVE | Noted: 2024-07-11

## 2024-07-11 PROBLEM — I10 HYPERTENSION: Status: RESOLVED | Noted: 2023-01-29 | Resolved: 2024-07-11

## 2024-07-11 PROBLEM — G93.41 ACUTE METABOLIC ENCEPHALOPATHY: Status: ACTIVE | Noted: 2024-07-11

## 2024-07-11 LAB
ALBUMIN SERPL BCP-MCNC: 2.6 G/DL (ref 3.2–4.9)
ALBUMIN/GLOB SERPL: 0.7 G/DL
ALP SERPL-CCNC: 55 U/L (ref 30–99)
ALT SERPL-CCNC: 6 U/L (ref 2–50)
AMMONIA PLAS-SCNC: 15 UMOL/L (ref 11–45)
AMPHET UR QL SCN: NEGATIVE
ANION GAP SERPL CALC-SCNC: 9 MMOL/L (ref 7–16)
APPEARANCE UR: ABNORMAL
AST SERPL-CCNC: 9 U/L (ref 12–45)
BACTERIA #/AREA URNS HPF: NEGATIVE /HPF
BARBITURATES UR QL SCN: NEGATIVE
BASOPHILS # BLD AUTO: 0.7 % (ref 0–1.8)
BASOPHILS # BLD: 0.05 K/UL (ref 0–0.12)
BENZODIAZ UR QL SCN: NEGATIVE
BILIRUB SERPL-MCNC: 0.4 MG/DL (ref 0.1–1.5)
BILIRUB UR QL STRIP.AUTO: NEGATIVE
BUN SERPL-MCNC: 12 MG/DL (ref 8–22)
BZE UR QL SCN: NEGATIVE
CALCIUM ALBUM COR SERPL-MCNC: 9.5 MG/DL (ref 8.5–10.5)
CALCIUM SERPL-MCNC: 8.4 MG/DL (ref 8.5–10.5)
CANNABINOIDS UR QL SCN: NEGATIVE
CHLORIDE SERPL-SCNC: 103 MMOL/L (ref 96–112)
CO2 SERPL-SCNC: 25 MMOL/L (ref 20–33)
COLOR UR: YELLOW
CREAT SERPL-MCNC: 1.04 MG/DL (ref 0.5–1.4)
EKG IMPRESSION: NORMAL
EOSINOPHIL # BLD AUTO: 0.41 K/UL (ref 0–0.51)
EOSINOPHIL NFR BLD: 5.9 % (ref 0–6.9)
EPI CELLS #/AREA URNS HPF: NEGATIVE /HPF
ERYTHROCYTE [DISTWIDTH] IN BLOOD BY AUTOMATED COUNT: 48.5 FL (ref 35.9–50)
ETHANOL BLD-MCNC: <10.1 MG/DL
FENTANYL UR QL: NEGATIVE
FLUAV RNA SPEC QL NAA+PROBE: NEGATIVE
FLUBV RNA SPEC QL NAA+PROBE: NEGATIVE
GFR SERPLBLD CREATININE-BSD FMLA CKD-EPI: 74 ML/MIN/1.73 M 2
GLOBULIN SER CALC-MCNC: 3.7 G/DL (ref 1.9–3.5)
GLUCOSE BLD STRIP.AUTO-MCNC: 123 MG/DL (ref 65–99)
GLUCOSE BLD STRIP.AUTO-MCNC: 133 MG/DL (ref 65–99)
GLUCOSE BLD STRIP.AUTO-MCNC: 142 MG/DL (ref 65–99)
GLUCOSE BLD STRIP.AUTO-MCNC: 143 MG/DL (ref 65–99)
GLUCOSE BLD STRIP.AUTO-MCNC: 144 MG/DL (ref 65–99)
GLUCOSE SERPL-MCNC: 143 MG/DL (ref 65–99)
GLUCOSE UR STRIP.AUTO-MCNC: NEGATIVE MG/DL
HCT VFR BLD AUTO: 29.9 % (ref 42–52)
HGB BLD-MCNC: 10.3 G/DL (ref 14–18)
HYALINE CASTS #/AREA URNS LPF: ABNORMAL /LPF
IMM GRANULOCYTES # BLD AUTO: 0.04 K/UL (ref 0–0.11)
IMM GRANULOCYTES NFR BLD AUTO: 0.6 % (ref 0–0.9)
KETONES UR STRIP.AUTO-MCNC: 15 MG/DL
LACTATE SERPL-SCNC: 1.3 MMOL/L (ref 0.5–2)
LACTATE SERPL-SCNC: 1.9 MMOL/L (ref 0.5–2)
LEUKOCYTE ESTERASE UR QL STRIP.AUTO: ABNORMAL
LYMPHOCYTES # BLD AUTO: 2.44 K/UL (ref 1–4.8)
LYMPHOCYTES NFR BLD: 35 % (ref 22–41)
MCH RBC QN AUTO: 31.3 PG (ref 27–33)
MCHC RBC AUTO-ENTMCNC: 34.4 G/DL (ref 32.3–36.5)
MCV RBC AUTO: 90.9 FL (ref 81.4–97.8)
METHADONE UR QL SCN: NEGATIVE
MICRO URNS: ABNORMAL
MONOCYTES # BLD AUTO: 0.78 K/UL (ref 0–0.85)
MONOCYTES NFR BLD AUTO: 11.2 % (ref 0–13.4)
NEUTROPHILS # BLD AUTO: 3.25 K/UL (ref 1.82–7.42)
NEUTROPHILS NFR BLD: 46.6 % (ref 44–72)
NITRITE UR QL STRIP.AUTO: NEGATIVE
NRBC # BLD AUTO: 0 K/UL
NRBC BLD-RTO: 0 /100 WBC (ref 0–0.2)
OPIATES UR QL SCN: NEGATIVE
OXYCODONE UR QL SCN: NEGATIVE
PCP UR QL SCN: NEGATIVE
PH UR STRIP.AUTO: 6.5 [PH] (ref 5–8)
PLATELET # BLD AUTO: 101 K/UL (ref 164–446)
PMV BLD AUTO: 8.3 FL (ref 9–12.9)
POTASSIUM SERPL-SCNC: 4 MMOL/L (ref 3.6–5.5)
PROPOXYPH UR QL SCN: NEGATIVE
PROT SERPL-MCNC: 6.3 G/DL (ref 6–8.2)
PROT UR QL STRIP: 300 MG/DL
RBC # BLD AUTO: 3.29 M/UL (ref 4.7–6.1)
RBC # URNS HPF: ABNORMAL /HPF
RBC UR QL AUTO: ABNORMAL
RSV RNA SPEC QL NAA+PROBE: NEGATIVE
SARS-COV-2 RNA RESP QL NAA+PROBE: NOTDETECTED
SODIUM SERPL-SCNC: 137 MMOL/L (ref 135–145)
SP GR UR STRIP.AUTO: 1.02
TROPONIN T SERPL-MCNC: 54 NG/L (ref 6–19)
TROPONIN T SERPL-MCNC: 58 NG/L (ref 6–19)
TSH SERPL DL<=0.005 MIU/L-ACNC: 1.92 UIU/ML (ref 0.38–5.33)
UROBILINOGEN UR STRIP.AUTO-MCNC: 0.2 MG/DL
VIT B12 SERPL-MCNC: 1175 PG/ML (ref 211–911)
WBC # BLD AUTO: 7 K/UL (ref 4.8–10.8)
WBC #/AREA URNS HPF: ABNORMAL /HPF

## 2024-07-11 PROCEDURE — 99223 1ST HOSP IP/OBS HIGH 75: CPT | Mod: AI | Performed by: STUDENT IN AN ORGANIZED HEALTH CARE EDUCATION/TRAINING PROGRAM

## 2024-07-11 PROCEDURE — 36415 COLL VENOUS BLD VENIPUNCTURE: CPT

## 2024-07-11 PROCEDURE — 700105 HCHG RX REV CODE 258: Performed by: EMERGENCY MEDICINE

## 2024-07-11 PROCEDURE — 82962 GLUCOSE BLOOD TEST: CPT | Mod: 91

## 2024-07-11 PROCEDURE — 770006 HCHG ROOM/CARE - MED/SURG/GYN SEMI*

## 2024-07-11 PROCEDURE — 84443 ASSAY THYROID STIM HORMONE: CPT

## 2024-07-11 PROCEDURE — 0241U HCHG SARS-COV-2 COVID-19 NFCT DS RESP RNA 4 TRGT ED POC: CPT

## 2024-07-11 PROCEDURE — 87077 CULTURE AEROBIC IDENTIFY: CPT

## 2024-07-11 PROCEDURE — 93005 ELECTROCARDIOGRAM TRACING: CPT | Performed by: EMERGENCY MEDICINE

## 2024-07-11 PROCEDURE — 87147 CULTURE TYPE IMMUNOLOGIC: CPT

## 2024-07-11 PROCEDURE — 80307 DRUG TEST PRSMV CHEM ANLYZR: CPT

## 2024-07-11 PROCEDURE — 96374 THER/PROPH/DIAG INJ IV PUSH: CPT

## 2024-07-11 PROCEDURE — 82607 VITAMIN B-12: CPT

## 2024-07-11 PROCEDURE — 83605 ASSAY OF LACTIC ACID: CPT | Mod: 91

## 2024-07-11 PROCEDURE — 82140 ASSAY OF AMMONIA: CPT

## 2024-07-11 PROCEDURE — 71045 X-RAY EXAM CHEST 1 VIEW: CPT

## 2024-07-11 PROCEDURE — A9270 NON-COVERED ITEM OR SERVICE: HCPCS | Performed by: STUDENT IN AN ORGANIZED HEALTH CARE EDUCATION/TRAINING PROGRAM

## 2024-07-11 PROCEDURE — 81001 URINALYSIS AUTO W/SCOPE: CPT

## 2024-07-11 PROCEDURE — 700111 HCHG RX REV CODE 636 W/ 250 OVERRIDE (IP): Mod: JZ | Performed by: EMERGENCY MEDICINE

## 2024-07-11 PROCEDURE — 700105 HCHG RX REV CODE 258: Performed by: STUDENT IN AN ORGANIZED HEALTH CARE EDUCATION/TRAINING PROGRAM

## 2024-07-11 PROCEDURE — 303105 HCHG CATHETER EXTRA

## 2024-07-11 PROCEDURE — 80053 COMPREHEN METABOLIC PANEL: CPT

## 2024-07-11 PROCEDURE — 99285 EMERGENCY DEPT VISIT HI MDM: CPT

## 2024-07-11 PROCEDURE — 87086 URINE CULTURE/COLONY COUNT: CPT

## 2024-07-11 PROCEDURE — 85025 COMPLETE CBC W/AUTO DIFF WBC: CPT

## 2024-07-11 PROCEDURE — 700102 HCHG RX REV CODE 250 W/ 637 OVERRIDE(OP): Performed by: STUDENT IN AN ORGANIZED HEALTH CARE EDUCATION/TRAINING PROGRAM

## 2024-07-11 PROCEDURE — 92610 EVALUATE SWALLOWING FUNCTION: CPT

## 2024-07-11 PROCEDURE — 82077 ASSAY SPEC XCP UR&BREATH IA: CPT

## 2024-07-11 PROCEDURE — 70450 CT HEAD/BRAIN W/O DYE: CPT

## 2024-07-11 PROCEDURE — 51702 INSERT TEMP BLADDER CATH: CPT

## 2024-07-11 PROCEDURE — 84484 ASSAY OF TROPONIN QUANT: CPT | Mod: 91

## 2024-07-11 PROCEDURE — 87040 BLOOD CULTURE FOR BACTERIA: CPT

## 2024-07-11 PROCEDURE — 87186 SC STD MICRODIL/AGAR DIL: CPT

## 2024-07-11 RX ORDER — OXYCODONE HYDROCHLORIDE 5 MG/1
2.5 TABLET ORAL EVERY 6 HOURS PRN
Status: DISCONTINUED | OUTPATIENT
Start: 2024-07-11 | End: 2024-07-15 | Stop reason: HOSPADM

## 2024-07-11 RX ORDER — KETOROLAC TROMETHAMINE 15 MG/ML
15 INJECTION, SOLUTION INTRAMUSCULAR; INTRAVENOUS EVERY 6 HOURS PRN
Status: DISCONTINUED | OUTPATIENT
Start: 2024-07-11 | End: 2024-07-11

## 2024-07-11 RX ORDER — RISPERIDONE 1 MG/1
1 TABLET ORAL 3 TIMES DAILY
Status: DISCONTINUED | OUTPATIENT
Start: 2024-07-11 | End: 2024-07-15 | Stop reason: HOSPADM

## 2024-07-11 RX ORDER — ONDANSETRON 2 MG/ML
4 INJECTION INTRAMUSCULAR; INTRAVENOUS EVERY 4 HOURS PRN
Status: DISCONTINUED | OUTPATIENT
Start: 2024-07-11 | End: 2024-07-15 | Stop reason: HOSPADM

## 2024-07-11 RX ORDER — INSULIN LISPRO 100 [IU]/ML
1-6 INJECTION, SOLUTION INTRAVENOUS; SUBCUTANEOUS
Status: DISCONTINUED | OUTPATIENT
Start: 2024-07-11 | End: 2024-07-15 | Stop reason: HOSPADM

## 2024-07-11 RX ORDER — AMLODIPINE BESYLATE 5 MG/1
5 TABLET ORAL 2 TIMES DAILY
Status: DISCONTINUED | OUTPATIENT
Start: 2024-07-11 | End: 2024-07-15 | Stop reason: HOSPADM

## 2024-07-11 RX ORDER — PANTOPRAZOLE SODIUM 20 MG/1
20 TABLET, DELAYED RELEASE ORAL
Status: DISCONTINUED | OUTPATIENT
Start: 2024-07-11 | End: 2024-07-11

## 2024-07-11 RX ORDER — DEXTROSE MONOHYDRATE 25 G/50ML
25 INJECTION, SOLUTION INTRAVENOUS
Status: DISCONTINUED | OUTPATIENT
Start: 2024-07-11 | End: 2024-07-15 | Stop reason: HOSPADM

## 2024-07-11 RX ORDER — UREA 10 %
500 LOTION (ML) TOPICAL EVERY EVENING
Status: DISCONTINUED | OUTPATIENT
Start: 2024-07-11 | End: 2024-07-15 | Stop reason: HOSPADM

## 2024-07-11 RX ORDER — PRAVASTATIN SODIUM 20 MG
80 TABLET ORAL
Status: DISCONTINUED | OUTPATIENT
Start: 2024-07-11 | End: 2024-07-15 | Stop reason: HOSPADM

## 2024-07-11 RX ORDER — INSULIN LISPRO 100 [IU]/ML
0.2 INJECTION, SOLUTION INTRAVENOUS; SUBCUTANEOUS
Status: DISCONTINUED | OUTPATIENT
Start: 2024-07-11 | End: 2024-07-15 | Stop reason: HOSPADM

## 2024-07-11 RX ORDER — TAMSULOSIN HYDROCHLORIDE 0.4 MG/1
0.4 CAPSULE ORAL DAILY
Status: DISCONTINUED | OUTPATIENT
Start: 2024-07-11 | End: 2024-07-15 | Stop reason: HOSPADM

## 2024-07-11 RX ORDER — DIVALPROEX SODIUM 125 MG/1
500 CAPSULE, COATED PELLETS ORAL 3 TIMES DAILY
Status: DISCONTINUED | OUTPATIENT
Start: 2024-07-11 | End: 2024-07-11

## 2024-07-11 RX ORDER — OMEPRAZOLE 20 MG/1
20 CAPSULE, DELAYED RELEASE ORAL DAILY
Status: DISCONTINUED | OUTPATIENT
Start: 2024-07-11 | End: 2024-07-15 | Stop reason: HOSPADM

## 2024-07-11 RX ORDER — L.ACID/L.CASEI/B.BIF/B.LON/FOS 2B CELL-50
1 CAPSULE ORAL 2 TIMES DAILY
COMMUNITY

## 2024-07-11 RX ORDER — SODIUM CHLORIDE 9 MG/ML
1000 INJECTION, SOLUTION INTRAVENOUS
Status: ON HOLD | COMMUNITY
End: 2024-07-15

## 2024-07-11 RX ORDER — CEFTRIAXONE 1 G/1
1000 INJECTION, POWDER, FOR SOLUTION INTRAMUSCULAR; INTRAVENOUS ONCE
Status: COMPLETED | OUTPATIENT
Start: 2024-07-11 | End: 2024-07-11

## 2024-07-11 RX ORDER — CEFTRIAXONE 1 G/1
1000 INJECTION, POWDER, FOR SOLUTION INTRAMUSCULAR; INTRAVENOUS DAILY
Status: DISCONTINUED | OUTPATIENT
Start: 2024-07-12 | End: 2024-07-11

## 2024-07-11 RX ORDER — SODIUM CHLORIDE 9 MG/ML
INJECTION, SOLUTION INTRAVENOUS CONTINUOUS
Status: DISCONTINUED | OUTPATIENT
Start: 2024-07-11 | End: 2024-07-13

## 2024-07-11 RX ORDER — SODIUM CHLORIDE 9 MG/ML
INJECTION, SOLUTION INTRAVENOUS CONTINUOUS
Status: DISCONTINUED | OUTPATIENT
Start: 2024-07-11 | End: 2024-07-11

## 2024-07-11 RX ORDER — LABETALOL HYDROCHLORIDE 5 MG/ML
10 INJECTION, SOLUTION INTRAVENOUS EVERY 4 HOURS PRN
Status: DISCONTINUED | OUTPATIENT
Start: 2024-07-11 | End: 2024-07-11

## 2024-07-11 RX ORDER — DIVALPROEX SODIUM 500 MG/1
500 TABLET, DELAYED RELEASE ORAL 3 TIMES DAILY
Status: DISCONTINUED | OUTPATIENT
Start: 2024-07-11 | End: 2024-07-13

## 2024-07-11 RX ORDER — ONDANSETRON 4 MG/1
4 TABLET, ORALLY DISINTEGRATING ORAL EVERY 4 HOURS PRN
Status: DISCONTINUED | OUTPATIENT
Start: 2024-07-11 | End: 2024-07-15 | Stop reason: HOSPADM

## 2024-07-11 RX ORDER — ACETAMINOPHEN 325 MG/1
650 TABLET ORAL EVERY 6 HOURS PRN
Status: DISCONTINUED | OUTPATIENT
Start: 2024-07-11 | End: 2024-07-15 | Stop reason: HOSPADM

## 2024-07-11 RX ORDER — FERROUS SULFATE 325(65) MG
325 TABLET ORAL EVERY EVENING
Status: DISCONTINUED | OUTPATIENT
Start: 2024-07-11 | End: 2024-07-15 | Stop reason: HOSPADM

## 2024-07-11 RX ORDER — BACLOFEN 10 MG/1
10 TABLET ORAL 2 TIMES DAILY
Status: DISCONTINUED | OUTPATIENT
Start: 2024-07-11 | End: 2024-07-15 | Stop reason: HOSPADM

## 2024-07-11 RX ORDER — DULOXETIN HYDROCHLORIDE 30 MG/1
30 CAPSULE, DELAYED RELEASE ORAL DAILY
Status: DISCONTINUED | OUTPATIENT
Start: 2024-07-11 | End: 2024-07-15 | Stop reason: HOSPADM

## 2024-07-11 RX ORDER — PROPRANOLOL HYDROCHLORIDE 10 MG/1
10 TABLET ORAL EVERY 12 HOURS
Status: DISCONTINUED | OUTPATIENT
Start: 2024-07-11 | End: 2024-07-15 | Stop reason: HOSPADM

## 2024-07-11 RX ORDER — FINASTERIDE 5 MG/1
5 TABLET, FILM COATED ORAL DAILY
Status: DISCONTINUED | OUTPATIENT
Start: 2024-07-11 | End: 2024-07-15 | Stop reason: HOSPADM

## 2024-07-11 RX ADMIN — DULOXETINE HYDROCHLORIDE 30 MG: 30 CAPSULE, DELAYED RELEASE ORAL at 18:28

## 2024-07-11 RX ADMIN — APIXABAN 2.5 MG: 2.5 TABLET, FILM COATED ORAL at 20:29

## 2024-07-11 RX ADMIN — FINASTERIDE 5 MG: 5 TABLET, FILM COATED ORAL at 18:29

## 2024-07-11 RX ADMIN — BACLOFEN 10 MG: 10 TABLET ORAL at 18:29

## 2024-07-11 RX ADMIN — OMEPRAZOLE 20 MG: 20 CAPSULE, DELAYED RELEASE ORAL at 20:29

## 2024-07-11 RX ADMIN — SODIUM CHLORIDE 1000 ML: 9 INJECTION, SOLUTION INTRAVENOUS at 04:54

## 2024-07-11 RX ADMIN — TAMSULOSIN HYDROCHLORIDE 0.4 MG: 0.4 CAPSULE ORAL at 18:29

## 2024-07-11 RX ADMIN — SODIUM CHLORIDE 1000 ML: 9 INJECTION, SOLUTION INTRAVENOUS at 03:33

## 2024-07-11 RX ADMIN — SODIUM CHLORIDE: 9 INJECTION, SOLUTION INTRAVENOUS at 18:26

## 2024-07-11 RX ADMIN — PRAVASTATIN SODIUM 80 MG: 20 TABLET ORAL at 20:29

## 2024-07-11 RX ADMIN — AMLODIPINE BESYLATE 5 MG: 5 TABLET ORAL at 18:29

## 2024-07-11 RX ADMIN — CYANOCOBALAMIN TAB 500 MCG 500 MCG: 500 TAB at 18:29

## 2024-07-11 RX ADMIN — CEFTRIAXONE SODIUM 1000 MG: 1 INJECTION, POWDER, FOR SOLUTION INTRAMUSCULAR; INTRAVENOUS at 03:38

## 2024-07-11 RX ADMIN — PROPRANOLOL HYDROCHLORIDE 10 MG: 10 TABLET ORAL at 18:28

## 2024-07-11 RX ADMIN — DIVALPROEX SODIUM 500 MG: 500 TABLET, DELAYED RELEASE ORAL at 18:31

## 2024-07-11 RX ADMIN — FERROUS SULFATE TAB 325 MG (65 MG ELEMENTAL FE) 325 MG: 325 (65 FE) TAB at 18:29

## 2024-07-11 RX ADMIN — RISPERIDONE 1 MG: 1 TABLET, FILM COATED ORAL at 18:29

## 2024-07-11 SDOH — ECONOMIC STABILITY: TRANSPORTATION INSECURITY
IN THE PAST 12 MONTHS, HAS LACK OF RELIABLE TRANSPORTATION KEPT YOU FROM MEDICAL APPOINTMENTS, MEETINGS, WORK OR FROM GETTING THINGS NEEDED FOR DAILY LIVING?: PATIENT UNABLE TO ANSWER

## 2024-07-11 SDOH — ECONOMIC STABILITY: TRANSPORTATION INSECURITY
IN THE PAST 12 MONTHS, HAS THE LACK OF TRANSPORTATION KEPT YOU FROM MEDICAL APPOINTMENTS OR FROM GETTING MEDICATIONS?: PATIENT UNABLE TO ANSWER

## 2024-07-11 ASSESSMENT — CHA2DS2 SCORE
HYPERTENSION: YES
VASCULAR DISEASE: NO
AGE 75 OR GREATER: YES
PRIOR STROKE OR TIA OR THROMBOEMBOLISM: YES
CHA2DS2 VASC SCORE: 6
SEX: MALE
AGE 65 TO 74: NO
DIABETES: YES
CHF OR LEFT VENTRICULAR DYSFUNCTION: NO

## 2024-07-11 ASSESSMENT — FIBROSIS 4 INDEX: FIB4 SCORE: 3.37

## 2024-07-11 ASSESSMENT — COGNITIVE AND FUNCTIONAL STATUS - GENERAL
DRESSING REGULAR UPPER BODY CLOTHING: TOTAL
TOILETING: TOTAL
DRESSING REGULAR LOWER BODY CLOTHING: A LOT
HELP NEEDED FOR BATHING: TOTAL
SUGGESTED CMS G CODE MODIFIER MOBILITY: CM
TURNING FROM BACK TO SIDE WHILE IN FLAT BAD: A LOT
SUGGESTED CMS G CODE MODIFIER DAILY ACTIVITY: CM
WALKING IN HOSPITAL ROOM: TOTAL
STANDING UP FROM CHAIR USING ARMS: TOTAL
EATING MEALS: A LOT
MOVING TO AND FROM BED TO CHAIR: TOTAL
MOVING FROM LYING ON BACK TO SITTING ON SIDE OF FLAT BED: A LOT
DAILY ACTIVITIY SCORE: 8
CLIMB 3 TO 5 STEPS WITH RAILING: TOTAL
PERSONAL GROOMING: TOTAL
MOBILITY SCORE: 8

## 2024-07-11 ASSESSMENT — SOCIAL DETERMINANTS OF HEALTH (SDOH)
IN THE PAST 12 MONTHS, HAS THE ELECTRIC, GAS, OIL, OR WATER COMPANY THREATENED TO SHUT OFF SERVICE IN YOUR HOME?: PATIENT UNABLE TO ANSWER
WITHIN THE PAST 12 MONTHS, THE FOOD YOU BOUGHT JUST DIDN'T LAST AND YOU DIDN'T HAVE MONEY TO GET MORE: PATIENT UNABLE TO ANSWER
WITHIN THE PAST 12 MONTHS, YOU WORRIED THAT YOUR FOOD WOULD RUN OUT BEFORE YOU GOT THE MONEY TO BUY MORE: PATIENT UNABLE TO ANSWER

## 2024-07-12 LAB
ALBUMIN SERPL BCP-MCNC: 2.8 G/DL (ref 3.2–4.9)
ALBUMIN/GLOB SERPL: 0.7 G/DL
ALP SERPL-CCNC: 61 U/L (ref 30–99)
ALT SERPL-CCNC: 8 U/L (ref 2–50)
ANION GAP SERPL CALC-SCNC: 10 MMOL/L (ref 7–16)
AST SERPL-CCNC: 15 U/L (ref 12–45)
BASOPHILS # BLD AUTO: 0.7 % (ref 0–1.8)
BASOPHILS # BLD: 0.04 K/UL (ref 0–0.12)
BILIRUB SERPL-MCNC: 0.4 MG/DL (ref 0.1–1.5)
BUN SERPL-MCNC: 13 MG/DL (ref 8–22)
CALCIUM ALBUM COR SERPL-MCNC: 10.2 MG/DL (ref 8.5–10.5)
CALCIUM SERPL-MCNC: 9.2 MG/DL (ref 8.5–10.5)
CHLORIDE SERPL-SCNC: 102 MMOL/L (ref 96–112)
CO2 SERPL-SCNC: 25 MMOL/L (ref 20–33)
CREAT SERPL-MCNC: 0.83 MG/DL (ref 0.5–1.4)
EOSINOPHIL # BLD AUTO: 0.5 K/UL (ref 0–0.51)
EOSINOPHIL NFR BLD: 9.2 % (ref 0–6.9)
ERYTHROCYTE [DISTWIDTH] IN BLOOD BY AUTOMATED COUNT: 46.1 FL (ref 35.9–50)
GFR SERPLBLD CREATININE-BSD FMLA CKD-EPI: 90 ML/MIN/1.73 M 2
GLOBULIN SER CALC-MCNC: 4 G/DL (ref 1.9–3.5)
GLUCOSE BLD STRIP.AUTO-MCNC: 147 MG/DL (ref 65–99)
GLUCOSE BLD STRIP.AUTO-MCNC: 166 MG/DL (ref 65–99)
GLUCOSE BLD STRIP.AUTO-MCNC: 170 MG/DL (ref 65–99)
GLUCOSE SERPL-MCNC: 177 MG/DL (ref 65–99)
HCT VFR BLD AUTO: 30.8 % (ref 42–52)
HGB BLD-MCNC: 10.4 G/DL (ref 14–18)
IMM GRANULOCYTES # BLD AUTO: 0.04 K/UL (ref 0–0.11)
IMM GRANULOCYTES NFR BLD AUTO: 0.7 % (ref 0–0.9)
LYMPHOCYTES # BLD AUTO: 1.73 K/UL (ref 1–4.8)
LYMPHOCYTES NFR BLD: 31.9 % (ref 22–41)
MCH RBC QN AUTO: 30.7 PG (ref 27–33)
MCHC RBC AUTO-ENTMCNC: 33.8 G/DL (ref 32.3–36.5)
MCV RBC AUTO: 90.9 FL (ref 81.4–97.8)
MONOCYTES # BLD AUTO: 0.59 K/UL (ref 0–0.85)
MONOCYTES NFR BLD AUTO: 10.9 % (ref 0–13.4)
NEUTROPHILS # BLD AUTO: 2.53 K/UL (ref 1.82–7.42)
NEUTROPHILS NFR BLD: 46.6 % (ref 44–72)
NRBC # BLD AUTO: 0 K/UL
NRBC BLD-RTO: 0 /100 WBC (ref 0–0.2)
PLATELET # BLD AUTO: 135 K/UL (ref 164–446)
PLATELETS.RETICULATED NFR BLD AUTO: 0.7 % (ref 0.6–13.1)
PMV BLD AUTO: 8.2 FL (ref 9–12.9)
POTASSIUM SERPL-SCNC: 4.4 MMOL/L (ref 3.6–5.5)
PROT SERPL-MCNC: 6.8 G/DL (ref 6–8.2)
RBC # BLD AUTO: 3.39 M/UL (ref 4.7–6.1)
SODIUM SERPL-SCNC: 137 MMOL/L (ref 135–145)
WBC # BLD AUTO: 5.4 K/UL (ref 4.8–10.8)

## 2024-07-12 PROCEDURE — 700101 HCHG RX REV CODE 250: Performed by: STUDENT IN AN ORGANIZED HEALTH CARE EDUCATION/TRAINING PROGRAM

## 2024-07-12 PROCEDURE — 700111 HCHG RX REV CODE 636 W/ 250 OVERRIDE (IP): Performed by: STUDENT IN AN ORGANIZED HEALTH CARE EDUCATION/TRAINING PROGRAM

## 2024-07-12 PROCEDURE — 80053 COMPREHEN METABOLIC PANEL: CPT

## 2024-07-12 PROCEDURE — 82962 GLUCOSE BLOOD TEST: CPT

## 2024-07-12 PROCEDURE — 700102 HCHG RX REV CODE 250 W/ 637 OVERRIDE(OP): Performed by: STUDENT IN AN ORGANIZED HEALTH CARE EDUCATION/TRAINING PROGRAM

## 2024-07-12 PROCEDURE — 99233 SBSQ HOSP IP/OBS HIGH 50: CPT | Performed by: HOSPITALIST

## 2024-07-12 PROCEDURE — A9270 NON-COVERED ITEM OR SERVICE: HCPCS | Performed by: STUDENT IN AN ORGANIZED HEALTH CARE EDUCATION/TRAINING PROGRAM

## 2024-07-12 PROCEDURE — 770006 HCHG ROOM/CARE - MED/SURG/GYN SEMI*

## 2024-07-12 PROCEDURE — 36415 COLL VENOUS BLD VENIPUNCTURE: CPT

## 2024-07-12 PROCEDURE — 85055 RETICULATED PLATELET ASSAY: CPT

## 2024-07-12 PROCEDURE — 85025 COMPLETE CBC W/AUTO DIFF WBC: CPT

## 2024-07-12 RX ADMIN — FERROUS SULFATE TAB 325 MG (65 MG ELEMENTAL FE) 325 MG: 325 (65 FE) TAB at 17:40

## 2024-07-12 RX ADMIN — DIVALPROEX SODIUM 500 MG: 500 TABLET, DELAYED RELEASE ORAL at 05:38

## 2024-07-12 RX ADMIN — AMLODIPINE BESYLATE 5 MG: 5 TABLET ORAL at 17:40

## 2024-07-12 RX ADMIN — DIVALPROEX SODIUM 500 MG: 500 TABLET, DELAYED RELEASE ORAL at 13:28

## 2024-07-12 RX ADMIN — INSULIN LISPRO 1 UNITS: 100 INJECTION, SOLUTION INTRAVENOUS; SUBCUTANEOUS at 09:16

## 2024-07-12 RX ADMIN — CYANOCOBALAMIN TAB 500 MCG 500 MCG: 500 TAB at 17:40

## 2024-07-12 RX ADMIN — CEFTRIAXONE SODIUM 1000 MG: 10 INJECTION, POWDER, FOR SOLUTION INTRAVENOUS at 05:46

## 2024-07-12 RX ADMIN — TAMSULOSIN HYDROCHLORIDE 0.4 MG: 0.4 CAPSULE ORAL at 05:38

## 2024-07-12 RX ADMIN — APIXABAN 2.5 MG: 2.5 TABLET, FILM COATED ORAL at 17:40

## 2024-07-12 RX ADMIN — PROPRANOLOL HYDROCHLORIDE 10 MG: 10 TABLET ORAL at 17:40

## 2024-07-12 RX ADMIN — INSULIN LISPRO 5 UNITS: 100 INJECTION, SOLUTION INTRAVENOUS; SUBCUTANEOUS at 13:28

## 2024-07-12 RX ADMIN — INSULIN LISPRO 1 UNITS: 100 INJECTION, SOLUTION INTRAVENOUS; SUBCUTANEOUS at 13:28

## 2024-07-12 RX ADMIN — PROPRANOLOL HYDROCHLORIDE 10 MG: 10 TABLET ORAL at 06:03

## 2024-07-12 RX ADMIN — FLUTICASONE FUROATE, UMECLIDINIUM BROMIDE AND VILANTEROL TRIFENATATE 1 PUFF: 100; 62.5; 25 POWDER RESPIRATORY (INHALATION) at 09:36

## 2024-07-12 RX ADMIN — DULOXETINE HYDROCHLORIDE 30 MG: 30 CAPSULE, DELAYED RELEASE ORAL at 05:37

## 2024-07-12 RX ADMIN — DIVALPROEX SODIUM 500 MG: 500 TABLET, DELAYED RELEASE ORAL at 17:40

## 2024-07-12 RX ADMIN — BACLOFEN 10 MG: 10 TABLET ORAL at 05:37

## 2024-07-12 RX ADMIN — PRAVASTATIN SODIUM 80 MG: 20 TABLET ORAL at 20:22

## 2024-07-12 RX ADMIN — BACLOFEN 10 MG: 10 TABLET ORAL at 17:40

## 2024-07-12 RX ADMIN — RISPERIDONE 1 MG: 1 TABLET, FILM COATED ORAL at 17:40

## 2024-07-12 RX ADMIN — FINASTERIDE 5 MG: 5 TABLET, FILM COATED ORAL at 05:38

## 2024-07-12 RX ADMIN — RISPERIDONE 1 MG: 1 TABLET, FILM COATED ORAL at 05:37

## 2024-07-12 RX ADMIN — INSULIN LISPRO 5 UNITS: 100 INJECTION, SOLUTION INTRAVENOUS; SUBCUTANEOUS at 09:15

## 2024-07-12 RX ADMIN — AMLODIPINE BESYLATE 5 MG: 5 TABLET ORAL at 05:38

## 2024-07-12 RX ADMIN — APIXABAN 2.5 MG: 2.5 TABLET, FILM COATED ORAL at 05:37

## 2024-07-12 RX ADMIN — RISPERIDONE 1 MG: 1 TABLET, FILM COATED ORAL at 13:27

## 2024-07-12 ASSESSMENT — COGNITIVE AND FUNCTIONAL STATUS - GENERAL
WALKING IN HOSPITAL ROOM: TOTAL
SUGGESTED CMS G CODE MODIFIER MOBILITY: CN
MOVING FROM LYING ON BACK TO SITTING ON SIDE OF FLAT BED: TOTAL
MOBILITY SCORE: 6
CLIMB 3 TO 5 STEPS WITH RAILING: TOTAL
TURNING FROM BACK TO SIDE WHILE IN FLAT BAD: TOTAL
STANDING UP FROM CHAIR USING ARMS: TOTAL
MOVING TO AND FROM BED TO CHAIR: TOTAL

## 2024-07-12 ASSESSMENT — PAIN SCALES - PAIN ASSESSMENT IN ADVANCED DEMENTIA (PAINAD)
BODYLANGUAGE: RELAXED
BODYLANGUAGE: RELAXED
CONSOLABILITY: NO NEED TO CONSOLE
TOTALSCORE: 0
FACIALEXPRESSION: SMILING OR INEXPRESSIVE
BREATHING: NORMAL
CONSOLABILITY: NO NEED TO CONSOLE
BREATHING: NORMAL
TOTALSCORE: 0
FACIALEXPRESSION: SMILING OR INEXPRESSIVE

## 2024-07-12 ASSESSMENT — PAIN DESCRIPTION - PAIN TYPE
TYPE: CHRONIC PAIN
TYPE: CHRONIC PAIN

## 2024-07-12 ASSESSMENT — PATIENT HEALTH QUESTIONNAIRE - PHQ9
2. FEELING DOWN, DEPRESSED, IRRITABLE, OR HOPELESS: NOT AT ALL
SUM OF ALL RESPONSES TO PHQ9 QUESTIONS 1 AND 2: 0
1. LITTLE INTEREST OR PLEASURE IN DOING THINGS: NOT AT ALL

## 2024-07-13 ENCOUNTER — APPOINTMENT (OUTPATIENT)
Dept: RADIOLOGY | Facility: MEDICAL CENTER | Age: 79
DRG: 689 | End: 2024-07-13
Attending: HOSPITALIST
Payer: COMMERCIAL

## 2024-07-13 ENCOUNTER — APPOINTMENT (OUTPATIENT)
Dept: CARDIOLOGY | Facility: MEDICAL CENTER | Age: 79
DRG: 689 | End: 2024-07-13
Attending: STUDENT IN AN ORGANIZED HEALTH CARE EDUCATION/TRAINING PROGRAM
Payer: COMMERCIAL

## 2024-07-13 PROBLEM — E83.42 HYPOMAGNESEMIA: Status: ACTIVE | Noted: 2024-07-13

## 2024-07-13 LAB
ANION GAP SERPL CALC-SCNC: 9 MMOL/L (ref 7–16)
BACTERIA UR CULT: ABNORMAL
BACTERIA UR CULT: ABNORMAL
BUN SERPL-MCNC: 16 MG/DL (ref 8–22)
CALCIUM SERPL-MCNC: 8.5 MG/DL (ref 8.5–10.5)
CHLORIDE SERPL-SCNC: 103 MMOL/L (ref 96–112)
CO2 SERPL-SCNC: 24 MMOL/L (ref 20–33)
CREAT SERPL-MCNC: 0.73 MG/DL (ref 0.5–1.4)
ERYTHROCYTE [DISTWIDTH] IN BLOOD BY AUTOMATED COUNT: 47.1 FL (ref 35.9–50)
GFR SERPLBLD CREATININE-BSD FMLA CKD-EPI: 94 ML/MIN/1.73 M 2
GLUCOSE BLD STRIP.AUTO-MCNC: 135 MG/DL (ref 65–99)
GLUCOSE BLD STRIP.AUTO-MCNC: 157 MG/DL (ref 65–99)
GLUCOSE BLD STRIP.AUTO-MCNC: 167 MG/DL (ref 65–99)
GLUCOSE BLD STRIP.AUTO-MCNC: 216 MG/DL (ref 65–99)
GLUCOSE SERPL-MCNC: 174 MG/DL (ref 65–99)
HCT VFR BLD AUTO: 28.8 % (ref 42–52)
HGB BLD-MCNC: 9.8 G/DL (ref 14–18)
MAGNESIUM SERPL-MCNC: 1.6 MG/DL (ref 1.5–2.5)
MCH RBC QN AUTO: 31.2 PG (ref 27–33)
MCHC RBC AUTO-ENTMCNC: 34 G/DL (ref 32.3–36.5)
MCV RBC AUTO: 91.7 FL (ref 81.4–97.8)
PHOSPHATE SERPL-MCNC: 2.9 MG/DL (ref 2.5–4.5)
PLATELET # BLD AUTO: 104 K/UL (ref 164–446)
PMV BLD AUTO: 7.9 FL (ref 9–12.9)
POTASSIUM SERPL-SCNC: 3.7 MMOL/L (ref 3.6–5.5)
RBC # BLD AUTO: 3.14 M/UL (ref 4.7–6.1)
SIGNIFICANT IND 70042: ABNORMAL
SITE SITE: ABNORMAL
SODIUM SERPL-SCNC: 136 MMOL/L (ref 135–145)
SOURCE SOURCE: ABNORMAL
WBC # BLD AUTO: 5.4 K/UL (ref 4.8–10.8)

## 2024-07-13 PROCEDURE — 97602 WOUND(S) CARE NON-SELECTIVE: CPT

## 2024-07-13 PROCEDURE — 700111 HCHG RX REV CODE 636 W/ 250 OVERRIDE (IP): Performed by: STUDENT IN AN ORGANIZED HEALTH CARE EDUCATION/TRAINING PROGRAM

## 2024-07-13 PROCEDURE — 700111 HCHG RX REV CODE 636 W/ 250 OVERRIDE (IP): Performed by: HOSPITALIST

## 2024-07-13 PROCEDURE — 700102 HCHG RX REV CODE 250 W/ 637 OVERRIDE(OP): Performed by: STUDENT IN AN ORGANIZED HEALTH CARE EDUCATION/TRAINING PROGRAM

## 2024-07-13 PROCEDURE — 82962 GLUCOSE BLOOD TEST: CPT | Mod: 91

## 2024-07-13 PROCEDURE — 700102 HCHG RX REV CODE 250 W/ 637 OVERRIDE(OP): Performed by: HOSPITALIST

## 2024-07-13 PROCEDURE — 80048 BASIC METABOLIC PNL TOTAL CA: CPT

## 2024-07-13 PROCEDURE — 93306 TTE W/DOPPLER COMPLETE: CPT

## 2024-07-13 PROCEDURE — 770001 HCHG ROOM/CARE - MED/SURG/GYN PRIV*

## 2024-07-13 PROCEDURE — A9270 NON-COVERED ITEM OR SERVICE: HCPCS | Performed by: HOSPITALIST

## 2024-07-13 PROCEDURE — 700117 HCHG RX CONTRAST REV CODE 255: Performed by: STUDENT IN AN ORGANIZED HEALTH CARE EDUCATION/TRAINING PROGRAM

## 2024-07-13 PROCEDURE — 83735 ASSAY OF MAGNESIUM: CPT

## 2024-07-13 PROCEDURE — A9270 NON-COVERED ITEM OR SERVICE: HCPCS | Performed by: STUDENT IN AN ORGANIZED HEALTH CARE EDUCATION/TRAINING PROGRAM

## 2024-07-13 PROCEDURE — 84100 ASSAY OF PHOSPHORUS: CPT

## 2024-07-13 PROCEDURE — 8E0ZXY6 ISOLATION: ICD-10-PCS | Performed by: HOSPITALIST

## 2024-07-13 PROCEDURE — 99233 SBSQ HOSP IP/OBS HIGH 50: CPT | Performed by: HOSPITALIST

## 2024-07-13 PROCEDURE — 93306 TTE W/DOPPLER COMPLETE: CPT | Mod: 26 | Performed by: INTERNAL MEDICINE

## 2024-07-13 PROCEDURE — 36415 COLL VENOUS BLD VENIPUNCTURE: CPT

## 2024-07-13 PROCEDURE — 76775 US EXAM ABDO BACK WALL LIM: CPT

## 2024-07-13 PROCEDURE — 700101 HCHG RX REV CODE 250: Performed by: STUDENT IN AN ORGANIZED HEALTH CARE EDUCATION/TRAINING PROGRAM

## 2024-07-13 PROCEDURE — 85027 COMPLETE CBC AUTOMATED: CPT

## 2024-07-13 RX ORDER — SULFAMETHOXAZOLE AND TRIMETHOPRIM 800; 160 MG/1; MG/1
1 TABLET ORAL EVERY 12 HOURS
Status: DISCONTINUED | OUTPATIENT
Start: 2024-07-13 | End: 2024-07-15 | Stop reason: HOSPADM

## 2024-07-13 RX ORDER — MAGNESIUM SULFATE HEPTAHYDRATE 40 MG/ML
2 INJECTION, SOLUTION INTRAVENOUS ONCE
Status: COMPLETED | OUTPATIENT
Start: 2024-07-13 | End: 2024-07-13

## 2024-07-13 RX ORDER — VALPROIC ACID 250 MG/5ML
500 SOLUTION ORAL 3 TIMES DAILY
Status: DISCONTINUED | OUTPATIENT
Start: 2024-07-13 | End: 2024-07-15 | Stop reason: HOSPADM

## 2024-07-13 RX ADMIN — AMLODIPINE BESYLATE 5 MG: 5 TABLET ORAL at 18:04

## 2024-07-13 RX ADMIN — PROPRANOLOL HYDROCHLORIDE 10 MG: 10 TABLET ORAL at 06:00

## 2024-07-13 RX ADMIN — FLUTICASONE FUROATE, UMECLIDINIUM BROMIDE AND VILANTEROL TRIFENATATE 1 PUFF: 100; 62.5; 25 POWDER RESPIRATORY (INHALATION) at 09:50

## 2024-07-13 RX ADMIN — INSULIN LISPRO 2 UNITS: 100 INJECTION, SOLUTION INTRAVENOUS; SUBCUTANEOUS at 22:21

## 2024-07-13 RX ADMIN — INSULIN LISPRO 1 UNITS: 100 INJECTION, SOLUTION INTRAVENOUS; SUBCUTANEOUS at 18:09

## 2024-07-13 RX ADMIN — DIVALPROEX SODIUM 500 MG: 500 TABLET, DELAYED RELEASE ORAL at 15:44

## 2024-07-13 RX ADMIN — HUMAN ALBUMIN MICROSPHERES AND PERFLUTREN 3 ML: 10; .22 INJECTION, SOLUTION INTRAVENOUS at 10:38

## 2024-07-13 RX ADMIN — FINASTERIDE 5 MG: 5 TABLET, FILM COATED ORAL at 06:00

## 2024-07-13 RX ADMIN — OXYCODONE 2.5 MG: 5 TABLET ORAL at 02:55

## 2024-07-13 RX ADMIN — TAMSULOSIN HYDROCHLORIDE 0.4 MG: 0.4 CAPSULE ORAL at 06:01

## 2024-07-13 RX ADMIN — MAGNESIUM SULFATE HEPTAHYDRATE 2 G: 2 INJECTION, SOLUTION INTRAVENOUS at 15:47

## 2024-07-13 RX ADMIN — PRAVASTATIN SODIUM 80 MG: 20 TABLET ORAL at 22:10

## 2024-07-13 RX ADMIN — OMEPRAZOLE 20 MG: 20 CAPSULE, DELAYED RELEASE ORAL at 06:00

## 2024-07-13 RX ADMIN — DULOXETINE HYDROCHLORIDE 30 MG: 30 CAPSULE, DELAYED RELEASE ORAL at 06:00

## 2024-07-13 RX ADMIN — APIXABAN 2.5 MG: 2.5 TABLET, FILM COATED ORAL at 06:00

## 2024-07-13 RX ADMIN — INSULIN LISPRO 5 UNITS: 100 INJECTION, SOLUTION INTRAVENOUS; SUBCUTANEOUS at 18:09

## 2024-07-13 RX ADMIN — RISPERIDONE 1 MG: 1 TABLET, FILM COATED ORAL at 18:04

## 2024-07-13 RX ADMIN — MICONAZOLE NITRATE: 20 CREAM TOPICAL at 18:05

## 2024-07-13 RX ADMIN — CEFTRIAXONE SODIUM 1000 MG: 10 INJECTION, POWDER, FOR SOLUTION INTRAVENOUS at 06:01

## 2024-07-13 RX ADMIN — RISPERIDONE 1 MG: 1 TABLET, FILM COATED ORAL at 06:01

## 2024-07-13 RX ADMIN — AMLODIPINE BESYLATE 5 MG: 5 TABLET ORAL at 06:00

## 2024-07-13 RX ADMIN — FERROUS SULFATE TAB 325 MG (65 MG ELEMENTAL FE) 325 MG: 325 (65 FE) TAB at 18:04

## 2024-07-13 RX ADMIN — INSULIN LISPRO 1 UNITS: 100 INJECTION, SOLUTION INTRAVENOUS; SUBCUTANEOUS at 09:49

## 2024-07-13 RX ADMIN — CYANOCOBALAMIN TAB 500 MCG 500 MCG: 500 TAB at 18:04

## 2024-07-13 RX ADMIN — BACLOFEN 10 MG: 10 TABLET ORAL at 18:04

## 2024-07-13 RX ADMIN — SULFAMETHOXAZOLE AND TRIMETHOPRIM 1 TABLET: 800; 160 TABLET ORAL at 15:44

## 2024-07-13 RX ADMIN — RISPERIDONE 1 MG: 1 TABLET, FILM COATED ORAL at 15:44

## 2024-07-13 RX ADMIN — APIXABAN 2.5 MG: 2.5 TABLET, FILM COATED ORAL at 18:04

## 2024-07-13 RX ADMIN — BACLOFEN 10 MG: 10 TABLET ORAL at 06:00

## 2024-07-13 RX ADMIN — DIVALPROEX SODIUM 500 MG: 500 TABLET, DELAYED RELEASE ORAL at 06:00

## 2024-07-13 RX ADMIN — PROPRANOLOL HYDROCHLORIDE 10 MG: 10 TABLET ORAL at 18:04

## 2024-07-13 RX ADMIN — INSULIN LISPRO 5 UNITS: 100 INJECTION, SOLUTION INTRAVENOUS; SUBCUTANEOUS at 09:48

## 2024-07-13 ASSESSMENT — PAIN SCALES - PAIN ASSESSMENT IN ADVANCED DEMENTIA (PAINAD)
NEGVOCALIZATION: OCCASIONAL MOAN/GROAN, LOW SPEECH, NEGATIVE/DISAPPROVING QUALITY
FACIALEXPRESSION: SAD, FRIGHTENED, FROWN
BODYLANGUAGE: RELAXED
TOTALSCORE: 4
BREATHING: NORMAL
CONSOLABILITY: UNABLE TO CONSOLE, DISTRACT OR REASSURE

## 2024-07-13 ASSESSMENT — PAIN DESCRIPTION - PAIN TYPE
TYPE: CHRONIC PAIN

## 2024-07-13 ASSESSMENT — FIBROSIS 4 INDEX: FIB4 SCORE: 3.93

## 2024-07-14 LAB
ANION GAP SERPL CALC-SCNC: 9 MMOL/L (ref 7–16)
BUN SERPL-MCNC: 17 MG/DL (ref 8–22)
CALCIUM SERPL-MCNC: 8.5 MG/DL (ref 8.5–10.5)
CHLORIDE SERPL-SCNC: 104 MMOL/L (ref 96–112)
CO2 SERPL-SCNC: 23 MMOL/L (ref 20–33)
CREAT SERPL-MCNC: 0.79 MG/DL (ref 0.5–1.4)
ERYTHROCYTE [DISTWIDTH] IN BLOOD BY AUTOMATED COUNT: 46.4 FL (ref 35.9–50)
GFR SERPLBLD CREATININE-BSD FMLA CKD-EPI: 91 ML/MIN/1.73 M 2
GLUCOSE BLD STRIP.AUTO-MCNC: 110 MG/DL (ref 65–99)
GLUCOSE BLD STRIP.AUTO-MCNC: 125 MG/DL (ref 65–99)
GLUCOSE BLD STRIP.AUTO-MCNC: 137 MG/DL (ref 65–99)
GLUCOSE BLD STRIP.AUTO-MCNC: 140 MG/DL (ref 65–99)
GLUCOSE SERPL-MCNC: 131 MG/DL (ref 65–99)
HCT VFR BLD AUTO: 28.2 % (ref 42–52)
HGB BLD-MCNC: 9.6 G/DL (ref 14–18)
MAGNESIUM SERPL-MCNC: 1.9 MG/DL (ref 1.5–2.5)
MCH RBC QN AUTO: 30.6 PG (ref 27–33)
MCHC RBC AUTO-ENTMCNC: 34 G/DL (ref 32.3–36.5)
MCV RBC AUTO: 89.8 FL (ref 81.4–97.8)
PLATELET # BLD AUTO: 112 K/UL (ref 164–446)
PMV BLD AUTO: 8.3 FL (ref 9–12.9)
POTASSIUM SERPL-SCNC: 3.9 MMOL/L (ref 3.6–5.5)
RBC # BLD AUTO: 3.14 M/UL (ref 4.7–6.1)
SODIUM SERPL-SCNC: 136 MMOL/L (ref 135–145)
WBC # BLD AUTO: 5.8 K/UL (ref 4.8–10.8)

## 2024-07-14 PROCEDURE — A9270 NON-COVERED ITEM OR SERVICE: HCPCS | Performed by: STUDENT IN AN ORGANIZED HEALTH CARE EDUCATION/TRAINING PROGRAM

## 2024-07-14 PROCEDURE — 770001 HCHG ROOM/CARE - MED/SURG/GYN PRIV*

## 2024-07-14 PROCEDURE — 700102 HCHG RX REV CODE 250 W/ 637 OVERRIDE(OP): Performed by: HOSPITALIST

## 2024-07-14 PROCEDURE — 83735 ASSAY OF MAGNESIUM: CPT

## 2024-07-14 PROCEDURE — 36415 COLL VENOUS BLD VENIPUNCTURE: CPT

## 2024-07-14 PROCEDURE — 700102 HCHG RX REV CODE 250 W/ 637 OVERRIDE(OP): Performed by: STUDENT IN AN ORGANIZED HEALTH CARE EDUCATION/TRAINING PROGRAM

## 2024-07-14 PROCEDURE — 82962 GLUCOSE BLOOD TEST: CPT | Mod: 91

## 2024-07-14 PROCEDURE — 80048 BASIC METABOLIC PNL TOTAL CA: CPT

## 2024-07-14 PROCEDURE — 85027 COMPLETE CBC AUTOMATED: CPT

## 2024-07-14 PROCEDURE — 99232 SBSQ HOSP IP/OBS MODERATE 35: CPT | Performed by: HOSPITALIST

## 2024-07-14 PROCEDURE — A9270 NON-COVERED ITEM OR SERVICE: HCPCS | Performed by: HOSPITALIST

## 2024-07-14 RX ADMIN — FERROUS SULFATE TAB 325 MG (65 MG ELEMENTAL FE) 325 MG: 325 (65 FE) TAB at 18:12

## 2024-07-14 RX ADMIN — AMLODIPINE BESYLATE 5 MG: 5 TABLET ORAL at 04:46

## 2024-07-14 RX ADMIN — RISPERIDONE 1 MG: 1 TABLET, FILM COATED ORAL at 18:12

## 2024-07-14 RX ADMIN — AMLODIPINE BESYLATE 5 MG: 5 TABLET ORAL at 18:13

## 2024-07-14 RX ADMIN — VALPROIC ACID 500 MG: 500 SOLUTION ORAL at 16:15

## 2024-07-14 RX ADMIN — PROPRANOLOL HYDROCHLORIDE 10 MG: 10 TABLET ORAL at 04:45

## 2024-07-14 RX ADMIN — VALPROIC ACID 500 MG: 500 SOLUTION ORAL at 04:46

## 2024-07-14 RX ADMIN — BACLOFEN 10 MG: 10 TABLET ORAL at 04:45

## 2024-07-14 RX ADMIN — APIXABAN 2.5 MG: 2.5 TABLET, FILM COATED ORAL at 18:13

## 2024-07-14 RX ADMIN — MICONAZOLE NITRATE: 20 CREAM TOPICAL at 18:12

## 2024-07-14 RX ADMIN — OMEPRAZOLE 20 MG: 20 CAPSULE, DELAYED RELEASE ORAL at 04:46

## 2024-07-14 RX ADMIN — BACLOFEN 10 MG: 10 TABLET ORAL at 18:13

## 2024-07-14 RX ADMIN — INSULIN LISPRO 5 UNITS: 100 INJECTION, SOLUTION INTRAVENOUS; SUBCUTANEOUS at 08:48

## 2024-07-14 RX ADMIN — TAMSULOSIN HYDROCHLORIDE 0.4 MG: 0.4 CAPSULE ORAL at 04:46

## 2024-07-14 RX ADMIN — RISPERIDONE 1 MG: 1 TABLET, FILM COATED ORAL at 04:45

## 2024-07-14 RX ADMIN — SULFAMETHOXAZOLE AND TRIMETHOPRIM 1 TABLET: 800; 160 TABLET ORAL at 18:13

## 2024-07-14 RX ADMIN — CYANOCOBALAMIN TAB 500 MCG 500 MCG: 500 TAB at 18:12

## 2024-07-14 RX ADMIN — RISPERIDONE 1 MG: 1 TABLET, FILM COATED ORAL at 16:15

## 2024-07-14 RX ADMIN — INSULIN LISPRO 5 UNITS: 100 INJECTION, SOLUTION INTRAVENOUS; SUBCUTANEOUS at 16:10

## 2024-07-14 RX ADMIN — DULOXETINE HYDROCHLORIDE 30 MG: 30 CAPSULE, DELAYED RELEASE ORAL at 04:45

## 2024-07-14 RX ADMIN — VALPROIC ACID 500 MG: 500 SOLUTION ORAL at 18:13

## 2024-07-14 RX ADMIN — INSULIN LISPRO 5 UNITS: 100 INJECTION, SOLUTION INTRAVENOUS; SUBCUTANEOUS at 18:25

## 2024-07-14 RX ADMIN — PRAVASTATIN SODIUM 80 MG: 20 TABLET ORAL at 20:42

## 2024-07-14 RX ADMIN — PROPRANOLOL HYDROCHLORIDE 10 MG: 10 TABLET ORAL at 18:13

## 2024-07-14 RX ADMIN — SULFAMETHOXAZOLE AND TRIMETHOPRIM 1 TABLET: 800; 160 TABLET ORAL at 04:46

## 2024-07-14 RX ADMIN — FLUTICASONE FUROATE, UMECLIDINIUM BROMIDE AND VILANTEROL TRIFENATATE 1 PUFF: 100; 62.5; 25 POWDER RESPIRATORY (INHALATION) at 08:47

## 2024-07-14 RX ADMIN — APIXABAN 2.5 MG: 2.5 TABLET, FILM COATED ORAL at 04:46

## 2024-07-14 RX ADMIN — FINASTERIDE 5 MG: 5 TABLET, FILM COATED ORAL at 04:45

## 2024-07-14 RX ADMIN — MICONAZOLE NITRATE: 20 CREAM TOPICAL at 04:45

## 2024-07-14 ASSESSMENT — PAIN DESCRIPTION - PAIN TYPE
TYPE: CHRONIC PAIN
TYPE: CHRONIC PAIN;ACUTE PAIN

## 2024-07-15 VITALS
OXYGEN SATURATION: 100 % | RESPIRATION RATE: 18 BRPM | DIASTOLIC BLOOD PRESSURE: 65 MMHG | HEIGHT: 70 IN | HEART RATE: 64 BPM | BODY MASS INDEX: 26.57 KG/M2 | SYSTOLIC BLOOD PRESSURE: 129 MMHG | TEMPERATURE: 97 F | WEIGHT: 185.63 LBS

## 2024-07-15 PROBLEM — E83.42 HYPOMAGNESEMIA: Status: RESOLVED | Noted: 2024-07-13 | Resolved: 2024-07-15

## 2024-07-15 PROBLEM — I16.0 HYPERTENSIVE URGENCY: Status: RESOLVED | Noted: 2024-07-11 | Resolved: 2024-07-15

## 2024-07-15 PROBLEM — G93.41 ACUTE METABOLIC ENCEPHALOPATHY: Status: RESOLVED | Noted: 2024-07-11 | Resolved: 2024-07-15

## 2024-07-15 PROBLEM — Z71.89 ACP (ADVANCE CARE PLANNING): Status: RESOLVED | Noted: 2023-09-26 | Resolved: 2024-07-15

## 2024-07-15 PROBLEM — M79.89 LEFT UPPER EXTREMITY SWELLING: Status: RESOLVED | Noted: 2024-07-11 | Resolved: 2024-07-15

## 2024-07-15 PROBLEM — N39.0 UTI (URINARY TRACT INFECTION): Status: RESOLVED | Noted: 2023-11-06 | Resolved: 2024-07-15

## 2024-07-15 LAB
GLUCOSE BLD STRIP.AUTO-MCNC: 115 MG/DL (ref 65–99)
GLUCOSE BLD STRIP.AUTO-MCNC: 125 MG/DL (ref 65–99)

## 2024-07-15 PROCEDURE — 700102 HCHG RX REV CODE 250 W/ 637 OVERRIDE(OP): Performed by: STUDENT IN AN ORGANIZED HEALTH CARE EDUCATION/TRAINING PROGRAM

## 2024-07-15 PROCEDURE — 99239 HOSP IP/OBS DSCHRG MGMT >30: CPT | Performed by: HOSPITALIST

## 2024-07-15 PROCEDURE — 82962 GLUCOSE BLOOD TEST: CPT

## 2024-07-15 PROCEDURE — A9270 NON-COVERED ITEM OR SERVICE: HCPCS | Performed by: STUDENT IN AN ORGANIZED HEALTH CARE EDUCATION/TRAINING PROGRAM

## 2024-07-15 RX ORDER — SULFAMETHOXAZOLE AND TRIMETHOPRIM 800; 160 MG/1; MG/1
1 TABLET ORAL EVERY 12 HOURS
Qty: 10 TABLET | Refills: 0 | Status: ACTIVE | OUTPATIENT
Start: 2024-07-15 | End: 2024-07-20

## 2024-07-15 RX ADMIN — RISPERIDONE 1 MG: 1 TABLET, FILM COATED ORAL at 12:09

## 2024-07-15 RX ADMIN — MICONAZOLE NITRATE: 20 CREAM TOPICAL at 04:22

## 2024-07-15 RX ADMIN — FLUTICASONE FUROATE, UMECLIDINIUM BROMIDE AND VILANTEROL TRIFENATATE 1 PUFF: 100; 62.5; 25 POWDER RESPIRATORY (INHALATION) at 09:08

## 2024-07-15 RX ADMIN — VALPROIC ACID 500 MG: 500 SOLUTION ORAL at 12:09

## 2024-07-15 ASSESSMENT — PAIN SCALES - PAIN ASSESSMENT IN ADVANCED DEMENTIA (PAINAD)
CONSOLABILITY: NO NEED TO CONSOLE
BREATHING: NORMAL
BODYLANGUAGE: RELAXED
TOTALSCORE: 0
FACIALEXPRESSION: SMILING OR INEXPRESSIVE

## 2024-07-15 ASSESSMENT — PAIN DESCRIPTION - PAIN TYPE
TYPE: ACUTE PAIN;CHRONIC PAIN
TYPE: ACUTE PAIN

## 2024-07-22 ENCOUNTER — HOSPITAL ENCOUNTER (INPATIENT)
Facility: MEDICAL CENTER | Age: 79
LOS: 2 days | DRG: 698 | End: 2024-07-24
Attending: EMERGENCY MEDICINE | Admitting: STUDENT IN AN ORGANIZED HEALTH CARE EDUCATION/TRAINING PROGRAM
Payer: COMMERCIAL

## 2024-07-22 ENCOUNTER — APPOINTMENT (OUTPATIENT)
Dept: RADIOLOGY | Facility: MEDICAL CENTER | Age: 79
DRG: 698 | End: 2024-07-22
Attending: EMERGENCY MEDICINE
Payer: COMMERCIAL

## 2024-07-22 DIAGNOSIS — N39.0 URINARY TRACT INFECTION ASSOCIATED WITH CATHETERIZATION OF URINARY TRACT, UNSPECIFIED INDWELLING URINARY CATHETER TYPE, INITIAL ENCOUNTER (HCC): ICD-10-CM

## 2024-07-22 DIAGNOSIS — J44.1 ACUTE EXACERBATION OF CHRONIC OBSTRUCTIVE PULMONARY DISEASE (COPD) (HCC): ICD-10-CM

## 2024-07-22 DIAGNOSIS — J96.01 ACUTE HYPOXEMIC RESPIRATORY FAILURE (HCC): ICD-10-CM

## 2024-07-22 DIAGNOSIS — Z78.9 DNI (DO NOT INTUBATE): ICD-10-CM

## 2024-07-22 DIAGNOSIS — T83.511A URINARY TRACT INFECTION ASSOCIATED WITH CATHETERIZATION OF URINARY TRACT, UNSPECIFIED INDWELLING URINARY CATHETER TYPE, INITIAL ENCOUNTER (HCC): ICD-10-CM

## 2024-07-22 DIAGNOSIS — F03.90 DEMENTIA, UNSPECIFIED DEMENTIA SEVERITY, UNSPECIFIED DEMENTIA TYPE, UNSPECIFIED WHETHER BEHAVIORAL, PSYCHOTIC, OR MOOD DISTURBANCE OR ANXIETY (HCC): ICD-10-CM

## 2024-07-22 PROBLEM — J96.21 ACUTE ON CHRONIC RESPIRATORY FAILURE WITH HYPOXIA AND HYPERCAPNIA (HCC): Status: ACTIVE | Noted: 2023-11-02

## 2024-07-22 PROBLEM — J96.22 ACUTE ON CHRONIC RESPIRATORY FAILURE WITH HYPOXIA AND HYPERCAPNIA (HCC): Status: ACTIVE | Noted: 2023-11-02

## 2024-07-22 PROBLEM — R33.9 URINARY RETENTION: Status: ACTIVE | Noted: 2024-07-22

## 2024-07-22 PROBLEM — R62.7 FAILURE TO THRIVE IN ADULT: Status: ACTIVE | Noted: 2024-07-22

## 2024-07-22 PROBLEM — F03.C0 ADVANCED DEMENTIA (HCC): Status: ACTIVE | Noted: 2024-07-22

## 2024-07-22 LAB
ALBUMIN SERPL BCP-MCNC: 2.5 G/DL (ref 3.2–4.9)
ALBUMIN/GLOB SERPL: 0.7 G/DL
ALP SERPL-CCNC: 54 U/L (ref 30–99)
ALT SERPL-CCNC: 21 U/L (ref 2–50)
ANION GAP SERPL CALC-SCNC: 9 MMOL/L (ref 7–16)
APPEARANCE UR: ABNORMAL
AST SERPL-CCNC: 33 U/L (ref 12–45)
BACTERIA #/AREA URNS HPF: NEGATIVE /HPF
BASE EXCESS BLDA CALC-SCNC: 6 MMOL/L (ref -4–3)
BASOPHILS # BLD AUTO: 0.7 % (ref 0–1.8)
BASOPHILS # BLD: 0.05 K/UL (ref 0–0.12)
BILIRUB SERPL-MCNC: 0.3 MG/DL (ref 0.1–1.5)
BILIRUB UR QL STRIP.AUTO: NEGATIVE
BODY TEMPERATURE: 36.2 CENTIGRADE
BUN SERPL-MCNC: 11 MG/DL (ref 8–22)
CALCIUM ALBUM COR SERPL-MCNC: 9.6 MG/DL (ref 8.5–10.5)
CALCIUM SERPL-MCNC: 8.4 MG/DL (ref 8.5–10.5)
CHLORIDE SERPL-SCNC: 104 MMOL/L (ref 96–112)
CO2 SERPL-SCNC: 27 MMOL/L (ref 20–33)
COLOR UR: YELLOW
CORTIS SERPL-MCNC: 10.1 UG/DL (ref 0–23)
CREAT SERPL-MCNC: 1.05 MG/DL (ref 0.5–1.4)
EKG IMPRESSION: NORMAL
EOSINOPHIL # BLD AUTO: 0.65 K/UL (ref 0–0.51)
EOSINOPHIL NFR BLD: 9.3 % (ref 0–6.9)
EPI CELLS #/AREA URNS HPF: NEGATIVE /HPF
ERYTHROCYTE [DISTWIDTH] IN BLOOD BY AUTOMATED COUNT: 49.1 FL (ref 35.9–50)
GFR SERPLBLD CREATININE-BSD FMLA CKD-EPI: 73 ML/MIN/1.73 M 2
GLOBULIN SER CALC-MCNC: 3.7 G/DL (ref 1.9–3.5)
GLUCOSE BLD STRIP.AUTO-MCNC: 123 MG/DL (ref 65–99)
GLUCOSE BLD STRIP.AUTO-MCNC: 170 MG/DL (ref 65–99)
GLUCOSE SERPL-MCNC: 115 MG/DL (ref 65–99)
GLUCOSE UR STRIP.AUTO-MCNC: NEGATIVE MG/DL
HCO3 BLDA-SCNC: 32 MMOL/L (ref 17–25)
HCT VFR BLD AUTO: 28.6 % (ref 42–52)
HGB BLD-MCNC: 9.7 G/DL (ref 14–18)
HYALINE CASTS #/AREA URNS LPF: ABNORMAL /LPF
IMM GRANULOCYTES # BLD AUTO: 0.03 K/UL (ref 0–0.11)
IMM GRANULOCYTES NFR BLD AUTO: 0.4 % (ref 0–0.9)
INHALED O2 FLOW RATE: 3 L/MIN
INHALED O2 FLOW RATE: 3 L/MIN (ref 2–10)
INR PPP: 1.25 (ref 0.87–1.13)
KETONES UR STRIP.AUTO-MCNC: ABNORMAL MG/DL
LACTATE SERPL-SCNC: 1.3 MMOL/L (ref 0.5–2)
LEUKOCYTE ESTERASE UR QL STRIP.AUTO: ABNORMAL
LYMPHOCYTES # BLD AUTO: 2.47 K/UL (ref 1–4.8)
LYMPHOCYTES NFR BLD: 35.2 % (ref 22–41)
MCH RBC QN AUTO: 31.7 PG (ref 27–33)
MCHC RBC AUTO-ENTMCNC: 33.9 G/DL (ref 32.3–36.5)
MCV RBC AUTO: 93.5 FL (ref 81.4–97.8)
MICRO URNS: ABNORMAL
MONOCYTES # BLD AUTO: 0.77 K/UL (ref 0–0.85)
MONOCYTES NFR BLD AUTO: 11 % (ref 0–13.4)
NEUTROPHILS # BLD AUTO: 3.05 K/UL (ref 1.82–7.42)
NEUTROPHILS NFR BLD: 43.4 % (ref 44–72)
NITRITE UR QL STRIP.AUTO: NEGATIVE
NRBC # BLD AUTO: 0 K/UL
NRBC BLD-RTO: 0 /100 WBC (ref 0–0.2)
PCO2 BLDA: 53.4 MMHG (ref 26–37)
PCO2 TEMP ADJ BLDA: 51.6 MMHG (ref 26–37)
PH BLDA: 7.39 [PH] (ref 7.4–7.5)
PH TEMP ADJ BLDA: 7.41 [PH] (ref 7.4–7.5)
PH UR STRIP.AUTO: 7 [PH] (ref 5–8)
PLATELET # BLD AUTO: 140 K/UL (ref 164–446)
PMV BLD AUTO: 8.5 FL (ref 9–12.9)
PO2 BLDA: 28.5 MMHG (ref 64–87)
PO2 TEMP ADJ BLDA: 26.9 MMHG (ref 64–87)
POTASSIUM SERPL-SCNC: 4.4 MMOL/L (ref 3.6–5.5)
PROCALCITONIN SERPL-MCNC: 0.1 NG/ML
PROT SERPL-MCNC: 6.2 G/DL (ref 6–8.2)
PROT UR QL STRIP: 100 MG/DL
PROTHROMBIN TIME: 15.8 SEC (ref 12–14.6)
RBC # BLD AUTO: 3.06 M/UL (ref 4.7–6.1)
RBC # URNS HPF: ABNORMAL /HPF
RBC UR QL AUTO: ABNORMAL
SAO2 % BLDA: 51.5 % (ref 93–99)
SODIUM SERPL-SCNC: 140 MMOL/L (ref 135–145)
SP GR UR STRIP.AUTO: 1.02
UROBILINOGEN UR STRIP.AUTO-MCNC: 1 MG/DL
WBC # BLD AUTO: 7 K/UL (ref 4.8–10.8)
WBC #/AREA URNS HPF: ABNORMAL /HPF
YEAST BUDDING URNS QL: PRESENT /HPF
YEAST HYPHAE #/AREA URNS HPF: PRESENT /HPF

## 2024-07-22 PROCEDURE — 51702 INSERT TEMP BLADDER CATH: CPT

## 2024-07-22 PROCEDURE — 94760 N-INVAS EAR/PLS OXIMETRY 1: CPT

## 2024-07-22 PROCEDURE — 85610 PROTHROMBIN TIME: CPT

## 2024-07-22 PROCEDURE — 84145 PROCALCITONIN (PCT): CPT

## 2024-07-22 PROCEDURE — 36415 COLL VENOUS BLD VENIPUNCTURE: CPT

## 2024-07-22 PROCEDURE — 303105 HCHG CATHETER EXTRA

## 2024-07-22 PROCEDURE — 85025 COMPLETE CBC W/AUTO DIFF WBC: CPT

## 2024-07-22 PROCEDURE — 82803 BLOOD GASES ANY COMBINATION: CPT

## 2024-07-22 PROCEDURE — 83605 ASSAY OF LACTIC ACID: CPT

## 2024-07-22 PROCEDURE — 93005 ELECTROCARDIOGRAM TRACING: CPT | Performed by: EMERGENCY MEDICINE

## 2024-07-22 PROCEDURE — 94640 AIRWAY INHALATION TREATMENT: CPT

## 2024-07-22 PROCEDURE — 770020 HCHG ROOM/CARE - TELE (206)

## 2024-07-22 PROCEDURE — 700101 HCHG RX REV CODE 250: Performed by: STUDENT IN AN ORGANIZED HEALTH CARE EDUCATION/TRAINING PROGRAM

## 2024-07-22 PROCEDURE — 99285 EMERGENCY DEPT VISIT HI MDM: CPT

## 2024-07-22 PROCEDURE — 82533 TOTAL CORTISOL: CPT

## 2024-07-22 PROCEDURE — 71045 X-RAY EXAM CHEST 1 VIEW: CPT

## 2024-07-22 PROCEDURE — 87086 URINE CULTURE/COLONY COUNT: CPT

## 2024-07-22 PROCEDURE — 82962 GLUCOSE BLOOD TEST: CPT

## 2024-07-22 PROCEDURE — 81001 URINALYSIS AUTO W/SCOPE: CPT

## 2024-07-22 PROCEDURE — 700111 HCHG RX REV CODE 636 W/ 250 OVERRIDE (IP): Performed by: STUDENT IN AN ORGANIZED HEALTH CARE EDUCATION/TRAINING PROGRAM

## 2024-07-22 PROCEDURE — 700102 HCHG RX REV CODE 250 W/ 637 OVERRIDE(OP): Performed by: STUDENT IN AN ORGANIZED HEALTH CARE EDUCATION/TRAINING PROGRAM

## 2024-07-22 PROCEDURE — 80053 COMPREHEN METABOLIC PANEL: CPT

## 2024-07-22 PROCEDURE — 87040 BLOOD CULTURE FOR BACTERIA: CPT

## 2024-07-22 PROCEDURE — 87106 FUNGI IDENTIFICATION YEAST: CPT

## 2024-07-22 PROCEDURE — 99223 1ST HOSP IP/OBS HIGH 75: CPT | Performed by: STUDENT IN AN ORGANIZED HEALTH CARE EDUCATION/TRAINING PROGRAM

## 2024-07-22 RX ORDER — METHYLPREDNISOLONE SODIUM SUCCINATE 40 MG/ML
40 INJECTION, POWDER, LYOPHILIZED, FOR SOLUTION INTRAMUSCULAR; INTRAVENOUS DAILY
Status: DISCONTINUED | OUTPATIENT
Start: 2024-07-22 | End: 2024-07-23

## 2024-07-22 RX ORDER — FINASTERIDE 5 MG/1
5 TABLET, FILM COATED ORAL DAILY
Status: DISCONTINUED | OUTPATIENT
Start: 2024-07-23 | End: 2024-07-24 | Stop reason: HOSPADM

## 2024-07-22 RX ORDER — PROPRANOLOL HYDROCHLORIDE 10 MG/1
10 TABLET ORAL EVERY 12 HOURS
Status: DISCONTINUED | OUTPATIENT
Start: 2024-07-22 | End: 2024-07-24 | Stop reason: HOSPADM

## 2024-07-22 RX ORDER — BISACODYL 10 MG
10 SUPPOSITORY, RECTAL RECTAL PRN
COMMUNITY

## 2024-07-22 RX ORDER — DEXTROSE MONOHYDRATE 25 G/50ML
25 INJECTION, SOLUTION INTRAVENOUS
Status: DISCONTINUED | OUTPATIENT
Start: 2024-07-22 | End: 2024-07-24 | Stop reason: HOSPADM

## 2024-07-22 RX ORDER — PANTOPRAZOLE SODIUM 20 MG/1
20 TABLET, DELAYED RELEASE ORAL
Status: DISCONTINUED | OUTPATIENT
Start: 2024-07-22 | End: 2024-07-22

## 2024-07-22 RX ORDER — OMEPRAZOLE 20 MG/1
20 CAPSULE, DELAYED RELEASE ORAL DAILY
Status: DISCONTINUED | OUTPATIENT
Start: 2024-07-23 | End: 2024-07-24 | Stop reason: HOSPADM

## 2024-07-22 RX ORDER — FUROSEMIDE 10 MG/ML
20 INJECTION INTRAMUSCULAR; INTRAVENOUS
Status: DISCONTINUED | OUTPATIENT
Start: 2024-07-22 | End: 2024-07-24 | Stop reason: HOSPADM

## 2024-07-22 RX ORDER — ACETAMINOPHEN 325 MG/1
650 TABLET ORAL EVERY 6 HOURS PRN
Status: DISCONTINUED | OUTPATIENT
Start: 2024-07-22 | End: 2024-07-24 | Stop reason: HOSPADM

## 2024-07-22 RX ORDER — INSULIN LISPRO 100 [IU]/ML
1-6 INJECTION, SOLUTION INTRAVENOUS; SUBCUTANEOUS EVERY 6 HOURS
Status: DISCONTINUED | OUTPATIENT
Start: 2024-07-22 | End: 2024-07-24 | Stop reason: HOSPADM

## 2024-07-22 RX ORDER — DULOXETIN HYDROCHLORIDE 30 MG/1
30 CAPSULE, DELAYED RELEASE ORAL DAILY
Status: DISCONTINUED | OUTPATIENT
Start: 2024-07-22 | End: 2024-07-24 | Stop reason: HOSPADM

## 2024-07-22 RX ORDER — SODIUM CHLORIDE, SODIUM LACTATE, POTASSIUM CHLORIDE, AND CALCIUM CHLORIDE .6; .31; .03; .02 G/100ML; G/100ML; G/100ML; G/100ML
500 INJECTION, SOLUTION INTRAVENOUS
Status: DISCONTINUED | OUTPATIENT
Start: 2024-07-22 | End: 2024-07-24 | Stop reason: HOSPADM

## 2024-07-22 RX ORDER — ONDANSETRON 2 MG/ML
4 INJECTION INTRAMUSCULAR; INTRAVENOUS EVERY 4 HOURS PRN
Status: DISCONTINUED | OUTPATIENT
Start: 2024-07-22 | End: 2024-07-24 | Stop reason: HOSPADM

## 2024-07-22 RX ORDER — PRAVASTATIN SODIUM 20 MG
80 TABLET ORAL
Status: DISCONTINUED | OUTPATIENT
Start: 2024-07-22 | End: 2024-07-24 | Stop reason: HOSPADM

## 2024-07-22 RX ORDER — POLYETHYLENE GLYCOL 3350 17 G/17G
1 POWDER, FOR SOLUTION ORAL
Status: DISCONTINUED | OUTPATIENT
Start: 2024-07-22 | End: 2024-07-24 | Stop reason: HOSPADM

## 2024-07-22 RX ORDER — IPRATROPIUM BROMIDE AND ALBUTEROL SULFATE 2.5; .5 MG/3ML; MG/3ML
3 SOLUTION RESPIRATORY (INHALATION)
Status: DISCONTINUED | OUTPATIENT
Start: 2024-07-22 | End: 2024-07-24 | Stop reason: HOSPADM

## 2024-07-22 RX ORDER — DIVALPROEX SODIUM 500 MG/1
500 TABLET, DELAYED RELEASE ORAL EVERY 8 HOURS
Status: DISCONTINUED | OUTPATIENT
Start: 2024-07-22 | End: 2024-07-23

## 2024-07-22 RX ORDER — UREA 10 %
500 LOTION (ML) TOPICAL EVERY EVENING
Status: DISCONTINUED | OUTPATIENT
Start: 2024-07-22 | End: 2024-07-22

## 2024-07-22 RX ORDER — LINEZOLID 2 MG/ML
600 INJECTION, SOLUTION INTRAVENOUS EVERY 12 HOURS
Status: DISCONTINUED | OUTPATIENT
Start: 2024-07-22 | End: 2024-07-23

## 2024-07-22 RX ORDER — LABETALOL HYDROCHLORIDE 5 MG/ML
10 INJECTION, SOLUTION INTRAVENOUS EVERY 4 HOURS PRN
Status: DISCONTINUED | OUTPATIENT
Start: 2024-07-22 | End: 2024-07-24 | Stop reason: HOSPADM

## 2024-07-22 RX ORDER — ONDANSETRON 4 MG/1
4 TABLET, ORALLY DISINTEGRATING ORAL EVERY 4 HOURS PRN
Status: DISCONTINUED | OUTPATIENT
Start: 2024-07-22 | End: 2024-07-24 | Stop reason: HOSPADM

## 2024-07-22 RX ORDER — FLUTICASONE FUROATE AND VILANTEROL 100; 25 UG/1; UG/1
1 POWDER RESPIRATORY (INHALATION) EVERY MORNING
Status: DISCONTINUED | OUTPATIENT
Start: 2024-07-22 | End: 2024-07-22

## 2024-07-22 RX ORDER — TAMSULOSIN HYDROCHLORIDE 0.4 MG/1
0.4 CAPSULE ORAL DAILY
Status: DISCONTINUED | OUTPATIENT
Start: 2024-07-23 | End: 2024-07-24 | Stop reason: HOSPADM

## 2024-07-22 RX ORDER — SULFAMETHOXAZOLE AND TRIMETHOPRIM 800; 160 MG/1; MG/1
1 TABLET ORAL 2 TIMES DAILY
Status: ON HOLD | COMMUNITY
Start: 2024-07-15 | End: 2024-07-24

## 2024-07-22 RX ORDER — FERROUS SULFATE 325(65) MG
325 TABLET ORAL EVERY EVENING
Status: DISCONTINUED | OUTPATIENT
Start: 2024-07-22 | End: 2024-07-24 | Stop reason: HOSPADM

## 2024-07-22 RX ORDER — CIPROFLOXACIN 250 MG/1
250 TABLET, FILM COATED ORAL 2 TIMES DAILY
Status: ON HOLD | COMMUNITY
Start: 2024-06-07 | End: 2024-07-24

## 2024-07-22 RX ORDER — BACLOFEN 10 MG/1
10 TABLET ORAL 2 TIMES DAILY
Status: DISCONTINUED | OUTPATIENT
Start: 2024-07-22 | End: 2024-07-24 | Stop reason: HOSPADM

## 2024-07-22 RX ORDER — RISPERIDONE 1 MG/1
1 TABLET ORAL 3 TIMES DAILY
Status: DISCONTINUED | OUTPATIENT
Start: 2024-07-22 | End: 2024-07-24 | Stop reason: HOSPADM

## 2024-07-22 RX ORDER — GUAIFENESIN/DEXTROMETHORPHAN 100-10MG/5
10 SYRUP ORAL EVERY 6 HOURS PRN
Status: DISCONTINUED | OUTPATIENT
Start: 2024-07-22 | End: 2024-07-24 | Stop reason: HOSPADM

## 2024-07-22 RX ORDER — DIVALPROEX SODIUM 125 MG/1
500 CAPSULE, COATED PELLETS ORAL 3 TIMES DAILY
Status: DISCONTINUED | OUTPATIENT
Start: 2024-07-22 | End: 2024-07-22

## 2024-07-22 RX ORDER — AMOXICILLIN 250 MG
2 CAPSULE ORAL EVERY EVENING
Status: DISCONTINUED | OUTPATIENT
Start: 2024-07-22 | End: 2024-07-24 | Stop reason: HOSPADM

## 2024-07-22 RX ADMIN — IPRATROPIUM BROMIDE AND ALBUTEROL SULFATE 3 ML: 2.5; .5 SOLUTION RESPIRATORY (INHALATION) at 23:56

## 2024-07-22 RX ADMIN — IPRATROPIUM BROMIDE AND ALBUTEROL SULFATE 3 ML: 2.5; .5 SOLUTION RESPIRATORY (INHALATION) at 14:30

## 2024-07-22 RX ADMIN — METHYLPREDNISOLONE SODIUM SUCCINATE 40 MG: 40 INJECTION, POWDER, FOR SOLUTION INTRAMUSCULAR; INTRAVENOUS at 21:17

## 2024-07-22 RX ADMIN — IPRATROPIUM BROMIDE AND ALBUTEROL SULFATE 3 ML: 2.5; .5 SOLUTION RESPIRATORY (INHALATION) at 20:46

## 2024-07-22 RX ADMIN — LINEZOLID 600 MG: 600 INJECTION, SOLUTION INTRAVENOUS at 19:37

## 2024-07-22 RX ADMIN — FUROSEMIDE 20 MG: 10 INJECTION, SOLUTION INTRAVENOUS at 21:17

## 2024-07-22 ASSESSMENT — CHA2DS2 SCORE
CHA2DS2 VASC SCORE: 6
CHF OR LEFT VENTRICULAR DYSFUNCTION: NO
DIABETES: YES
AGE 65 TO 74: NO
SEX: MALE
AGE 75 OR GREATER: YES
PRIOR STROKE OR TIA OR THROMBOEMBOLISM: YES
HYPERTENSION: YES
VASCULAR DISEASE: NO

## 2024-07-22 ASSESSMENT — FIBROSIS 4 INDEX
FIB4 SCORE: 3.96
FIB4 SCORE: 3.65

## 2024-07-23 ENCOUNTER — APPOINTMENT (OUTPATIENT)
Dept: RADIOLOGY | Facility: MEDICAL CENTER | Age: 79
DRG: 698 | End: 2024-07-23
Attending: INTERNAL MEDICINE
Payer: COMMERCIAL

## 2024-07-23 LAB
ALBUMIN SERPL BCP-MCNC: 2.9 G/DL (ref 3.2–4.9)
ALBUMIN/GLOB SERPL: 0.7 G/DL
ALP SERPL-CCNC: 66 U/L (ref 30–99)
ALT SERPL-CCNC: 20 U/L (ref 2–50)
ANION GAP SERPL CALC-SCNC: 10 MMOL/L (ref 7–16)
AST SERPL-CCNC: 28 U/L (ref 12–45)
BASOPHILS # BLD AUTO: 0.7 % (ref 0–1.8)
BASOPHILS # BLD: 0.04 K/UL (ref 0–0.12)
BILIRUB SERPL-MCNC: 0.2 MG/DL (ref 0.1–1.5)
BUN SERPL-MCNC: 14 MG/DL (ref 8–22)
CALCIUM ALBUM COR SERPL-MCNC: 10.3 MG/DL (ref 8.5–10.5)
CALCIUM SERPL-MCNC: 9.4 MG/DL (ref 8.5–10.5)
CHLORIDE SERPL-SCNC: 101 MMOL/L (ref 96–112)
CO2 SERPL-SCNC: 27 MMOL/L (ref 20–33)
CREAT SERPL-MCNC: 1.17 MG/DL (ref 0.5–1.4)
EOSINOPHIL # BLD AUTO: 0.1 K/UL (ref 0–0.51)
EOSINOPHIL NFR BLD: 1.9 % (ref 0–6.9)
ERYTHROCYTE [DISTWIDTH] IN BLOOD BY AUTOMATED COUNT: 48 FL (ref 35.9–50)
GFR SERPLBLD CREATININE-BSD FMLA CKD-EPI: 64 ML/MIN/1.73 M 2
GLOBULIN SER CALC-MCNC: 4.2 G/DL (ref 1.9–3.5)
GLUCOSE BLD STRIP.AUTO-MCNC: 210 MG/DL (ref 65–99)
GLUCOSE BLD STRIP.AUTO-MCNC: 270 MG/DL (ref 65–99)
GLUCOSE BLD STRIP.AUTO-MCNC: 408 MG/DL (ref 65–99)
GLUCOSE SERPL-MCNC: 203 MG/DL (ref 65–99)
HCT VFR BLD AUTO: 32.8 % (ref 42–52)
HGB BLD-MCNC: 10.9 G/DL (ref 14–18)
IMM GRANULOCYTES # BLD AUTO: 0.06 K/UL (ref 0–0.11)
IMM GRANULOCYTES NFR BLD AUTO: 1.1 % (ref 0–0.9)
LYMPHOCYTES # BLD AUTO: 1.36 K/UL (ref 1–4.8)
LYMPHOCYTES NFR BLD: 25.3 % (ref 22–41)
MAGNESIUM SERPL-MCNC: 1.8 MG/DL (ref 1.5–2.5)
MCH RBC QN AUTO: 30.5 PG (ref 27–33)
MCHC RBC AUTO-ENTMCNC: 33.2 G/DL (ref 32.3–36.5)
MCV RBC AUTO: 91.9 FL (ref 81.4–97.8)
MONOCYTES # BLD AUTO: 0.18 K/UL (ref 0–0.85)
MONOCYTES NFR BLD AUTO: 3.3 % (ref 0–13.4)
NEUTROPHILS # BLD AUTO: 3.64 K/UL (ref 1.82–7.42)
NEUTROPHILS NFR BLD: 67.7 % (ref 44–72)
NRBC # BLD AUTO: 0 K/UL
NRBC BLD-RTO: 0 /100 WBC (ref 0–0.2)
PHOSPHATE SERPL-MCNC: 4.2 MG/DL (ref 2.5–4.5)
PLATELET # BLD AUTO: 130 K/UL (ref 164–446)
PMV BLD AUTO: 8.4 FL (ref 9–12.9)
POTASSIUM SERPL-SCNC: 5 MMOL/L (ref 3.6–5.5)
PROT SERPL-MCNC: 7.1 G/DL (ref 6–8.2)
RBC # BLD AUTO: 3.57 M/UL (ref 4.7–6.1)
SODIUM SERPL-SCNC: 138 MMOL/L (ref 135–145)
WBC # BLD AUTO: 5.4 K/UL (ref 4.8–10.8)

## 2024-07-23 PROCEDURE — 85025 COMPLETE CBC W/AUTO DIFF WBC: CPT

## 2024-07-23 PROCEDURE — 700102 HCHG RX REV CODE 250 W/ 637 OVERRIDE(OP): Performed by: INTERNAL MEDICINE

## 2024-07-23 PROCEDURE — 94640 AIRWAY INHALATION TREATMENT: CPT

## 2024-07-23 PROCEDURE — 97162 PT EVAL MOD COMPLEX 30 MIN: CPT

## 2024-07-23 PROCEDURE — 700102 HCHG RX REV CODE 250 W/ 637 OVERRIDE(OP): Performed by: STUDENT IN AN ORGANIZED HEALTH CARE EDUCATION/TRAINING PROGRAM

## 2024-07-23 PROCEDURE — 80053 COMPREHEN METABOLIC PANEL: CPT

## 2024-07-23 PROCEDURE — 92610 EVALUATE SWALLOWING FUNCTION: CPT

## 2024-07-23 PROCEDURE — 700101 HCHG RX REV CODE 250: Performed by: STUDENT IN AN ORGANIZED HEALTH CARE EDUCATION/TRAINING PROGRAM

## 2024-07-23 PROCEDURE — 700111 HCHG RX REV CODE 636 W/ 250 OVERRIDE (IP): Performed by: INTERNAL MEDICINE

## 2024-07-23 PROCEDURE — A9270 NON-COVERED ITEM OR SERVICE: HCPCS | Performed by: INTERNAL MEDICINE

## 2024-07-23 PROCEDURE — 83735 ASSAY OF MAGNESIUM: CPT

## 2024-07-23 PROCEDURE — 99232 SBSQ HOSP IP/OBS MODERATE 35: CPT | Performed by: INTERNAL MEDICINE

## 2024-07-23 PROCEDURE — 70450 CT HEAD/BRAIN W/O DYE: CPT

## 2024-07-23 PROCEDURE — 36415 COLL VENOUS BLD VENIPUNCTURE: CPT

## 2024-07-23 PROCEDURE — A9270 NON-COVERED ITEM OR SERVICE: HCPCS | Performed by: STUDENT IN AN ORGANIZED HEALTH CARE EDUCATION/TRAINING PROGRAM

## 2024-07-23 PROCEDURE — 700111 HCHG RX REV CODE 636 W/ 250 OVERRIDE (IP): Performed by: STUDENT IN AN ORGANIZED HEALTH CARE EDUCATION/TRAINING PROGRAM

## 2024-07-23 PROCEDURE — 84100 ASSAY OF PHOSPHORUS: CPT

## 2024-07-23 PROCEDURE — 770020 HCHG ROOM/CARE - TELE (206)

## 2024-07-23 PROCEDURE — 82962 GLUCOSE BLOOD TEST: CPT | Mod: 91

## 2024-07-23 RX ORDER — VALPROIC ACID 250 MG/5ML
500 SOLUTION ORAL 3 TIMES DAILY
Status: DISCONTINUED | OUTPATIENT
Start: 2024-07-23 | End: 2024-07-24 | Stop reason: HOSPADM

## 2024-07-23 RX ORDER — PREDNISONE 20 MG/1
40 TABLET ORAL DAILY
Status: DISCONTINUED | OUTPATIENT
Start: 2024-07-23 | End: 2024-07-24 | Stop reason: HOSPADM

## 2024-07-23 RX ORDER — LINEZOLID 600 MG/1
600 TABLET, FILM COATED ORAL EVERY 12 HOURS
Status: DISCONTINUED | OUTPATIENT
Start: 2024-07-23 | End: 2024-07-24 | Stop reason: HOSPADM

## 2024-07-23 RX ADMIN — VALPROIC ACID 500 MG: 250 SOLUTION ORAL at 18:21

## 2024-07-23 RX ADMIN — INSULIN LISPRO 5 UNITS: 100 INJECTION, SOLUTION INTRAVENOUS; SUBCUTANEOUS at 23:41

## 2024-07-23 RX ADMIN — IPRATROPIUM BROMIDE AND ALBUTEROL SULFATE 3 ML: 2.5; .5 SOLUTION RESPIRATORY (INHALATION) at 10:47

## 2024-07-23 RX ADMIN — ACETAMINOPHEN 650 MG: 325 TABLET, FILM COATED ORAL at 15:52

## 2024-07-23 RX ADMIN — APIXABAN 2.5 MG: 5 TABLET, FILM COATED ORAL at 17:58

## 2024-07-23 RX ADMIN — RISPERIDONE 1 MG: 1 TABLET, FILM COATED ORAL at 12:04

## 2024-07-23 RX ADMIN — VALPROIC ACID 500 MG: 250 SOLUTION ORAL at 15:30

## 2024-07-23 RX ADMIN — INSULIN LISPRO 2 UNITS: 100 INJECTION, SOLUTION INTRAVENOUS; SUBCUTANEOUS at 05:34

## 2024-07-23 RX ADMIN — MOMETASONE FUROATE AND FORMOTEROL FUMARATE DIHYDRATE 2 PUFF: 200; 5 AEROSOL RESPIRATORY (INHALATION) at 18:21

## 2024-07-23 RX ADMIN — BACLOFEN 10 MG: 10 TABLET ORAL at 17:58

## 2024-07-23 RX ADMIN — FUROSEMIDE 20 MG: 10 INJECTION, SOLUTION INTRAVENOUS at 15:30

## 2024-07-23 RX ADMIN — INSULIN LISPRO 3 UNITS: 100 INJECTION, SOLUTION INTRAVENOUS; SUBCUTANEOUS at 12:08

## 2024-07-23 RX ADMIN — INSULIN LISPRO 6 UNITS: 100 INJECTION, SOLUTION INTRAVENOUS; SUBCUTANEOUS at 18:18

## 2024-07-23 RX ADMIN — PRAVASTATIN SODIUM 80 MG: 20 TABLET ORAL at 20:34

## 2024-07-23 RX ADMIN — IPRATROPIUM BROMIDE AND ALBUTEROL SULFATE 3 ML: 2.5; .5 SOLUTION RESPIRATORY (INHALATION) at 03:37

## 2024-07-23 RX ADMIN — PREDNISONE 40 MG: 20 TABLET ORAL at 12:04

## 2024-07-23 RX ADMIN — LINEZOLID 600 MG: 600 INJECTION, SOLUTION INTRAVENOUS at 05:22

## 2024-07-23 RX ADMIN — PROPRANOLOL HYDROCHLORIDE 10 MG: 10 TABLET ORAL at 17:58

## 2024-07-23 RX ADMIN — FERROUS SULFATE TAB 325 MG (65 MG ELEMENTAL FE) 325 MG: 325 (65 FE) TAB at 17:58

## 2024-07-23 RX ADMIN — LINEZOLID 600 MG: 600 TABLET, FILM COATED ORAL at 17:58

## 2024-07-23 RX ADMIN — FUROSEMIDE 20 MG: 10 INJECTION, SOLUTION INTRAVENOUS at 05:19

## 2024-07-23 RX ADMIN — IPRATROPIUM BROMIDE AND ALBUTEROL SULFATE 3 ML: 2.5; .5 SOLUTION RESPIRATORY (INHALATION) at 20:39

## 2024-07-23 RX ADMIN — IPRATROPIUM BROMIDE AND ALBUTEROL SULFATE 3 ML: 2.5; .5 SOLUTION RESPIRATORY (INHALATION) at 07:57

## 2024-07-23 RX ADMIN — MOMETASONE FUROATE AND FORMOTEROL FUMARATE DIHYDRATE 2 PUFF: 200; 5 AEROSOL RESPIRATORY (INHALATION) at 07:58

## 2024-07-23 RX ADMIN — IPRATROPIUM BROMIDE AND ALBUTEROL SULFATE 3 ML: 2.5; .5 SOLUTION RESPIRATORY (INHALATION) at 23:35

## 2024-07-23 RX ADMIN — SENNOSIDES AND DOCUSATE SODIUM 2 TABLET: 50; 8.6 TABLET ORAL at 17:58

## 2024-07-23 RX ADMIN — INSULIN GLARGINE-YFGN 5 UNITS: 100 INJECTION, SOLUTION SUBCUTANEOUS at 18:17

## 2024-07-23 RX ADMIN — RISPERIDONE 1 MG: 1 TABLET, FILM COATED ORAL at 17:58

## 2024-07-23 ASSESSMENT — COGNITIVE AND FUNCTIONAL STATUS - GENERAL
MOVING FROM LYING ON BACK TO SITTING ON SIDE OF FLAT BED: A LOT
TURNING FROM BACK TO SIDE WHILE IN FLAT BAD: A LOT
MOVING TO AND FROM BED TO CHAIR: TOTAL
WALKING IN HOSPITAL ROOM: TOTAL
STANDING UP FROM CHAIR USING ARMS: TOTAL
SUGGESTED CMS G CODE MODIFIER MOBILITY: CM
CLIMB 3 TO 5 STEPS WITH RAILING: TOTAL
MOBILITY SCORE: 8

## 2024-07-23 ASSESSMENT — GAIT ASSESSMENTS: GAIT LEVEL OF ASSIST: UNABLE TO PARTICIPATE

## 2024-07-23 ASSESSMENT — PAIN DESCRIPTION - PAIN TYPE
TYPE: ACUTE PAIN
TYPE: ACUTE PAIN

## 2024-07-23 ASSESSMENT — FIBROSIS 4 INDEX: FIB4 SCORE: 3.71

## 2024-07-24 ENCOUNTER — APPOINTMENT (OUTPATIENT)
Dept: RADIOLOGY | Facility: MEDICAL CENTER | Age: 79
DRG: 698 | End: 2024-07-24
Attending: INTERNAL MEDICINE
Payer: COMMERCIAL

## 2024-07-24 VITALS
TEMPERATURE: 97.7 F | HEIGHT: 70 IN | HEART RATE: 74 BPM | OXYGEN SATURATION: 98 % | SYSTOLIC BLOOD PRESSURE: 125 MMHG | DIASTOLIC BLOOD PRESSURE: 59 MMHG | RESPIRATION RATE: 16 BRPM | BODY MASS INDEX: 24.78 KG/M2 | WEIGHT: 173.06 LBS

## 2024-07-24 LAB
BACTERIA UR CULT: ABNORMAL
BACTERIA UR CULT: ABNORMAL
GLUCOSE BLD STRIP.AUTO-MCNC: 293 MG/DL (ref 65–99)
GLUCOSE BLD STRIP.AUTO-MCNC: 352 MG/DL (ref 65–99)
GLUCOSE BLD STRIP.AUTO-MCNC: 359 MG/DL (ref 65–99)
SIGNIFICANT IND 70042: ABNORMAL
SITE SITE: ABNORMAL
SOURCE SOURCE: ABNORMAL

## 2024-07-24 PROCEDURE — 700101 HCHG RX REV CODE 250: Performed by: STUDENT IN AN ORGANIZED HEALTH CARE EDUCATION/TRAINING PROGRAM

## 2024-07-24 PROCEDURE — 700102 HCHG RX REV CODE 250 W/ 637 OVERRIDE(OP): Performed by: STUDENT IN AN ORGANIZED HEALTH CARE EDUCATION/TRAINING PROGRAM

## 2024-07-24 PROCEDURE — 94640 AIRWAY INHALATION TREATMENT: CPT

## 2024-07-24 PROCEDURE — 700111 HCHG RX REV CODE 636 W/ 250 OVERRIDE (IP): Performed by: INTERNAL MEDICINE

## 2024-07-24 PROCEDURE — A9270 NON-COVERED ITEM OR SERVICE: HCPCS | Performed by: STUDENT IN AN ORGANIZED HEALTH CARE EDUCATION/TRAINING PROGRAM

## 2024-07-24 PROCEDURE — 93971 EXTREMITY STUDY: CPT | Mod: LT

## 2024-07-24 PROCEDURE — 700111 HCHG RX REV CODE 636 W/ 250 OVERRIDE (IP): Mod: JZ | Performed by: STUDENT IN AN ORGANIZED HEALTH CARE EDUCATION/TRAINING PROGRAM

## 2024-07-24 PROCEDURE — 700102 HCHG RX REV CODE 250 W/ 637 OVERRIDE(OP): Performed by: INTERNAL MEDICINE

## 2024-07-24 PROCEDURE — 82962 GLUCOSE BLOOD TEST: CPT

## 2024-07-24 PROCEDURE — 99239 HOSP IP/OBS DSCHRG MGMT >30: CPT | Performed by: INTERNAL MEDICINE

## 2024-07-24 PROCEDURE — A9270 NON-COVERED ITEM OR SERVICE: HCPCS | Performed by: INTERNAL MEDICINE

## 2024-07-24 RX ORDER — LINEZOLID 600 MG/1
600 TABLET, FILM COATED ORAL EVERY 12 HOURS
Qty: 6 TABLET | Refills: 0 | Status: ACTIVE | OUTPATIENT
Start: 2024-07-24 | End: 2024-07-27

## 2024-07-24 RX ADMIN — PROPRANOLOL HYDROCHLORIDE 10 MG: 10 TABLET ORAL at 04:42

## 2024-07-24 RX ADMIN — VALPROIC ACID 500 MG: 250 SOLUTION ORAL at 12:31

## 2024-07-24 RX ADMIN — BACLOFEN 10 MG: 10 TABLET ORAL at 04:42

## 2024-07-24 RX ADMIN — APIXABAN 2.5 MG: 5 TABLET, FILM COATED ORAL at 04:42

## 2024-07-24 RX ADMIN — ACETAMINOPHEN 650 MG: 325 TABLET, FILM COATED ORAL at 12:31

## 2024-07-24 RX ADMIN — IPRATROPIUM BROMIDE AND ALBUTEROL SULFATE 3 ML: 2.5; .5 SOLUTION RESPIRATORY (INHALATION) at 02:06

## 2024-07-24 RX ADMIN — FINASTERIDE 5 MG: 5 TABLET, FILM COATED ORAL at 04:42

## 2024-07-24 RX ADMIN — TAMSULOSIN HYDROCHLORIDE 0.4 MG: 0.4 CAPSULE ORAL at 04:42

## 2024-07-24 RX ADMIN — DULOXETINE HYDROCHLORIDE 30 MG: 30 CAPSULE, DELAYED RELEASE ORAL at 04:42

## 2024-07-24 RX ADMIN — OMEPRAZOLE 20 MG: 20 CAPSULE, DELAYED RELEASE ORAL at 04:42

## 2024-07-24 RX ADMIN — INSULIN LISPRO 5 UNITS: 100 INJECTION, SOLUTION INTRAVENOUS; SUBCUTANEOUS at 12:26

## 2024-07-24 RX ADMIN — INSULIN LISPRO 3 UNITS: 100 INJECTION, SOLUTION INTRAVENOUS; SUBCUTANEOUS at 04:59

## 2024-07-24 RX ADMIN — LINEZOLID 600 MG: 600 TABLET, FILM COATED ORAL at 04:42

## 2024-07-24 RX ADMIN — PREDNISONE 40 MG: 20 TABLET ORAL at 04:43

## 2024-07-24 RX ADMIN — RISPERIDONE 1 MG: 1 TABLET, FILM COATED ORAL at 12:31

## 2024-07-24 RX ADMIN — RISPERIDONE 1 MG: 1 TABLET, FILM COATED ORAL at 04:42

## 2024-07-24 ASSESSMENT — PAIN DESCRIPTION - PAIN TYPE: TYPE: ACUTE PAIN

## 2024-07-24 ASSESSMENT — FIBROSIS 4 INDEX: FIB4 SCORE: 3.71

## 2024-09-11 ENCOUNTER — APPOINTMENT (OUTPATIENT)
Dept: RADIOLOGY | Facility: MEDICAL CENTER | Age: 79
End: 2024-09-11
Attending: EMERGENCY MEDICINE
Payer: MEDICARE

## 2024-09-11 ENCOUNTER — HOSPITAL ENCOUNTER (EMERGENCY)
Facility: MEDICAL CENTER | Age: 79
End: 2024-09-11
Attending: EMERGENCY MEDICINE
Payer: MEDICARE

## 2024-09-11 VITALS
DIASTOLIC BLOOD PRESSURE: 71 MMHG | TEMPERATURE: 96.6 F | HEIGHT: 64 IN | BODY MASS INDEX: 29.53 KG/M2 | WEIGHT: 173 LBS | HEART RATE: 56 BPM | SYSTOLIC BLOOD PRESSURE: 149 MMHG | OXYGEN SATURATION: 100 % | RESPIRATION RATE: 14 BRPM

## 2024-09-11 DIAGNOSIS — Z95.828 S/P PICC CENTRAL LINE PLACEMENT: ICD-10-CM

## 2024-09-11 LAB — EKG IMPRESSION: NORMAL

## 2024-09-11 PROCEDURE — C1751 CATH, INF, PER/CENT/MIDLINE: HCPCS

## 2024-09-11 PROCEDURE — 99284 EMERGENCY DEPT VISIT MOD MDM: CPT

## 2024-09-11 PROCEDURE — 93005 ELECTROCARDIOGRAM TRACING: CPT | Performed by: EMERGENCY MEDICINE

## 2024-09-11 ASSESSMENT — FIBROSIS 4 INDEX: FIB4 SCORE: 3.71

## 2024-09-11 NOTE — DISCHARGE PLANNING
MARIAH contacted by RN seeking assistance [facility].  [facility], they are unable to transport pt due to need for gurney.    [facility], [facility]  MARIAH set up [facility]  for 1250    RN notified.

## 2024-09-11 NOTE — ED NOTES
Erp gave order to put a midline catheter line in pt, order was placed and vascular was called to advise of order

## 2024-09-11 NOTE — ED NOTES
Vascular was called they states they needs infectious disease to be consulted before they place a picc line in a pt, pt most likely need to be admitted over night for work up, erp made aware

## 2024-09-11 NOTE — ED TRIAGE NOTES
"Chief Complaint   Patient presents with    Other     Pt biba [place] from [facility], pt tested positive for UTI, pt was placed on po septra then switched to IV meropenem and ampicillin, pt lost IV access and was sent here for PICC line placement, pt a&o x1 GCS 9 at baseline per ems     BP (!) 179/80   Pulse (!) 59   Temp 35.8 °C (96.5 °F) (Temporal)   Resp 14   Ht 1.626 m (5' 4\")   Wt 78.5 kg (173 lb)   SpO2 98%   BMI 29.70 kg/m²     "

## 2024-09-11 NOTE — PROCEDURES
Vascular Access Team    Date of Insertion: 09/11/24  Arm Circumference: n/a  Line Length: 10  Line Size: 20  Vein Occupancy %: 45  Reason for Midline: abx  Labs: WBC n/a, PLT n/a, BUN n/a, Cr n/a, GFR n/a, INR n/a  Ok per Dr. Davey to place despite having no labs.     Orders confirmed, vessel patency confirmed with ultrasound. Risks and benefits of procedure explained to patient and education regarding line associated bloodstream infections provided. Questions answered.     PowerGlide Midline placed in LUE per licensed provider order with ultrasound guidance. 20g, 10 cm line placed in Brachial vein after 1 attempt(s).  Catheter inserted with brisk blood return. Secured with 0cm external from insertion site.  Line flushed without resistance with 10 mL 0.9% normal saline.  Midline secured with Biopatch and Tegaderm.     Midline placement is confirmed by nurse using ultrasound and ability to flush and draw blood. Midline is appropriate for use at this time.  No X-ray is needed for placement confirmation. Pt tolerated procedure well.  Patient condition relayed to unit RN or ordering physician via this post procedure note in the EMR.    Ultrasound images uploaded to PACS and viewable in the EMR - yes  Ultrasound imaged printed and placed in paper chart - no      BARD PowerGlide Midline ref # [id number], Lot # [id number], Expiration Date 04/30/2025

## 2024-09-11 NOTE — ED PROVIDER NOTES
ED Provider Note    CHIEF COMPLAINT  Chief Complaint   Patient presents with    Other     Pt biba [facility] from [facility], pt tested positive for UTI, pt was placed on po septra then switched to IV meropenem and ampicillin, pt lost IV access and was sent here for PICC line placement, pt a&o x1 GCS 9 at baseline per ems       EXTERNAL RECORDS REVIEWED  Other I reviewed the patient's POLST from July 22, 2024 and the patient is a DNR DNI    HPI/ROS  Walehaim Olivas is a 79 y.o. male who presents to the Emergency Department for potential PICC line.  The patient presents from [facility].  He is currently undergoing IV antibiotics for a UTI with meropenem and ampicillin.  The patient's IV was lost and he was sent here for PICC line placement.  The patient will not provide any history but according to EMS he is at his baseline with a GCS of 9.  I did review his POLST from July 22, 2024 and he is a DNR DNI.    PAST MEDICAL HISTORY   has a past medical history of Alzheimer's dementia ([facility]), Anxiety, BPH (benign prostatic hyperplasia), CKD (chronic kidney disease), COPD (chronic obstructive pulmonary disease) ([facility]), COVID-19, Dementia ([facility]), Dementia ([facility]), GERD (gastroesophageal reflux disease), Hyperlipidemia, Hypertension, Insomnia, Major depressive disorder, Seizure disorder ([facility]), Stroke ([facility]), and Type II diabetes mellitus ([facility]).    SURGICAL HISTORY  patient denies any surgical history    FAMILY HISTORY  History reviewed. No pertinent family history.    SOCIAL HISTORY  Social History     Tobacco Use    Smoking status: Never    Smokeless tobacco: Never   Vaping Use    Vaping status: Never Used   Substance and Sexual Activity    Alcohol use: Not Currently    Drug use: Not Currently    Sexual activity: Not on file       CURRENT MEDICATIONS  Home Medications       Reviewed by Farooq Resendiz R.N. (Registered Nurse) on 09/11/24 at 0802  Med List Status: Not Addressed     Medication Last Dose  "Status   acetaminophen (TYLENOL) 325 MG Tab  Active   amLODIPine (NORVASC) 5 MG Tab  Active   apixaban (ELIQUIS) 2.5mg Tab  Active   baclofen (LIORESAL) 10 MG Tab  Active   bisacodyl (DULCOLAX) 10 MG Suppos  Active   cyanocobalamin (VITAMIN B-12) 500 MCG Tab  Active   divalproex (DEPAKOTE SPRINKLE) 125 MG Capsule Delayed Release Sprinkle  Active   DULoxetine HCl 30 MG Capsule Delayed Release Sprinkle  Active   ferrous sulfate 325 (65 Fe) MG tablet  Active   finasteride (PROSCAR) 5 MG Tab  Active   fluticasone furoate-vilanterol (BREO) 100-25 MCG/ACT AEROSOL POWDER, BREATH ACTIVATED  Active   insulin aspart (NOVOLOG) 100 UNIT/ML Solution  Active   insulin glargine (LANTUS) 100 UNIT/ML SC SOLN  Active   ipratropium-albuterol (DUONEB) 0.5-2.5 (3) MG/3ML nebulizer solution  Active   lidocaine (LMX) 4 % Cream  Active   pantoprazole (PROTONIX) 20 MG tablet  Active   polyethylene glycol/lytes (MIRALAX) 17 g Pack  Active   pravastatin (PRAVACHOL) 20 MG Tab  Active   Probiotic Product (PROBIOTIC BLEND) Cap  Active   propranolol (INDERAL) 10 MG Tab  Active   risperiDONE (RISPERDAL) 1 MG Tab  Active   sennosides (SENOKOT) 8.6 MG Tab  Active   tamsulosin (FLOMAX) 0.4 MG capsule  Active   Umeclidinium Bromide (INCRUSE ELLIPTA) 62.5 MCG/ACT AEROSOL POWDER, BREATH ACTIVATED  Active                    ALLERGIES  Allergies   Allergen Reactions    Metformin Unspecified     Unknown reaction, listed on MAR     Morphine Unspecified     Unknown reaction, listed on MAR     Simvastatin Unspecified     Unknown reaction, listed on MAR     Spironolactone Unspecified     Unknown reaction, listed on MAR     Haldol [Haloperidol] Anxiety     Daughter reports it over-sedates pt       PHYSICAL EXAM  VITAL SIGNS: BP (!) 179/80   Pulse (!) 59   Temp 35.8 °C (96.5 °F) (Temporal)   Resp 14   Ht 1.626 m (5' 4\")   Wt 78.5 kg (173 lb)   SpO2 98%   BMI 29.70 kg/m²    In general the patient appears chronically ill    HEENT unremarkable except for dry " mucous membranes    Pulmonary the patient's lungs are slightly diminished throughout    Cardiovascular S1-S2 with a regular rate and rhythm    GI abdomen soft     there is a Ferro catheter in place    Neurologic examination the patient opens his eyes spontaneously.  He will not speak.  He does withdraw to pain        COURSE & MEDICAL DECISION MAKING    This a 79-year-old gentleman who presents from a skilled nursing facility needing a PICC line for IV infusion of antibiotics.  We are able to consult the PICC line nursing team and they placed a midline catheter peripherally for antibiotics.  This will need to be removed in approximate 1 week.  There will be nursing and nursing communication regarding utilization and care of the midline catheter.  The patient will be discharged back to his skilled nursing facility.    FINAL DIAGNOSIS  PICC line placement    Disposition  The patient will be discharged in stable condition     Electronically signed by: Ancelmo Davey M.D., 9/11/2024 8:19 AM

## 2024-09-11 NOTE — DISCHARGE INSTRUCTIONS
Return for any further difficulties with the PICC line.  This needs to be removed in 1 week after antibiotics are infused.

## 2024-09-12 ENCOUNTER — APPOINTMENT (OUTPATIENT)
Dept: RADIOLOGY | Facility: MEDICAL CENTER | Age: 79
End: 2024-09-12
Attending: EMERGENCY MEDICINE
Payer: MEDICARE

## 2024-09-12 ENCOUNTER — HOSPITAL ENCOUNTER (EMERGENCY)
Facility: MEDICAL CENTER | Age: 79
End: 2024-09-12
Attending: EMERGENCY MEDICINE
Payer: MEDICARE

## 2024-09-12 VITALS
DIASTOLIC BLOOD PRESSURE: 84 MMHG | RESPIRATION RATE: 15 BRPM | WEIGHT: 173.06 LBS | OXYGEN SATURATION: 98 % | HEART RATE: 75 BPM | SYSTOLIC BLOOD PRESSURE: 167 MMHG | TEMPERATURE: 97.8 F | HEIGHT: 65 IN | BODY MASS INDEX: 28.83 KG/M2

## 2024-09-12 DIAGNOSIS — T82.9XXA: ICD-10-CM

## 2024-09-12 PROCEDURE — 71045 X-RAY EXAM CHEST 1 VIEW: CPT

## 2024-09-12 PROCEDURE — 99284 EMERGENCY DEPT VISIT MOD MDM: CPT | Mod: 25

## 2024-09-12 ASSESSMENT — FIBROSIS 4 INDEX: FIB4 SCORE: 3.71

## 2024-09-12 NOTE — ED NOTES
Site is without redness, swelling, exudate, or sign of infection. No visible swelling or subcutaneous emphysema is observed. Line flushed freely with 10mL of sterile 0.9% normal saline after obtaining brisk blood return. ERP made aware.

## 2024-09-12 NOTE — DISCHARGE PLANNING
Medical Social Work     MARIAH received a call from the RN requesting SW assitance with [facility] transport back to [facility]: [address], [place], [place]. MARIAH faxed a PCS form to [facility] and set up transport with [facility]ila for 0520. Transfer packet complete and given to the RN.

## 2024-09-12 NOTE — ED NOTES
Patient bedside report given to RN April . Pt alert, respirations even and unlabored, on room air. Call light in reach, Fall risk interventions in place.

## 2024-09-12 NOTE — ED NOTES
This nurse speak with staff Pierce at [facility], RN acknowledge patient plan to send back to facility.

## 2024-09-12 NOTE — ED TRIAGE NOTES
"79 y.o. male Wale Olivas    Chief Complaint   Patient presents with    Other     To assess PICC line patency.    BIB EMS to [facility]  Picked up from [facility]  EMS called by staff    Patient presented to ED for evaluation of patency for PICC line on left arm. Respirations even and unlabored on room air , afebrile at this time. Baseline AAO x 0 secondary to dementia. No any other active Chief complaints.     BP (!) 190/86   Pulse 71   Temp 36.8 °C (98.2 °F) (Temporal)   Resp 12   Ht 1.646 m (5' 4.8\")   Wt 78.5 kg (173 lb 1 oz)   SpO2 100%   BMI 28.98 kg/m²     Past Medical History:   Diagnosis Date    Alzheimer's dementia (HCC)     Anxiety     BPH (benign prostatic hyperplasia)     CKD (chronic kidney disease)     COPD (chronic obstructive pulmonary disease) (HCC)     COVID-19     Dementia (HCC)     Dementia (HCC)     GERD (gastroesophageal reflux disease)     Hyperlipidemia     Hypertension     Insomnia     Major depressive disorder     Seizure disorder (HCC)     Stroke (HCC)     Type II diabetes mellitus (HCC)        "

## 2024-09-12 NOTE — DISCHARGE INSTRUCTIONS
Your IV line was successfully able to be flushed and draw back blood.  Is now saline lock.  Continue to use as instructed.  Keep an eye out for any signs of infection or further complications.

## 2024-09-12 NOTE — ED NOTES
Pt discharged to [facility].   Patient went out of the ER via gurney by EMS., alert and oriented x 0, baseline, with all belongings .

## 2024-09-12 NOTE — ED PROVIDER NOTES
ED Provider Note    CHIEF COMPLAINT  Chief Complaint   Patient presents with    Other     To assess PICC line patency.        EXTERNAL RECORDS REVIEWED  I reviewed the ED notes from earlier today where a midline 10 cm catheter was placed in the left brachial vein under ultrasound guidance from nursing    HPI/ROS  LIMITATION TO HISTORY   Select: Altered mental status / Confusion  OUTSIDE HISTORIAN(S):  None    Wale Olivas is a 79 y.o. male who presents to the ER from his care facility for concern of complication of recently placed midline IV catheter.  He has been treated with IV antibiotics for UTI.  Had a 10 cm ultrasound-guided left brachial vein midline catheter placed this morning in the ED and was discharged back to his care facility.  While trying to get antibiotics his care facility supposedly the line was not working.  There does not appear to be any complications surrounding the site    PAST MEDICAL HISTORY   has a past medical history of Alzheimer's dementia ([facility]), Anxiety, BPH (benign prostatic hyperplasia), CKD (chronic kidney disease), COPD (chronic obstructive pulmonary disease) ([facility]), COVID-19, Dementia ([facility]), Dementia (HCC), GERD (gastroesophageal reflux disease), Hyperlipidemia, Hypertension, Insomnia, Major depressive disorder, Seizure disorder ([facility]), Stroke ([facility]), and Type II diabetes mellitus ([facility]).    SURGICAL HISTORY  patient denies any surgical history    FAMILY HISTORY  History reviewed. No pertinent family history.    SOCIAL HISTORY  Social History     Tobacco Use    Smoking status: Never    Smokeless tobacco: Never   Vaping Use    Vaping status: Never Used   Substance and Sexual Activity    Alcohol use: Not Currently    Drug use: Not Currently    Sexual activity: Not on file       CURRENT MEDICATIONS  Home Medications       Reviewed by Erna Florentino R.N. (Registered Nurse) on 09/12/24 at 0252  Med List Status: Not Addressed     Medication Last Dose Status   acetaminophen (TYLENOL)  "325 MG Tab  Active   amLODIPine (NORVASC) 5 MG Tab  Active   apixaban (ELIQUIS) 2.5mg Tab  Active   baclofen (LIORESAL) 10 MG Tab  Active   bisacodyl (DULCOLAX) 10 MG Suppos  Active   cyanocobalamin (VITAMIN B-12) 500 MCG Tab  Active   divalproex (DEPAKOTE SPRINKLE) 125 MG Capsule Delayed Release Sprinkle  Active   DULoxetine HCl 30 MG Capsule Delayed Release Sprinkle  Active   ferrous sulfate 325 (65 Fe) MG tablet  Active   finasteride (PROSCAR) 5 MG Tab  Active   fluticasone furoate-vilanterol (BREO) 100-25 MCG/ACT AEROSOL POWDER, BREATH ACTIVATED  Active   insulin aspart (NOVOLOG) 100 UNIT/ML Solution  Active   insulin glargine (LANTUS) 100 UNIT/ML SC SOLN  Active   ipratropium-albuterol (DUONEB) 0.5-2.5 (3) MG/3ML nebulizer solution  Active   lidocaine (LMX) 4 % Cream  Active   pantoprazole (PROTONIX) 20 MG tablet  Active   polyethylene glycol/lytes (MIRALAX) 17 g Pack  Active   pravastatin (PRAVACHOL) 20 MG Tab  Active   Probiotic Product (PROBIOTIC BLEND) Cap  Active   propranolol (INDERAL) 10 MG Tab  Active   risperiDONE (RISPERDAL) 1 MG Tab  Active   sennosides (SENOKOT) 8.6 MG Tab  Active   tamsulosin (FLOMAX) 0.4 MG capsule  Active   Umeclidinium Bromide (INCRUSE ELLIPTA) 62.5 MCG/ACT AEROSOL POWDER, BREATH ACTIVATED  Active                  Audit from Redirected Encounters    **Home medications have not yet been reviewed for this encounter**         ALLERGIES  Allergies   Allergen Reactions    Metformin Unspecified     Unknown reaction, listed on MAR     Morphine Unspecified     Unknown reaction, listed on MAR     Simvastatin Unspecified     Unknown reaction, listed on MAR     Spironolactone Unspecified     Unknown reaction, listed on MAR     Haldol [Haloperidol] Anxiety     Daughter reports it over-sedates pt       PHYSICAL EXAM  VITAL SIGNS: BP (!) 190/86   Pulse 71   Temp 36.8 °C (98.2 °F) (Temporal)   Resp 12   Ht 1.646 m (5' 4.8\")   Wt 78.5 kg (173 lb 1 oz)   SpO2 100%   BMI 28.98 kg/m²  "   General: Laying in stretcher in no distress, chronically ill appearance  Neuro: GCS9 ([id number])  Pulmonary: Breathing comfortably, clear breath sounds  CV: Regular rate and rhythm  Skin: Left upper extremity IV catheter in place with dressing clean dry intact without any drainage or surrounding skin changes      RADIOLOGY/PROCEDURES   I have independently interpreted the diagnostic imaging associated with this visit and am waiting the final reading from the radiologist.   My preliminary interpretation is as follows: Chest x-ray shows clear lung fields, there is no central catheter    Radiologist interpretation:  DX-CHEST-PORTABLE (1 VIEW)    (Results Pending)       COURSE & MEDICAL DECISION MAKING    ASSESSMENT, COURSE AND PLAN  Care Narrative: Differential includes thrombosis, extravasation, line fracture, line infection, equipment failure    On arrival patient is noted to be somewhat hypertensive.  He is at his baseline neurologic status, GCS 9, similar to prior notes.  There does not appear to be any lying surrounding skin changes to suggest infection, no warmth, erythema or fluctuance.  Differential above considered.  Nursing assessed the line, unclamped and was able to draw back blood and flush with ease.  They were able to do this twice.  Given that this is a 10 cm midline IV catheter there should be no evidence of catheter on chest x-ray however chest x-ray was obtained.  And this was normal.  Unclear etiology of why the line was not working at his nursing facility, may have had brief thrombosis.  Line was saline locked per nursing guidelines and there was no further workup or interventions necessary here.  Thus patient can safely be discharged back to [facility] for further ongoing management of chronic conditions          ADDITIONAL PROBLEMS MANAGED  None    DISPOSITION AND DISCUSSIONS  I have discussed management of the patient with the following physicians and DEANNA's:  None    Discussion of management with other [facility] or appropriate source(s): None    Escalation of care considered, and ultimately not performed:IV fluids    Barriers to care at this time, including but not limited to:  N/A .     Decision tools and prescription drugs considered including, but not limited to: N/A.    FINAL DIAGNOSIS  1. Complication of intravascular catheter, initial encounter         Electronically signed by: Shahab Thakkar M.D., 9/12/2024 2:56 AM

## 2024-10-14 ENCOUNTER — HOSPITAL ENCOUNTER (EMERGENCY)
Facility: MEDICAL CENTER | Age: 79
End: 2024-10-15
Attending: STUDENT IN AN ORGANIZED HEALTH CARE EDUCATION/TRAINING PROGRAM
Payer: MEDICARE

## 2024-10-14 DIAGNOSIS — Z78.9 PROBLEM WITH VASCULAR ACCESS: ICD-10-CM

## 2024-10-14 PROCEDURE — 99285 EMERGENCY DEPT VISIT HI MDM: CPT

## 2024-10-14 ASSESSMENT — FIBROSIS 4 INDEX: FIB4 SCORE: 3.71

## 2024-10-15 VITALS
WEIGHT: 173 LBS | SYSTOLIC BLOOD PRESSURE: 149 MMHG | HEIGHT: 64 IN | TEMPERATURE: 97.8 F | BODY MASS INDEX: 29.53 KG/M2 | DIASTOLIC BLOOD PRESSURE: 83 MMHG | HEART RATE: 99 BPM | OXYGEN SATURATION: 100 % | RESPIRATION RATE: 15 BRPM